# Patient Record
Sex: FEMALE | Race: WHITE | NOT HISPANIC OR LATINO | Employment: OTHER | URBAN - METROPOLITAN AREA
[De-identification: names, ages, dates, MRNs, and addresses within clinical notes are randomized per-mention and may not be internally consistent; named-entity substitution may affect disease eponyms.]

---

## 2017-01-24 ENCOUNTER — OFFICE VISIT (OUTPATIENT)
Dept: MEDICAL GROUP | Facility: MEDICAL CENTER | Age: 62
End: 2017-01-24
Payer: COMMERCIAL

## 2017-01-24 VITALS
DIASTOLIC BLOOD PRESSURE: 82 MMHG | HEIGHT: 62 IN | HEART RATE: 89 BPM | SYSTOLIC BLOOD PRESSURE: 140 MMHG | OXYGEN SATURATION: 96 % | WEIGHT: 243 LBS | TEMPERATURE: 97.2 F | BODY MASS INDEX: 44.72 KG/M2

## 2017-01-24 DIAGNOSIS — M25.511 CHRONIC RIGHT SHOULDER PAIN: ICD-10-CM

## 2017-01-24 DIAGNOSIS — E66.01 MORBID OBESITY WITH BMI OF 40.0-44.9, ADULT (HCC): ICD-10-CM

## 2017-01-24 DIAGNOSIS — G89.29 CHRONIC RIGHT SHOULDER PAIN: ICD-10-CM

## 2017-01-24 DIAGNOSIS — F32.89 OTHER DEPRESSION: ICD-10-CM

## 2017-01-24 PROCEDURE — 99214 OFFICE O/P EST MOD 30 MIN: CPT | Performed by: NURSE PRACTITIONER

## 2017-01-24 RX ORDER — HYDROCODONE BITARTRATE AND ACETAMINOPHEN 5; 325 MG/1; MG/1
1 TABLET ORAL EVERY 12 HOURS PRN
Qty: 60 TAB | Refills: 0 | Status: SHIPPED | OUTPATIENT
Start: 2017-01-24 | End: 2017-02-22 | Stop reason: SDUPTHER

## 2017-01-24 RX ORDER — DULOXETIN HYDROCHLORIDE 60 MG/1
60 CAPSULE, DELAYED RELEASE ORAL DAILY
Qty: 30 CAP | Refills: 1 | Status: SHIPPED | OUTPATIENT
Start: 2017-01-24 | End: 2017-02-22 | Stop reason: SDUPTHER

## 2017-01-24 ASSESSMENT — PATIENT HEALTH QUESTIONNAIRE - PHQ9
5. POOR APPETITE OR OVEREATING: 3 - NEARLY EVERY DAY
SUM OF ALL RESPONSES TO PHQ QUESTIONS 1-9: 20
CLINICAL INTERPRETATION OF PHQ2 SCORE: 6

## 2017-01-24 NOTE — PROGRESS NOTES
Subjective:      Rain Sanabria is a 61 y.o. female who presents with No chief complaint on file.            HPI  Seen in fu/ for chronic knee pain.  She also has LBP and rt shoulder pain.  She went to see ortho.  They did injection in her knees.  That pain is improved.  She is now more mobile.    She is due pain med refills.  She forgot to do her lab.  Will do this week.    She is sleeping better on elavil.  The zoloft is not helping so she inc to 200 mg.  Has never been on cymbalta.  She thinks about suicide occas but she r/t being overwhelmed.  Does have a plan but does feel that she will do it.  She feels that she wouldn't do that to her granddaughter.      Patient Active Problem List    Diagnosis Date Noted   • Morbid obesity with BMI of 40.0-44.9, adult ([facility]) 01/24/2017   • Chronic right shoulder pain 01/06/2016   • Hyperlipidemia    • Anxiety    • Vitamin D deficiency    • Abdominal hernia    • Sleep apnea 07/12/2010   • OA (osteoarthritis) 07/12/2010   • HTN (hypertension) 01/20/2010   • Depression 06/19/2009   • Genital herpes simplex 06/19/2009   • Insomnia 06/19/2009   • Migraine 06/19/2009   • Abnormal mammogram 06/19/2009   • Preventative health care 06/19/2009   • Cystocele 06/19/2009     Current Outpatient Prescriptions   Medication Sig Dispense Refill   • sertraline (ZOLOFT) 100 MG Tab TAKE 1 TAB BY MOUTH EVERY DAY. 30 Tab 3   • hydrocodone-acetaminophen (NORCO) 5-325 MG Tab per tablet Take 1 Tab by mouth every 12 hours as needed. May refill 5/19/16 60 Tab 0   • amitriptyline (ELAVIL) 10 MG Tab Take 1 Tab by mouth at bedtime as needed. 30 Tab 3   • acyclovir (ZOVIRAX) 400 MG tablet Take 1 Tab by mouth 2 times a day. 180 Tab 0   • albuterol (VENTOLIN OR PROVENTIL) 108 (90 BASE) MCG/ACT AERS Inhale 2 Puffs by mouth every four hours as needed for Shortness of Breath. Dispense a spacer 1 Inhaler 0   • Cholecalciferol (VITAMIN D) 1000 UNIT CAPS Take 2 Caps by mouth every day.     • Calcium-Vitamin D  "(CALCIUM + D PO) Take 400 mg by mouth every day.     • Multiple Vitamin (MULTIVITAMIN PO) Take 1 Tab by mouth every day.       No current facility-administered medications for this visit.     Allergies   Allergen Reactions   • Ace Inhibitors      Ace cough   • Tape      Adhesive tape       ROS    Review of Systems   Constitutional: Negative.  Negative for fever, chills, weight loss, malaise/fatigue and diaphoresis.   HENT: Negative.  Negative for hearing loss, ear pain, nosebleeds, congestion, sore throat, neck pain, tinnitus and ear discharge.    Respiratory: Negative.  Negative for cough, hemoptysis, sputum production, shortness of breath, wheezing and stridor.    Cardiovascular: Negative.  Negative for chest pain, palpitations, orthopnea, claudication, leg swelling and PND.   Gastrointestinal: denies nausea, vomiting, diarrhea, constipation, heartburn, melena or hematochezia.  Genitourinary: Denies dysuria, hematuria, urinary incontinence, frequency or urgency.           Objective:     /82 mmHg  Pulse 89  Temp(Src) 36.2 °C (97.2 °F)  Ht 1.575 m (5' 2.01\")  Wt 110.224 kg (243 lb)  BMI 44.43 kg/m2  SpO2 96%  Breastfeeding? No     Physical Exam  Physical Exam   Vitals reviewed.  Constitutional: oriented to person, place, and time. appears well-developed and well-nourished. No distress.   Cardiovascular: Normal rate, regular rhythm, normal heart sounds and intact distal pulses.  Exam reveals no gallop and no friction rub.  No murmur heard.  No carotid bruits.   Pulmonary/Chest: Effort normal and breath sounds normal. No stridor. No respiratory distress. no wheezes or rales. exhibits no tenderness.   Musculoskeletal: Normal range of motion. exhibits no edema. davin pedal pulses 2+.  Neurological: alert and oriented to person, place, and time. exhibits normal muscle tone. Coordination normal.   Skin: Skin is warm and dry. no diaphoresis.   Psychiatric: normal mood and affect. behavior is normal.             "   Assessment/Plan:     1. Chronic right shoulder pain  hydrocodone-acetaminophen (NORCO) 5-325 MG Tab per tablet    refill meds. f/u 1 months for med refill and lab review and sx eval   2. Other depression  Patient has been identified as being depressed and appropriate orders and counseling have been given    duloxetine (CYMBALTA) 60 MG Cap DR Particles delayed-release capsule    REFERRAL TO PSYCHIATRY    severe sx.  she promised that she would not hurt herself.  urgent referral to psych.  dc zoloft.  change to cymbalta 60 mg brittney.  f/u 1 month.     3. Morbid obesity with BMI of 40.0-44.9, adult (CMS-HCC)

## 2017-01-24 NOTE — MR AVS SNAPSHOT
"        Rain Sanabria   2017 11:30 AM   Office Visit   MRN: [id number]    Department:  [facility]   Dept Phone:  [phone]    Description:  Female : 1955   Provider:  SUHAIL Man           Allergies as of 2017     Allergen Noted Reactions    Ace Inhibitors 2010       Ace cough    Tape 2014       Adhesive tape      You were diagnosed with     Chronic right shoulder pain   [492584]   refill meds. f/u 1 months for med refill and lab review and sx eval    Other depression   [7004027]       Morbid obesity with BMI of 40.0-44.9, adult (CMS-HCC)   [884162]         Vital Signs     Blood Pressure Pulse Temperature Height Weight Body Mass Index    140/82 mmHg 89 36.2 °C (97.2 °F) 1.575 m (5' 2.01\") 110.224 kg (243 lb) 44.43 kg/m2    Oxygen Saturation Breastfeeding? Smoking Status             96% No Never Smoker          Basic Information     Date Of Birth Sex Race Ethnicity Preferred Language    1955 Female White Non- English      Problem List              ICD-10-CM Priority Class Noted - Resolved    Depression F32.9   2009 - Present    Genital herpes simplex A60.00   2009 - Present    Insomnia G47.00   2009 - Present    Migraine    2009 - Present    Abnormal mammogram R92.8   2009 - Present    Preventative health care Z00.00   2009 - Present    Cystocele N81.10   2009 - Present    HTN (hypertension) I10   2010 - Present    Sleep apnea G47.30   2010 - Present    OA (osteoarthritis) M19.90   2010 - Present    Vitamin D deficiency E55.9   Unknown - Present    Abdominal hernia K46.9   Unknown - Present    Hyperlipidemia E78.5   Unknown - Present    Anxiety F41.9   Unknown - Present    Chronic right shoulder pain M25.511, G89.29   2016 - Present    Morbid obesity with BMI of 40.0-44.9, adult (HCC) E66.01, Z68.41   2017 - Present      Health Maintenance        Date Due Completion Dates    IMM ZOSTER " VACCINE 2/10/2014 ---    MAMMOGRAM 1/19/2017 1/19/2016, 2/15/2013, 2/13/2012, 2/11/2011, 4/24/2009, 4/24/2009, 5/1/2008, 4/16/2008, 4/13/2007, 10/13/2004    PAP SMEAR 4/12/2019 4/12/2016, 4/12/2016, 4/12/2016 (Done), 6/28/2011    Override on 4/12/2016: Done    COLONOSCOPY 7/6/2022 7/6/2012 (Done)    Override on 7/6/2012: Done    IMM DTaP/Tdap/Td Vaccine (2 - Td) 6/13/2026 6/13/2016, 3/18/2016            Current Immunizations     Influenza TIV (IM) 1/4/2013, 12/22/2011 10:46 AM    Influenza Vaccine Adult HD 10/16/2016    Influenza Vaccine Pediatric 1/18/2011    Influenza Vaccine Quad Inj (Pf) 1/20/2016, 10/3/2014    Tdap Vaccine 6/13/2016 12:10 PM, 3/18/2016 11:42 AM      Below and/or attached are the medications your provider expects you to take. Review all of your home medications and newly ordered medications with your provider and/or pharmacist. Follow medication instructions as directed by your provider and/or pharmacist. Please keep your medication list with you and share with your provider. Update the information when medications are discontinued, doses are changed, or new medications (including over-the-counter products) are added; and carry medication information at all times in the event of emergency situations     Allergies:  ACE INHIBITORS - (reactions not documented)     TAPE - (reactions not documented)               Medications  Valid as of: January 24, 2017 - 11:50 AM    Generic Name Brand Name Tablet Size Instructions for use    Acyclovir (Tab) ZOVIRAX 400 MG Take 1 Tab by mouth 2 times a day.        Albuterol Sulfate (Aero Soln) albuterol 108 (90 BASE) MCG/ACT Inhale 2 Puffs by mouth every four hours as needed for Shortness of Breath. Dispense a spacer        Amitriptyline HCl (Tab) ELAVIL 10 MG Take 1 Tab by mouth at bedtime as needed.        Calcium Citrate-Vitamin D   Take 400 mg by mouth every day.        Cholecalciferol (Cap) Vitamin D 1000 UNIT Take 2 Caps by mouth every day.        DULoxetine  HCl (Cap DR Particles) CYMBALTA 60 MG Take 1 Cap by mouth every day.        Hydrocodone-Acetaminophen (Tab) NORCO 5-325 MG Take 1 Tab by mouth every 12 hours as needed. May refill 5/19/16        Multiple Vitamins-Minerals   Take 1 Tab by mouth every day.        Sertraline HCl (Tab) ZOLOFT 100 MG TAKE 1 TAB BY MOUTH EVERY DAY.        .                 Medicines prescribed today were sent to:     [facility]/PHARMACY #[id number] - [place], [place] - [address]    [address] [place] [address]    Phone: [phone] Fax: [phone]    Open 24 Hours?: No      Medication refill instructions:       If your prescription bottle indicates you have medication refills left, it is not necessary to call your provider’s office. Please contact your pharmacy and they will refill your medication.    If your prescription bottle indicates you do not have any refills left, you may request refills at any time through one of the following ways: The online [employer] system (except Urgent Care), by calling your provider’s office, or by asking your pharmacy to contact your provider’s office with a refill request. Medication refills are processed only during regular business hours and may not be available until the next business day. Your provider may request additional information or to have a follow-up visit with you prior to refilling your medication.   *Please Note: Medication refills are assigned a new Rx number when refilled electronically. Your pharmacy may indicate that no refills were authorized even though a new prescription for the same medication is available at the pharmacy. Please request the medicine by name with the pharmacy before contacting your provider for a refill.        Referral     A referral request has been sent to our patient care coordination department. Please allow 3-5 business days for us to process this request and contact you either by phone or mail. If you do not hear from us by the 5th business  day, please call us at [phone].           [employer] Access Code: Activation code not generated  Current [employer] Status: Active

## 2017-01-27 ENCOUNTER — HOSPITAL ENCOUNTER (OUTPATIENT)
Dept: LAB | Facility: MEDICAL CENTER | Age: 62
End: 2017-01-27
Attending: NURSE PRACTITIONER
Payer: COMMERCIAL

## 2017-01-27 LAB
25(OH)D3 SERPL-MCNC: 33 NG/ML (ref 30–100)
ALBUMIN SERPL BCP-MCNC: 3.6 G/DL (ref 3.2–4.9)
ALBUMIN/GLOB SERPL: 1.2 G/DL
ALP SERPL-CCNC: 57 U/L (ref 30–99)
ALT SERPL-CCNC: 17 U/L (ref 2–50)
ANION GAP SERPL CALC-SCNC: 6 MMOL/L (ref 0–11.9)
AST SERPL-CCNC: 20 U/L (ref 12–45)
BILIRUB SERPL-MCNC: 0.7 MG/DL (ref 0.1–1.5)
BUN SERPL-MCNC: 9 MG/DL (ref 8–22)
CALCIUM SERPL-MCNC: 9.5 MG/DL (ref 8.4–10.2)
CHLORIDE SERPL-SCNC: 106 MMOL/L (ref 96–112)
CHOLEST SERPL-MCNC: 199 MG/DL (ref 100–199)
CO2 SERPL-SCNC: 30 MMOL/L (ref 20–33)
CREAT SERPL-MCNC: 0.69 MG/DL (ref 0.5–1.4)
CREAT UR-MCNC: 67.5 MG/DL
GFR SERPL CREATININE-BSD FRML MDRD: >60 ML/MIN/1.73 M 2
GLOBULIN SER CALC-MCNC: 3.1 G/DL (ref 1.9–3.5)
GLUCOSE SERPL-MCNC: 103 MG/DL (ref 65–99)
HDLC SERPL-MCNC: 72 MG/DL
LDLC SERPL CALC-MCNC: 112 MG/DL
MICROALBUMIN UR-MCNC: <0.7 MG/DL
MICROALBUMIN/CREAT UR: NORMAL MG/G (ref 0–30)
POTASSIUM SERPL-SCNC: 4.7 MMOL/L (ref 3.6–5.5)
PROT SERPL-MCNC: 6.7 G/DL (ref 6–8.2)
SODIUM SERPL-SCNC: 142 MMOL/L (ref 135–145)
TRIGL SERPL-MCNC: 75 MG/DL (ref 0–149)

## 2017-01-27 PROCEDURE — 80061 LIPID PANEL: CPT

## 2017-01-27 PROCEDURE — 82570 ASSAY OF URINE CREATININE: CPT

## 2017-01-27 PROCEDURE — 82043 UR ALBUMIN QUANTITATIVE: CPT

## 2017-01-27 PROCEDURE — 82306 VITAMIN D 25 HYDROXY: CPT

## 2017-01-27 PROCEDURE — 80053 COMPREHEN METABOLIC PANEL: CPT

## 2017-01-27 PROCEDURE — 36415 COLL VENOUS BLD VENIPUNCTURE: CPT

## 2017-02-09 DIAGNOSIS — R05.9 COUGH: ICD-10-CM

## 2017-02-09 RX ORDER — ALBUTEROL SULFATE 90 UG/1
2 AEROSOL, METERED RESPIRATORY (INHALATION) EVERY 4 HOURS PRN
Qty: 1 INHALER | Refills: 0 | Status: SHIPPED | OUTPATIENT
Start: 2017-02-09 | End: 2017-03-13 | Stop reason: SDUPTHER

## 2017-02-09 NOTE — TELEPHONE ENCOUNTER
Was the patient seen in the last year in this department? Yes 1-    Does patient have an active prescription for medications requested? No     Received Request Via: Pharmacy

## 2017-02-22 ENCOUNTER — OFFICE VISIT (OUTPATIENT)
Dept: MEDICAL GROUP | Facility: MEDICAL CENTER | Age: 62
End: 2017-02-22
Payer: COMMERCIAL

## 2017-02-22 VITALS
TEMPERATURE: 98.1 F | DIASTOLIC BLOOD PRESSURE: 90 MMHG | SYSTOLIC BLOOD PRESSURE: 140 MMHG | HEART RATE: 84 BPM | OXYGEN SATURATION: 94 % | HEIGHT: 62 IN | BODY MASS INDEX: 45.27 KG/M2 | WEIGHT: 246 LBS

## 2017-02-22 DIAGNOSIS — E66.01 MORBID OBESITY WITH BMI OF 45.0-49.9, ADULT (HCC): ICD-10-CM

## 2017-02-22 DIAGNOSIS — G89.29 CHRONIC RIGHT SHOULDER PAIN: ICD-10-CM

## 2017-02-22 DIAGNOSIS — E78.5 HYPERLIPIDEMIA LDL GOAL <100: ICD-10-CM

## 2017-02-22 DIAGNOSIS — F32.89 OTHER DEPRESSION: ICD-10-CM

## 2017-02-22 DIAGNOSIS — G89.29 CHRONIC LEFT SHOULDER PAIN: ICD-10-CM

## 2017-02-22 DIAGNOSIS — M25.511 CHRONIC RIGHT SHOULDER PAIN: ICD-10-CM

## 2017-02-22 DIAGNOSIS — M25.512 CHRONIC LEFT SHOULDER PAIN: ICD-10-CM

## 2017-02-22 DIAGNOSIS — R73.01 IMPAIRED FASTING GLUCOSE: ICD-10-CM

## 2017-02-22 PROCEDURE — 99213 OFFICE O/P EST LOW 20 MIN: CPT | Performed by: NURSE PRACTITIONER

## 2017-02-22 RX ORDER — HYDROCODONE BITARTRATE AND ACETAMINOPHEN 5; 325 MG/1; MG/1
1 TABLET ORAL EVERY 12 HOURS PRN
Qty: 60 TAB | Refills: 0 | Status: SHIPPED | OUTPATIENT
Start: 2017-02-22 | End: 2017-02-22 | Stop reason: SDUPTHER

## 2017-02-22 RX ORDER — DULOXETIN HYDROCHLORIDE 60 MG/1
60 CAPSULE, DELAYED RELEASE ORAL DAILY
Qty: 90 CAP | Refills: 1 | Status: SHIPPED | OUTPATIENT
Start: 2017-02-22 | End: 2017-05-22

## 2017-02-22 RX ORDER — HYDROCODONE BITARTRATE AND ACETAMINOPHEN 5; 325 MG/1; MG/1
1 TABLET ORAL EVERY 12 HOURS PRN
Qty: 60 TAB | Refills: 0 | Status: SHIPPED | OUTPATIENT
Start: 2017-02-22 | End: 2017-05-22 | Stop reason: SDUPTHER

## 2017-02-22 NOTE — PROGRESS NOTES
Subjective:      Rain Sanabria is a 62 y.o. female who presents with No chief complaint on file.            HPI  Seen in f/u for depression.  She was taken off zoloft last month and start on cymbalta.  Her sx are improved.  She is set to see psych on friday.  No suicidal ideation.  She is still having her lt shoulder pain.  She uses rice heat on it.  Also on norco. Needs refill on meds  Reviewed lab with pt. H er CMP is wnl except glcuose mildly elevated at 103.    LP shows good trg and HDL. LDL sl elevated at 112.  Goal <100.  Not on a low carb diet.  GFR, alb/cr ratio, D is wnl.    Patient Active Problem List    Diagnosis Date Noted   • Morbid obesity with BMI of 45.0-49.9, adult ([facility]) 02/22/2017   • Morbid obesity with BMI of 40.0-44.9, adult ([facility]) 01/24/2017   • Chronic right shoulder pain 01/06/2016   • Hyperlipidemia    • Anxiety    • Vitamin D deficiency    • Abdominal hernia    • Sleep apnea 07/12/2010   • OA (osteoarthritis) 07/12/2010   • HTN (hypertension) 01/20/2010   • Depression 06/19/2009   • Genital herpes simplex 06/19/2009   • Insomnia 06/19/2009   • Migraine 06/19/2009   • Abnormal mammogram 06/19/2009   • Preventative health care 06/19/2009   • Cystocele 06/19/2009     Current Outpatient Prescriptions   Medication Sig Dispense Refill   • amitriptyline (ELAVIL) 10 MG Tab Take 1 Tab by mouth at bedtime as needed. 30 Tab 2   • albuterol 108 (90 BASE) MCG/ACT Aero Soln inhalation aerosol Inhale 2 Puffs by mouth every four hours as needed for Shortness of Breath. Dispense a spacer 1 Inhaler 0   • duloxetine (CYMBALTA) 60 MG Cap DR Particles delayed-release capsule Take 1 Cap by mouth every day. 30 Cap 1   • hydrocodone-acetaminophen (NORCO) 5-325 MG Tab per tablet Take 1 Tab by mouth every 12 hours as needed. May refill 5/19/16 60 Tab 0   • acyclovir (ZOVIRAX) 400 MG tablet Take 1 Tab by mouth 2 times a day. 180 Tab 0   • Cholecalciferol (VITAMIN D) 1000 UNIT CAPS Take 2 Caps by mouth every day.   "   • Calcium-Vitamin D (CALCIUM + D PO) Take 400 mg by mouth every day.     • Multiple Vitamin (MULTIVITAMIN PO) Take 1 Tab by mouth every day.       No current facility-administered medications for this visit.     Allergies   Allergen Reactions   • Ace Inhibitors      Ace cough   • Tape      Adhesive tape         ROS    Review of Systems   Constitutional: Negative.  Negative for fever, chills, weight loss, malaise/fatigue and diaphoresis.   HENT: Negative.  Negative for hearing loss, ear pain, nosebleeds, congestion, sore throat, neck pain, tinnitus and ear discharge.    Respiratory: Negative.  Negative for cough, hemoptysis, sputum production, shortness of breath, wheezing and stridor.    Cardiovascular: Negative.  Negative for chest pain, palpitations, orthopnea, claudication, leg swelling and PND.   Gastrointestinal: denies nausea, vomiting, diarrhea, constipation, heartburn, melena or hematochezia.  Genitourinary: Denies dysuria, hematuria, urinary incontinence, frequency or urgency.             Objective:     /90 mmHg  Pulse 84  Temp(Src) 36.7 °C (98.1 °F)  Ht 1.575 m (5' 2\")  Wt 111.585 kg (246 lb)  BMI 44.98 kg/m2  SpO2 94%  Breastfeeding? No     Physical Exam  Physical Exam   Vitals reviewed.  Constitutional: oriented to person, place, and time. appears well-developed and well-nourished. No distress.   Cardiovascular: Normal rate, regular rhythm, normal heart sounds and intact distal pulses.  Exam reveals no gallop and no friction rub.  No murmur heard.  No carotid bruits.   Pulmonary/Chest: Effort normal and breath sounds normal. No stridor. No respiratory distress. no wheezes or rales. exhibits no tenderness.   Musculoskeletal: Normal range of motion. exhibits no edema. davin pedal pulses 2+.  Neurological: alert and oriented to person, place, and time. exhibits normal muscle tone. Coordination normal.   Skin: Skin is warm and dry. no diaphoresis.   Psychiatric: normal mood and affect. behavior " is normal.             Assessment/Plan:     1. Other depression  duloxetine (CYMBALTA) 60 MG Cap DR Particles delayed-release capsule    improved on cymbalta.  f/u with psych this week   2. Chronic left shoulder pain      refill norco.  f/u 3m onths for med refill   3. Impaired fasting glucose      glucose only sl elevated.  dec complex carbs in diet.    4. Hyperlipidemia LDL goal <100      improve exercise and low fat/chol diet.     5. Morbid obesity with BMI of 45.0-49.9, adult ([facility])  Patient identified as having weight management issue.  Appropriate orders and counseling given.   6. Other depression  duloxetine (CYMBALTA) 60 MG Cap DR Particles delayed-release capsule    severe sx.  she promised that she would not hurt herself.  urgent referral to psych.  dc zoloft.  change to cymbalta 60 mg brittney.  f/u 1 month.     7. Chronic right shoulder pain  hydrocodone-acetaminophen (NORCO) 5-325 MG Tab per tablet    DISCONTINUED: hydrocodone-acetaminophen (NORCO) 5-325 MG Tab per tablet    DISCONTINUED: hydrocodone-acetaminophen (NORCO) 5-325 MG Tab per tablet    refill meds. f/u 1 months for med refill and lab review and sx eval

## 2017-02-22 NOTE — MR AVS SNAPSHOT
"        Rain Sanabria   2017 11:30 AM   Office Visit   MRN: [id number]    Department:  [facility]   Dept Phone:  [phone]    Description:  Female : 1955   Provider:  SUHAIL Man           Allergies as of 2017     Allergen Noted Reactions    Ace Inhibitors 2010       Ace cough    Tape 2014       Adhesive tape      You were diagnosed with     Other depression   [9495228]   improved on cymbalta.  f/u with psych this week    Chronic left shoulder pain   [024549]   refill norco.  f/u [address] [place] [address] for med refill    Impaired fasting glucose   [790.21.ICD-9-CM]   glucose only sl elevated.  dec complex carbs in diet.     Hyperlipidemia LDL goal <100   [888850]   improve exercise and low fat/chol diet.      Morbid obesity with BMI of 45.0-49.9, adult (CMS-HCC)   [467075]       Other depression   [3371863]   severe sx.  she promised that she would not hurt herself.  urgent referral to psych.  dc zoloft.  change to cymbalta 60 mg brittney.  f/u 1 month.      Chronic right shoulder pain   [396126]   refill meds. f/u 1 months for med refill and lab review and sx eval      Vital Signs     Blood Pressure Pulse Temperature Height Weight Body Mass Index    140/90 mmHg 84 36.7 °C (98.1 °F) 1.575 m (5' 2\") 111.585 kg (246 lb) 44.98 kg/m2    Oxygen Saturation Breastfeeding? Smoking Status             94% No Never Smoker          Basic Information     Date Of Birth Sex Race Ethnicity Preferred Language    1955 Female White Non- English      Problem List              ICD-10-CM Priority Class Noted - Resolved    Depression F32.9   2009 - Present    Genital herpes simplex A60.00   2009 - Present    Insomnia G47.00   2009 - Present    Migraine    2009 - Present    Abnormal mammogram R92.8   2009 - Present    Preventative health care Z00.00   2009 - Present    Cystocele N81.10   2009 - Present    HTN (hypertension) I10   2010 - Present   " Sleep apnea G47.30   7/12/2010 - Present    OA (osteoarthritis) M19.90   7/12/2010 - Present    Vitamin D deficiency E55.9   Unknown - Present    Abdominal hernia K46.9   Unknown - Present    Hyperlipidemia E78.5   Unknown - Present    Anxiety F41.9   Unknown - Present    Chronic right shoulder pain M25.511, G89.29   1/6/2016 - Present    Morbid obesity with BMI of 40.0-44.9, adult (HCC) E66.01, Z68.41   1/24/2017 - Present    Morbid obesity with BMI of 45.0-49.9, adult (HCC) E66.01, Z68.42   2/22/2017 - Present      Health Maintenance        Date Due Completion Dates    IMM ZOSTER VACCINE 2/10/2014 ---    MAMMOGRAM 1/19/2017 1/19/2016, 2/15/2013, 2/13/2012, 2/11/2011, 4/24/2009, 4/24/2009, 5/1/2008, 4/16/2008, 4/13/2007, 10/13/2004    PAP SMEAR 4/12/2019 4/12/2016, 4/12/2016, 4/12/2016 (Done), 6/28/2011    Override on 4/12/2016: Done    COLONOSCOPY 7/6/2022 7/6/2012 (Done)    Override on 7/6/2012: Done    IMM DTaP/Tdap/Td Vaccine (2 - Td) 6/13/2026 6/13/2016, 3/18/2016            Current Immunizations     Influenza TIV (IM) 1/4/2013, 12/22/2011 10:46 AM    Influenza Vaccine Adult HD 10/16/2016    Influenza Vaccine Pediatric 1/18/2011    Influenza Vaccine Quad Inj (Pf) 1/20/2016, 10/3/2014    Tdap Vaccine 6/13/2016 12:10 PM, 3/18/2016 11:42 AM      Below and/or attached are the medications your provider expects you to take. Review all of your home medications and newly ordered medications with your provider and/or pharmacist. Follow medication instructions as directed by your provider and/or pharmacist. Please keep your medication list with you and share with your provider. Update the information when medications are discontinued, doses are changed, or new medications (including over-the-counter products) are added; and carry medication information at all times in the event of emergency situations     Allergies:  ACE INHIBITORS - (reactions not documented)     TAPE - (reactions not documented)                  Medications  Valid as of: February 22, 2017 -  2:31 PM    Generic Name Brand Name Tablet Size Instructions for use    Acyclovir (Tab) ZOVIRAX 400 MG Take 1 Tab by mouth 2 times a day.        Albuterol Sulfate (Aero Soln) albuterol 108 (90 BASE) MCG/ACT Inhale 2 Puffs by mouth every four hours as needed for Shortness of Breath. Dispense a spacer        Amitriptyline HCl (Tab) ELAVIL 10 MG Take 1 Tab by mouth at bedtime as needed.        Calcium Citrate-Vitamin D   Take 400 mg by mouth every day.        Cholecalciferol (Cap) Vitamin D 1000 UNIT Take 2 Caps by mouth every day.        DULoxetine HCl (Cap DR Particles) CYMBALTA 60 MG Take 1 Cap by mouth every day.        Hydrocodone-Acetaminophen (Tab) NORCO 5-325 MG Take 1 Tab by mouth every 12 hours as needed. May refill 5/19/16        Multiple Vitamins-Minerals   Take 1 Tab by mouth every day.        .                 Medicines prescribed today were sent to:     [facility]/PHARMACY #[id number] - [place], [place] - [address] [place] [address] [address] [address]    Phone: [phone] Fax: [phone]    Open 24 Hours?: No      Medication refill instructions:       If your prescription bottle indicates you have medication refills left, it is not necessary to call your provider’s office. Please contact your pharmacy and they will refill your medication.    If your prescription bottle indicates you do not have any refills left, you may request refills at any time through one of the following ways: The online [employer] system (except Urgent Care), by calling your provider’s office, or by asking your pharmacy to contact your provider’s office with a refill request. Medication refills are processed only during regular business hours and may not be available until the next business day. Your provider may request additional information or to have a follow-up visit with you prior to refilling your medication.   *Please Note: Medication refills are assigned a new Rx  number when refilled electronically. Your pharmacy may indicate that no refills were authorized even though a new prescription for the same medication is available at the pharmacy. Please request the medicine by name with the pharmacy before contacting your provider for a refill.           MyChart Access Code: Activation code not generated  Current [employer] Status: Active

## 2017-03-10 ENCOUNTER — OFFICE VISIT (OUTPATIENT)
Dept: MEDICAL GROUP | Facility: MEDICAL CENTER | Age: 62
End: 2017-03-10
Payer: COMMERCIAL

## 2017-03-10 VITALS
HEIGHT: 62 IN | DIASTOLIC BLOOD PRESSURE: 76 MMHG | BODY MASS INDEX: 45.32 KG/M2 | SYSTOLIC BLOOD PRESSURE: 132 MMHG | OXYGEN SATURATION: 93 % | WEIGHT: 246.25 LBS | HEART RATE: 78 BPM | TEMPERATURE: 98.4 F | RESPIRATION RATE: 16 BRPM

## 2017-03-10 DIAGNOSIS — L72.3 SEBACEOUS CYST: ICD-10-CM

## 2017-03-10 PROCEDURE — 99214 OFFICE O/P EST MOD 30 MIN: CPT | Performed by: PHYSICIAN ASSISTANT

## 2017-03-10 RX ORDER — SERTRALINE HYDROCHLORIDE 100 MG/1
TABLET, FILM COATED ORAL
Refills: 3 | COMMUNITY
Start: 2017-02-11 | End: 2017-05-22

## 2017-03-10 RX ORDER — AMITRIPTYLINE HYDROCHLORIDE 25 MG/1
25 TABLET, FILM COATED ORAL
Refills: 1 | COMMUNITY
Start: 2017-02-24 | End: 2017-06-22 | Stop reason: SDUPTHER

## 2017-03-10 ASSESSMENT — PAIN SCALES - GENERAL: PAINLEVEL: NO PAIN

## 2017-03-10 NOTE — MR AVS SNAPSHOT
"        Rain Sanabria   3/10/2017 8:40 AM   Office Visit   MRN: [id number]    Department:  [place] [address]   Dept Phone:  [phone]    Description:  Female : 1955   Provider:  Luiza Olmedo PA-C           Reason for Visit     Cyst bilat cyst on head      Allergies as of 3/10/2017     Allergen Noted Reactions    Ace Inhibitors 2010       Ace cough    Tape 2014       Adhesive tape      Vital Signs     Blood Pressure Pulse Temperature Respirations Height Weight    132/76 mmHg 78 36.9 °C (98.4 °F) 16 1.575 m (5' 2\") 111.7 kg (246 lb 4.1 oz)    Body Mass Index Oxygen Saturation Breastfeeding? Smoking Status          45.03 kg/m2 93% No Never Smoker         Basic Information     Date Of Birth Sex Race Ethnicity Preferred Language    1955 Female White Non- English      Your appointments     Mar 24, 2017  4:20 PM   Established Patient with Luiza Olmedo PA-C   [facility])    [address]  [address]  [place] [id number] [phone]           You will be receiving a confirmation call a few days before your appointment from our automated call confirmation system.              Problem List              ICD-10-CM Priority Class Noted - Resolved    Depression F32.9   2009 - Present    Genital herpes simplex A60.00   2009 - Present    Insomnia G47.00   2009 - Present    Migraine    2009 - Present    Abnormal mammogram R92.8   2009 - Present    Preventative health care Z00.00   2009 - Present    Cystocele N81.10   2009 - Present    HTN (hypertension) I10   2010 - Present    Sleep apnea G47.30   2010 - Present    OA (osteoarthritis) M19.90   2010 - Present    Vitamin D deficiency E55.9   Unknown - Present    Abdominal hernia K46.9   Unknown - Present    Hyperlipidemia E78.5   Unknown - Present    Anxiety F41.9   Unknown - Present    Chronic right shoulder pain M25.511, G89.29   " 1/6/2016 - Present    Morbid obesity with BMI of 40.0-44.9, adult ([facility]) E66.01, Z68.41   1/24/2017 - Present    Morbid obesity with BMI of 45.0-49.9, adult ([facility]) E66.01, Z68.42   2/22/2017 - Present      Health Maintenance        Date Due Completion Dates    IMM ZOSTER VACCINE 2/10/2014 ---    MAMMOGRAM 1/19/2017 1/19/2016, 2/15/2013, 2/13/2012, 2/11/2011, 4/24/2009, 4/24/2009, 5/1/2008, 4/16/2008, 4/13/2007, 10/13/2004    PAP SMEAR 4/12/2019 4/12/2016, 4/12/2016, 4/12/2016 (Done), 6/28/2011    Override on 4/12/2016: Done    COLONOSCOPY 7/6/2022 7/6/2012 (Done)    Override on 7/6/2012: Done    IMM DTaP/Tdap/Td Vaccine (2 - Td) 6/13/2026 6/13/2016, 3/18/2016            Current Immunizations     Influenza TIV (IM) 1/4/2013, 12/22/2011 10:46 AM    Influenza Vaccine Adult HD 10/16/2016    Influenza Vaccine Pediatric 1/18/2011    Influenza Vaccine Quad Inj (Pf) 1/20/2016, 10/3/2014    Tdap Vaccine 6/13/2016 12:10 PM, 3/18/2016 11:42 AM      Below and/or attached are the medications your provider expects you to take. Review all of your home medications and newly ordered medications with your provider and/or pharmacist. Follow medication instructions as directed by your provider and/or pharmacist. Please keep your medication list with you and share with your provider. Update the information when medications are discontinued, doses are changed, or new medications (including over-the-counter products) are added; and carry medication information at all times in the event of emergency situations     Allergies:  ACE INHIBITORS - (reactions not documented)     TAPE - (reactions not documented)               Medications  Valid as of: March 10, 2017 -  9:20 AM    Generic Name Brand Name Tablet Size Instructions for use    Acyclovir (Tab) ZOVIRAX 400 MG Take 1 Tab by mouth 2 times a day.        Albuterol Sulfate (Aero Soln) albuterol 108 (90 BASE) MCG/ACT Inhale 2 Puffs by mouth every four hours as needed for Shortness of Breath.  Dispense a spacer        Amitriptyline HCl (Tab) ELAVIL 10 MG Take 1 Tab by mouth at bedtime as needed.        Amitriptyline HCl (Tab) ELAVIL 25 MG Take 25 mg by mouth.        Calcium Citrate-Vitamin D   Take 400 mg by mouth every day.        Cholecalciferol (Cap) Vitamin D 1000 UNIT Take 2 Caps by mouth every day.        DULoxetine HCl (Cap DR Particles) CYMBALTA 60 MG Take 1 Cap by mouth every day.        Hydrocodone-Acetaminophen (Tab) NORCO 5-325 MG Take 1 Tab by mouth every 12 hours as needed. May refill 5/19/16        Multiple Vitamins-Minerals   Take 1 Tab by mouth every day.        Sertraline HCl (Tab) ZOLOFT 100 MG TAKE 1 TABLET BY MOUTH DAILY        .                 Medicines prescribed today were sent to:     [facility]/PHARMACY #[id number] - [place], [place] - [address]    [address] [place] [address]    Phone: [phone] Fax: [phone]    Open 24 Hours?: No      Medication refill instructions:       If your prescription bottle indicates you have medication refills left, it is not necessary to call your provider’s office. Please contact your pharmacy and they will refill your medication.    If your prescription bottle indicates you do not have any refills left, you may request refills at any time through one of the following ways: The online [employer] system (except Urgent Care), by calling your provider’s office, or by asking your pharmacy to contact your provider’s office with a refill request. Medication refills are processed only during regular business hours and may not be available until the next business day. Your provider may request additional information or to have a follow-up visit with you prior to refilling your medication.   *Please Note: Medication refills are assigned a new Rx number when refilled electronically. Your pharmacy may indicate that no refills were authorized even though a new prescription for the same medication is available at the pharmacy. Please request  the medicine by name with the pharmacy before contacting your provider for a refill.           MyChart Access Code: Activation code not generated  Current MyChart Status: Active

## 2017-03-10 NOTE — PROGRESS NOTES
"Subjective:     Chief Complaint   Patient presents with   • Cyst     bilat cyst on head     Rain Sanabria is a 62 y.o. female here today for cysts on scalp as listed below    Has hx of getting cysts.   Now has two \"cysts\" on scalp.   They don't bother her but she was going to shave her head to support her friend with cancer and wants to get them removed first.   Denies growing, changing, itching, drainage, fever, chills.     Current medicines (including changes today)  Current Outpatient Prescriptions   Medication Sig Dispense Refill   • duloxetine (CYMBALTA) 60 MG Cap DR Particles delayed-release capsule Take 1 Cap by mouth every day. 90 Cap 1   • hydrocodone-acetaminophen (NORCO) 5-325 MG Tab per tablet Take 1 Tab by mouth every 12 hours as needed. May refill 5/19/16 60 Tab 0   • amitriptyline (ELAVIL) 10 MG Tab Take 1 Tab by mouth at bedtime as needed. 30 Tab 2   • albuterol 108 (90 BASE) MCG/ACT Aero Soln inhalation aerosol Inhale 2 Puffs by mouth every four hours as needed for Shortness of Breath. Dispense a spacer 1 Inhaler 0   • Cholecalciferol (VITAMIN D) 1000 UNIT CAPS Take 2 Caps by mouth every day.     • Calcium-Vitamin D (CALCIUM + D PO) Take 400 mg by mouth every day.     • Multiple Vitamin (MULTIVITAMIN PO) Take 1 Tab by mouth every day.     • sertraline (ZOLOFT) 100 MG Tab TAKE 1 TABLET BY MOUTH DAILY  3   • amitriptyline (ELAVIL) 25 MG Tab Take 25 mg by mouth.  1   • acyclovir (ZOVIRAX) 400 MG tablet Take 1 Tab by mouth 2 times a day. 180 Tab 0     No current facility-administered medications for this visit.     She  has a past medical history of Depression (6/19/2009); Genital herpes (6/19/2009); Insomnia (6/19/2009); Migraine (6/19/2009); Abnormal mammogram (6/19/2009); Cystocele (6/19/2009); Hypertension; Hyperlipidemia; Vitamin D deficiency; Sleep apnea; Abdominal hernia; Anxiety; OA (osteoarthritis); and Chronic right shoulder pain.    ROS   See history of present illness above       " "Objective:     Blood pressure 132/76, pulse 78, temperature 36.9 °C (98.4 °F), resp. rate 16, height 1.575 m (5' 2\"), weight 111.7 kg (246 lb 4.1 oz), SpO2 93 %, not currently breastfeeding. Body mass index is 45.03 kg/(m^2).   Physical Exam:  Alert, oriented in no acute distress.  Eye contact is good, speech goal directed, affect calm  HEENT: conjunctiva non-injected, sclera non-icteric, PERRL.  Skin: left parietal scalp, 2.5cm x 2.5cm smooth mobile, skin colored subcutaneous skin colored nodule.  Also right parietal scalp with 1cm x 1cm smooth mobile, skin colored subcutaneous skin colored nodule.  Ext: no edema, color normal, peripheral pulses 2+, temperature normal    Assessment and Plan:   The following treatment plan was discussed     1. Sebaceous cyst  New dx. x2 on scalp. Most likely cysts.  Discussed doing nothing or the removal process and that they can still grow back especially if she typically grows a lot of cysts.   Pt would like these removed. Stated she has others and may get more done as well but we will only do 2 on scalp at 1 time.       Followup: Return in about 2 weeks (around 3/24/2017) for cyst removal .           Please note that this dictation was created using voice recognition software. I have made every reasonable attempt to correct obvious errors, but I expect that there are errors of grammar and possibly content that I did not discover before finalizing the note.  "

## 2017-03-14 RX ORDER — ALBUTEROL SULFATE 90 UG/1
AEROSOL, METERED RESPIRATORY (INHALATION)
Qty: 18 INHALER | Refills: 0 | Status: SHIPPED | OUTPATIENT
Start: 2017-03-14 | End: 2017-03-16 | Stop reason: SDUPTHER

## 2017-03-16 ENCOUNTER — OFFICE VISIT (OUTPATIENT)
Dept: MEDICAL GROUP | Facility: MEDICAL CENTER | Age: 62
End: 2017-03-16
Payer: COMMERCIAL

## 2017-03-16 VITALS
TEMPERATURE: 98.1 F | HEART RATE: 92 BPM | DIASTOLIC BLOOD PRESSURE: 82 MMHG | SYSTOLIC BLOOD PRESSURE: 130 MMHG | OXYGEN SATURATION: 93 % | RESPIRATION RATE: 16 BRPM | HEIGHT: 62 IN | WEIGHT: 247 LBS | BODY MASS INDEX: 45.45 KG/M2

## 2017-03-16 DIAGNOSIS — R06.02 SHORTNESS OF BREATH: ICD-10-CM

## 2017-03-16 PROCEDURE — 99214 OFFICE O/P EST MOD 30 MIN: CPT | Performed by: FAMILY MEDICINE

## 2017-03-16 RX ORDER — ALBUTEROL SULFATE 90 UG/1
AEROSOL, METERED RESPIRATORY (INHALATION)
Qty: 18 INHALER | Refills: 5 | Status: SHIPPED | OUTPATIENT
Start: 2017-03-16 | End: 2019-06-25 | Stop reason: SDUPTHER

## 2017-03-16 NOTE — ASSESSMENT & PLAN NOTE
Patient occasionally get shortness of breath after doing rapid talking for her job. She has fast and prolonged talking. She will use of Ventolin about 2-3 times a week after one of these rapid talking episodes.  She recently lost her Ventolin inhaler and requests a refill.  There is no history of smoking or asthma.  Patient does have central obesity and does not exercise.

## 2017-03-16 NOTE — MR AVS SNAPSHOT
"        Rain Sanabria   3/16/2017 1:40 PM   Office Visit   MRN: [id number]    Department:  [facility]   Dept Phone:  [phone]    Description:  Female : 1955   Provider:  Haider Mathur M.D.           Reason for Visit     Medication Management           Allergies as of 3/16/2017     Allergen Noted Reactions    Ace Inhibitors 2010       Ace cough    Tape 2014       Adhesive tape      You were diagnosed with     Shortness of breath   [786.05.ICD-9-CM]         Vital Signs     Blood Pressure Pulse Temperature Respirations Height Weight    130/82 mmHg 92 36.7 °C (98.1 °F) 16 1.575 m (5' 2.01\") 112.038 kg (247 lb)    Body Mass Index Oxygen Saturation Breastfeeding? Smoking Status          45.17 kg/m2 93% No Never Smoker         Basic Information     Date Of Birth Sex Race Ethnicity Preferred Language    1955 Female White Non- English      Your appointments     Mar 24, 2017  4:20 PM   Established Patient with Luiza Olmedo PA-C   [facility]    [address]  [address]  [place] [id number] [phone]           You will be receiving a confirmation call a few days before your appointment from our automated call confirmation system.            May 22, 2017 11:45 AM   Established Patient with SUHAIL Man   [facility])    [address]  [place] [id number] [phone]           You will be receiving a confirmation call a few days before your appointment from our automated call confirmation system.              Problem List              ICD-10-CM Priority Class Noted - Resolved    Depression F32.9   2009 - Present    Genital herpes simplex A60.00   2009 - Present    Insomnia G47.00   2009 - Present    Migraine    2009 - Present    Abnormal mammogram R92.8   2009 - Present    Preventative health care Z00.00   2009 - Present   " Cystocele N81.10   6/19/2009 - Present    HTN (hypertension) I10   1/20/2010 - Present    Sleep apnea G47.30   7/12/2010 - Present    OA (osteoarthritis) M19.90   7/12/2010 - Present    Vitamin D deficiency E55.9   Unknown - Present    Abdominal hernia K46.9   Unknown - Present    Hyperlipidemia E78.5   Unknown - Present    Anxiety F41.9   Unknown - Present    Chronic right shoulder pain M25.511, G89.29   1/6/2016 - Present    Morbid obesity with BMI of 40.0-44.9, adult ([facility]) E66.01, Z68.41   1/24/2017 - Present    Morbid obesity with BMI of 45.0-49.9, adult ([facility]) E66.01, Z68.42   2/22/2017 - Present    Shortness of breath R06.02   3/16/2017 - Present      Health Maintenance        Date Due Completion Dates    IMM ZOSTER VACCINE 2/10/2014 ---    MAMMOGRAM 1/19/2017 1/19/2016, 2/15/2013, 2/13/2012, 2/11/2011, 4/24/2009, 4/24/2009, 5/1/2008, 4/16/2008, 4/13/2007, 10/13/2004    PAP SMEAR 4/12/2019 4/12/2016, 4/12/2016, 4/12/2016 (Done), 6/28/2011    Override on 4/12/2016: Done    COLONOSCOPY 7/6/2022 7/6/2012 (Done)    Override on 7/6/2012: Done    IMM DTaP/Tdap/Td Vaccine (2 - Td) 6/13/2026 6/13/2016, 3/18/2016            Current Immunizations     Influenza TIV (IM) 1/4/2013, 12/22/2011 10:46 AM    Influenza Vaccine Adult HD 10/16/2016    Influenza Vaccine Pediatric 1/18/2011    Influenza Vaccine Quad Inj (Pf) 1/20/2016, 10/3/2014    Tdap Vaccine 6/13/2016 12:10 PM, 3/18/2016 11:42 AM      Below and/or attached are the medications your provider expects you to take. Review all of your home medications and newly ordered medications with your provider and/or pharmacist. Follow medication instructions as directed by your provider and/or pharmacist. Please keep your medication list with you and share with your provider. Update the information when medications are discontinued, doses are changed, or new medications (including over-the-counter products) are added; and carry medication information at all times in the event of  emergency situations     Allergies:  ACE INHIBITORS - (reactions not documented)     TAPE - (reactions not documented)               Medications  Valid as of: March 16, 2017 -  2:23 PM    Generic Name Brand Name Tablet Size Instructions for use    Acyclovir (Tab) ZOVIRAX 400 MG Take 1 Tab by mouth 2 times a day.        Albuterol Sulfate (Aero Soln) albuterol 108 (90 BASE) MCG/ACT INHALE 2 PUFFS BY MOUTH EVERY FOUR HOURS AS NEEDED FOR SHORTNESS OF BREATH. DISPENSE A SPACER        Amitriptyline HCl (Tab) ELAVIL 10 MG Take 1 Tab by mouth at bedtime as needed.        Amitriptyline HCl (Tab) ELAVIL 25 MG Take 25 mg by mouth.        Calcium Citrate-Vitamin D   Take 400 mg by mouth every day.        Cholecalciferol (Cap) Vitamin D 1000 UNIT Take 2 Caps by mouth every day.        DULoxetine HCl (Cap DR Particles) CYMBALTA 60 MG Take 1 Cap by mouth every day.        Hydrocodone-Acetaminophen (Tab) NORCO 5-325 MG Take 1 Tab by mouth every 12 hours as needed. May refill 5/19/16        Multiple Vitamins-Minerals   Take 1 Tab by mouth every day.        Sertraline HCl (Tab) ZOLOFT 100 MG TAKE 1 TABLET BY MOUTH DAILY        .                 Medicines prescribed today were sent to:     [facility]/PHARMACY #[id number] - [place], [place] - [address] [place] [address] [address]    [address] [place] [address]    Phone: [phone] Fax: [phone]    Open 24 Hours?: No      Medication refill instructions:       If your prescription bottle indicates you have medication refills left, it is not necessary to call your provider’s office. Please contact your pharmacy and they will refill your medication.    If your prescription bottle indicates you do not have any refills left, you may request refills at any time through one of the following ways: The online [employer] system (except Urgent Care), by calling your provider’s office, or by asking your pharmacy to contact your provider’s office with a refill request. Medication refills are processed only  during regular business hours and may not be available until the next business day. Your provider may request additional information or to have a follow-up visit with you prior to refilling your medication.   *Please Note: Medication refills are assigned a new Rx number when refilled electronically. Your pharmacy may indicate that no refills were authorized even though a new prescription for the same medication is available at the pharmacy. Please request the medicine by name with the pharmacy before contacting your provider for a refill.           [employer] Access Code: Activation code not generated  Current [employer] Status: Active

## 2017-03-16 NOTE — PROGRESS NOTES
Subjective:   Rain Sanabria is a 62 y.o. female here today for shortness of breath    Shortness of breath  Patient occasionally get shortness of breath after doing rapid talking for her job. She has fast and prolonged talking. She will use of Ventolin about 2-3 times a week after one of these rapid talking episodes.  She recently lost her Ventolin inhaler and requests a refill.  There is no history of smoking or asthma.  Patient does have central obesity and does not exercise.         Current medicines (including changes today)  Current Outpatient Prescriptions   Medication Sig Dispense Refill   • albuterol (VENTOLIN HFA) 108 (90 BASE) MCG/ACT Aero Soln inhalation aerosol INHALE 2 PUFFS BY MOUTH EVERY FOUR HOURS AS NEEDED FOR SHORTNESS OF BREATH. DISPENSE A SPACER 18 Inhaler 5   • sertraline (ZOLOFT) 100 MG Tab TAKE 1 TABLET BY MOUTH DAILY  3   • amitriptyline (ELAVIL) 25 MG Tab Take 25 mg by mouth.  1   • duloxetine (CYMBALTA) 60 MG Cap DR Particles delayed-release capsule Take 1 Cap by mouth every day. 90 Cap 1   • hydrocodone-acetaminophen (NORCO) 5-325 MG Tab per tablet Take 1 Tab by mouth every 12 hours as needed. May refill 5/19/16 60 Tab 0   • amitriptyline (ELAVIL) 10 MG Tab Take 1 Tab by mouth at bedtime as needed. 30 Tab 2   • acyclovir (ZOVIRAX) 400 MG tablet Take 1 Tab by mouth 2 times a day. 180 Tab 0   • Cholecalciferol (VITAMIN D) 1000 UNIT CAPS Take 2 Caps by mouth every day.     • Calcium-Vitamin D (CALCIUM + D PO) Take 400 mg by mouth every day.     • Multiple Vitamin (MULTIVITAMIN PO) Take 1 Tab by mouth every day.       No current facility-administered medications for this visit.     She  has a past medical history of Depression (6/19/2009); Genital herpes (6/19/2009); Insomnia (6/19/2009); Migraine (6/19/2009); Abnormal mammogram (6/19/2009); Cystocele (6/19/2009); Hypertension; Hyperlipidemia; Vitamin D deficiency; Sleep apnea; Abdominal hernia; Anxiety; OA (osteoarthritis); and Chronic  "right shoulder pain.    ROS   No chest pain, no abdominal pain, no wheezing       Objective:     Blood pressure 130/82, pulse 92, temperature 36.7 °C (98.1 °F), resp. rate 16, height 1.575 m (5' 2.01\"), weight 112.038 kg (247 lb), SpO2 93 %, not currently breastfeeding. Body mass index is 45.17 kg/(m^2).   Physical Exam:  Constitutional: Alert, no distress.  Skin: Warm, dry, good turgor, no rashes in visible areas.  Eye: Equal, round and reactive, conjunctiva clear, lids normal.  Respiratory: Unlabored respiratory effort, lungs clear to auscultation, no wheezes, no ronchi.  Psych: Alert and oriented x3, normal affect and mood.        Assessment and Plan:   The following treatment plan was discussed    1. Shortness of breath  Continue as needed Ventolin. No suspicion for COPD or asthma. This is most likely similar to an exercise-induced asthma comprehensive exercise she is having exertional dyspnea with rapid and prolonged forced talking.  - albuterol (VENTOLIN HFA) 108 (90 BASE) MCG/ACT Aero Soln inhalation aerosol; INHALE 2 PUFFS BY MOUTH EVERY FOUR HOURS AS NEEDED FOR SHORTNESS OF BREATH. DISPENSE A SPACER  Dispense: 18 Inhaler; Refill: 5      Followup: Return if symptoms worsen or fail to improve.           "

## 2017-03-24 ENCOUNTER — HOSPITAL ENCOUNTER (OUTPATIENT)
Facility: MEDICAL CENTER | Age: 62
End: 2017-03-24
Attending: PHYSICIAN ASSISTANT
Payer: COMMERCIAL

## 2017-03-24 ENCOUNTER — OFFICE VISIT (OUTPATIENT)
Dept: MEDICAL GROUP | Facility: MEDICAL CENTER | Age: 62
End: 2017-03-24
Payer: COMMERCIAL

## 2017-03-24 VITALS
BODY MASS INDEX: 45.45 KG/M2 | OXYGEN SATURATION: 93 % | WEIGHT: 247 LBS | TEMPERATURE: 97.8 F | HEIGHT: 62 IN | RESPIRATION RATE: 16 BRPM | SYSTOLIC BLOOD PRESSURE: 122 MMHG | DIASTOLIC BLOOD PRESSURE: 80 MMHG | HEART RATE: 78 BPM

## 2017-03-24 DIAGNOSIS — D48.5 NEOPLASM OF UNCERTAIN BEHAVIOR OF SKIN: ICD-10-CM

## 2017-03-24 PROCEDURE — 88304 TISSUE EXAM BY PATHOLOGIST: CPT

## 2017-03-24 PROCEDURE — 11422 EXC H-F-NK-SP B9+MARG 1.1-2: CPT | Performed by: PHYSICIAN ASSISTANT

## 2017-03-24 PROCEDURE — 11420 EXC H-F-NK-SP B9+MARG 0.5/<: CPT | Mod: 51 | Performed by: PHYSICIAN ASSISTANT

## 2017-03-24 ASSESSMENT — PAIN SCALES - GENERAL: PAINLEVEL: NO PAIN

## 2017-03-25 NOTE — PROGRESS NOTES
"PROCEDURE NOTE,     Rain Sanabria 61yo female here for cyst removals.     Has hx of getting cysts.    Now has two \"cysts\" on scalp.    They don't bother her but she was going to shave her head to support her friend with cancer and wants to get them removed first.    Denies growing, changing, itching, drainage, fever, chills.     O: Skin: left parietal scalp, 2.5cm x 2.5cm smooth mobile, skin colored subcutaneous skin colored nodule.  Also right parietal scalp with 1cm x 1cm smooth mobile, skin colored subcutaneous skin colored nodule.     The risks (including bleeding and infection) and benefits of the   procedure and written informed consent obtained.     Local anesthesia was performed with  Lidocaine 2% with epinephrine, prepped with hibiclens, and draped in the usual sterile fashion.  On the right parietal scalp a 5mm punch was used and the rest of the cyst was dissected free and removed. On the left parietal scalp An incision was made over the cyst, which was dissected free of the surrounding tissue and removed.  The cyst was filled with typical sebaceous material.    The right parietal scalp wound was closed with 4-0 nylon using 2 simple interrupted stitches and a sterile dressing   applied. The left parietal scalp wound was closed with 5 staples. The specimens were sent for pathologic   examination.    A/P: cysts vs other, scalp x2.    The patient tolerated the procedure well and without apparent complications.     The patient was instructed to keep the wound dry and covered for 24-48h and clean thereafter, and warning signs of infection were reviewed.  Will return for suture removal in 10 days.  Recommended that the patient use NSAID as needed for pain.  Followup sooner for any concerns.  "

## 2017-04-04 ENCOUNTER — NON-PROVIDER VISIT (OUTPATIENT)
Dept: MEDICAL GROUP | Facility: MEDICAL CENTER | Age: 62
End: 2017-04-04

## 2017-04-04 DIAGNOSIS — Z48.02 REMOVAL OF STAPLE: ICD-10-CM

## 2017-04-04 NOTE — MR AVS SNAPSHOT
Rain Sanabria   2017 11:30 AM   Appointment   MRN: [id number]    Department:  [facility]   Dept Phone:  [phone]    Description:  Female : 1955   Provider:  [facility]           Allergies as of 2017     Allergen Noted Reactions    Ace Inhibitors 2010       Ace cough    Tape 2014       Adhesive tape      Vital Signs     Smoking Status                   Never Smoker            Basic Information     Date Of Birth Sex Race Ethnicity Preferred Language    1955 Female White Non- English      Your appointments     May 22, 2017 11:45 AM   Established Patient with SUHAIL Man   [facility] ([facility])    [address]  [facility] [address] [phone]           You will be receiving a confirmation call a few days before your appointment from our automated call confirmation system.              Problem List              ICD-10-CM Priority Class Noted - Resolved    Depression F32.9   2009 - Present    Genital herpes simplex A60.00   2009 - Present    Insomnia G47.00   2009 - Present    Migraine    2009 - Present    Abnormal mammogram R92.8   2009 - Present    Preventative health care Z00.00   2009 - Present    Cystocele N81.10   2009 - Present    HTN (hypertension) I10   2010 - Present    Sleep apnea G47.30   2010 - Present    OA (osteoarthritis) M19.90   2010 - Present    Vitamin D deficiency E55.9   Unknown - Present    Abdominal hernia K46.9   Unknown - Present    Hyperlipidemia E78.5   Unknown - Present    Anxiety F41.9   Unknown - Present    Chronic right shoulder pain M25.511, G89.29   2016 - Present    Morbid obesity with BMI of 40.0-44.9, adult (HCC) E66.01, Z68.41   2017 - Present    Morbid obesity with BMI of 45.0-49.9, adult (HCC) E66.01, Z68.42   2017 - Present    Shortness of breath R06.02   3/16/2017 - Present      Health  Maintenance        Date Due Completion Dates    IMM ZOSTER VACCINE 2/10/2014 ---    MAMMOGRAM 1/19/2017 1/19/2016, 2/15/2013, 2/13/2012, 2/11/2011, 4/24/2009, 4/24/2009, 5/1/2008, 4/16/2008, 4/13/2007, 10/13/2004    PAP SMEAR 4/12/2019 4/12/2016, 4/12/2016, 4/12/2016 (Done), 6/28/2011    Override on 4/12/2016: Done    COLONOSCOPY 7/6/2022 7/6/2012 (Done)    Override on 7/6/2012: Done    IMM DTaP/Tdap/Td Vaccine (2 - Td) 6/13/2026 6/13/2016, 3/18/2016            Current Immunizations     Influenza TIV (IM) 1/4/2013, 12/22/2011 10:46 AM    Influenza Vaccine Adult HD 10/16/2016    Influenza Vaccine Pediatric 1/18/2011    Influenza Vaccine Quad Inj (Pf) 1/20/2016, 10/3/2014    Tdap Vaccine 6/13/2016 12:10 PM, 3/18/2016 11:42 AM      Below and/or attached are the medications your provider expects you to take. Review all of your home medications and newly ordered medications with your provider and/or pharmacist. Follow medication instructions as directed by your provider and/or pharmacist. Please keep your medication list with you and share with your provider. Update the information when medications are discontinued, doses are changed, or new medications (including over-the-counter products) are added; and carry medication information at all times in the event of emergency situations     Allergies:  ACE INHIBITORS - (reactions not documented)     TAPE - (reactions not documented)               Medications  Valid as of: April 04, 2017 - 11:53 AM    Generic Name Brand Name Tablet Size Instructions for use    Acyclovir (Tab) ZOVIRAX 400 MG Take 1 Tab by mouth 2 times a day.        Albuterol Sulfate (Aero Soln) albuterol 108 (90 BASE) MCG/ACT INHALE 2 PUFFS BY MOUTH EVERY FOUR HOURS AS NEEDED FOR SHORTNESS OF BREATH. DISPENSE A SPACER        Amitriptyline HCl (Tab) ELAVIL 10 MG Take 1 Tab by mouth at bedtime as needed.        Amitriptyline HCl (Tab) ELAVIL 25 MG Take 25 mg by mouth.        Calcium Citrate-Vitamin D   Take 400  mg by mouth every day.        Cholecalciferol (Cap) Vitamin D 1000 UNIT Take 2 Caps by mouth every day.        DULoxetine HCl (Cap DR Particles) CYMBALTA 60 MG Take 1 Cap by mouth every day.        Hydrocodone-Acetaminophen (Tab) NORCO 5-325 MG Take 1 Tab by mouth every 12 hours as needed. May refill 5/19/16        Multiple Vitamins-Minerals   Take 1 Tab by mouth every day.        Sertraline HCl (Tab) ZOLOFT 100 MG TAKE 1 TABLET BY MOUTH DAILY        .                 Medicines prescribed today were sent to:     [facility]Y #[id number] - [place], [place] - [address]    [address] [place] [address]    Phone: [phone] Fax: [phone]    Open 24 Hours?: No      Medication refill instructions:       If your prescription bottle indicates you have medication refills left, it is not necessary to call your provider’s office. Please contact your pharmacy and they will refill your medication.    If your prescription bottle indicates you do not have any refills left, you may request refills at any time through one of the following ways: The online [employer] system (except Urgent Care), by calling your provider’s office, or by asking your pharmacy to contact your provider’s office with a refill request. Medication refills are processed only during regular business hours and may not be available until the next business day. Your provider may request additional information or to have a follow-up visit with you prior to refilling your medication.   *Please Note: Medication refills are assigned a new Rx number when refilled electronically. Your pharmacy may indicate that no refills were authorized even though a new prescription for the same medication is available at the pharmacy. Please request the medicine by name with the pharmacy before contacting your provider for a refill.           [employer] Access Code: Activation code not generated  Current [employer] Status: Active

## 2017-04-04 NOTE — PROGRESS NOTES
Rain Sanabria is a 62 y.o. female here for a non-provider visit for Staple and stitch removal    If abnormal was an in office provider notified today (if so, indicate provider)? No  Routed to PCP? No

## 2017-05-22 ENCOUNTER — OFFICE VISIT (OUTPATIENT)
Dept: MEDICAL GROUP | Facility: MEDICAL CENTER | Age: 62
End: 2017-05-22
Payer: COMMERCIAL

## 2017-05-22 VITALS
SYSTOLIC BLOOD PRESSURE: 130 MMHG | TEMPERATURE: 97.7 F | WEIGHT: 252 LBS | BODY MASS INDEX: 46.38 KG/M2 | OXYGEN SATURATION: 99 % | DIASTOLIC BLOOD PRESSURE: 84 MMHG | HEIGHT: 62 IN | HEART RATE: 83 BPM

## 2017-05-22 DIAGNOSIS — G89.29 CHRONIC PAIN OF LEFT KNEE: ICD-10-CM

## 2017-05-22 DIAGNOSIS — Z79.891 CHRONIC USE OF OPIATE DRUGS THERAPEUTIC PURPOSES: ICD-10-CM

## 2017-05-22 DIAGNOSIS — R59.0 CERVICAL LYMPHADENOPATHY: ICD-10-CM

## 2017-05-22 DIAGNOSIS — M25.511 CHRONIC RIGHT SHOULDER PAIN: ICD-10-CM

## 2017-05-22 DIAGNOSIS — M25.562 CHRONIC PAIN OF LEFT KNEE: ICD-10-CM

## 2017-05-22 DIAGNOSIS — N95.9 POST MENOPAUSAL PROBLEMS: ICD-10-CM

## 2017-05-22 DIAGNOSIS — R30.0 DYSURIA: ICD-10-CM

## 2017-05-22 DIAGNOSIS — G89.29 CHRONIC RIGHT SHOULDER PAIN: ICD-10-CM

## 2017-05-22 DIAGNOSIS — Z12.31 ENCOUNTER FOR SCREENING MAMMOGRAM FOR MALIGNANT NEOPLASM OF BREAST: ICD-10-CM

## 2017-05-22 LAB
APPEARANCE UR: CLEAR
BILIRUB UR STRIP-MCNC: NORMAL MG/DL
COLOR UR AUTO: YELLOW
GLUCOSE UR STRIP.AUTO-MCNC: NORMAL MG/DL
KETONES UR STRIP.AUTO-MCNC: NORMAL MG/DL
LEUKOCYTE ESTERASE UR QL STRIP.AUTO: NORMAL
NITRITE UR QL STRIP.AUTO: NORMAL
PH UR STRIP.AUTO: 6 [PH] (ref 5–8)
PROT UR QL STRIP: NORMAL MG/DL
RBC UR QL AUTO: NORMAL
SP GR UR STRIP.AUTO: 1
UROBILINOGEN UR STRIP-MCNC: NORMAL MG/DL

## 2017-05-22 PROCEDURE — 99214 OFFICE O/P EST MOD 30 MIN: CPT | Performed by: NURSE PRACTITIONER

## 2017-05-22 PROCEDURE — 81002 URINALYSIS NONAUTO W/O SCOPE: CPT | Performed by: NURSE PRACTITIONER

## 2017-05-22 RX ORDER — DULOXETIN HYDROCHLORIDE 20 MG/1
20 CAPSULE, DELAYED RELEASE ORAL DAILY
COMMUNITY
End: 2017-11-22

## 2017-05-22 RX ORDER — HYDROCODONE BITARTRATE AND ACETAMINOPHEN 5; 325 MG/1; MG/1
1 TABLET ORAL EVERY 12 HOURS PRN
Qty: 60 TAB | Refills: 0 | Status: SHIPPED | OUTPATIENT
Start: 2017-05-22 | End: 2017-05-22 | Stop reason: SDUPTHER

## 2017-05-22 RX ORDER — HYDROCODONE BITARTRATE AND ACETAMINOPHEN 5; 325 MG/1; MG/1
1 TABLET ORAL EVERY 12 HOURS PRN
Qty: 60 TAB | Refills: 0 | Status: SHIPPED | OUTPATIENT
Start: 2017-05-22 | End: 2017-08-21 | Stop reason: SDUPTHER

## 2017-05-22 NOTE — MR AVS SNAPSHOT
"        Rain Sanabria   2017 11:45 AM   Office Visit   MRN: [id number]    Department:  [facility]   Dept Phone:  [phone]    Description:  Female : 1955   Provider:  SUHAIL Man           Reason for Visit     Medication Refill           Allergies as of 2017     Allergen Noted Reactions    Ace Inhibitors 2010       Ace cough    Tape 2014       Adhesive tape      You were diagnosed with     Chronic right shoulder pain   [911516]   refill meds. UDS and contract updated today    Chronic use of opiate drugs therapeutic purposes   [1032718]       Chronic pain of left knee   [767095]       Encounter for screening mammogram for malignant neoplasm of breast   [370395]       Post menopausal problems   [031067]       Dysuria   [788.1.ICD-9-CM]   check UA in office today.      Cervical lymphadenopathy   [043283]   do neck us and f/u with pt with results.       Vital Signs     Blood Pressure Pulse Temperature Height Weight Body Mass Index    130/84 mmHg 83 36.5 °C (97.7 °F) 1.575 m (5' 2\") 114.306 kg (252 lb) 46.08 kg/m2    Oxygen Saturation Breastfeeding? Smoking Status             99% No Never Smoker          Basic Information     Date Of Birth Sex Race Ethnicity Preferred Language    1955 Female White Non- English      Problem List              ICD-10-CM Priority Class Noted - Resolved    Depression F32.9   2009 - Present    Genital herpes simplex A60.00   2009 - Present    Insomnia G47.00   2009 - Present    Migraine    2009 - Present    Abnormal mammogram R92.8   2009 - Present    Preventative health care Z00.00   2009 - Present    Cystocele N81.10   2009 - Present    HTN (hypertension) I10   2010 - Present    Sleep apnea G47.30   2010 - Present    OA (osteoarthritis) M19.90   2010 - Present    Vitamin D deficiency E55.9   Unknown - Present    Abdominal hernia K46.9   Unknown - Present    Hyperlipidemia " E78.5   Unknown - Present    Anxiety F41.9   Unknown - Present    Chronic right shoulder pain M25.511, G89.29   1/6/2016 - Present    Morbid obesity with BMI of 40.0-44.9, adult ([facility]) E66.01, Z68.41   1/24/2017 - Present    Morbid obesity with BMI of 45.0-49.9, adult ([facility]) E66.01, Z68.42   2/22/2017 - Present    Shortness of breath R06.02   3/16/2017 - Present      Health Maintenance        Date Due Completion Dates    BONE DENSITY 2/11/2013 2/11/2011    IMM ZOSTER VACCINE 2/10/2014 ---    MAMMOGRAM 1/19/2017 1/19/2016, 2/15/2013, 2/13/2012, 2/11/2011, 4/24/2009, 4/24/2009, 5/1/2008, 4/16/2008, 4/13/2007, 10/13/2004    PAP SMEAR 4/12/2019 4/12/2016, 4/12/2016, 4/12/2016 (Done), 6/28/2011    Override on 4/12/2016: Done    COLONOSCOPY 7/6/2022 7/6/2012 (Done)    Override on 7/6/2012: Done    IMM DTaP/Tdap/Td Vaccine (2 - Td) 6/13/2026 6/13/2016, 3/18/2016            Current Immunizations     Influenza TIV (IM) 1/4/2013, 12/22/2011 10:46 AM    Influenza Vaccine Adult HD 10/16/2016    Influenza Vaccine Pediatric 1/18/2011    Influenza Vaccine Quad Inj (Pf) 1/20/2016, 10/3/2014    Tdap Vaccine 6/13/2016 12:10 PM, 3/18/2016 11:42 AM      Below and/or attached are the medications your provider expects you to take. Review all of your home medications and newly ordered medications with your provider and/or pharmacist. Follow medication instructions as directed by your provider and/or pharmacist. Please keep your medication list with you and share with your provider. Update the information when medications are discontinued, doses are changed, or new medications (including over-the-counter products) are added; and carry medication information at all times in the event of emergency situations     Allergies:  ACE INHIBITORS - (reactions not documented)     TAPE - (reactions not documented)               Medications  Valid as of: May 22, 2017 - 12:45 PM    Generic Name Brand Name Tablet Size Instructions for use    Acyclovir  (Tab) ZOVIRAX 400 MG Take 1 Tab by mouth 2 times a day.        Albuterol Sulfate (Aero Soln) albuterol 108 (90 BASE) MCG/ACT INHALE 2 PUFFS BY MOUTH EVERY FOUR HOURS AS NEEDED FOR SHORTNESS OF BREATH. DISPENSE A SPACER        Amitriptyline HCl (Tab) ELAVIL 25 MG Take 25 mg by mouth.        Calcium Citrate-Vitamin D   Take 400 mg by mouth every day.        Cholecalciferol (Cap) Vitamin D 1000 UNIT Take 2 Caps by mouth every day.        DULoxetine HCl (Cap DR Particles) CYMBALTA 20 MG Take 20 mg by mouth every day.        Hydrocodone-Acetaminophen (Tab) NORCO 5-325 MG Take 1 Tab by mouth every 12 hours as needed. May refill 5/19/16        Multiple Vitamins-Minerals   Take 1 Tab by mouth every day.        .                 Medicines prescribed today were sent to:     [facility]/PHARMACY #[id number] - [place], [place] - [address]    [address] [place] [address]    Phone: [phone] Fax: [phone]    Open 24 Hours?: No      Medication refill instructions:       If your prescription bottle indicates you have medication refills left, it is not necessary to call your provider’s office. Please contact your pharmacy and they will refill your medication.    If your prescription bottle indicates you do not have any refills left, you may request refills at any time through one of the following ways: The online [email or web address] system (except Urgent Care), by calling your provider’s office, or by asking your pharmacy to contact your provider’s office with a refill request. Medication refills are processed only during regular business hours and may not be available until the next business day. Your provider may request additional information or to have a follow-up visit with you prior to refilling your medication.   *Please Note: Medication refills are assigned a new Rx number when refilled electronically. Your pharmacy may indicate that no refills were authorized even though a new prescription for the same medication  is available at the pharmacy. Please request the medicine by name with the pharmacy before contacting your provider for a refill.        Your To Do List     Future Labs/Procedures Complete By Expires    DS-BONE DENSITY STUDY (DEXA)  As directed 11/22/2017    MA-SCREEN MAMMO W/CAD-BILAT  As directed 6/23/2018    [facility] PAIN MANAGEMENT SCREEN  As directed 5/22/2018    Comments:    Current Meds (name, sig, last dose):   Current outpatient prescriptions:   •  duloxetine, 20 mg, Oral, DAILY  •  hydrocodone-acetaminophen, 1 Tab, Oral, Q12HRS PRN  •  albuterol, INHALE 2 PUFFS BY MOUTH EVERY FOUR HOURS AS NEEDED FOR SHORTNESS OF BREATH. DISPENSE A SPACER  •  amitriptyline, Take 25 mg by mouth.  •  acyclovir, 400 mg, Oral, BID, 5/17/2016  •  Vitamin D, 2 Cap, Oral, DAILY  •  Calcium-Vitamin D (CALCIUM + D PO), 400 mg, Oral, DAILY  •  Multiple Vitamin (MULTIVITAMIN PO), 1 Tab, Oral, DAILY          US-SOFT TISSUES OF HEAD - NECK  As directed 11/22/2017         [employer] Access Code: Activation code not generated  Current [employer] Status: Active

## 2017-05-22 NOTE — PROGRESS NOTES
Subjective:      Rain Sanabria is a 62 y.o. female who presents with Medication Refill            HPI  Seen in f/u for deprssion and anxiety.  She saw psych.  Was dec on cymbalta to 20 mg dialy.  It is cotnrolling her anxiety and depression.  It was dec d/t day time fatigue.  Still having that.  Will try taking at nite.    She is due mammo and dexa scan.   Needs refills of her norco.  She uses that for rt shouder and left knee pain.  She went and had davin knee inj.  This was her 2nd time.  Helped some but will prob need TKR in next couple of years.  She is having occas dysuria.  That has been occurring over last 3 weeks.  None for last 3 days.  No abd or back pain.  No fever, chills or sweating.   She is having multiple family issues.  She has noted left neck lymph node is larger.  No tenderenss.  No URI. No fever.     Patient Active Problem List    Diagnosis Date Noted   • Shortness of breath 03/16/2017   • Morbid obesity with BMI of 45.0-49.9, adult ([facility]) 02/22/2017   • Morbid obesity with BMI of 40.0-44.9, adult ([facility]) 01/24/2017   • Chronic right shoulder pain 01/06/2016   • Hyperlipidemia    • Anxiety    • Vitamin D deficiency    • Abdominal hernia    • Sleep apnea 07/12/2010   • OA (osteoarthritis) 07/12/2010   • HTN (hypertension) 01/20/2010   • Depression 06/19/2009   • Genital herpes simplex 06/19/2009   • Insomnia 06/19/2009   • Migraine 06/19/2009   • Abnormal mammogram 06/19/2009   • Preventative health care 06/19/2009   • Cystocele 06/19/2009     Current Outpatient Prescriptions   Medication Sig Dispense Refill   • duloxetine (CYMBALTA) 20 MG Cap DR Particles Take 20 mg by mouth every day.     • albuterol (VENTOLIN HFA) 108 (90 BASE) MCG/ACT Aero Soln inhalation aerosol INHALE 2 PUFFS BY MOUTH EVERY FOUR HOURS AS NEEDED FOR SHORTNESS OF BREATH. DISPENSE A SPACER 18 Inhaler 5   • amitriptyline (ELAVIL) 25 MG Tab Take 25 mg by mouth.  1   • hydrocodone-acetaminophen (NORCO) 5-325 MG Tab per tablet Take  "1 Tab by mouth every 12 hours as needed. May refill 5/19/16 60 Tab 0   • acyclovir (ZOVIRAX) 400 MG tablet Take 1 Tab by mouth 2 times a day. 180 Tab 0   • Cholecalciferol (VITAMIN D) 1000 UNIT CAPS Take 2 Caps by mouth every day.     • Calcium-Vitamin D (CALCIUM + D PO) Take 400 mg by mouth every day.     • Multiple Vitamin (MULTIVITAMIN PO) Take 1 Tab by mouth every day.       No current facility-administered medications for this visit.     Allergies   Allergen Reactions   • Ace Inhibitors      Ace cough   • Tape      Adhesive tape          ROS  Review of Systems   Constitutional: Negative.  Negative for fever, chills, weight loss, malaise/fatigue and diaphoresis.   HENT: Negative.  Negative for hearing loss, ear pain, nosebleeds, congestion, sore throat, neck pain, tinnitus and ear discharge.    Respiratory: Negative.  Negative for cough, hemoptysis, sputum production, shortness of breath, wheezing and stridor.    Cardiovascular: Negative.  Negative for chest pain, palpitations, orthopnea, claudication, leg swelling and PND.   Gastrointestinal: denies nausea, vomiting, diarrhea, constipation, heartburn, melena or hematochezia.  Genitourinary: Denies dysuria, hematuria, urinary incontinence, frequency or urgency.           Objective:     /84 mmHg  Pulse 83  Temp(Src) 36.5 °C (97.7 °F)  Ht 1.575 m (5' 2\")  Wt 114.306 kg (252 lb)  BMI 46.08 kg/m2  SpO2 99%  Breastfeeding? No     Physical Exam      Physical Exam   Vitals reviewed.  Constitutional: oriented to person, place, and time. appears well-developed and well-nourished. No distress.   Cardiovascular: Normal rate, regular rhythm, normal heart sounds and intact distal pulses.  Exam reveals no gallop and no friction rub.  No murmur heard.  No carotid bruits.   Pulmonary/Chest: Effort normal and breath sounds normal. No stridor. No respiratory distress. no wheezes or rales. exhibits no tenderness.   Musculoskeletal: Normal range of motion. exhibits no " edema. davin pedal pulses 2+.  Lymphadenopathy: no cervical or supraclavicular adenopathy.   Abd:  No CVAT,  Soft,  Bs noted in all quadrants.  No HSM.  No abdominal tenderness.  Neurological: alert and oriented to person, place, and time. exhibits normal muscle tone. Coordination normal.   Skin: Skin is warm and dry. no diaphoresis.   Psychiatric: normal mood and affect. behavior is normal.            Assessment/Plan:     1. Chronic right shoulder pain  duloxetine (CYMBALTA) 20 MG Cap DR Particles    hydrocodone-acetaminophen (NORCO) 5-325 MG Tab per tablet    CONTROLLED SUBSTANCE TREATMENT AGREEMENT    [facility] PAIN MANAGEMENT SCREEN    DISCONTINUED: hydrocodone-acetaminophen (NORCO) 5-325 MG Tab per tablet    DISCONTINUED: hydrocodone-acetaminophen (NORCO) 5-325 MG Tab per tablet    refill meds. UDS and contract updated today   2. Chronic use of opiate drugs therapeutic purposes  duloxetine (CYMBALTA) 20 MG Cap DR Particles    hydrocodone-acetaminophen (NORCO) 5-325 MG Tab per tablet    CONTROLLED SUBSTANCE TREATMENT AGREEMENT    [facility] PAIN MANAGEMENT SCREEN    DISCONTINUED: hydrocodone-acetaminophen (NORCO) 5-325 MG Tab per tablet   3. Chronic pain of left knee  duloxetine (CYMBALTA) 20 MG Cap DR Particles    hydrocodone-acetaminophen (NORCO) 5-325 MG Tab per tablet    CONTROLLED SUBSTANCE TREATMENT AGREEMENT    [facility] PAIN MANAGEMENT SCREEN    DISCONTINUED: hydrocodone-acetaminophen (NORCO) 5-325 MG Tab per tablet   4. Encounter for screening mammogram for malignant neoplasm of breast  MA-SCREEN MAMMO W/CAD-BILAT   5. Post menopausal problems  DS-BONE DENSITY STUDY (DEXA)     6.  F/u 3 months for pain med refill

## 2017-06-09 ENCOUNTER — TELEPHONE (OUTPATIENT)
Dept: MEDICAL GROUP | Facility: MEDICAL CENTER | Age: 62
End: 2017-06-09

## 2017-06-09 ENCOUNTER — HOSPITAL ENCOUNTER (OUTPATIENT)
Dept: RADIOLOGY | Facility: MEDICAL CENTER | Age: 62
End: 2017-06-09
Attending: NURSE PRACTITIONER
Payer: COMMERCIAL

## 2017-06-09 DIAGNOSIS — R59.0 CERVICAL LYMPHADENOPATHY: ICD-10-CM

## 2017-06-09 PROCEDURE — 76536 US EXAM OF HEAD AND NECK: CPT

## 2017-07-09 ENCOUNTER — TELEPHONE (OUTPATIENT)
Dept: MEDICAL GROUP | Facility: MEDICAL CENTER | Age: 62
End: 2017-07-09

## 2017-07-09 NOTE — TELEPHONE ENCOUNTER
Please let ptknow that her fatigue may be dt the norco and amitriptyline.  She can try stopping both and see if her sx improve.

## 2017-07-10 ENCOUNTER — APPOINTMENT (OUTPATIENT)
Dept: RADIOLOGY | Facility: MEDICAL CENTER | Age: 62
End: 2017-07-10
Attending: EMERGENCY MEDICINE
Payer: COMMERCIAL

## 2017-07-10 ENCOUNTER — HOSPITAL ENCOUNTER (EMERGENCY)
Facility: MEDICAL CENTER | Age: 62
End: 2017-07-10
Attending: EMERGENCY MEDICINE
Payer: COMMERCIAL

## 2017-07-10 VITALS
SYSTOLIC BLOOD PRESSURE: 144 MMHG | HEART RATE: 77 BPM | TEMPERATURE: 99.4 F | BODY MASS INDEX: 44.3 KG/M2 | DIASTOLIC BLOOD PRESSURE: 93 MMHG | HEIGHT: 63 IN | WEIGHT: 250 LBS | RESPIRATION RATE: 16 BRPM | OXYGEN SATURATION: 98 %

## 2017-07-10 DIAGNOSIS — R07.81 RIB PAIN: ICD-10-CM

## 2017-07-10 PROCEDURE — 700111 HCHG RX REV CODE 636 W/ 250 OVERRIDE (IP): Performed by: EMERGENCY MEDICINE

## 2017-07-10 PROCEDURE — 96372 THER/PROPH/DIAG INJ SC/IM: CPT

## 2017-07-10 PROCEDURE — 71101 X-RAY EXAM UNILAT RIBS/CHEST: CPT | Mod: LT

## 2017-07-10 PROCEDURE — 99284 EMERGENCY DEPT VISIT MOD MDM: CPT

## 2017-07-10 RX ORDER — KETOROLAC TROMETHAMINE 30 MG/ML
30 INJECTION, SOLUTION INTRAMUSCULAR; INTRAVENOUS ONCE
Status: COMPLETED | OUTPATIENT
Start: 2017-07-10 | End: 2017-07-10

## 2017-07-10 RX ADMIN — KETOROLAC TROMETHAMINE 30 MG: 30 INJECTION, SOLUTION INTRAMUSCULAR at 15:30

## 2017-07-10 ASSESSMENT — PAIN SCALES - GENERAL: PAINLEVEL_OUTOF10: 6

## 2017-07-10 NOTE — ED AVS SNAPSHOT
Home Care Instructions                                                                                                                Rain Sanabria   MRN: [id number]    Department:  [facility], Emergency Dept   Date of Visit:  7/10/2017            [facility], Emergency Dept    [address]    [place] [address]    Phone:  [phone]      You were seen by     Wolf Gilbert M.D.      Your Diagnosis Was     Rib pain     R07.81 Left anterior      Follow-up Information     1. Follow up with SUHAIL Man.    Specialty:  Family Medicine    Why:  call your doctor regarding any extra pain medication you may need.    Contact information    [address]  [address]  [place] [address] [phone]        Medication Information     Review all of your home medications and newly ordered medications with your primary doctor and/or pharmacist as soon as possible. Follow medication instructions as directed by your doctor and/or pharmacist.     Please keep your complete medication list with you and share with your physician. Update the information when medications are discontinued, doses are changed, or new medications (including over-the-counter products) are added; and carry medication information at all times in the event of emergency situations.               Medication List      ASK your doctor about these medications        Instructions    Morning Afternoon Evening Bedtime    acyclovir 400 MG tablet   Commonly known as:  ZOVIRAX        Take 1 Tab by mouth 2 times a day.   Dose:  400 mg                        albuterol 108 (90 BASE) MCG/ACT Aers inhalation aerosol   Commonly known as:  VENTOLIN HFA        Doctor's comments:  DSW   INHALE 2 PUFFS BY MOUTH EVERY FOUR HOURS AS NEEDED FOR SHORTNESS OF BREATH. DISPENSE A SPACER                        amitriptyline 25 MG Tabs   Commonly known as:  ELAVIL        Take 1 Tab by mouth at bedtime as needed.     Dose:  25 mg                        CALCIUM + D PO        Take 400 mg by mouth every day.   Dose:  400 mg                        * duloxetine 20 MG Cpep   Commonly known as:  CYMBALTA        Take 20 mg by mouth every day.   Dose:  20 mg                        * duloxetine 30 MG Cpep   Commonly known as:  CYMBALTA        Take 1 Cap by mouth every day.   Dose:  30 mg                        hydrocodone-acetaminophen 5-325 MG Tabs per tablet   Commonly known as:  NORCO        Doctor's comments:  May refill 7/22/17   Take 1 Tab by mouth every 12 hours as needed. May refill 5/19/16   Dose:  1 Tab                        MULTIVITAMIN PO        Take 1 Tab by mouth every day.   Dose:  1 Tab                        Vitamin D 1000 UNIT Caps        Take 2 Caps by mouth every day.   Dose:  2 Cap                        * Notice:  This list has 2 medication(s) that are the same as other medications prescribed for you. Read the directions carefully, and ask your doctor or other care provider to review them with you.            Procedures and tests performed during your visit     [id number] (WITH 1-VIEW CXR) LEFT        Discharge Instructions       Chest Wall Pain  Chest wall pain is pain in or around the bones and muscles of your chest. It may take up to 6 weeks to get better. It may take longer if you must stay physically active in your work and activities.   CAUSES   Chest wall pain may happen on its own. However, it may be caused by:  · A viral illness like the flu.  · Injury.  · Coughing.  · Exercise.  · Arthritis.  · Fibromyalgia.  · Shingles.  HOME CARE INSTRUCTIONS   · Avoid overtiring physical activity. Try not to strain or perform activities that cause pain. This includes any activities using your chest or your abdominal and side muscles, especially if heavy weights are used.  · Put ice on the sore area.  ¨ Put ice in a plastic bag.  ¨ Place a towel between your skin and the bag.  ¨ Leave the ice on for 15-20  minutes per hour while awake for the first 2 days.  · Only take over-the-counter or prescription medicines for pain, discomfort, or fever as directed by your caregiver.  SEEK IMMEDIATE MEDICAL CARE IF:   · Your pain increases, or you are very uncomfortable.  · You have a fever.  · Your chest pain becomes worse.  · You have new, unexplained symptoms.  · You have nausea or vomiting.  · You feel sweaty or lightheaded.  · You have a cough with phlegm (sputum), or you cough up blood.  MAKE SURE YOU:   · Understand these instructions.  · Will watch your condition.  · Will get help right away if you are not doing well or get worse.     This information is not intended to replace advice given to you by your health care provider. Make sure you discuss any questions you have with your health care provider.     Document Released: 12/18/2006 Document Revised: 03/11/2013 Document Reviewed: 03/14/2016  [employer] Interactive Patient Education ©2016 [employer]            Patient Information     Patient Information    Following emergency treatment: all patient requiring follow-up care must return either to a private physician or a clinic if your condition worsens before you are able to obtain further medical attention, please return to the emergency room.     Billing Information    At [facility], we work to make the billing process streamlined for our patients.  Our Representatives are here to answer any questions you may have regarding your hospital bill.  If you have insurance coverage and have supplied your insurance information to us, we will submit a claim to your insurer on your behalf.  Should you have any questions regarding your bill, we can be reached online or by phone as follows:  Online: You are able pay your bills online or live chat with our representatives about any billing questions you may have. We are here to help Monday - Friday from 8:00am to 7:30pm and 9:00am - 12:00pm on Saturdays.  Please visit  [email or web address]  for more information.   Phone:  [phone] or [phone]    Please note that your emergency physician, surgeon, pathologist, radiologist, anesthesiologist, and other specialists are not employed by [facility] and will therefore bill separately for their services.  Please contact them directly for any questions concerning their bills at the numbers below:     Emergency Physician Services:  [phone]  [facility]:  [phone]  [facility]:  [phone]  [facility]:  [phone]    1. Your final bill may vary from the amount quoted upon discharge if all procedures are not complete at that time, or if your doctor has additional procedures of which we are not aware. You will receive an additional bill if you return to the Emergency Department at [facility] for suture removal regardless of the facility of which the sutures were placed.     2. Please arrange for settlement of this account at the emergency registration.    3. All self-pay accounts are due in full at the time of treatment.  If you are unable to meet this obligation then payment is expected within 4-5 days.     4. If you have had radiology studies (CT, X-ray, Ultrasound, MRI), you have received a preliminary result during your emergency department visit. Please contact the radiology department [phone] to receive a copy of your final result. Please discuss the Final result with your primary physician or with the follow up physician provided.     Crisis Hotline:  [place] [address] Crisis Hotline:  1-882-VDFOLMS or 1-520.322.9275  [place] Crisis Hotline:    1-286.158.6990 or 975-304-2899         ED Discharge Follow Up Questions    1. In order to provide you with very good care, we would like to follow up with a phone call in the next few days.  May we have your permission to contact you?     YES /  NO    2. What is the best phone number to call  you? (       )_____-__________    3. What is the best time to call you?      Morning  /  Afternoon  /  Evening                   Patient Signature:  ____________________________________________________________    Date:  ____________________________________________________________      Your appointments     Jul 21, 2017  7:20 AM   MA [id number] with [facility] MG 1   [facility] ([address])    [address]  [facility] [id number] [phone]           No deodorant, powder, perfume or lotion under the arm or breast area.            Jul 21, 2017  8:00 AM   BONE DENSITY (DEXASCAN) with [facility] BD 1   [facility] ([address])    [address]  [facility] [id number] [phone]           No calcium supplements 24 hours prior to exam, including antacids or multivitamins w/calcium.  Pt may eat and drink normally.  No injection procedures prior to Dexa on the same day.  No barium contrast, no CTs with IV or oral contrast, no Pet/CTs and no Nuc Med injections for 7 days prior to a Dexa (including Barium Swallow, Upper GI, Small Bowel Series).  If scheduling a Dexa on the same day as a CT with IV or oral contrast, any test with barium, Pet/CT or a Nuc Med with injection, always schedule the Dexa before the other study.            Aug 21, 2017 11:30 AM   Established Patient with SUHAIL Man   [facility]    [address]  [facility] [address]   [phone]           You will be receiving a confirmation call a few days before your appointment from our automated call confirmation system.   Please bring to your appointment; a photo ID, copies of your insurance card, all medication bottles and any co-pay that you are responsible for.  Please allow 45-60 minutes to complete your scheduled appointment.

## 2017-07-10 NOTE — DISCHARGE INSTRUCTIONS
Chest Wall Pain  Chest wall pain is pain in or around the bones and muscles of your chest. It may take up to 6 weeks to get better. It may take longer if you must stay physically active in your work and activities.   CAUSES   Chest wall pain may happen on its own. However, it may be caused by:  · A viral illness like the flu.  · Injury.  · Coughing.  · Exercise.  · Arthritis.  · Fibromyalgia.  · Shingles.  HOME CARE INSTRUCTIONS   · Avoid overtiring physical activity. Try not to strain or perform activities that cause pain. This includes any activities using your chest or your abdominal and side muscles, especially if heavy weights are used.  · Put ice on the sore area.  ¨ Put ice in a plastic bag.  ¨ Place a towel between your skin and the bag.  ¨ Leave the ice on for 15-20 minutes per hour while awake for the first 2 days.  · Only take over-the-counter or prescription medicines for pain, discomfort, or fever as directed by your caregiver.  SEEK IMMEDIATE MEDICAL CARE IF:   · Your pain increases, or you are very uncomfortable.  · You have a fever.  · Your chest pain becomes worse.  · You have new, unexplained symptoms.  · You have nausea or vomiting.  · You feel sweaty or lightheaded.  · You have a cough with phlegm (sputum), or you cough up blood.  MAKE SURE YOU:   · Understand these instructions.  · Will watch your condition.  · Will get help right away if you are not doing well or get worse.     This information is not intended to replace advice given to you by your health care provider. Make sure you discuss any questions you have with your health care provider.     Document Released: 12/18/2006 Document Revised: 03/11/2013 Document Reviewed: 03/14/2016  [employer] Interactive Patient Education ©2016 [employer]

## 2017-07-10 NOTE — ED NOTES
Cleaning her hot tub yesterday leaning on her right rib and now co pain to the same. More pain with a deep breath.

## 2017-07-10 NOTE — ED AVS SNAPSHOT
AnaBios Access Code: Activation code not generated  Current AnaBios Status: Active    [employer]  A secure, online tool to manage your health information     [employer]’s AnaBios® is a secure, online tool that connects you to your personalized health information from the privacy of your home -- day or night - making it very easy for you to manage your healthcare. Once the activation process is completed, you can even access your medical information using the AnaBios ana, which is available for free in the Apple Ana store or Google Play store.     AnaBios provides the following levels of access (as shown below):   My Chart Features   [facility] Primary Care Doctor [facility]  Specialists [facility]  Urgent  Care Non-[facility]  Primary Care  Doctor   Email your healthcare team securely and privately 24/7 X X X X   Manage appointments: schedule your next appointment; view details of past/upcoming appointments X      Request prescription refills. X      View recent personal medical records, including lab and immunizations X X X X   View health record, including health history, allergies, medications X X X X   Read reports about your outpatient visits, procedures, consult and ER notes X X X X   See your discharge summary, which is a recap of your hospital and/or ER visit that includes your diagnosis, lab results, and care plan. X X       How to register for AnaBios:  1. Go to  [email or web address].  2. Click on the Sign Up Now box, which takes you to the New Member Sign Up page. You will need to provide the following information:  a. Enter your AnaBios Access Code exactly as it appears at the top of this page. (You will not need to use this code after you’ve completed the sign-up process. If you do not sign up before the expiration date, you must request a new code.)   b. Enter your date of birth.   c. Enter your home email address.   d. Click Submit, and follow the next screen’s instructions.  3. Create a AnaBios ID. This will  be your Cyber-Rain login ID and cannot be changed, so think of one that is secure and easy to remember.  4. Create a Cyber-Rain password. You can change your password at any time.  5. Enter your Password Reset Question and Answer. This can be used at a later time if you forget your password.   6. Enter your e-mail address. This allows you to receive e-mail notifications when new information is available in Cyber-Rain.  7. Click Sign Up. You can now view your health information.    For assistance activating your Cyber-Rain account, call [phone]

## 2017-07-10 NOTE — ED AVS SNAPSHOT
7/10/2017    Rain Sanabria  [address] [place] [address]    Dear Rain:    [facility] wants to ensure your discharge home is safe and you or your loved ones have had all of your questions answered regarding your care after you leave the hospital.    Below is a list of resources and contact information should you have any questions regarding your hospital stay, follow-up instructions, or active medical symptoms.    Questions or Concerns Regarding… Contact   Medical Questions Related to Your Discharge  (7 days a week, 8am-5pm) Contact a Nurse Care Coordinator   [phone]   Medical Questions Not Related to Your Discharge  (24 hours a day / 7 days a week)  Contact the Nurse Health Line   [phone]    Medications or Discharge Instructions Refer to your discharge packet   or contact your [facility] Primary Care Provider   [phone]   Follow-up Appointment(s) Schedule your appointment via alooma   or contact Scheduling [phone]   Billing Review your statement via alooma  or contact Billing [phone]   Medical Records Review your records via alooma   or contact Medical Records [phone]     You may receive a telephone call within two days of discharge. This call is to make certain you understand your discharge instructions and have the opportunity to have any questions answered. You can also easily access your medical information, test results and upcoming appointments via the alooma free online health management tool. You can learn more and sign up at [employer]/alooma. For assistance setting up your alooma account, please call [phone].    Once again, we want to ensure your discharge home is safe and that you have a clear understanding of any next steps in your care. If you have any questions or concerns, please do not hesitate to contact us, we are here for you. Thank you for choosing [facility] for your healthcare needs.    Sincerely,    Your [facility] Healthcare Team

## 2017-07-10 NOTE — ED PROVIDER NOTES
ED Provider Note    CHIEF COMPLAINT  Chief Complaint   Patient presents with   • Rib Injury     left       HPI  Rain Sanabria is a 62 y.o. female who presents with left anterior and lateral rib pain, onset yesterday while cleaning her doctor.  She was bending over the edge reaching into the hot tub when she felt sudden pain to the right anterolateral ribs.  No cough, no shortness of breath.  The pain is worse with deep breath.  She denies history of DVT.  No recent leg swelling.  Pain was sudden onset at the time of the injury.  No head or neck pain acutely.  No cough, no shortness of breath.    REVIEW OF SYSTEMS    Constitutional: No fever  Respiratory: Pain with breathing.  She denies shortness of breath  Cardiac: No exertional chest pain or syncope  Gastrointestinal: No abdominal pain  Musculoskeletal: Right-sided rib pain, no spinal pain  Neurologic: No numbness        PAST MEDICAL HISTORY  Past Medical History   Diagnosis Date   • Depression 6/19/2009   • Genital herpes 6/19/2009   • Insomnia 6/19/2009   • Migraine 6/19/2009   • Abnormal mammogram 6/19/2009   • Cystocele 6/19/2009   • Hypertension    • Hyperlipidemia    • Vitamin D deficiency    • Sleep apnea      2011 had a very abn apnea link.  sx improved on nitetime o2.  her ins wouldn't pay for o2 and she can't afford it.  she is looking into options for o2.    • Abdominal hernia    • Anxiety    • OA (osteoarthritis)    • Chronic right shoulder pain        FAMILY HISTORY  Family History   Problem Relation Age of Onset   • Blood Disease Daughter      leukemia-all   • Diabetes Mother    • Hypertension Mother    • Diabetes Maternal Grandfather    • Diabetes Paternal Grandfather    • Cancer Paternal Grandfather        SOCIAL HISTORY  Social History     Social History   • Marital Status: Legally      Spouse Name: N/A   • Number of Children: N/A   • Years of Education: N/A     Social History Main Topics   • Smoking status: Never Smoker    •  "Smokeless tobacco: Never Used   • Alcohol Use: 4.2 oz/week     7 Standard drinks or equivalent per week   • Drug Use: No   • Sexual Activity: Not on file     Other Topics Concern   • Not on file     Social History Narrative       SURGICAL HISTORY  Past Surgical History   Procedure Laterality Date   • Tubal ligation     • Gastric bypass laparoscopic     • Breast biopsy       benign   • Hernia repair     • Shoulder surgery  11/24/2009     right   • Tubal coagulation laparoscopic bilateral     • Toe arthroplasty       toe shortening middle toe davin   • Rhinoplasty     • Breast biopsy       hx of benign left breast bx.   • Hysteroscopy thermal ablation       endometrial       CURRENT MEDICATIONS  Home Medications     **Home medications have not yet been reviewed for this encounter**          ALLERGIES  Allergies   Allergen Reactions   • Ace Inhibitors      Ace cough   • Tape      Adhesive tape       PHYSICAL EXAM  VITAL SIGNS: /93 mmHg  Pulse 77  Temp(Src) 37.4 °C (99.4 °F)  Resp 16  Ht 1.588 m (5' 2.5\")  Wt 113.4 kg (250 lb)  BMI 44.97 kg/m2  SpO2 98%  Constitutional:  Non-toxic appearance.   HENT: No facial swelling  Eyes: Anicteric, no conjunctivitis.     Cardiovascular: Normal heart rate, Normal rhythm  Pulmonary:  No wheezing, No rales.  Good air movement.  Gastrointestinal: Soft, No tenderness, No masses  Skin: Warm, Dry, No cyanosis.  No bruising to the chest wall, no erythematous mary carmen or abrasion  Neurologic: Speech is clear, follows commands, facial expression is symmetrical.  Psychiatric: Affect normal,  Mood normal.   Musculoskeletal: Right anterior axillary line, tenderness extends towards the sternum without crepitance or deformity to the right mid ribs.      RADIOLOGY/PROCEDURES  [id number] (WITH 1-VIEW CXR) LEFT   Final Result      Normal rib series.            COURSE & MEDICAL DECISION MAKING  Pertinent Labs & Imaging studies reviewed. (See chart for details)  Patient with rib injury, " negative x-ray.  No evidence of pneumothorax or underlying pulmonary contusion.  Patient will be treated with pain medication to be used as needed, is advised to return for shortness of breath, hemoptysis, any concerns.  Suspect rib contusion versus subclinical fracture versus pulled intercostal musculature    FINAL IMPRESSION     1. Rib pain                    Electronically signed by: Wolf Gilbert, 7/10/2017 10:33 PM

## 2017-07-21 ENCOUNTER — APPOINTMENT (OUTPATIENT)
Dept: RADIOLOGY | Facility: MEDICAL CENTER | Age: 62
End: 2017-07-21
Attending: NURSE PRACTITIONER
Payer: COMMERCIAL

## 2017-08-17 RX ORDER — DULOXETIN HYDROCHLORIDE 30 MG/1
CAPSULE, DELAYED RELEASE ORAL
Qty: 90 CAP | Refills: 1 | Status: SHIPPED | OUTPATIENT
Start: 2017-08-17 | End: 2017-10-17 | Stop reason: SDUPTHER

## 2017-08-17 RX ORDER — AMITRIPTYLINE HYDROCHLORIDE 25 MG/1
TABLET, FILM COATED ORAL
Qty: 30 TAB | Refills: 5 | Status: SHIPPED | OUTPATIENT
Start: 2017-08-17 | End: 2018-01-26

## 2017-08-18 ENCOUNTER — APPOINTMENT (OUTPATIENT)
Dept: RADIOLOGY | Facility: MEDICAL CENTER | Age: 62
End: 2017-08-18
Attending: NURSE PRACTITIONER
Payer: COMMERCIAL

## 2017-08-21 ENCOUNTER — OFFICE VISIT (OUTPATIENT)
Dept: MEDICAL GROUP | Facility: MEDICAL CENTER | Age: 62
End: 2017-08-21
Payer: COMMERCIAL

## 2017-08-21 VITALS
DIASTOLIC BLOOD PRESSURE: 92 MMHG | OXYGEN SATURATION: 99 % | BODY MASS INDEX: 45.64 KG/M2 | SYSTOLIC BLOOD PRESSURE: 149 MMHG | HEART RATE: 76 BPM | WEIGHT: 248 LBS | TEMPERATURE: 97.6 F | HEIGHT: 62 IN

## 2017-08-21 DIAGNOSIS — M25.511 CHRONIC RIGHT SHOULDER PAIN: ICD-10-CM

## 2017-08-21 DIAGNOSIS — M25.562 CHRONIC PAIN OF LEFT KNEE: ICD-10-CM

## 2017-08-21 DIAGNOSIS — G89.29 CHRONIC RIGHT SHOULDER PAIN: ICD-10-CM

## 2017-08-21 DIAGNOSIS — Z79.891 CHRONIC USE OF OPIATE DRUGS THERAPEUTIC PURPOSES: ICD-10-CM

## 2017-08-21 DIAGNOSIS — G89.29 CHRONIC PAIN OF LEFT KNEE: ICD-10-CM

## 2017-08-21 PROCEDURE — 99213 OFFICE O/P EST LOW 20 MIN: CPT | Performed by: NURSE PRACTITIONER

## 2017-08-21 RX ORDER — HYDROCODONE BITARTRATE AND ACETAMINOPHEN 5; 325 MG/1; MG/1
1 TABLET ORAL EVERY 12 HOURS PRN
Qty: 60 TAB | Refills: 0 | Status: SHIPPED | OUTPATIENT
Start: 2017-08-21 | End: 2017-11-22 | Stop reason: SDUPTHER

## 2017-08-21 RX ORDER — HYDROCODONE BITARTRATE AND ACETAMINOPHEN 5; 325 MG/1; MG/1
1 TABLET ORAL EVERY 12 HOURS PRN
Qty: 60 TAB | Refills: 0 | Status: SHIPPED | OUTPATIENT
Start: 2017-08-21 | End: 2017-08-21 | Stop reason: SDUPTHER

## 2017-08-21 NOTE — MR AVS SNAPSHOT
"        Rain Sanabria   2017 11:30 AM   Office Visit   MRN: [id number]    Department:  [facility]   Dept Phone:  [phone]    Description:  Female : 1955   Provider:  SUHAIL Man           Allergies as of 2017     Allergen Noted Reactions    Ace Inhibitors 2010       Ace cough    Tape 2014       Adhesive tape      You were diagnosed with     Chronic right shoulder pain   [162243]   refill meds. f/u 3 months for pain med refill    Chronic use of opiate drugs therapeutic purposes   [5386214]       Chronic pain of left knee   [150907]   refilled pain meds. continue f/u with ortho    BMI 45.0-49.9, adult ([facility])   [346959]         Vital Signs     Blood Pressure Pulse Temperature Height Weight Body Mass Index    149/92 mmHg 76 36.4 °C (97.6 °F) 1.575 m (5' 2\") 112.492 kg (248 lb) 45.35 kg/m2    Oxygen Saturation Breastfeeding? Smoking Status             99% No Never Smoker          Basic Information     Date Of Birth Sex Race Ethnicity Preferred Language    1955 Female White Non- English      Your appointments     Oct 06, 2017  9:45 AM   MA SCRN10 with [facility] MG 3   [facility] ([address])    [address] [place] [id number]   [phone]           No deodorant, powder, perfume or lotion under the arm or breast area.            Oct 06, 2017 10:00 AM   BONE DENSITY (DEXASCAN) with [facility] BD 1   [address])    [address] [place] [id number] [phone]           No calcium supplements 24 hours prior to exam, including antacids or multivitamins w/calcium.  Pt may eat and drink normally.  No injection procedures prior to Dexa on the same day.  No barium contrast, no CTs with IV or oral contrast, no Pet/CTs and no Nuc Med injections for 7 days prior to a Dexa (including Barium Swallow, Upper GI, Small Bowel Series).  If scheduling a Dexa on the same day as a CT " with IV or oral contrast, any test with barium, Pet/CT or a Nuc Med with injection, always schedule the Dexa before the other study.              Problem List              ICD-10-CM Priority Class Noted - Resolved    Depression F32.9   6/19/2009 - Present    Genital herpes simplex A60.00   6/19/2009 - Present    Insomnia G47.00   6/19/2009 - Present    Migraine    6/19/2009 - Present    Abnormal mammogram R92.8   6/19/2009 - Present    Preventative health care Z00.00   6/19/2009 - Present    Cystocele N81.10   6/19/2009 - Present    HTN (hypertension) I10   1/20/2010 - Present    Sleep apnea G47.30   7/12/2010 - Present    OA (osteoarthritis) M19.90   7/12/2010 - Present    Vitamin D deficiency E55.9   Unknown - Present    Abdominal hernia K46.9   Unknown - Present    Hyperlipidemia E78.5   Unknown - Present    Anxiety F41.9   Unknown - Present    Chronic right shoulder pain M25.511, G89.29   1/6/2016 - Present    Morbid obesity with BMI of 40.0-44.9, adult (HCC) E66.01, Z68.41   1/24/2017 - Present    Morbid obesity with BMI of 45.0-49.9, adult (HCC) E66.01, Z68.42   2/22/2017 - Present    Shortness of breath R06.02   3/16/2017 - Present      Health Maintenance        Date Due Completion Dates    BONE DENSITY 2/11/2013 2/11/2011    IMM ZOSTER VACCINE 2/10/2014 ---    MAMMOGRAM 1/19/2017 1/19/2016, 2/15/2013, 2/13/2012, 2/11/2011, 4/24/2009, 4/24/2009, 5/1/2008, 4/16/2008, 4/13/2007, 10/13/2004    IMM INFLUENZA (1) 9/1/2017 10/16/2016, 1/20/2016, 10/3/2014, 1/4/2013, 12/22/2011, 1/18/2011    PAP SMEAR 4/12/2019 4/12/2016, 4/12/2016, 4/12/2016 (Done), 6/28/2011    Override on 4/12/2016: Done    COLONOSCOPY 7/6/2022 7/6/2012 (Done)    Override on 7/6/2012: Done    IMM DTaP/Tdap/Td Vaccine (2 - Td) 6/13/2026 6/13/2016, 3/18/2016            Current Immunizations     Influenza TIV (IM) 1/4/2013, 12/22/2011 10:46 AM    Influenza Vaccine Adult HD 10/16/2016    Influenza Vaccine Pediatric 1/18/2011    Influenza Vaccine Quad  Inj (Pf) 1/20/2016, 10/3/2014    Tdap Vaccine 6/13/2016 12:10 PM, 3/18/2016 11:42 AM      Below and/or attached are the medications your provider expects you to take. Review all of your home medications and newly ordered medications with your provider and/or pharmacist. Follow medication instructions as directed by your provider and/or pharmacist. Please keep your medication list with you and share with your provider. Update the information when medications are discontinued, doses are changed, or new medications (including over-the-counter products) are added; and carry medication information at all times in the event of emergency situations     Allergies:  ACE INHIBITORS - (reactions not documented)     TAPE - (reactions not documented)               Medications  Valid as of: August 21, 2017 - 12:00 PM    Generic Name Brand Name Tablet Size Instructions for use    Acyclovir (Tab) ZOVIRAX 400 MG Take 1 Tab by mouth 2 times a day.        Albuterol Sulfate (Aero Soln) albuterol 108 (90 Base) MCG/ACT INHALE 2 PUFFS BY MOUTH EVERY FOUR HOURS AS NEEDED FOR SHORTNESS OF BREATH. DISPENSE A SPACER        Amitriptyline HCl (Tab) ELAVIL 25 MG TAKE 1 TAB BY MOUTH AT BEDTIME AS NEEDED.        Calcium Citrate-Vitamin D   Take 400 mg by mouth every day.        Cholecalciferol (Cap) Vitamin D 1000 UNIT Take 2 Caps by mouth every day.        DULoxetine HCl (Cap DR Particles) CYMBALTA 20 MG Take 20 mg by mouth every day.        DULoxetine HCl (Cap DR Particles) CYMBALTA 30 MG TAKE 1 CAP BY MOUTH EVERY DAY.        Hydrocodone-Acetaminophen (Tab) NORCO 5-325 MG Take 1 Tab by mouth every 12 hours as needed. May refill 5/19/16        Multiple Vitamins-Minerals   Take 1 Tab by mouth every day.        .                 Medicines prescribed today were sent to:     [facility]ACY #[id number] - [place] - [address]    [address] [address]    Phone: [phone] Fax: [phone]    Open 24 Hours?: No      Medication refill  instructions:       If your prescription bottle indicates you have medication refills left, it is not necessary to call your provider’s office. Please contact your pharmacy and they will refill your medication.    If your prescription bottle indicates you do not have any refills left, you may request refills at any time through one of the following ways: The online IKOTECH system (except Urgent Care), by calling your provider’s office, or by asking your pharmacy to contact your provider’s office with a refill request. Medication refills are processed only during regular business hours and may not be available until the next business day. Your provider may request additional information or to have a follow-up visit with you prior to refilling your medication.   *Please Note: Medication refills are assigned a new Rx number when refilled electronically. Your pharmacy may indicate that no refills were authorized even though a new prescription for the same medication is available at the pharmacy. Please request the medicine by name with the pharmacy before contacting your provider for a refill.           IKOTECH Access Code: Activation code not generated  Current IKOTECH Status: Active

## 2017-08-21 NOTE — PROGRESS NOTES
No chief complaint on file.      HISTORY OF PRESENT ILLNESS: Patient is a 62 y.o. female established patient who presents today for a med refill of a controlled substance in addition to their other issues listed in the rest of the progress note    The patient is requesting medication for the following issue:  joint pain in davin knees.  Recent xray showed severe OA.  She is set up with ORTHO to discuss what to do.   Approximate date of onset of condition was long ago.  The source of the pain/condition is OA davin knees.  Has had davin knee shots that worked well initially.  The current severity of pain is 7/ 10.  Today her bp is elevated.  She has had a stressful month.  She is also having lots of pain today.     Patient Active Problem List    Diagnosis Date Noted   • Shortness of breath 03/16/2017   • Morbid obesity with BMI of 45.0-49.9, adult ([facility]) 02/22/2017   • Morbid obesity with BMI of 40.0-44.9, adult ([facility]) 01/24/2017   • Chronic right shoulder pain 01/06/2016   • Hyperlipidemia    • Anxiety    • Vitamin D deficiency    • Abdominal hernia    • Sleep apnea 07/12/2010   • OA (osteoarthritis) 07/12/2010   • HTN (hypertension) 01/20/2010   • Depression 06/19/2009   • Genital herpes simplex 06/19/2009   • Insomnia 06/19/2009   • Migraine 06/19/2009   • Abnormal mammogram 06/19/2009   • Preventative health care 06/19/2009   • Cystocele 06/19/2009     Current Outpatient Prescriptions   Medication Sig Dispense Refill   • duloxetine (CYMBALTA) 30 MG Cap DR Particles TAKE 1 CAP BY MOUTH EVERY DAY. 90 Cap 1   • amitriptyline (ELAVIL) 25 MG Tab TAKE 1 TAB BY MOUTH AT BEDTIME AS NEEDED. 30 Tab 5   • duloxetine (CYMBALTA) 20 MG Cap DR Particles Take 20 mg by mouth every day.     • hydrocodone-acetaminophen (NORCO) 5-325 MG Tab per tablet Take 1 Tab by mouth every 12 hours as needed. May refill 5/19/16 60 Tab 0   • albuterol (VENTOLIN HFA) 108 (90 BASE) MCG/ACT Aero Soln inhalation aerosol INHALE 2 PUFFS BY MOUTH EVERY FOUR  HOURS AS NEEDED FOR SHORTNESS OF BREATH. DISPENSE A SPACER 18 Inhaler 5   • acyclovir (ZOVIRAX) 400 MG tablet Take 1 Tab by mouth 2 times a day. 180 Tab 0   • Cholecalciferol (VITAMIN D) 1000 UNIT CAPS Take 2 Caps by mouth every day.     • Calcium-Vitamin D (CALCIUM + D PO) Take 400 mg by mouth every day.     • Multiple Vitamin (MULTIVITAMIN PO) Take 1 Tab by mouth every day.       No current facility-administered medications for this visit.     Allergies   Allergen Reactions   • Ace Inhibitors      Ace cough   • Tape      Adhesive tape       Current Problems:    Chronic Opiate Therapy: V58.69:    Chronic Pain Syndrome:  338.4:     Other condition: hyperlipidemiat, migraine, depreesion, HTN      Consequences of Chronic Opiate therapy:  (5 A's)  Analgesia:  stable  Activity:  improved  Adverse Events:  none  Aberrant Behaviors:  none  Affect/Mood: appropriate  Last CMP:   1/17  Appropriate Imaging done:   6/17/16    Patient Active Problem List    Diagnosis Date Noted   • Shortness of breath 03/16/2017   • Morbid obesity with BMI of 45.0-49.9, adult ([facility]) 02/22/2017   • Morbid obesity with BMI of 40.0-44.9, adult ([facility]) 01/24/2017   • Chronic right shoulder pain 01/06/2016   • Hyperlipidemia    • Anxiety    • Vitamin D deficiency    • Abdominal hernia    • Sleep apnea 07/12/2010   • OA (osteoarthritis) 07/12/2010   • HTN (hypertension) 01/20/2010   • Depression 06/19/2009   • Genital herpes simplex 06/19/2009   • Insomnia 06/19/2009   • Migraine 06/19/2009   • Abnormal mammogram 06/19/2009   • Preventative health care 06/19/2009   • Cystocele 06/19/2009       Allergies:Ace inhibitors and Tape    Current Outpatient Prescriptions   Medication Sig Dispense Refill   • duloxetine (CYMBALTA) 30 MG Cap DR Particles TAKE 1 CAP BY MOUTH EVERY DAY. 90 Cap 1   • amitriptyline (ELAVIL) 25 MG Tab TAKE 1 TAB BY MOUTH AT BEDTIME AS NEEDED. 30 Tab 5   • duloxetine (CYMBALTA) 20 MG Cap DR Particles Take 20 mg by mouth every day.     •  hydrocodone-acetaminophen (NORCO) 5-325 MG Tab per tablet Take 1 Tab by mouth every 12 hours as needed. May refill 5/19/16 60 Tab 0   • albuterol (VENTOLIN HFA) 108 (90 BASE) MCG/ACT Aero Soln inhalation aerosol INHALE 2 PUFFS BY MOUTH EVERY FOUR HOURS AS NEEDED FOR SHORTNESS OF BREATH. DISPENSE A SPACER 18 Inhaler 5   • acyclovir (ZOVIRAX) 400 MG tablet Take 1 Tab by mouth 2 times a day. 180 Tab 0   • Cholecalciferol (VITAMIN D) 1000 UNIT CAPS Take 2 Caps by mouth every day.     • Calcium-Vitamin D (CALCIUM + D PO) Take 400 mg by mouth every day.     • Multiple Vitamin (MULTIVITAMIN PO) Take 1 Tab by mouth every day.       No current facility-administered medications for this visit.       I reviewed Tylenol/ Acetaminophen dosing: the patient has been informed about the use of tylenol to help with any pain issue. The appropriate amount for this patient given their liver function has been discussed.    I have discussed with the patient that with any controlled substance prescription it is vital to have these medications in a safe, secure environment. I have discussed that it is their responsibility that their medications are not accessible to anyone else who may use them inadvertently.    I have discussed with the patient that any controlled substance can impair the ability to drive or operate any machinery and that the patient should not use them if they plan on driving or operating machinery.    I have reviewed and updated the past medical/surgical, family history and social history as of today.    Review of Systems   Constitutional: Negative.  Negative for fever, chills, weight loss, malaise/fatigue and diaphoresis.   HENT: Negative.  Negative for hearing loss, ear pain, nosebleeds, congestion, sore throat, neck pain, tinnitus and ear discharge.    Respiratory: Negative.  Negative for cough, hemoptysis, sputum production, shortness of breath, wheezing and stridor.    Cardiovascular: Negative.  Negative for chest  pain, palpitations, orthopnea, claudication, leg swelling and PND.   Gastrointestinal: denies nausea, vomiting, diarrhea, constipation, heartburn, melena or hematochezia.  Genitourinary: Denies dysuria, hematuria, urinary incontinence, frequency or urgency.        Physical Exam   Vitals reviewed.  Constitutional: oriented to person, place, and time. appears well-developed and well-nourished. No distress.   Cardiovascular: Normal rate, regular rhythm, normal heart sounds and intact distal pulses.  Exam reveals no gallop and no friction rub.  No murmur heard.  No carotid bruits.   Pulmonary/Chest: Effort normal and breath sounds normal. No stridor. No respiratory distress. no wheezes or rales. exhibits no tenderness.   Musculoskeletal: Normal range of motion. exhibits no edema. davin pedal pulses 2+.  Lymphadenopathy: no cervical or supraclavicular adenopathy.   Abd:  No CVAT,  Soft,  Bs noted in all quadrants.  No HSM.  No abdominal tenderness.  Neurological: alert and oriented to person, place, and time. exhibits normal muscle tone. Coordination normal.   Skin: Skin is warm and dry. no diaphoresis.   Psychiatric: normal mood and affect. behavior is normal.           DISCUSSION OF CARE PLAN:    - I discussed management options. I reviewed symptomatic care     - I will obtain/review additional records if needed    - I discussed efforts with home exercise program and activity modification     - I reviewed medication monitoring.     I recommend discontinuation of benzodiazepines if the patient is on chronic opiate therapy    The patient was advised to avoid alcohol use with prescribed medication. I counseled the patient regarding risks, side effects, and interactions of medications. I counseled the patient on the controlled substance prescribing program. I reviewed further symptomatic medications.  - I reviewed additional diagnostic options: Yes.  - I reviewed additional therapeutic options: Yes  - I reviewed psychosocial  interventions:  Yes    Please note that this dictation was created using voice recognition software. I have made every reasonable attempt to correct obvious errors, but I expect that there are errors of grammar and possibly content that I did not discover before finalizing the note.    Assessment/Plan:    A Controlled Substance Agreement has been signed within the last year. A urine drug screen has been done in the past year.

## 2017-10-17 DIAGNOSIS — F41.9 ANXIETY: ICD-10-CM

## 2017-10-17 RX ORDER — DULOXETIN HYDROCHLORIDE 30 MG/1
30 CAPSULE, DELAYED RELEASE ORAL
Qty: 90 CAP | Refills: 1 | Status: SHIPPED | OUTPATIENT
Start: 2017-10-17 | End: 2018-02-23 | Stop reason: SDUPTHER

## 2017-10-17 RX ORDER — ACYCLOVIR 400 MG/1
400 TABLET ORAL 2 TIMES DAILY
Qty: 180 TAB | Refills: 0 | Status: CANCELLED | OUTPATIENT
Start: 2017-10-17

## 2017-10-17 NOTE — TELEPHONE ENCOUNTER
From: Rain Thompsone Daniele  Sent: 10/17/2017 12:09 PM PDT  Subject: Medication Renewal Request    Rain BELTRE Daniele would like a refill of the following medications:  acyclovir (ZOVIRAX) 400 MG tablet [Haider Mathur M.D.]    Preferred pharmacy: [id number] [place] - [address]    Comment:  Please refill Rx for DULOXETINE and I am using [facility] [address] [facility]. DO NOT fill RX checked above.

## 2017-11-03 ENCOUNTER — HOSPITAL ENCOUNTER (OUTPATIENT)
Dept: RADIOLOGY | Facility: MEDICAL CENTER | Age: 62
End: 2017-11-03
Attending: NURSE PRACTITIONER
Payer: COMMERCIAL

## 2017-11-03 DIAGNOSIS — Z12.31 ENCOUNTER FOR SCREENING MAMMOGRAM FOR MALIGNANT NEOPLASM OF BREAST: ICD-10-CM

## 2017-11-03 DIAGNOSIS — N95.9 POST MENOPAUSAL PROBLEMS: ICD-10-CM

## 2017-11-03 PROCEDURE — 77080 DXA BONE DENSITY AXIAL: CPT

## 2017-11-03 PROCEDURE — G0202 SCR MAMMO BI INCL CAD: HCPCS

## 2017-11-19 ENCOUNTER — OFFICE VISIT (OUTPATIENT)
Dept: URGENT CARE | Facility: PHYSICIAN GROUP | Age: 62
End: 2017-11-19
Payer: COMMERCIAL

## 2017-11-19 VITALS
TEMPERATURE: 98.2 F | WEIGHT: 252 LBS | DIASTOLIC BLOOD PRESSURE: 78 MMHG | HEIGHT: 62 IN | SYSTOLIC BLOOD PRESSURE: 130 MMHG | RESPIRATION RATE: 16 BRPM | HEART RATE: 86 BPM | OXYGEN SATURATION: 96 % | BODY MASS INDEX: 46.38 KG/M2

## 2017-11-19 DIAGNOSIS — J20.9 ACUTE BRONCHITIS WITH BRONCHOSPASM: ICD-10-CM

## 2017-11-19 PROCEDURE — 99203 OFFICE O/P NEW LOW 30 MIN: CPT | Performed by: EMERGENCY MEDICINE

## 2017-11-19 RX ORDER — ALBUTEROL SULFATE 90 UG/1
1-2 AEROSOL, METERED RESPIRATORY (INHALATION) EVERY 6 HOURS PRN
Qty: 8.5 G | Refills: 0 | Status: SHIPPED | OUTPATIENT
Start: 2017-11-19 | End: 2018-01-26

## 2017-11-19 RX ORDER — BENZONATATE 100 MG/1
100 CAPSULE ORAL 3 TIMES DAILY PRN
Qty: 15 CAP | Refills: 0 | Status: SHIPPED | OUTPATIENT
Start: 2017-11-19 | End: 2018-01-26

## 2017-11-19 ASSESSMENT — ENCOUNTER SYMPTOMS
MYALGIAS: 0
HEARTBURN: 0
SINUS PAIN: 0
NAUSEA: 0
HEMOPTYSIS: 0
COUGH: 1
SWEATS: 0
SHORTNESS OF BREATH: 0
RHINORRHEA: 0
FEVER: 1
HEADACHES: 1
CHILLS: 0
DIARRHEA: 0
SORE THROAT: 1
PALPITATIONS: 0
VOMITING: 0
WHEEZING: 1
SPUTUM PRODUCTION: 0

## 2017-11-20 NOTE — PROGRESS NOTES
Subjective:      Rain Sanabria is a 62 y.o. female who presents with URI (x5days cough, fever, congestion sinus pressure)            Cough   This is a new problem. Episode onset: 5 days. The problem has been unchanged. The problem occurs every few minutes. The cough is non-productive. Associated symptoms include a fever, headaches, postnasal drip, a sore throat and wheezing. Pertinent negatives include no chest pain, chills, ear congestion, ear pain, heartburn, hemoptysis, myalgias, nasal congestion, rash, rhinorrhea, shortness of breath or sweats. Exacerbated by: deep breathing. She has tried nothing for the symptoms. Her past medical history is significant for bronchitis. There is no history of environmental allergies.       Review of Systems   Constitutional: Positive for fever. Negative for chills.   HENT: Positive for nosebleeds, postnasal drip and sore throat. Negative for congestion, ear pain, rhinorrhea and sinus pain.    Respiratory: Positive for cough and wheezing. Negative for hemoptysis, sputum production and shortness of breath.    Cardiovascular: Negative for chest pain, palpitations and leg swelling.   Gastrointestinal: Negative for diarrhea, heartburn, nausea and vomiting.   Musculoskeletal: Negative for myalgias.   Skin: Negative for rash.   Neurological: Positive for headaches.   Endo/Heme/Allergies: Negative for environmental allergies.     PMH:  has a past medical history of Abdominal hernia; Abnormal mammogram (6/19/2009); Anxiety; Chronic right shoulder pain; Cystocele (6/19/2009); Depression (6/19/2009); Genital herpes (6/19/2009); Hyperlipidemia; Hypertension; Insomnia (6/19/2009); Migraine (6/19/2009); OA (osteoarthritis); Sleep apnea; and Vitamin D deficiency.  MEDS:   Current Outpatient Prescriptions:   •  albuterol 108 (90 Base) MCG/ACT Aero Soln inhalation aerosol, Inhale 1-2 Puffs by mouth every 6 hours as needed (cough, wheeze)., Disp: 8.5 g, Rfl: 0  •  benzonatate (TESSALON) 100  MG Cap, Take 1 Cap by mouth 3 times a day as needed for Cough., Disp: 15 Cap, Rfl: 0  •  duloxetine (CYMBALTA) 30 MG Cap DR Particles, Take 1 Cap by mouth every day. TAKE 1 CAP BY MOUTH EVERY DAY., Disp: 90 Cap, Rfl: 1  •  hydrocodone-acetaminophen (NORCO) 5-325 MG Tab per tablet, Take 1 Tab by mouth every 12 hours as needed. May refill 5/19/16, Disp: 60 Tab, Rfl: 0  •  Cholecalciferol (VITAMIN D) 1000 UNIT CAPS, Take 2 Caps by mouth every day., Disp: , Rfl:   •  Calcium-Vitamin D (CALCIUM + D PO), Take 400 mg by mouth every day., Disp: , Rfl:   •  Multiple Vitamin (MULTIVITAMIN PO), Take 1 Tab by mouth every day., Disp: , Rfl:   •  amitriptyline (ELAVIL) 25 MG Tab, TAKE 1 TAB BY MOUTH AT BEDTIME AS NEEDED., Disp: 30 Tab, Rfl: 5  •  duloxetine (CYMBALTA) 20 MG Cap DR Particles, Take 20 mg by mouth every day., Disp: , Rfl:   •  albuterol (VENTOLIN HFA) 108 (90 BASE) MCG/ACT Aero Soln inhalation aerosol, INHALE 2 PUFFS BY MOUTH EVERY FOUR HOURS AS NEEDED FOR SHORTNESS OF BREATH. DISPENSE A SPACER, Disp: 18 Inhaler, Rfl: 5  •  acyclovir (ZOVIRAX) 400 MG tablet, Take 1 Tab by mouth 2 times a day., Disp: 180 Tab, Rfl: 0  ALLERGIES:   Allergies   Allergen Reactions   • Ace Inhibitors      Ace cough   • Tape      Adhesive tape     SURGHX:   Past Surgical History:   Procedure Laterality Date   • SHOULDER SURGERY  11/24/2009    right   • BREAST BIOPSY      benign   • BREAST BIOPSY      hx of benign left breast bx.   • GASTRIC BYPASS LAPAROSCOPIC     • HERNIA REPAIR     • HYSTEROSCOPY THERMAL ABLATION      endometrial   • RHINOPLASTY     • TOE ARTHROPLASTY      toe shortening middle toe davin   • TUBAL COAGULATION LAPAROSCOPIC BILATERAL     • TUBAL LIGATION       SOCHX:  reports that she has never smoked. She has never used smokeless tobacco. She reports that she drinks about 4.2 oz of alcohol per week . She reports that she does not use drugs.  FH: family history includes Blood Disease in her daughter; Cancer in her paternal  "grandfather; Diabetes in her maternal grandfather, mother, and paternal grandfather; Hypertension in her mother.       Objective:     /78   Pulse 86   Temp 36.8 °C (98.2 °F)   Resp 16   Ht 1.575 m (5' 2\")   Wt 114.3 kg (252 lb)   SpO2 96%   BMI 46.09 kg/m²      Physical Exam   Constitutional: She appears well-developed and well-nourished. She is cooperative.  Non-toxic appearance. No distress.   HENT:   Right Ear: Tympanic membrane and ear canal normal.   Left Ear: Tympanic membrane and ear canal normal.   Nose: Mucosal edema present. No rhinorrhea.   Mouth/Throat: Mucous membranes are normal. No uvula swelling. No oropharyngeal exudate, posterior oropharyngeal edema or posterior oropharyngeal erythema.   Eyes: Conjunctivae are normal.   Neck: Trachea normal and phonation normal. Neck supple.   Cardiovascular: Normal rate, regular rhythm and normal heart sounds.    No significant pedal edema.   Pulmonary/Chest: Effort normal and breath sounds normal.   Lymphadenopathy:     She has no cervical adenopathy.   Neurological: She is alert.   Skin: Skin is warm and dry.   Psychiatric: She has a normal mood and affect.               Assessment/Plan:     1. Acute bronchitis with bronchospasm  Recommended supportive care measures, including rest, increasing oral fluid intake.  - albuterol 108 (90 Base) MCG/ACT Aero Soln inhalation aerosol; Inhale 1-2 Puffs by mouth every 6 hours as needed (cough, wheeze).  Dispense: 8.5 g; Refill: 0  - benzonatate (TESSALON) 100 MG Cap; Take 1 Cap by mouth 3 times a day as needed for Cough.  Dispense: 15 Cap; Refill: 0      "

## 2017-11-22 ENCOUNTER — OFFICE VISIT (OUTPATIENT)
Dept: MEDICAL GROUP | Facility: MEDICAL CENTER | Age: 62
End: 2017-11-22
Payer: COMMERCIAL

## 2017-11-22 VITALS
DIASTOLIC BLOOD PRESSURE: 78 MMHG | HEART RATE: 83 BPM | BODY MASS INDEX: 45.82 KG/M2 | WEIGHT: 249 LBS | TEMPERATURE: 97.3 F | SYSTOLIC BLOOD PRESSURE: 128 MMHG | HEIGHT: 62 IN | OXYGEN SATURATION: 98 %

## 2017-11-22 DIAGNOSIS — R73.9 HYPERGLYCEMIA: ICD-10-CM

## 2017-11-22 DIAGNOSIS — Z79.891 CHRONIC USE OF OPIATE DRUGS THERAPEUTIC PURPOSES: ICD-10-CM

## 2017-11-22 DIAGNOSIS — E78.5 HYPERLIPIDEMIA LDL GOAL <100: ICD-10-CM

## 2017-11-22 DIAGNOSIS — M25.511 CHRONIC RIGHT SHOULDER PAIN: ICD-10-CM

## 2017-11-22 DIAGNOSIS — G89.29 CHRONIC PAIN OF LEFT KNEE: ICD-10-CM

## 2017-11-22 DIAGNOSIS — R09.89 BIBASILAR CRACKLES: ICD-10-CM

## 2017-11-22 DIAGNOSIS — G89.29 CHRONIC RIGHT SHOULDER PAIN: ICD-10-CM

## 2017-11-22 DIAGNOSIS — M25.562 CHRONIC PAIN OF LEFT KNEE: ICD-10-CM

## 2017-11-22 PROCEDURE — 99214 OFFICE O/P EST MOD 30 MIN: CPT | Performed by: NURSE PRACTITIONER

## 2017-11-22 RX ORDER — HYDROCODONE BITARTRATE AND ACETAMINOPHEN 5; 325 MG/1; MG/1
1 TABLET ORAL EVERY 12 HOURS PRN
Qty: 60 TAB | Refills: 0 | Status: SHIPPED | OUTPATIENT
Start: 2017-11-22 | End: 2017-11-22 | Stop reason: SDUPTHER

## 2017-11-22 RX ORDER — HYDROCODONE BITARTRATE AND ACETAMINOPHEN 5; 325 MG/1; MG/1
1 TABLET ORAL EVERY 12 HOURS PRN
Qty: 60 TAB | Refills: 0 | Status: SHIPPED | OUTPATIENT
Start: 2017-11-22 | End: 2018-02-26 | Stop reason: SDUPTHER

## 2017-11-22 ASSESSMENT — PATIENT HEALTH QUESTIONNAIRE - PHQ9: CLINICAL INTERPRETATION OF PHQ2 SCORE: 0

## 2017-11-22 NOTE — PROGRESS NOTES
Subjective:     Rain Sanabria is a 62 y.o. female who presents with   Chief Complaint   Patient presents with   • Follow-Up   .    HPI:   Seen in fu for pain med refill.  She is on chronic norco for her back and joint pain.  She is going to have on her left knee                     2/12/18 with Dr Frias.  She is due refills on med.  Last dose of 2 nites ago.   She will be due her routine lab with next appt.  Her CMP was wnl last year except for very mildly elevated glucose.  Her LP showed elevated LDL.    She is due flu shot and shingles.  Will get at pharmacy today.    She is getting over URI. Was seen in  on sunday.        Patient Active Problem List    Diagnosis Date Noted   • Shortness of breath 03/16/2017   • Morbid obesity with BMI of 45.0-49.9, adult ([facility]) 02/22/2017   • Morbid obesity with BMI of 40.0-44.9, adult ([facility]) 01/24/2017   • Chronic right shoulder pain 01/06/2016   • Hyperlipidemia    • Anxiety    • Vitamin D deficiency    • Abdominal hernia    • Sleep apnea 07/12/2010   • OA (osteoarthritis) 07/12/2010   • HTN (hypertension) 01/20/2010   • Depression 06/19/2009   • Genital herpes simplex 06/19/2009   • Insomnia 06/19/2009   • Migraine 06/19/2009   • Abnormal mammogram 06/19/2009   • Preventative health care 06/19/2009   • Cystocele 06/19/2009       Current medicines (including changes today)  Current Outpatient Prescriptions   Medication Sig Dispense Refill   • hydrocodone-acetaminophen (NORCO) 5-325 MG Tab per tablet Take 1 Tab by mouth every 12 hours as needed. May refill 1/22/18 60 Tab 0   • benzonatate (TESSALON) 100 MG Cap Take 1 Cap by mouth 3 times a day as needed for Cough. 15 Cap 0   • duloxetine (CYMBALTA) 30 MG Cap DR Particles Take 1 Cap by mouth every day. TAKE 1 CAP BY MOUTH EVERY DAY. 90 Cap 1   • albuterol (VENTOLIN HFA) 108 (90 BASE) MCG/ACT Aero Soln inhalation aerosol INHALE 2 PUFFS BY MOUTH EVERY FOUR HOURS AS NEEDED FOR SHORTNESS OF BREATH. DISPENSE A SPACER 18  "Inhaler 5   • Cholecalciferol (VITAMIN D) 1000 UNIT CAPS Take 2 Caps by mouth every day.     • Calcium-Vitamin D (CALCIUM + D PO) Take 400 mg by mouth every day.     • Multiple Vitamin (MULTIVITAMIN PO) Take 1 Tab by mouth every day.     • albuterol 108 (90 Base) MCG/ACT Aero Soln inhalation aerosol Inhale 1-2 Puffs by mouth every 6 hours as needed (cough, wheeze). 8.5 g 0   • amitriptyline (ELAVIL) 25 MG Tab TAKE 1 TAB BY MOUTH AT BEDTIME AS NEEDED. 30 Tab 5   • acyclovir (ZOVIRAX) 400 MG tablet Take 1 Tab by mouth 2 times a day. 180 Tab 0     No current facility-administered medications for this visit.        Allergies   Allergen Reactions   • Ace Inhibitors      Ace cough   • Tape      Adhesive tape       ROS  Constitutional: Negative. Negative for fever, chills, weight loss, malaise/fatigue and diaphoresis.   HENT: Negative. Negative for hearing loss, ear pain, nosebleeds, congestion, sore throat, neck pain, tinnitus and ear discharge.   Respiratory: Negative. Negative for cough, hemoptysis, sputum production, shortness of breath, wheezing and stridor.   Cardiovascular: Negative. Negative for chest pain, palpitations, orthopnea, claudication, leg swelling and PND.   Gastrointestinal: Denies nausea, vomiting, diarrhea, constipation, heartburn, melena or hematochezia.  Genitourinary: Denies dysuria, hematuria, urinary incontinence, frequency or urgency.        Objective:     Blood pressure 128/78, pulse 83, temperature 36.3 °C (97.3 °F), height 1.575 m (5' 2\"), weight 112.9 kg (249 lb), SpO2 98 %. Body mass index is 45.54 kg/m².    Physical Exam:  Vitals reviewed.  Constitutional: Oriented to person, place, and time. appears well-developed and well-nourished. No distress.   Cardiovascular: Normal rate, regular rhythm, normal heart sounds and intact distal pulses. Exam reveals no gallop and no friction rub. No murmur heard. No carotid bruits.   Pulmonary/Chest: Effort normal and breath sounds normal. No stridor. No " respiratory distress. no wheezes.  Bibasilar crackles. exhibits no tenderness.   Musculoskeletal: Normal range of motion. exhibits no edema. davin pedal pulses 2+.  Lymphadenopathy: No cervical or supraclavicular adenopathy.   Neurological: Alert and oriented to person, place, and time. exhibits normal muscle tone.  Skin: Skin is warm and dry. No diaphoresis.   Psychiatric: Normal mood and affect. Behavior is normal.      Assessment and Plan:     The following treatment plan was discussed:    1. Chronic right shoulder pain  hydrocodone-acetaminophen (NORCO) 5-325 MG Tab per tablet    DISCONTINUED: hydrocodone-acetaminophen (NORCO) 5-325 MG Tab per tablet    DISCONTINUED: hydrocodone-acetaminophen (NORCO) 5-325 MG Tab per tablet    DISCONTINUED: hydrocodone-acetaminophen (NORCO) 5-325 MG Tab per tablet    refill meds. f/u 3 months for pain med refill and lab review   2. Chronic use of opiate drugs therapeutic purposes  hydrocodone-acetaminophen (NORCO) 5-325 MG Tab per tablet    DISCONTINUED: hydrocodone-acetaminophen (NORCO) 5-325 MG Tab per tablet    DISCONTINUED: hydrocodone-acetaminophen (NORCO) 5-325 MG Tab per tablet    DISCONTINUED: hydrocodone-acetaminophen (NORCO) 5-325 MG Tab per tablet   3. Chronic pain of left knee  hydrocodone-acetaminophen (NORCO) 5-325 MG Tab per tablet    DISCONTINUED: hydrocodone-acetaminophen (NORCO) 5-325 MG Tab per tablet    DISCONTINUED: hydrocodone-acetaminophen (NORCO) 5-325 MG Tab per tablet    DISCONTINUED: hydrocodone-acetaminophen (NORCO) 5-325 MG Tab per tablet    refilled pain meds. continue f/u with ortho. surgery in 2/18.   4. Hyperglycemia  CMP14+LP    check lab in 3 months with CMP, LP.  f/u for lab review and med refill.    5. Hyperlipidemia LDL goal <100  CMP14+LP   6. Bibasilar crackles  DX-CHEST-2 VIEWS    do cxr.  not on antibx         Followup: Return in about 3 months (around 2/22/2018).

## 2018-01-08 ENCOUNTER — TELEPHONE (OUTPATIENT)
Dept: MEDICAL GROUP | Facility: MEDICAL CENTER | Age: 63
End: 2018-01-08

## 2018-01-09 ENCOUNTER — TELEPHONE (OUTPATIENT)
Dept: MEDICAL GROUP | Facility: MEDICAL CENTER | Age: 63
End: 2018-01-09

## 2018-01-09 NOTE — TELEPHONE ENCOUNTER
1. Caller Name: Rain Sanabria                                         Call Back Number: [phone]      Patient approves a detailed voicemail message: yes    Pt states her insurance is not going to cover this medication any longer. Asking for an alternate.

## 2018-01-09 NOTE — TELEPHONE ENCOUNTER
Left message for patient to call back at [phone].  Tried PAR-jarrod states pt is inactive-ask pt if she has new insurance or if she wants different med

## 2018-01-10 NOTE — TELEPHONE ENCOUNTER
Pt called back and read a letter from insurance Co. Letter states must call this number for benefits and will be shown as inactive if calling the normal [facility] number. [facility] [phone]

## 2018-01-11 NOTE — TELEPHONE ENCOUNTER
Par submitted to 4 companies total all have stated they do not service the pt drug plan. Notified pt through [employer]

## 2018-01-12 ENCOUNTER — HOSPITAL ENCOUNTER (OUTPATIENT)
Dept: LAB | Facility: MEDICAL CENTER | Age: 63
End: 2018-01-12
Attending: NURSE PRACTITIONER
Payer: COMMERCIAL

## 2018-01-13 ENCOUNTER — HOSPITAL ENCOUNTER (OUTPATIENT)
Dept: LAB | Facility: MEDICAL CENTER | Age: 63
End: 2018-01-13
Attending: NURSE PRACTITIONER
Payer: COMMERCIAL

## 2018-01-13 LAB
ALBUMIN SERPL BCP-MCNC: 3.7 G/DL (ref 3.2–4.9)
ALBUMIN/GLOB SERPL: 1.2 G/DL
ALP SERPL-CCNC: 64 U/L (ref 30–99)
ALT SERPL-CCNC: 17 U/L (ref 2–50)
ANION GAP SERPL CALC-SCNC: 7 MMOL/L (ref 0–11.9)
AST SERPL-CCNC: 22 U/L (ref 12–45)
BILIRUB SERPL-MCNC: 0.5 MG/DL (ref 0.1–1.5)
BUN SERPL-MCNC: 16 MG/DL (ref 8–22)
CALCIUM SERPL-MCNC: 9.3 MG/DL (ref 8.5–10.5)
CHLORIDE SERPL-SCNC: 106 MMOL/L (ref 96–112)
CHOLEST SERPL-MCNC: 178 MG/DL (ref 100–199)
CO2 SERPL-SCNC: 29 MMOL/L (ref 20–33)
CREAT SERPL-MCNC: 0.57 MG/DL (ref 0.5–1.4)
GLOBULIN SER CALC-MCNC: 3.1 G/DL (ref 1.9–3.5)
GLUCOSE SERPL-MCNC: 84 MG/DL (ref 65–99)
HDLC SERPL-MCNC: 78 MG/DL
LDLC SERPL CALC-MCNC: 85 MG/DL
POTASSIUM SERPL-SCNC: 4.2 MMOL/L (ref 3.6–5.5)
PROT SERPL-MCNC: 6.8 G/DL (ref 6–8.2)
SODIUM SERPL-SCNC: 142 MMOL/L (ref 135–145)
TRIGL SERPL-MCNC: 75 MG/DL (ref 0–149)

## 2018-01-13 PROCEDURE — 80053 COMPREHEN METABOLIC PANEL: CPT

## 2018-01-13 PROCEDURE — 36415 COLL VENOUS BLD VENIPUNCTURE: CPT

## 2018-01-13 PROCEDURE — 80061 LIPID PANEL: CPT

## 2018-01-25 ENCOUNTER — APPOINTMENT (OUTPATIENT)
Dept: OTHER | Facility: IMAGING CENTER | Age: 63
End: 2018-01-25

## 2018-01-26 DIAGNOSIS — Z01.812 PRE-OPERATIVE LABORATORY EXAMINATION: ICD-10-CM

## 2018-01-26 DIAGNOSIS — Z01.810 PRE-OPERATIVE CARDIOVASCULAR EXAMINATION: ICD-10-CM

## 2018-01-26 LAB
ANION GAP SERPL CALC-SCNC: 8 MMOL/L (ref 0–11.9)
APPEARANCE UR: CLEAR
BILIRUB UR QL STRIP.AUTO: NEGATIVE
BUN SERPL-MCNC: 12 MG/DL (ref 8–22)
CALCIUM SERPL-MCNC: 9.2 MG/DL (ref 8.5–10.5)
CHLORIDE SERPL-SCNC: 106 MMOL/L (ref 96–112)
CO2 SERPL-SCNC: 28 MMOL/L (ref 20–33)
COLOR UR: NORMAL
CREAT SERPL-MCNC: 0.71 MG/DL (ref 0.5–1.4)
CULTURE IF INDICATED INDCX: NO UA CULTURE
EKG IMPRESSION: NORMAL
ERYTHROCYTE [DISTWIDTH] IN BLOOD BY AUTOMATED COUNT: 51.3 FL (ref 35.9–50)
GLUCOSE SERPL-MCNC: 84 MG/DL (ref 65–99)
GLUCOSE UR STRIP.AUTO-MCNC: NEGATIVE MG/DL
HCT VFR BLD AUTO: 45.9 % (ref 37–47)
HGB BLD-MCNC: 14.6 G/DL (ref 12–16)
KETONES UR STRIP.AUTO-MCNC: NEGATIVE MG/DL
LEUKOCYTE ESTERASE UR QL STRIP.AUTO: NEGATIVE
MCH RBC QN AUTO: 31.4 PG (ref 27–33)
MCHC RBC AUTO-ENTMCNC: 31.8 G/DL (ref 33.6–35)
MCV RBC AUTO: 98.7 FL (ref 81.4–97.8)
MICRO URNS: NORMAL
NITRITE UR QL STRIP.AUTO: NEGATIVE
PH UR STRIP.AUTO: 6.5 [PH]
PLATELET # BLD AUTO: 292 K/UL (ref 164–446)
PMV BLD AUTO: 10.6 FL (ref 9–12.9)
POTASSIUM SERPL-SCNC: 4 MMOL/L (ref 3.6–5.5)
PROT UR QL STRIP: NEGATIVE MG/DL
RBC # BLD AUTO: 4.65 M/UL (ref 4.2–5.4)
RBC UR QL AUTO: NEGATIVE
SCCMEC + MECA PNL NOSE NAA+PROBE: NEGATIVE
SCCMEC + MECA PNL NOSE NAA+PROBE: NEGATIVE
SODIUM SERPL-SCNC: 142 MMOL/L (ref 135–145)
SP GR UR STRIP.AUTO: 1.02
UROBILINOGEN UR STRIP.AUTO-MCNC: 1 MG/DL
WBC # BLD AUTO: 6.9 K/UL (ref 4.8–10.8)

## 2018-01-26 PROCEDURE — 81003 URINALYSIS AUTO W/O SCOPE: CPT

## 2018-01-26 PROCEDURE — 87641 MR-STAPH DNA AMP PROBE: CPT

## 2018-01-26 PROCEDURE — 80048 BASIC METABOLIC PNL TOTAL CA: CPT

## 2018-01-26 PROCEDURE — 36415 COLL VENOUS BLD VENIPUNCTURE: CPT

## 2018-01-26 PROCEDURE — 93010 ELECTROCARDIOGRAM REPORT: CPT | Performed by: INTERNAL MEDICINE

## 2018-01-26 PROCEDURE — 93005 ELECTROCARDIOGRAM TRACING: CPT

## 2018-01-26 PROCEDURE — 85027 COMPLETE CBC AUTOMATED: CPT

## 2018-01-26 PROCEDURE — 87640 STAPH A DNA AMP PROBE: CPT

## 2018-01-26 RX ORDER — SODIUM PHOSPHATE,MONO-DIBASIC 19G-7G/118
1500 ENEMA (ML) RECTAL DAILY
COMMUNITY
End: 2023-12-07

## 2018-01-26 RX ORDER — ACYCLOVIR 400 MG/1
400 TABLET ORAL 2 TIMES DAILY PRN
COMMUNITY
End: 2019-04-17 | Stop reason: SDUPTHER

## 2018-01-26 RX ORDER — ASCORBIC ACID 500 MG
3000 TABLET ORAL DAILY
COMMUNITY

## 2018-01-26 NOTE — DISCHARGE PLANNING
DISCHARGE PLANNING NOTE - TOTAL JOINT     Procedure: Procedure(s):  KNEE ARTHROPLASTY TOTAL  Procedure Date: 2/12/2018  Insurance:  Payor: [employer] / Plan: [place] BCBS / Product Type: *No Product type* /   Equipment currently available at home? None  Steps into the home? 2  Steps within the home? 0  Toilet height? Standard  Type of shower? tub-shower  Who will be with you during your recovery? daughter  Is Outpatient Physical Therapy set up after surgery? Yes   Did you take the Total Joint Class and where? Yes, at [facility]     Plan:

## 2018-01-26 NOTE — OR NURSING
"Pre-admit appointment completed. \"Preparing for your procedure\" sheet given to pt along with verbal and written instructions. Pt instructed to continue regularly prescribed medications through the day before surgery. Pt instructed to take the following medications the day of surgery with a sip of water, per anesthesia protocol; if needed-albuterol MDI and norco.    Pt to bring MDI DOS.     Pt given DU education. Pt has hx of diagnosed sleep apnea but does not have a CPAP device and scores 5 on DU screen.     Dr Jacques notified via email of procedure due to BMI=45.84, pt does not use CPAP, and other co-morbidities.   "

## 2018-01-29 NOTE — OR NURSING
Dr Jacques reviewed patients medical history. Based upon information available, Dr Jacques indicates that it is OK to proceed with procedure.

## 2018-02-12 ENCOUNTER — HOSPITAL ENCOUNTER (INPATIENT)
Facility: MEDICAL CENTER | Age: 63
LOS: 1 days | DRG: 470 | End: 2018-02-13
Attending: ORTHOPAEDIC SURGERY | Admitting: ORTHOPAEDIC SURGERY
Payer: COMMERCIAL

## 2018-02-12 ENCOUNTER — APPOINTMENT (OUTPATIENT)
Dept: RADIOLOGY | Facility: MEDICAL CENTER | Age: 63
DRG: 470 | End: 2018-02-12
Attending: ORTHOPAEDIC SURGERY
Payer: COMMERCIAL

## 2018-02-12 PROCEDURE — 700101 HCHG RX REV CODE 250

## 2018-02-12 PROCEDURE — A6223 GAUZE >16<=48 NO W/SAL W/O B: HCPCS | Performed by: ORTHOPAEDIC SURGERY

## 2018-02-12 PROCEDURE — 160002 HCHG RECOVERY MINUTES (STAT): Performed by: ORTHOPAEDIC SURGERY

## 2018-02-12 PROCEDURE — 160009 HCHG ANES TIME/MIN: Performed by: ORTHOPAEDIC SURGERY

## 2018-02-12 PROCEDURE — 500440 HCHG DRESSING, STERILE ROLL (KERLIX): Performed by: ORTHOPAEDIC SURGERY

## 2018-02-12 PROCEDURE — 700101 HCHG RX REV CODE 250: Performed by: ORTHOPAEDIC SURGERY

## 2018-02-12 PROCEDURE — 502000 HCHG MISC OR IMPLANTS RC 0278: Performed by: ORTHOPAEDIC SURGERY

## 2018-02-12 PROCEDURE — 73560 X-RAY EXAM OF KNEE 1 OR 2: CPT | Mod: LT

## 2018-02-12 PROCEDURE — L8699 PROSTHETIC IMPLANT NOS: HCPCS | Performed by: ORTHOPAEDIC SURGERY

## 2018-02-12 PROCEDURE — 160035 HCHG PACU - 1ST 60 MINS PHASE I: Performed by: ORTHOPAEDIC SURGERY

## 2018-02-12 PROCEDURE — 160048 HCHG OR STATISTICAL LEVEL 1-5: Performed by: ORTHOPAEDIC SURGERY

## 2018-02-12 PROCEDURE — A9270 NON-COVERED ITEM OR SERVICE: HCPCS | Performed by: ORTHOPAEDIC SURGERY

## 2018-02-12 PROCEDURE — 160029 HCHG SURGERY MINUTES - 1ST 30 MINS LEVEL 4: Performed by: ORTHOPAEDIC SURGERY

## 2018-02-12 PROCEDURE — 700102 HCHG RX REV CODE 250 W/ 637 OVERRIDE(OP)

## 2018-02-12 PROCEDURE — 700112 HCHG RX REV CODE 229: Performed by: ORTHOPAEDIC SURGERY

## 2018-02-12 PROCEDURE — 770001 HCHG ROOM/CARE - MED/SURG/GYN PRIV*

## 2018-02-12 PROCEDURE — 160022 HCHG BLOCK: Performed by: ORTHOPAEDIC SURGERY

## 2018-02-12 PROCEDURE — 700111 HCHG RX REV CODE 636 W/ 250 OVERRIDE (IP): Performed by: ORTHOPAEDIC SURGERY

## 2018-02-12 PROCEDURE — 700111 HCHG RX REV CODE 636 W/ 250 OVERRIDE (IP)

## 2018-02-12 PROCEDURE — A9270 NON-COVERED ITEM OR SERVICE: HCPCS

## 2018-02-12 PROCEDURE — 160041 HCHG SURGERY MINUTES - EA ADDL 1 MIN LEVEL 4: Performed by: ORTHOPAEDIC SURGERY

## 2018-02-12 PROCEDURE — 160036 HCHG PACU - EA ADDL 30 MINS PHASE I: Performed by: ORTHOPAEDIC SURGERY

## 2018-02-12 PROCEDURE — 94760 N-INVAS EAR/PLS OXIMETRY 1: CPT

## 2018-02-12 PROCEDURE — 501838 HCHG SUTURE GENERAL: Performed by: ORTHOPAEDIC SURGERY

## 2018-02-12 PROCEDURE — 500864 HCHG NEEDLE, SPINAL 18G: Performed by: ORTHOPAEDIC SURGERY

## 2018-02-12 PROCEDURE — 700102 HCHG RX REV CODE 250 W/ 637 OVERRIDE(OP): Performed by: ORTHOPAEDIC SURGERY

## 2018-02-12 PROCEDURE — 0SRD069 REPLACEMENT OF LEFT KNEE JOINT WITH OXIDIZED ZIRCONIUM ON POLYETHYLENE SYNTHETIC SUBSTITUTE, CEMENTED, OPEN APPROACH: ICD-10-PCS | Performed by: ORTHOPAEDIC SURGERY

## 2018-02-12 DEVICE — IMPLANTABLE DEVICE: Type: IMPLANTABLE DEVICE | Site: KNEE | Status: FUNCTIONAL

## 2018-02-12 DEVICE — IMPLANT GNS II CMT TIB SIZE 5 LEFT: Type: IMPLANTABLE DEVICE | Site: KNEE | Status: FUNCTIONAL

## 2018-02-12 DEVICE — IMPLANT GII OVAL RESURFACING PAT 32MM (1EA): Type: IMPLANTABLE DEVICE | Site: KNEE | Status: FUNCTIONAL

## 2018-02-12 DEVICE — IMPLANT GNS II C/R FEM SIZE 5 LEFT: Type: IMPLANTABLE DEVICE | Site: KNEE | Status: FUNCTIONAL

## 2018-02-12 RX ORDER — ROPIVACAINE HYDROCHLORIDE 5 MG/ML
INJECTION, SOLUTION EPIDURAL; INFILTRATION; PERINEURAL
Status: DISCONTINUED | OUTPATIENT
Start: 2018-02-12 | End: 2018-02-12 | Stop reason: HOSPADM

## 2018-02-12 RX ORDER — GABAPENTIN 300 MG/1
CAPSULE ORAL
Status: COMPLETED
Start: 2018-02-12 | End: 2018-02-12

## 2018-02-12 RX ORDER — DOCUSATE SODIUM 100 MG/1
100 CAPSULE, LIQUID FILLED ORAL 2 TIMES DAILY
Status: DISCONTINUED | OUTPATIENT
Start: 2018-02-12 | End: 2018-02-13 | Stop reason: HOSPADM

## 2018-02-12 RX ORDER — ENEMA 19; 7 G/133ML; G/133ML
1 ENEMA RECTAL
Status: DISCONTINUED | OUTPATIENT
Start: 2018-02-12 | End: 2018-02-13 | Stop reason: HOSPADM

## 2018-02-12 RX ORDER — EPINEPHRINE 1 MG/ML(1)
AMPUL (ML) INJECTION
Status: DISCONTINUED | OUTPATIENT
Start: 2018-02-12 | End: 2018-02-12 | Stop reason: HOSPADM

## 2018-02-12 RX ORDER — DIPHENHYDRAMINE HYDROCHLORIDE 50 MG/ML
25 INJECTION INTRAMUSCULAR; INTRAVENOUS EVERY 6 HOURS PRN
Status: DISCONTINUED | OUTPATIENT
Start: 2018-02-12 | End: 2018-02-13 | Stop reason: HOSPADM

## 2018-02-12 RX ORDER — ALBUTEROL SULFATE 90 UG/1
2 AEROSOL, METERED RESPIRATORY (INHALATION) EVERY 4 HOURS PRN
Status: DISCONTINUED | OUTPATIENT
Start: 2018-02-12 | End: 2018-02-13 | Stop reason: HOSPADM

## 2018-02-12 RX ORDER — CELECOXIB 200 MG/1
200 CAPSULE ORAL 2 TIMES DAILY WITH MEALS
Status: DISCONTINUED | OUTPATIENT
Start: 2018-02-12 | End: 2018-02-13 | Stop reason: HOSPADM

## 2018-02-12 RX ORDER — HYDROMORPHONE HYDROCHLORIDE 2 MG/ML
0.5 INJECTION, SOLUTION INTRAMUSCULAR; INTRAVENOUS; SUBCUTANEOUS
Status: DISCONTINUED | OUTPATIENT
Start: 2018-02-12 | End: 2018-02-13 | Stop reason: HOSPADM

## 2018-02-12 RX ORDER — BISACODYL 10 MG
10 SUPPOSITORY, RECTAL RECTAL
Status: DISCONTINUED | OUTPATIENT
Start: 2018-02-12 | End: 2018-02-13 | Stop reason: HOSPADM

## 2018-02-12 RX ORDER — OXYCODONE HYDROCHLORIDE 10 MG/1
10 TABLET ORAL
Status: DISCONTINUED | OUTPATIENT
Start: 2018-02-12 | End: 2018-02-13 | Stop reason: HOSPADM

## 2018-02-12 RX ORDER — SODIUM CHLORIDE, SODIUM LACTATE, POTASSIUM CHLORIDE, CALCIUM CHLORIDE 600; 310; 30; 20 MG/100ML; MG/100ML; MG/100ML; MG/100ML
INJECTION, SOLUTION INTRAVENOUS
Status: DISCONTINUED | OUTPATIENT
Start: 2018-02-12 | End: 2018-02-13 | Stop reason: HOSPADM

## 2018-02-12 RX ORDER — OXYCODONE HCL 10 MG/1
TABLET, FILM COATED, EXTENDED RELEASE ORAL
Status: COMPLETED
Start: 2018-02-12 | End: 2018-02-12

## 2018-02-12 RX ORDER — KETOROLAC TROMETHAMINE 30 MG/ML
INJECTION, SOLUTION INTRAMUSCULAR; INTRAVENOUS
Status: DISCONTINUED | OUTPATIENT
Start: 2018-02-12 | End: 2018-02-12 | Stop reason: HOSPADM

## 2018-02-12 RX ORDER — AMOXICILLIN 250 MG
1 CAPSULE ORAL
Status: DISCONTINUED | OUTPATIENT
Start: 2018-02-12 | End: 2018-02-13 | Stop reason: HOSPADM

## 2018-02-12 RX ORDER — ACETAMINOPHEN 500 MG
TABLET ORAL
Status: COMPLETED
Start: 2018-02-12 | End: 2018-02-12

## 2018-02-12 RX ORDER — DULOXETIN HYDROCHLORIDE 30 MG/1
30 CAPSULE, DELAYED RELEASE ORAL EVERY EVENING
Status: DISCONTINUED | OUTPATIENT
Start: 2018-02-12 | End: 2018-02-13 | Stop reason: HOSPADM

## 2018-02-12 RX ORDER — ONDANSETRON 2 MG/ML
4 INJECTION INTRAMUSCULAR; INTRAVENOUS EVERY 4 HOURS PRN
Status: DISCONTINUED | OUTPATIENT
Start: 2018-02-12 | End: 2018-02-13 | Stop reason: HOSPADM

## 2018-02-12 RX ORDER — ALBUTEROL SULFATE 90 UG/1
2 AEROSOL, METERED RESPIRATORY (INHALATION)
Status: DISCONTINUED | OUTPATIENT
Start: 2018-02-12 | End: 2018-02-12

## 2018-02-12 RX ORDER — AMOXICILLIN 250 MG
1 CAPSULE ORAL NIGHTLY
Status: DISCONTINUED | OUTPATIENT
Start: 2018-02-12 | End: 2018-02-13 | Stop reason: HOSPADM

## 2018-02-12 RX ORDER — HALOPERIDOL 5 MG/ML
1 INJECTION INTRAMUSCULAR EVERY 6 HOURS PRN
Status: DISCONTINUED | OUTPATIENT
Start: 2018-02-12 | End: 2018-02-13 | Stop reason: HOSPADM

## 2018-02-12 RX ORDER — DIPHENHYDRAMINE HCL 25 MG
25 TABLET ORAL NIGHTLY PRN
Status: DISCONTINUED | OUTPATIENT
Start: 2018-02-13 | End: 2018-02-13 | Stop reason: HOSPADM

## 2018-02-12 RX ORDER — DEXAMETHASONE SODIUM PHOSPHATE 4 MG/ML
4 INJECTION, SOLUTION INTRA-ARTICULAR; INTRALESIONAL; INTRAMUSCULAR; INTRAVENOUS; SOFT TISSUE
Status: DISCONTINUED | OUTPATIENT
Start: 2018-02-12 | End: 2018-02-13 | Stop reason: HOSPADM

## 2018-02-12 RX ORDER — SCOLOPAMINE TRANSDERMAL SYSTEM 1 MG/1
1 PATCH, EXTENDED RELEASE TRANSDERMAL
Status: DISCONTINUED | OUTPATIENT
Start: 2018-02-12 | End: 2018-02-13 | Stop reason: HOSPADM

## 2018-02-12 RX ORDER — OXYCODONE HYDROCHLORIDE 5 MG/1
5 TABLET ORAL
Status: DISCONTINUED | OUTPATIENT
Start: 2018-02-12 | End: 2018-02-13 | Stop reason: HOSPADM

## 2018-02-12 RX ORDER — ACETAMINOPHEN 500 MG
1000 TABLET ORAL EVERY 6 HOURS
Status: DISCONTINUED | OUTPATIENT
Start: 2018-02-12 | End: 2018-02-13 | Stop reason: HOSPADM

## 2018-02-12 RX ORDER — POLYETHYLENE GLYCOL 3350 17 G/17G
1 POWDER, FOR SOLUTION ORAL 2 TIMES DAILY PRN
Status: DISCONTINUED | OUTPATIENT
Start: 2018-02-12 | End: 2018-02-13 | Stop reason: HOSPADM

## 2018-02-12 RX ORDER — CELECOXIB 200 MG/1
CAPSULE ORAL
Status: COMPLETED
Start: 2018-02-12 | End: 2018-02-12

## 2018-02-12 RX ADMIN — DOCUSATE SODIUM AND SENNOSIDES 1 TABLET: 8.6; 5 TABLET, FILM COATED ORAL at 20:36

## 2018-02-12 RX ADMIN — OXYCODONE HYDROCHLORIDE 10 MG: 10 TABLET ORAL at 20:02

## 2018-02-12 RX ADMIN — ACETAMINOPHEN 1000 MG: 500 TABLET, COATED ORAL at 09:26

## 2018-02-12 RX ADMIN — CELECOXIB 200 MG: 200 CAPSULE ORAL at 17:53

## 2018-02-12 RX ADMIN — DOCUSATE SODIUM 100 MG: 100 CAPSULE, LIQUID FILLED ORAL at 20:37

## 2018-02-12 RX ADMIN — OXYCODONE HYDROCHLORIDE 10 MG: 10 TABLET, FILM COATED, EXTENDED RELEASE ORAL at 09:26

## 2018-02-12 RX ADMIN — GABAPENTIN 300 MG: 300 CAPSULE ORAL at 09:26

## 2018-02-12 RX ADMIN — ACETAMINOPHEN 1000 MG: 500 TABLET ORAL at 17:52

## 2018-02-12 RX ADMIN — WATER 2 G: 100 INJECTION, SOLUTION INTRAVENOUS at 17:52

## 2018-02-12 RX ADMIN — CELECOXIB 400 MG: 200 CAPSULE ORAL at 09:26

## 2018-02-12 RX ADMIN — OXYCODONE HYDROCHLORIDE 10 MG: 10 TABLET ORAL at 14:12

## 2018-02-12 RX ADMIN — DULOXETINE HYDROCHLORIDE 30 MG: 30 CAPSULE, DELAYED RELEASE ORAL at 20:36

## 2018-02-12 RX ADMIN — SODIUM CHLORIDE, SODIUM LACTATE, POTASSIUM CHLORIDE, CALCIUM CHLORIDE: 600; 310; 30; 20 INJECTION, SOLUTION INTRAVENOUS at 09:08

## 2018-02-12 RX ADMIN — DOCUSATE SODIUM 100 MG: 100 CAPSULE, LIQUID FILLED ORAL at 14:00

## 2018-02-12 ASSESSMENT — PATIENT HEALTH QUESTIONNAIRE - PHQ9
SUM OF ALL RESPONSES TO PHQ9 QUESTIONS 1 AND 2: 0
SUM OF ALL RESPONSES TO PHQ QUESTIONS 1-9: 0
2. FEELING DOWN, DEPRESSED, IRRITABLE, OR HOPELESS: NOT AT ALL
1. LITTLE INTEREST OR PLEASURE IN DOING THINGS: NOT AT ALL

## 2018-02-12 ASSESSMENT — LIFESTYLE VARIABLES
ON A TYPICAL DAY WHEN YOU DRINK ALCOHOL HOW MANY DRINKS DO YOU HAVE: 2
HOW MANY TIMES IN THE PAST YEAR HAVE YOU HAD 5 OR MORE DRINKS IN A DAY: 0
EVER_SMOKED: NEVER
TOTAL SCORE: 0
AVERAGE NUMBER OF DAYS PER WEEK YOU HAVE A DRINK CONTAINING ALCOHOL: 7
HAVE PEOPLE ANNOYED YOU BY CRITICIZING YOUR DRINKING: NO
CONSUMPTION TOTAL: POSITIVE
EVER HAD A DRINK FIRST THING IN THE MORNING TO STEADY YOUR NERVES TO GET RID OF A HANGOVER: NO
EVER_SMOKED: NEVER
TOTAL SCORE: 0
TOTAL SCORE: 0
DO YOU DRINK ALCOHOL: YES
HAVE YOU EVER FELT YOU SHOULD CUT DOWN ON YOUR DRINKING: NO
EVER FELT BAD OR GUILTY ABOUT YOUR DRINKING: NO

## 2018-02-12 ASSESSMENT — COPD QUESTIONNAIRES
HAVE YOU SMOKED AT LEAST 100 CIGARETTES IN YOUR ENTIRE LIFE: NO/DON'T KNOW
DO YOU EVER COUGH UP ANY MUCUS OR PHLEGM?: NO/ONLY WITH OCCASIONAL COLDS OR INFECTIONS
COPD SCREENING SCORE: 2
DURING THE PAST 4 WEEKS HOW MUCH DID YOU FEEL SHORT OF BREATH: NONE/LITTLE OF THE TIME

## 2018-02-12 ASSESSMENT — PAIN SCALES - GENERAL
PAINLEVEL_OUTOF10: 0
PAINLEVEL_OUTOF10: 3
PAINLEVEL_OUTOF10: 7
PAINLEVEL_OUTOF10: 3
PAINLEVEL_OUTOF10: 0
PAINLEVEL_OUTOF10: 7

## 2018-02-12 NOTE — OR SURGEON
Immediate Post OP Note    PreOp Diagnosis: Severe OA left knee    PostOp Diagnosis: same    Procedure(s):  KNEE ARTHROPLASTY TOTAL - Wound Class: Clean    Surgeon(s):  Ankti Frias M.D.  Assist Aguila    Anesthesiologist/Type of Anesthesia:  Anesthesiologist: Nito Rodríguez M.D./General    Surgical Staff:  Assistant: Michael Sheehan C.N.A.  Circulator: Doretha Schulz R.N.  Limb Terry: Dmitriy eDlgado  Relief Circulator: Savanna Riggins R.N.  Scrub Person: Cal Blanca    Specimens:  * No specimens in log *    Estimated Blood Loss: 100cc    Findings: OA    Complications: none        2/12/2018 11:32 AM Ankit Frias

## 2018-02-12 NOTE — OR NURSING
0900: Brought patient back to pre-op and assumed care.  0940: Patient allergies and NPO status verified, home medication reconciliation completed and belongings secured. Patient verbalizes understanding of pain scale, expected course of stay and plan of care. Surgical site verified with patient. IV access established. Sequentials placed on legs.

## 2018-02-12 NOTE — OR NURSING
1128 arrived from OR via bed, received jesus wright.   1135 left knee with ace wrap, polar ice under ace wrap per Or, pt awake alert but dozes frequently, tolerating sips water without incident.  Pedal pulses equal bilaterally cap refill both lower extrem.Q 3 sec. HOB elevated 30 degrees, pillow under left ankle. Pt states she has mild pain but no pain med requested. Denies nausea   1140 resting comfortably,   1155 resting comfortably tolerating water without incident. 1200 x ray here   1210 resting comfortably, denies uncontrolled pain or nausea ,   1225 pt denies uncontrolled pain or nausea, wants a diet coke!   1245 meets criteria for dc to floor, o2 remains on nc

## 2018-02-12 NOTE — FLOWSHEET NOTE
02/12/18 1353   Interdisciplinary Plan of Care-Goals (Indications)   Hyperinflation Protocol Indications Pre or Post-op Abdominal, Thoracic or Orthopedic Surgery   Interdisciplinary Plan of Care-Outcomes    Hyperinflation Protocol Goals/Outcome Greater Than 60% of Predicted I.S. Volume x 24 hrs   Education   Education Yes - Pt. / Family has been Instructed in use of Respiratory Equipment   Incentive Spirometry Group   Incentive Spirometry Instruction Yes   Breathing Exercises Yes   Incentive Spirometer Volume 2250 mL   Incentive Spirometer Date Last Changed 02/12/18   Incentive Spirometer Next Change Date (Q 30 Days) 03/12/18   Chest Exam   Respiration 16   Heart Rate (Monitored) 96   Oximetry   #Pulse Oximetry (Single Determination) Yes   Continuous Oximetry Yes   Oxygen   Home O2 Use Prior To Admission? No   Pulse Oximetry 100 %   O2 (LPM) 2   O2 Daily Delivery Respiratory  Silicone Nasal Cannula

## 2018-02-12 NOTE — OR NURSING
1128- Pt to Pacu via bed with side rails up.  VSS.  Pt unresponsive with OPA in place.  L knee dressing cdi. Brisk cap refill to L toes noted.  1133- report to Kesha REIS.

## 2018-02-12 NOTE — OP REPORT
DATE OF SERVICE:  02/12/2018    PREOPERATIVE DIAGNOSIS:  Severe degenerative arthritis, left knee.    POSTOPERATIVE DIAGNOSIS:  Severe degenerative arthritis, left knee.    PROCEDURE:  Left total knee replacement using a [employer] Irma II   system with a cemented size 5 femur, cemented size 5 tibia, a 13 mm spacer,   and a cemented 32 mm oval patella.    SURGEON:  Ankit Frias MD    ASSISTANT:  MEENAKSHI Batista.  Non MD first assist.  Of note, the assistant   played a crucial role in this procedure by helping with the exposure and   instrumentation for this complex arthroplasty.    ANESTHESIA:  General per LMA with adductor canal block.    ANESTHESIOLOGIST:  Nito Rodríguez MD    OPERATIVE INDICATIONS:  The patient is a 63-year-old white female who has had   a long history of worsening lateral knee pain and increasing valgus deformity   with x-ray showing severe lateral osteoarthritis.  She is no longer responding   to conservative program which included nonsteroidal anti-inflammatories,   activity modification, corticosteroid injections, and exercise and it is felt   that a total knee replacement is indicated in this active woman.    PROCEDURE IN DETAIL:  The patient was brought to the operating room and placed   on table in supine position where a satisfactory general anesthetic agent was   administered per LMA.  An adductor canal block was performed by Dr. Rodríguez in   preoperative holding for perioperative pain control.  The left knee revealed a   range of approximately 0, 5, 110.  The left lower extremity was prepped with   gel prep, draped free in a sterile fashion.  Leg was elevated for   exsanguination and tourniquet was inflated to 250 mmHg.  An 18 cm midline   incision was made beginning just distal to the tibial tubercle and extending   proximally.  This was carried sharply through skin and subcutaneous tissue   down to the extensor mechanism, which was exposed.  A medial parapatellar    incision was made.  The fat pad was excised and patella was everted.  The   anterior horns of the medial and lateral menisci were resected as was the   remnants of the ACL.  There was noted to be marked lateral arthritis with some   bony erosion on the lateral portion of the tibia.  A minimal medial release   was performed.  The distal femur was cannulated and a long intramedullary   guide deepika was placed with 5-degree valgus alignment guide.  This was   appropriately positioned and secured and the initial size 5 femur was felt   appropriate from the intraoperative measurements.  Initial anterior femoral   cut was made.  The distal femoral cutting guide was placed and secured and a   distal femoral cut was made taking an additional 2 mm to allow for decent bone   on the lateral condyle.  The size 5, 4-in-1 cutting jig was placed and   secured and final anterior and posterior as well as chamfer cuts were made.  A   size 5 trial was placed with excellent fit.  The PCL retractor was placed.    The posterior horns of medial and lateral menisci were resected.  The   extramedullary tibial guide was positioned and secured and a tibial cut was   made.  A size 5 tibia was felt to be appropriate.  The initial tibial template   was placed, positioned and secured and the tibia was cannulated.  The second   tibial guide was placed along with the femoral trial and 11 mm spacer.  The   rotation was obtained and marked.  The lug holes for the femur were punched.    The fin cutter was placed to cut the fins for the tibia.  The tourniquet was   released with initial tourniquet time of 24 minutes.  The wound was irrigated   with normal saline and hemostasis was obtained with electrocautery.  The   patella was measured and the patellar cutting guide was placed and a patellar   cut being made.  A 32 oval patella fit well.  The guide for the 32 oval   patella was placed.  The alignment holes were drilled.  The wound was   copiously  irrigated with normal saline using a waterpik and final hemostasis   was obtained.  The leg was re-elevated and the tourniquet inflated to 250   mmHg.  The sharp bony surfaces were irrigated with the Waterpik.  Two batches   of high viscosity cement were mixed with vacuum suctioning techniques.  This   was applied first to the tibia and tibial component, which was placed and   fully impacted, then to the femur and femoral component, which was placed and   fully impacted.  Excess cement was removed from around both components.  A 13   mm spacer was placed and the knee was placed in full extension to compress the   components.  Cement was applied to the patella and patellar component, which   was placed and held with a patellar clamp.  The excess cement was removed from   around the patella.  The cement was allowed to fully cure.  All excess cement   was then carefully removed from around the components.  Trials with 11 and 13   mm spacers were performed.  The 13 mm spacer gave full extension with Flexion   to 130 degrees and excellent stability both in flexion and extension.  A   joint solution was created consisting a 40 mL of 0.5% ropivacaine, 30 mg of   Toradol, 1 mL of epinephrine diluted in 90 mL in normal saline.  60 mL were   placed in the periarticular tissues with remaining 30 mL placed in the   subcutaneous tissues.  The permanent 13 mm spacer was placed and seated again   giving full motion with excellent stability.  The patella continues to track   laterally.  A lateral release was performed under direct visualization with an   electrocautery.  The tourniquet was then released.  Total tourniquet time of   56 minutes.  The superior lateral genicular artery was cauterized.  The wound   was again irrigated with normal saline with final hemostasis was obtained.    The extensor mechanism was closed with, #2 PDO Quill suture, the subcutaneous   tissue with 2-0 Monocryl, and the skin with staples.  A sterile  dressing of   Adaptic flats, Kerlix, and Ace wrap and PolarCare cooling pad and another Ace   wrap was applied.  The patient was awakened and extubated in the operating   room, taken to recovery room in stable condition.  Sponge and needle counts   were correct.  There were no identified intraoperative complications.       ____________________________________     MD HALIE Conrad / BUBBA    DD:  02/12/2018 11:32:16  DT:  02/12/2018 13:35:56    D#:  [id number]  Job#:  [id number]    cc: XIAO AVITIA [facility] FA

## 2018-02-13 VITALS
HEART RATE: 76 BPM | SYSTOLIC BLOOD PRESSURE: 119 MMHG | WEIGHT: 250.44 LBS | OXYGEN SATURATION: 93 % | HEIGHT: 62 IN | TEMPERATURE: 98 F | BODY MASS INDEX: 46.09 KG/M2 | RESPIRATION RATE: 18 BRPM | DIASTOLIC BLOOD PRESSURE: 59 MMHG

## 2018-02-13 LAB
ANION GAP SERPL CALC-SCNC: 3 MMOL/L (ref 0–11.9)
BUN SERPL-MCNC: 10 MG/DL (ref 8–22)
CALCIUM SERPL-MCNC: 8.1 MG/DL (ref 8.4–10.2)
CHLORIDE SERPL-SCNC: 108 MMOL/L (ref 96–112)
CO2 SERPL-SCNC: 28 MMOL/L (ref 20–33)
CREAT SERPL-MCNC: 0.62 MG/DL (ref 0.5–1.4)
ERYTHROCYTE [DISTWIDTH] IN BLOOD BY AUTOMATED COUNT: 51.3 FL (ref 35.9–50)
GLUCOSE SERPL-MCNC: 144 MG/DL (ref 65–99)
HCT VFR BLD AUTO: 34.9 % (ref 37–47)
HGB BLD-MCNC: 11.5 G/DL (ref 12–16)
MCH RBC QN AUTO: 32.3 PG (ref 27–33)
MCHC RBC AUTO-ENTMCNC: 33 G/DL (ref 33.6–35)
MCV RBC AUTO: 98 FL (ref 81.4–97.8)
PLATELET # BLD AUTO: 199 K/UL (ref 164–446)
PMV BLD AUTO: 10.5 FL (ref 9–12.9)
POTASSIUM SERPL-SCNC: 3.8 MMOL/L (ref 3.6–5.5)
RBC # BLD AUTO: 3.56 M/UL (ref 4.2–5.4)
SODIUM SERPL-SCNC: 139 MMOL/L (ref 135–145)
WBC # BLD AUTO: 10.2 K/UL (ref 4.8–10.8)

## 2018-02-13 PROCEDURE — G8988 SELF CARE GOAL STATUS: HCPCS | Mod: CJ

## 2018-02-13 PROCEDURE — 700101 HCHG RX REV CODE 250: Performed by: ORTHOPAEDIC SURGERY

## 2018-02-13 PROCEDURE — A9270 NON-COVERED ITEM OR SERVICE: HCPCS | Performed by: ORTHOPAEDIC SURGERY

## 2018-02-13 PROCEDURE — 85027 COMPLETE CBC AUTOMATED: CPT

## 2018-02-13 PROCEDURE — 80048 BASIC METABOLIC PNL TOTAL CA: CPT

## 2018-02-13 PROCEDURE — G8989 SELF CARE D/C STATUS: HCPCS | Mod: CJ

## 2018-02-13 PROCEDURE — 97535 SELF CARE MNGMENT TRAINING: CPT

## 2018-02-13 PROCEDURE — 97165 OT EVAL LOW COMPLEX 30 MIN: CPT

## 2018-02-13 PROCEDURE — 700112 HCHG RX REV CODE 229: Performed by: ORTHOPAEDIC SURGERY

## 2018-02-13 PROCEDURE — G8987 SELF CARE CURRENT STATUS: HCPCS | Mod: CJ

## 2018-02-13 PROCEDURE — G8980 MOBILITY D/C STATUS: HCPCS | Mod: CJ

## 2018-02-13 PROCEDURE — G8978 MOBILITY CURRENT STATUS: HCPCS | Mod: CJ

## 2018-02-13 PROCEDURE — 36415 COLL VENOUS BLD VENIPUNCTURE: CPT

## 2018-02-13 PROCEDURE — G8979 MOBILITY GOAL STATUS: HCPCS | Mod: CJ

## 2018-02-13 PROCEDURE — 700102 HCHG RX REV CODE 250 W/ 637 OVERRIDE(OP): Performed by: ORTHOPAEDIC SURGERY

## 2018-02-13 PROCEDURE — 97162 PT EVAL MOD COMPLEX 30 MIN: CPT

## 2018-02-13 PROCEDURE — 700111 HCHG RX REV CODE 636 W/ 250 OVERRIDE (IP): Performed by: ORTHOPAEDIC SURGERY

## 2018-02-13 RX ADMIN — OXYCODONE HYDROCHLORIDE 10 MG: 10 TABLET ORAL at 03:06

## 2018-02-13 RX ADMIN — CELECOXIB 200 MG: 200 CAPSULE ORAL at 08:50

## 2018-02-13 RX ADMIN — ACETAMINOPHEN 1000 MG: 500 TABLET ORAL at 06:10

## 2018-02-13 RX ADMIN — OXYCODONE HYDROCHLORIDE 10 MG: 10 TABLET ORAL at 12:19

## 2018-02-13 RX ADMIN — OXYCODONE HYDROCHLORIDE 10 MG: 10 TABLET ORAL at 06:11

## 2018-02-13 RX ADMIN — ASPIRIN 325 MG: 325 TABLET, DELAYED RELEASE ORAL at 06:11

## 2018-02-13 RX ADMIN — OXYCODONE HYDROCHLORIDE 10 MG: 10 TABLET ORAL at 00:11

## 2018-02-13 RX ADMIN — WATER 2 G: 100 INJECTION, SOLUTION INTRAVENOUS at 00:11

## 2018-02-13 RX ADMIN — ACETAMINOPHEN 1000 MG: 500 TABLET ORAL at 00:11

## 2018-02-13 RX ADMIN — DOCUSATE SODIUM 100 MG: 100 CAPSULE, LIQUID FILLED ORAL at 08:50

## 2018-02-13 ASSESSMENT — PAIN SCALES - GENERAL
PAINLEVEL_OUTOF10: 0
PAINLEVEL_OUTOF10: 7
PAINLEVEL_OUTOF10: 5

## 2018-02-13 ASSESSMENT — GAIT ASSESSMENTS
DEVIATION: STEP TO;ANTALGIC
ASSISTIVE DEVICE: FRONT WHEEL WALKER
GAIT LEVEL OF ASSIST: STAND BY ASSIST
DISTANCE (FEET): 100

## 2018-02-13 ASSESSMENT — PATIENT HEALTH QUESTIONNAIRE - PHQ9
SUM OF ALL RESPONSES TO PHQ QUESTIONS 1-9: 0
SUM OF ALL RESPONSES TO PHQ9 QUESTIONS 1 AND 2: 0
2. FEELING DOWN, DEPRESSED, IRRITABLE, OR HOPELESS: NOT AT ALL
1. LITTLE INTEREST OR PLEASURE IN DOING THINGS: NOT AT ALL

## 2018-02-13 ASSESSMENT — ACTIVITIES OF DAILY LIVING (ADL): TOILETING: INDEPENDENT

## 2018-02-13 NOTE — PROGRESS NOTES
"Post op day # 1    No New Complaints, pain well controlled    Blood pressure 119/59, pulse 76, temperature 36.7 °C (98 °F), resp. rate 18, height 1.575 m (5' 2\"), weight 113.6 kg (250 lb 7.1 oz), SpO2 93 %.    Neurovascular intact  Wound Clean and Dry  Good quad control, ROM 0/1/90    Recent Labs      02/13/18   0525   WBC  10.2   RBC  3.56*   HEMOGLOBIN  11.5*   HEMATOCRIT  34.9*   MCV  98.0*   MCH  32.3   MCHC  33.0*   RDW  51.3*   PLATELETCT  199   MPV  10.5     Recent Labs      02/13/18   0525   SODIUM  139   POTASSIUM  3.8   CHLORIDE  108   CO2  28   GLUCOSE  144*   BUN  10   CREATININE  0.62   CALCIUM  8.1*       Plan: Home after pm PT.  WBAT on left, aggressive ROM left knee.  Please remove IV and change dressing before discharge.  I will arrange outpt PT.  May shower with incision covered.  bid.  Dictated  "

## 2018-02-13 NOTE — DISCHARGE INSTRUCTIONS
Discharge Instructions    Discharged to home by car with relative. Discharged via wheelchair, hospital escort: Yes.  Special equipment needed: Not Applicable    Be sure to schedule a follow-up appointment with your primary care doctor or any specialists as instructed.     Discharge Plan:   Influenza Vaccine Indication: Not indicated: Previously immunized this influenza season and > 8 years of age    I understand that a diet low in cholesterol, fat, and sodium is recommended for good health. Unless I have been given specific instructions below for another diet, I accept this instruction as my diet prescription.   Other diet: Regular    Special Instructions: Discharge instructions for the Orthopedic Patient    Follow up with Primary Care Physician within 2 weeks of discharge to home, regarding:  Review of medications and diagnostic testing.  Surveillance for medical complications.  Workup and treatment of osteoporosis, if appropriate.     -Is this a Joint Replacement patient? Yes Total Joint Knee Replacement Discharge Instructions    Pain  - The goal is to slowly wean off the prescription pain medicine.  - Ice can be used for pain control.  20 minutes at a time is recommended, and never directly against your skin or incision.  - Most patients are off the pain pills by 3 weeks; others may require a low level of pain medications for many months.  If your pain continues to be severe, follow up with your physician.  Infection    Knee joint infections; occur in fewer than 2% of patients. The most common causes of infection following total knee replacement surgery are from bacteria that enter the bloodstream during dental procedures, urinary tract infections, or skin infections. These bacteria can lodge around your knee replacement and cause an infection.  - Keep the incision as clean and dry as possible.  - Always wash your hands before touching your incision.  - Skin infections tend to develop around 7-10 days after  surgery; most can be treated with oral antibiotics.  - Dental Care should be delayed for 3 months after surgery, your surgeon recommends taking a dose of antibiotics 1 hour prior to any dental procedure. After 2 years, most surgeons recommend antibiotics only before an extensive procedure.  Ask your surgeon what he recommends.  - Signs and symptoms of infection can include:  low grade fever, redness, pain, swelling and drainage from your incision.  Notify your surgeon immediately if you develop any of these symptoms.  Other instructions  - Bowel habits - constipation is extremely common and is caused by a combination of anesthesia, lack of mobility and pain medicine.  Use stool softeners or laxatives if necessary. It is important not to ignore this problem, as bowel obstructions can be a serious complication after joint replacement surgery.  - Mood/Energy Level - Many patients experience a lack of energy and endurance for up to 2-3 months after surgery.  Some may also feel down and can even become depressed.  This is likely due to the postoperative anemia, change in activity level, lack of sleep, pain medicine and just the emotional reaction to the surgery itself that is a big disruption in a person’s life.  This usually passes.  If symptoms persist, follow up with your primary physician.  - Returning to work - Your surgeon will give you more specific instructions. Depending on the type of activities you perform, it may be 6 to 8 weeks before you return to work.   Generally, if you work a sedentary job requiring little standing or walking, most patients may return within 2-6 weeks.  Manual labor jobs involving walking, lifting and standing may take longer. Your surgeon’s office can provide a release to part-time or light duty work early on in your recovery and progress you to full duty as able.    - Driving - If your left knee was replaced and you have an automatic transmission, you may be able to begin driving in a  week or so, provided you are no longer taking narcotic pain medication. If your right knee was replaced, avoid driving for 6 to 8 weeks. Remember that your reflexes may not be as sharp as before your surgery. Ask your surgeon for specific instructions.   - Avoiding falls - A fall during the first few weeks after surgery can damage your new knee and may result in a need for further surgery.   throw rugs and tack down loose carpeting.  Be aware of floor hazards such as pets, small objects or uneven surfaces.    - Airport Metal Detectors - The sensitivity of metal detectors varies and it is likely that your prosthesis will cause an alarm.  Inform the  of your artificial joint.  Diet  - Resume your normal diet as tolerated.  - It is important to achieve a healthy nutritional status by eating a well balanced diet on a regular basis.  - Your physician may recommend that you take iron and vitamin supplements.   - Continue to drink plenty of fluids.  Shower/Bathing  - You may shower as soon as you get home from the hospital unless otherwise instructed.  - Keep your incision out of water.  To keep the incision dry when showering, cover it with a plastic bag or plastic wrap.  - Pat incision dry if it gets wet.  Don’t rub.  - Do not submerge in a bath until staples are out and the incision is completely healed. (Approximately 6-8 weeks)  Dressing Change:  Procedure (if recommended by your physician)  - Wash hands.  - Open all new dressing change materials.  - Remove old dressing and discard.  - Inspect incision for redness, increase in clear drainage, yellow/green drainage, odor and surrounding skin hot to touch.  -  ABD (large gauze) pad or “island dressing” by one corner and lay over the incision.  Be careful not to touch the inside of the dressing that will lay over the incision.  - Secure in place as instructed (Ace wrap or tape).    Swelling/Bruising    - Swelling can last from 3-6  months.  - Elevate your leg higher than your heart while reclining.   The first week you are home you should elevate your leg an equal amount of time, as you are active.    - Anti-inflammatory pills can be taken once you have stopped the blood thinners.  - The swelling is usually worse after you go home since you are upright for longer periods of time.  - Bruising is common and can involve the entire leg including the thigh, calf and even foot. Bruising often does not appear until after you arrive home and it can be quite dramatic- purple, black, and green.  The bruising you can see is not usually concerning and will subside without any treatment.      Blood Clot Prevention  Blood clots in the legs and the less common, but frightening, clots that travel to the lungs are a real focus of our preventative. Most patients are at standard risk for them, but those patients who are at higher risk include people who have had previous clots, a family history of clotting, smoking, diabetes, obesity, advanced age, use of estrogen and a sedentary lifestyle.    - Signs of blood clots in legs - Swelling in thigh, calf or ankle that does not go down with elevation.  Pain, heat and tenderness in calf, back of calf or groin area.  NOTE: blood clots can occur in either leg.  - You have been receiving anticoagulant therapy (blood thinners) in the hospital and you may be instructed to continue at home depending on your risk factors.  - Your risk for developing a clot continues for up to 2-3 months after surgery.  You should avoid prolonged sitting and dehydration during that time (long air trips and car trips).  If you do take a trip during this time, please get up and move around every 1- 1.5 hours.  - If you are prescribed blood-thinning medication for home, follow instructions as directed. (Handouts provided if applicable).      Activity  Once home, you should continue to stay active. The key is to remember not to overdo it! While  you can expect some good days and some bad days, you should notice a gradual improvement and a gradual increase in your endurance over the next 6 to 12 months. Exercise is a critical component of home care, particularly during the first few weeks after surgery.     - Normal activities of daily living You should be able to resume most within 3 to 6 weeks following surgery. Some pain with activity and at night is common for several weeks after surgery  -  Physical Therapy Exercises - Continue to do the exercises prescribed for at least two months after surgery. Riding a stationary bicycle can help maintain muscle tone and keep your knee flexible. Try to achieve the maximum degree of bending and extension possible. (handout provided by Therapist).  - Sexual Activity -. Your surgeon can tell you when it safe to resume sexual activity.    - Sleeping Positions - You can safely sleep on your back, on either side, or on your stomach.   - Other Activities - Walk as much as you like, but remember that walking is no substitute for the exercises your doctor and physical therapist will prescribe. Lower impact activities are preferred.  If you have specific questions, consult your Surgeon.    When to Call the Doctor   Call the physician if:   - Fever over 100.5? F  - Increased pain, drainage, redness, odor or heat around the incision area  - Shaking chills  - Increased knee pain with activity and rest  - Increased pain in calf, tenderness or redness above or below the knee  - Increased swelling of calf, ankle, foot  - Sudden increased shortness of breath, sudden onset of chest pain, localized chest pain with coughing  - Incision opening  Or, if there are any questions or concerns about medications or care.    and   Total Joint Hip Replacement Discharge Instructions    Pain  - The goal is to slowly wean off the prescription pain medicine.  - Ice can be used for pain control.  20 minutes at a time is recommended, and never directly  against your skin or incision.  - Most patients are off the pain pills by 3 weeks; others may require a low level of pain medications for many months. If your pain continues to be severe, follow up with your physician.  Infection  Deep hip joint infections that require removal of the prostheses occur in less than 0.1% of patients. Lesser infections in the skin (cellulites) are more common and much more easily treated.  - Keep the incision as clean and dry as possible.  - Always wash your hands before touching your incision.  - Skin infections tend to develop around 7-10 days after surgery, most can be treated with oral antibiotics.  - Dental Care should be delayed for 3 months after surgery, your surgeon recommends taking a dose of antibiotics 1 hour prior to any dental procedure.  After 2 years, most surgeons recommend antibiotics only before an extensive procedure.  Ask your surgeon what he recommends.  - Signs and symptoms of infection can include:  low grade fever, redness, pain, swelling and drainage from your incision.  Notify your surgeon immediately if you develop any of these symptoms.  Post op Disturbances  - Bowel habits - constipation is extremely common and is caused by a combination of anesthesia, lack of mobility and pain medicine.  Use stool softeners or laxatives if necessary. It is important not to ignore this problem, as bowel obstructions can be a serious complication after joint replacement surgery.  - Mood/Energy Level - Many patients experience a lack of energy and endurance for up to 2-3 months after surgery.  Some may also feel down and can even become depressed.  This is likely due to the postoperative anemia, change in activity level, lack of sleep, pain medicine and just the emotional reaction to the surgery itself that is a big disruption in a person’s life.  This usually passes.  If symptoms persist, follow up with your primary physician.  - Returning to work - Your surgeon will give  you more specific instructions.  Generally, if you work a sedentary job requiring little standing or walking, most patients may return within 2-6 weeks.  Manual labor jobs involving walking, lifting and standing may take 3-4 months.  Your surgeon’s office can provide a release to part-time or light duty work early on in your recovery and progress you to full duty as able.  - Driving - You can begin driving an automatic shift car in 4 to 8 weeks, provided you are no longer taking narcotic pain medication. If you have a stick-shift car and your right hip was replaced, do not begin driving until your doctor says you can.   - Avoiding falls -  throw rugs and tack down loose carpeting.  Be aware of floor hazards such as pets, small objects or uneven surfaces.   -  Airport Metal Detectors - The sensitivity of metal detectors varies and it is likely that your prosthesis will cause an alarm. Inform the  that you have an artificial joint.  Diet  - Resume your normal diet as tolerated.  - It is important to achieve a healthy nutritional status by eating a well balanced diet on a regular basis.  - Your physician may recommend that you take iron and vitamin supplements.   - Continue to drink plenty of fluids.  Shower/Bathing  - You may shower as soon as you get home from the hospital unless otherwise instructed.  - Keep your incision out of water.  To keep the incision dry when showering, cover it with a plastic bag or plastic wrap.  - Pat incision dry if it gets wet.  Don’t rub.  - Do not submerge in a bath until staples are out and the incision is completely healed. (Approximately 6-8 weeks after surgery).  Dressing Change:  Procedure (if recommended by your physician)  - Wash hands.  - Open all dressing change materials.  - Remove old dressing and discard.  - Inspect incision for redness, increase in clear drainage, yellow/green drainage, odor and surrounding skin hot to touch.  -  ABD (large  gauze) pad by one corner and lay over the incision.  Be careful not to touch the inside of the dressing that will lay over the incision.  - Secure in place as instructed (Ace wrap or tape).    Swelling/Bruising  - Swelling is normal after hip replacement and can involve the thigh, knee, calf and foot.  - Swelling can last from 3-6 months.  - Elevate your leg higher than your heart while reclining.  The first week you are home you should elevate your leg an equal amount of time, as you are active.    - Anti-inflammatory pills can be taken once you have stopped the blood thinners.  - The swelling is usually worse after you go home since you are upright for longer periods of time.  - Bruising is common and can involve the entire leg including the thigh, calf and even foot.  Bruising often does not appear until after you arrive home and it can be quite dramatic- purple, black, green.  The bruising you can see is not usually concerning and will subside without any treatment.      Blood Clot Prevention  Blood clots in the legs and the less common, but frightening, clots that travel to the lungs are a real focus of our preventative. Most patients are at standard risk for them, but those patients who are at higher risk include people who have had previous clots, a family history of clotting, smoking, diabetes, obesity, advanced age, use of estrogen and a sedentary lifestyle.    - Signs of blood clots in legs - Swelling in thigh, calf or ankle that does not go down with elevation.  Pain, heat and tenderness in calf, back of calf or groin area.  NOTE: blood clots can occur in either leg.  - You have been receiving anticoagulant therapy (blood thinners) in the hospital and you may be instructed to continue at home depending on your risk factors.  - Your risk for developing a clot continues for up to 2-3 months after surgery.  You should avoid prolonged sitting and dehydration during that time (long air trips and car trips).   If you do take a trip during this time, please get up and move around every 1- 1.5 hours.  - If you are prescribed blood thinning medication for home, follow instructions as directed. (Handouts provided if applicable).      Activity    Once you get home, you should stay active. The key is not to overdo it! While you can expect some good days and some bad days, you should notice a gradual improvement over time you should notice a gradual improvement and a gradual increase in your endurance over the next 6 to 12 months.    - Weight Bearing - If you have undergone cemented or hybrid hip replacement, you can put some weight on the leg immediately using a cane or walker, and you should continue to use some support for 4 to 6 weeks to help the muscles recover.   - Sleeping Positions - Sleep on your back with your legs slightly apart or on your side with a regular pillow between your knees. Be sure to use the pillow for at least 6 weeks, or until your doctor says you can do without it. Sleeping on your stomach should be all right  - Sitting - For at least the first 3 months, sit only in chairs that have arms. Do not sit on low chairs, low stools, or reclining chairs. Do not cross your legs at the knees. The physical therapist will show you how to sit and stand from a chair, keeping your affected leg out in front of you. Get up and move around on a regular basis--at least once every hour.  - Walking - Walk as much as you like once your doctor gives you the go-ahead, but remember that walking is no substitute for your prescribed exercises. Walking with a pair of trekking poles is helpful and adds as much as 40% to the exercise you get when you walk  - Therapy may be needed in some cases, to strengthen your muscles and improve your gait (walking pattern).  This decision will be made at your post-operative appointment.  Follow your therapist recommended post-operative exercises (handout provided by Therapist).  - Swimming is  also recommended; you can begin as soon as the sutures have been removed and the wound is healed, approximately 6 to 8 weeks after surgery. Using a pair of training fins may make swimming a more enjoyable and effective exercise.  - Other activities - Lower impact activities are preferred.  If you have specific questions, consult your Surgeon.    - Sexual activity - Your surgeon can tell you when it should be safe to resume sexual activity.      When to Call the Doctor   Call the physician if:   - Fever over 100.5? F  - Increased pain, drainage, redness, odor or heat around the incision area  - Shaking chills  - Increased knee pain with activity and rest  - Increased pain in calf, tenderness or redness above or below the knee  - Increased swelling of calf, ankle, foot  - Sudden increased shortness of breath, sudden onset of chest pain, localized chest pain with coughing  - Incision opening  Or, if there are any questions or concerns about medications or care.       -Is this patient being discharged with medication to prevent blood clots?  Yes, Aspirin Aspirin, ASA oral tablets  What is this medicine?  ASPIRIN (AS pir in) is a pain reliever. It is used to treat mild pain and fever. This medicine is also used as directed by a doctor to prevent and to treat heart attacks, to prevent strokes, and to treat arthritis or inflammation.  This medicine may be used for other purposes; ask your health care provider or pharmacist if you have questions.  COMMON BRAND NAME(S): Aspir-Low, Aspir-Livia , Aspirtab , Firecomms Advanced Aspirin, Firecomms Aspirin Extra Strength, Firecomms Aspirin Plus, Firecomms Aspirin, Firecomms Genuine Aspirin, Firecomms Womens Aspirin , Bufferin Extra Strength, Bufferin Low Dose, Bufferin  What should I tell my health care provider before I take this medicine?  They need to know if you have any of these conditions:  -anemia  -asthma  -bleeding problems  -child with chickenpox, the flu, or other viral  infection  -diabetes  -gout  -if you frequently drink alcohol containing drinks  -kidney disease  -liver disease  -low level of vitamin K  -lupus  -smoke tobacco  -stomach ulcers or other problems  -an unusual or allergic reaction to aspirin, tartrazine dye, other medicines, dyes, or preservatives  -pregnant or trying to get pregnant  -breast-feeding  How should I use this medicine?  Take this medicine by mouth with a glass of water. Follow the directions on the package or prescription label. You can take this medicine with or without food. If it upsets your stomach, take it with food. Do not take your medicine more often than directed.  Talk to your pediatrician regarding the use of this medicine in children. While this drug may be prescribed for children as young as 12 years of age for selected conditions, precautions do apply. Children and teenagers should not use this medicine to treat chicken pox or flu symptoms unless directed by a doctor.  Patients over 65 years old may have a stronger reaction and need a smaller dose.  Overdosage: If you think you have taken too much of this medicine contact a poison control center or emergency room at once.  NOTE: This medicine is only for you. Do not share this medicine with others.  What if I miss a dose?  If you are taking this medicine on a regular schedule and miss a dose, take it as soon as you can. If it is almost time for your next dose, take only that dose. Do not take double or extra doses.  What may interact with this medicine?  Do not take this medicine with any of the following medications:  -cidofovir  -ketorolac  -probenecid  This medicine may also interact with the following medications:  -alcohol  -alendronate  -bismuth subsalicylate  -flavocoxid  -herbal supplements like feverfew, garlic, aman, ginkgo biloba, horse chestnut  -medicines for diabetes or glaucoma like acetazolamide, methazolamide  -medicines for gout  -medicines that treat or prevent blood  clots like enoxaparin, heparin, ticlopidine, warfarin  -other aspirin and aspirin-like medicines  -NSAIDs, medicines for pain and inflammation, like ibuprofen or naproxen  -pemetrexed  -sulfinpyrazone  -varicella live vaccine  This list may not describe all possible interactions. Give your health care provider a list of all the medicines, herbs, non-prescription drugs, or dietary supplements you use. Also tell them if you smoke, drink alcohol, or use illegal drugs. Some items may interact with your medicine.  What should I watch for while using this medicine?  If you are treating yourself for pain, tell your doctor or health care professional if the pain lasts more than 10 days, if it gets worse, or if there is a new or different kind of pain. Tell your doctor if you see redness or swelling. Also, check with your doctor if you have a fever that lasts for more than 3 days. Only take this medicine to prevent heart attacks or blood clotting if prescribed by your doctor or health care professional.  Do not take aspirin or aspirin-like medicines with this medicine. Too much aspirin can be dangerous. Always read the labels carefully.  This medicine can irritate your stomach or cause bleeding problems. Do not smoke cigarettes or drink alcohol while taking this medicine. Do not lie down for 30 minutes after taking this medicine to prevent irritation to your throat.  If you are scheduled for any medical or dental procedure, tell your healthcare provider that you are taking this medicine. You may need to stop taking this medicine before the procedure.  What side effects may I notice from receiving this medicine?  Side effects that you should report to your doctor or health care professional as soon as possible:  -allergic reactions like skin rash, itching or hives, swelling of the face, lips, or tongue  -black, tarry stools  -bloody, coffee ground-like vomit  -breathing problems  -changes in hearing, ringing in the  ears  -confusion  -general ill feeling or flu-like symptoms  -pain on swallowing  -redness, blistering, peeling or loosening of the skin, including inside the mouth or nose  -trouble passing urine or change in the amount of urine  -unusual bleeding or bruising  -unusually weak or tired  -yellowing of the eyes or skin  Side effects that usually do not require medical attention (report to your doctor or health care professional if they continue or are bothersome):  -diarrhea or constipation  -nausea, vomiting  -stomach gas, heartburn  This list may not describe all possible side effects. Call your doctor for medical advice about side effects. You may report side effects to FDA at 6-181-ABC-7366.  Where should I keep my medicine?  Keep out of the reach of children.  Store at room temperature between 15 and 30 degrees C (59 and 86 degrees F). Protect from heat and moisture. Do not use this medicine if it has a strong vinegar smell. Throw away any unused medicine after the expiration date.  NOTE: This sheet is a summary. It may not cover all possible information. If you have questions about this medicine, talk to your doctor, pharmacist, or health care provider.  © 2014, Elsevier/Gold Standard. (3/10/2009 10:44:17 AM)      · Is patient discharged on Warfarin / Coumadin?   No     Depression / Suicide Risk    As you are discharged from this [facility] facility, it is important to learn how to keep safe from harming yourself.    Recognize the warning signs:  · Abrupt changes in personality, positive or negative- including increase in energy   · Giving away possessions  · Change in eating patterns- significant weight changes-  positive or negative  · Change in sleeping patterns- unable to sleep or sleeping all the time   · Unwillingness or inability to communicate  · Depression  · Unusual sadness, discouragement and loneliness  · Talk of wanting to die  · Neglect of personal appearance   · Rebelliousness- reckless  behavior  · Withdrawal from people/activities they love  · Confusion- inability to concentrate     If you or a loved one observes any of these behaviors or has concerns about self-harm, here's what you can do:  · Talk about it- your feelings and reasons for harming yourself  · Remove any means that you might use to hurt yourself (examples: pills, rope, extension cords, firearm)  · Get professional help from the community (Mental Health, Substance Abuse, psychological counseling)  · Do not be alone:Call your Safe Contact- someone whom you trust who will be there for you.  · Call your local CRISIS HOTLINE 466-5831 or 452-183-3076  · Call your local Children's Mobile Crisis Response Team [place] (814) 393-3060 or www.[employer]  · Call the toll free National Suicide Prevention Hotlines   · National Suicide Prevention Lifeline 625-198-OMND (9526)  · National Hope Line Network 800-SUICIDE (423-8827)

## 2018-02-13 NOTE — PROGRESS NOTES
Pt A&Ox4, sitting up in bed. Denies pain at this time. Pt able to dorsi/plantar flex w/out difficulty. Polar ice to the Lt knee, +CMS. Pt notified to call for assistance.

## 2018-02-13 NOTE — DIETARY
NUTRITION SERVICES: BMI - Pt with BMI >40 (=45.81). Weight loss counseling not appropriate in acute care setting.     RECOMMEND - Referral to outpatient nutrition services for weight management after D/C. RD available PRN.

## 2018-02-13 NOTE — THERAPY
"Occupational Therapy Evaluation completed.   Functional Status:  Pt seated up in chair, reports she has been toileting with nursing without issue.  Pt able to dress with SBA for LB, indep UB.  Denied need to groom at the sink.  SBA with FWW for short distance in the room.  Educated pt on role of OT and Total Knee Replacement Protocol.  Reviewed application of precautions and use of Adaptive Equipment for dressing, bathing, toileting, grooming, kitchen activities and general functional mobility in the home. Reviewed the use of reacher if needed, shower chair and raised toilet seat to assist with ADL's.  Reviewed tub/shower transfers. Provided pt with handout and suggestions on where to obtain needed items. Educated on covering incision/dressing with extra plastic wrap and waterproof tape to ensure that incision does not get wet.  Educated on around the clock pain management strategies so that pt does not wake up in a pain crisis.  Pt verbalized understanding of all education provided.    Plan of Care: Patient with no further skilled OT needs in the acute care setting at this time  Discharge Recommendations:  Equipment: No Equipment Needed. Post-acute therapy Discharge to home with outpatient or home health for additional skilled therapy services.    See \"Rehab Therapy-Acute\" Patient Summary Report for complete documentation.    "

## 2018-02-13 NOTE — PROGRESS NOTES
Total Joint Replacement Program Rounding     Inpatient bedside rounding attempted to address quality of care provided by total joint replacement program IDT and overall patient experience at [facility]. Pt currently working with PT. Rounding with RN completed - no concerns at this time. Patient is progressing appropriately s/p surgery. Will check back as able.

## 2018-02-13 NOTE — DISCHARGE SUMMARY
DATE OF ADMISSION:  02/12/2018    DATE OF DISCHARGE:  02/13/2018    ADMITTING DIAGNOSIS:  Advanced degenerative arthritis, left knee.    DISCHARGE DIAGNOSIS:  Advanced degenerative arthritis, left knee.    PROCEDURE:  Left total knee replacement on 02/12/2018.    INFECTIONS:  None.    COMPLICATIONS:  None.    CONDITION ON DISCHARGE:  Stable.    ADMITTING INFORMATION:  Please see dictated note.    HOSPITAL COURSE:  After appropriate preoperative evaluation and obtaining   informed consent, the patient was taken to the operating on 02/12/2018 where   the above listed procedure was performed under general anesthetic agent.  The   patient tolerated the procedure well and was taken to recovery room, then to   the floor in stable condition.  She received 24 hours of perioperative Ancef   and was begun on aspirin as well as use of mechanical prophylaxis for DVT   prevention.  She was begun on ambulation and weightbearing as tolerated on the   left leg on the afternoon of surgery and by postoperative day #1, was   comfortable on p.o. pain medication and independently ambulatory with her   walker.  She has range of motion of 0, 1, 90 in the left knee with good quad   control.  She has an intact neurologic and vascular exam.  A clean, dry wound;   a soft, nontender calf; and stable labs.  She will be discharged to home   after p.m. therapy on postoperative day #1.    DISPOSITION:  The patient will be discharged home today and will return to see   me in 2 weeks for a wound check and staple removal.  Arrangements were made   for outpatient physical therapy and I have encouraged her to begin this week.    She may be weightbearing as tolerated on the left leg and I have encouraged   her to work vigorously on range of motion, both flexion and extension of the   left knee.  She does have a walker at home for ambulation.  She may shower   with the incision covered, but the wound should be kept clean and dry and we   should be  notified if there are problems with increasing pain, drainage,   evidence of infection, neurovascular compromise, or other significant concern.    She will resume her normal preoperative home medications and was given a   prescription for Percocet 10/325 to take 1/2 to 1 p.o. q.4 hours p.r.n. pain   at her preoperative visit.  She will take a 325 mg aspirin b.i.d. for DVT   prophylaxis.       ____________________________________     MD HALIE Conrad / BUBBA    DD:  02/13/2018 08:31:12  DT:  02/13/2018 09:06:35    D#:  [id number]  Job#:  [id number]

## 2018-02-13 NOTE — CARE PLAN
Problem: Safety  Goal: Will remain free from injury  Outcome: PROGRESSING AS EXPECTED    Intervention: Provide assistance with mobility   02/13/18 0581   OTHER   Assistance / Tolerance Assistance of One;Tolerates Well   Pt educated to call for assistance before ambulating in hallways or up to bathroom       Problem: Venous Thromboembolism (VTW)/Deep Vein Thrombosis (DVT) Prevention:  Goal: Patient will participate in Venous Thrombosis (VTE)/Deep Vein Thrombosis (DVT)Prevention Measures  Outcome: PROGRESSING AS EXPECTED   02/13/18 0109   OTHER   Risk Assessment Score 5   VTE RISK Very High   Pharmacologic Prophylaxis Used (ASA)   Mechanical/VTE Prophylaxis   Mechanical Prophylaxis  SCDs, Sequential Compression Device   SCDs, Sequential Compression Device On

## 2018-02-18 ENCOUNTER — HOSPITAL ENCOUNTER (EMERGENCY)
Facility: MEDICAL CENTER | Age: 63
End: 2018-02-19
Attending: EMERGENCY MEDICINE
Payer: COMMERCIAL

## 2018-02-18 DIAGNOSIS — M79.605 PAIN OF LEFT LOWER EXTREMITY: ICD-10-CM

## 2018-02-18 PROCEDURE — 99284 EMERGENCY DEPT VISIT MOD MDM: CPT

## 2018-02-18 ASSESSMENT — PAIN SCALES - GENERAL: PAINLEVEL_OUTOF10: 9

## 2018-02-19 VITALS
RESPIRATION RATE: 16 BRPM | DIASTOLIC BLOOD PRESSURE: 72 MMHG | SYSTOLIC BLOOD PRESSURE: 162 MMHG | TEMPERATURE: 98.2 F | OXYGEN SATURATION: 93 % | HEART RATE: 83 BPM | BODY MASS INDEX: 46.01 KG/M2 | WEIGHT: 250 LBS | HEIGHT: 62 IN

## 2018-02-19 PROCEDURE — 93971 EXTREMITY STUDY: CPT

## 2018-02-19 PROCEDURE — A9270 NON-COVERED ITEM OR SERVICE: HCPCS | Performed by: EMERGENCY MEDICINE

## 2018-02-19 PROCEDURE — 700102 HCHG RX REV CODE 250 W/ 637 OVERRIDE(OP): Performed by: EMERGENCY MEDICINE

## 2018-02-19 RX ORDER — HYDROCODONE BITARTRATE AND ACETAMINOPHEN 5; 325 MG/1; MG/1
0.5 TABLET ORAL ONCE
Status: COMPLETED | OUTPATIENT
Start: 2018-02-19 | End: 2018-02-19

## 2018-02-19 RX ADMIN — HYDROCODONE BITARTRATE AND ACETAMINOPHEN 0.5 TABLET: 5; 325 TABLET ORAL at 01:34

## 2018-02-19 NOTE — ED PROVIDER NOTES
ED Provider Note    CHIEF COMPLAINT  Chief Complaint   Patient presents with   • Leg Pain     Pt had L total knee replacement on 2/12, discharged on 2/14. Since return has had increase in bruising and pain to whole leg. Large amount of bruising noted from lateral calf up to thigh, tender to touch. CSM intact, reports no difference in sensation.    • Post-Op Complications       HPI  Rain Sanbaria is a 63 y.o. female who presents to the emergency department with ecchymosis and discomfort to the left lower extremity. The patient had a total knee replacement on Tuesday of last week. She states the surgery was uneventful and she went to physical therapy subsequently. She says occasionally she gets shooting pain that was already down into her calf. She states she is continuing to have some swelling to the left leg and she is also noticed some ecchymosis posteriorly in the calf in the popliteal region of the left lower extremity. She has not had any associated fevers. She does not have any chest pain or difficulty with breathing.    REVIEW OF SYSTEMS  See HPI for further details. All other systems are negative.     PAST MEDICAL HISTORY  Past Medical History:   Diagnosis Date   • Abdominal hernia    • Abnormal mammogram 6/19/2009   • Alcohol use 01/26/2018    2 per day   • Anxiety    • Arthritis 01/26/2018    To hands and knees   • Chronic right shoulder pain    • Cystocele 6/19/2009   • Dental disorder     Upper denture   • Depression 6/19/2009   • Depression with anxiety 01/26/2018   • Genital herpes 6/19/2009 1/26/18-no breakout for 10 years.   • High cholesterol    • Hyperlipidemia    • Hypertension 01/26/2018    Controlled and no longer any meds for 1 year.   • Insomnia 6/19/2009   • Migraine 06/19/2009 1/26/18-states none since 2003.   • OA (osteoarthritis)    • Pain 01/26/2018    Knees   • Sleep apnea     2011 had a very abn apnea link.  sx improved on nitetime o2.  her ins wouldn't pay for o2 and she  "can't afford it.  she is looking into options for o2.    • Sleep apnea 01/26/2018    States did not want CPAP so never had one.    • Vitamin D deficiency        SOCIAL HISTORY  Social History     Social History   • Marital status: Legally      Spouse name: N/A   • Number of children: N/A   • Years of education: N/A     Social History Main Topics   • Smoking status: Never Smoker   • Smokeless tobacco: Never Used   • Alcohol use 4.2 oz/week     7 Standard drinks or equivalent per week      Comment: 2 per day   • Drug use: No   • Sexual activity: Not on file     Other Topics Concern   • Not on file     Social History Narrative   • No narrative on file           PHYSICAL EXAM  VITAL SIGNS: BP (!) 162/72   Pulse 83   Temp 36.8 °C (98.2 °F)   Resp 16   Ht 1.575 m (5' 2\")   Wt 113.4 kg (250 lb)   SpO2 96%   BMI 45.73 kg/m²   Constitutional: Well developed, Well nourished, No acute distress, Non-toxic appearance.   HENT: Normocephalic, Atraumatic, tympanic membranes are intact and nonerythematous bilaterally, Oropharynx moist without exudates or erythema, Nose normal.   Eyes: PERRLA, EOMI, Conjunctiva normal.  Neck: Supple without meningismus  Lymphatic: No lymphadenopathy noted.   Cardiovascular: Normal heart rate, Normal rhythm, No murmurs, No rubs, No gallops.   Thorax & Lungs: Normal breath sounds, No respiratory distress, No wheezing, No chest tenderness.   Abdomen: Bowel sounds normal, Soft, No tenderness, no rebound, no guarding, no distention, No masses, No pulsatile masses.   Skin: Ecchymosis tracking from the popliteal region of the left leg down into the calf  Back: No tenderness, No CVA tenderness.   Extremities: The ecchymosis described above, mild swelling to the left lower extremity, extremities otherwise Atraumatic with symmetric distal pulses, No edema, No tenderness, No cyanosis, No clubbing.   Neurologic: Alert & oriented x 3, cranial nerves II through XII are intact, Normal motor " function, Normal sensory function, No focal deficits noted.   Psychiatric: Affect normal, Judgment normal, Mood normal.       RADIOLOGY/PROCEDURES  Ultrasound shows no evidence of a DVT    COURSE & MEDICAL DECISION MAKING  Pertinent Labs & Imaging studies reviewed. (See chart for details)  This a 63-year-old female who presents with postoperative leg discomfort. The ultrasound does not show any evidence of a DVT. I suspect the ecchymosis is postoperative in nature. The patient will be discharged with instructions to continue her physical therapy and follow up with orthopedics as needed.     FINAL IMPRESSION  1. Postoperative left leg pain     Disposition  The patient will be discharged in stable condition    Electronically signed by: Sukhi Zapata, 2/18/2018 11:28 PM

## 2018-02-19 NOTE — ED TRIAGE NOTES
"Chief Complaint   Patient presents with   • Leg Pain     Pt had L total knee replacement on 2/12, discharged on 2/14. Since return has had increase in bruising and pain to whole leg. Large amount of bruising noted from lateral calf up to thigh, tender to touch. CSM intact, reports no difference in sensation.    • Post-Op Complications     BP (!) 162/72   Pulse 83   Temp 36.8 °C (98.2 °F)   Resp 16   Ht 1.575 m (5' 2\")   Wt 113.4 kg (250 lb)   SpO2 96%   BMI 45.73 kg/m²     Pt to triage via , states unable to stand for weight at this time d/t discomfort/difficulty ambulating. Reports increase in bruising and pain began after first PT session on Thursday. Pt has been taking her prescribed post op pain meds. Pt returned to [facility], educated on triage process, instructed to notify staff of worsening symptoms.   "

## 2018-02-25 RX ORDER — DULOXETIN HYDROCHLORIDE 30 MG/1
30 CAPSULE, DELAYED RELEASE ORAL
Qty: 90 CAP | Refills: 1 | Status: SHIPPED | OUTPATIENT
Start: 2018-02-25 | End: 2018-08-21 | Stop reason: SDUPTHER

## 2018-02-26 ENCOUNTER — OFFICE VISIT (OUTPATIENT)
Dept: MEDICAL GROUP | Facility: MEDICAL CENTER | Age: 63
End: 2018-02-26
Payer: COMMERCIAL

## 2018-02-26 VITALS
HEIGHT: 62 IN | SYSTOLIC BLOOD PRESSURE: 124 MMHG | HEART RATE: 84 BPM | WEIGHT: 253 LBS | TEMPERATURE: 97.4 F | BODY MASS INDEX: 46.56 KG/M2 | OXYGEN SATURATION: 95 % | DIASTOLIC BLOOD PRESSURE: 90 MMHG

## 2018-02-26 DIAGNOSIS — Z79.891 CHRONIC USE OF OPIATE DRUGS THERAPEUTIC PURPOSES: ICD-10-CM

## 2018-02-26 DIAGNOSIS — G89.29 CHRONIC PAIN OF LEFT KNEE: ICD-10-CM

## 2018-02-26 DIAGNOSIS — M25.562 CHRONIC PAIN OF LEFT KNEE: ICD-10-CM

## 2018-02-26 PROCEDURE — 99214 OFFICE O/P EST MOD 30 MIN: CPT | Performed by: NURSE PRACTITIONER

## 2018-02-26 RX ORDER — HYDROCODONE BITARTRATE AND ACETAMINOPHEN 5; 325 MG/1; MG/1
1 TABLET ORAL EVERY 12 HOURS PRN
Qty: 60 TAB | Refills: 0 | Status: SHIPPED | OUTPATIENT
Start: 2018-03-26 | End: 2018-02-26 | Stop reason: SDUPTHER

## 2018-02-26 RX ORDER — HYDROCODONE BITARTRATE AND ACETAMINOPHEN 5; 325 MG/1; MG/1
1 TABLET ORAL EVERY 12 HOURS PRN
Qty: 60 TAB | Refills: 0 | Status: SHIPPED | OUTPATIENT
Start: 2018-02-26 | End: 2018-02-26 | Stop reason: SDUPTHER

## 2018-02-26 RX ORDER — HYDROCODONE BITARTRATE AND ACETAMINOPHEN 5; 325 MG/1; MG/1
1 TABLET ORAL EVERY 12 HOURS PRN
Qty: 60 TAB | Refills: 0 | Status: SHIPPED | OUTPATIENT
Start: 2018-04-26 | End: 2018-05-26

## 2018-02-26 NOTE — PROGRESS NOTES
Subjective:     Rain Sanabria is a 63 y.o. female who presents with left knee pain.    HPI:   Seen in f/u for pain med refill for left knee pain.  She just had TKR on 2/12/18.  Still having lots of pain and swelling.  She had to go toER.  Her surgery leg turned black and blue.  More swelling.  They checked for DVT and ti was neg.  Now  Needs refill on norco.  Her last med was 2/22/18.  She is also using tyl.    Reviewed lab with pt.  Her CMP, GFR, LP is wnl.   Her last UDS and contract wree 5/17.      Patient Active Problem List    Diagnosis Date Noted   • Shortness of breath 03/16/2017   • Morbid obesity with BMI of 45.0-49.9, adult ([facility]) 02/22/2017   • Morbid obesity with BMI of 40.0-44.9, adult ([facility]) 01/24/2017   • Chronic right shoulder pain 01/06/2016   • Hyperlipidemia    • Anxiety    • Vitamin D deficiency    • Abdominal hernia    • Sleep apnea 07/12/2010   • OA (osteoarthritis) 07/12/2010   • HTN (hypertension) 01/20/2010   • Depression 06/19/2009   • Genital herpes simplex 06/19/2009   • Insomnia 06/19/2009   • Migraine 06/19/2009   • Abnormal mammogram 06/19/2009   • Preventative health care 06/19/2009   • Cystocele 06/19/2009       Current medicines (including changes today)  Current Outpatient Prescriptions   Medication Sig Dispense Refill   • [START ON 4/26/2018] HYDROcodone-acetaminophen (NORCO) 5-325 MG Tab per tablet Take 1 Tab by mouth every 12 hours as needed for up to 30 days. 60 Tab 0   • DULoxetine (CYMBALTA) 30 MG Cap DR Particles Take 1 Cap by mouth every day. TAKE 1 CAP BY MOUTH EVERY DAY. 90 Cap 1   • ascorbic acid (ASCORBIC ACID) 500 MG Tab Take 3,000 mg by mouth every day.     • vitamin E 100 UNIT capsule Take 300 Units by mouth every day.     • Multiple Vitamins-Minerals (ENERGY BOOSTER PO) Take 80 mg by mouth. With caffiene     • glucosamine Sulfate 500 MG Cap Take 1,500 mg by mouth every day.     • acyclovir (ZOVIRAX) 400 MG tablet Take 400 mg by mouth 2 times a day as needed.    "  • Cholecalciferol (VITAMIN D) 400 UNIT Tab Take 1,600 Units by mouth every day.     • B Complex Vitamins (VITAMIN B COMPLEX PO) Take 20 mg by mouth every day.     • albuterol (VENTOLIN HFA) 108 (90 BASE) MCG/ACT Aero Soln inhalation aerosol INHALE 2 PUFFS BY MOUTH EVERY FOUR HOURS AS NEEDED FOR SHORTNESS OF BREATH. DISPENSE A SPACER 18 Inhaler 5   • Multiple Vitamin (MULTIVITAMIN PO) Take 1 Tab by mouth every day.       No current facility-administered medications for this visit.        Allergies   Allergen Reactions   • Ace Inhibitors      Ace cough   • Tape Rash and Itching     Adhesive tape.  PAPER TAPE OK.       ROS  Constitutional: Negative. Negative for fever, chills, weight loss, malaise/fatigue and diaphoresis.   HENT: Negative. Negative for hearing loss, ear pain, nosebleeds, congestion, sore throat, neck pain, tinnitus and ear discharge.   Respiratory: Negative. Negative for cough, hemoptysis, sputum production, shortness of breath, wheezing and stridor.   Cardiovascular: Negative. Negative for chest pain, palpitations, orthopnea, claudication, leg swelling and PND.   Gastrointestinal: Denies nausea, vomiting, diarrhea, constipation, heartburn, melena or hematochezia.  Genitourinary: Denies dysuria, hematuria, urinary incontinence, frequency or urgency.        Objective:     Blood pressure 124/90, pulse 84, temperature 36.3 °C (97.4 °F), height 1.575 m (5' 2\"), weight 114.8 kg (253 lb), SpO2 95 %, not currently breastfeeding. Body mass index is 46.27 kg/m².    Physical Exam:  Vitals reviewed.  Constitutional: Oriented to person, place, and time. appears well-developed and well-nourished. No distress.   Cardiovascular: Normal rate, regular rhythm, normal heart sounds and intact distal pulses. Exam reveals no gallop and no friction rub. No murmur heard. No carotid bruits.   Pulmonary/Chest: Effort normal and breath sounds normal. No stridor. No respiratory distress. no wheezes or rales. exhibits no " tenderness.   Musculoskeletal: Normal range of motion. exhibits no edema. davin pedal pulses 2+.  Lymphadenopathy: No cervical or supraclavicular adenopathy.   Neurological: Alert and oriented to person, place, and time. exhibits normal muscle tone.  Skin: Skin is warm and dry. No diaphoresis.   Psychiatric: Normal mood and affect. Behavior is normal.      Assessment and Plan:     The following treatment plan was discussed:    1. Chronic use of opiate drugs therapeutic purposes  HYDROcodone-acetaminophen (NORCO) 5-325 MG Tab per tablet    DISCONTINUED: HYDROcodone-acetaminophen (NORCO) 5-325 MG Tab per tablet    DISCONTINUED: HYDROcodone-acetaminophen (NORCO) 5-325 MG Tab per tablet    refilled norco for 23 months.  f/u 3 months for med refill.  all lab wnl.     2. Chronic pain of left knee  HYDROcodone-acetaminophen (NORCO) 5-325 MG Tab per tablet    DISCONTINUED: HYDROcodone-acetaminophen (NORCO) 5-325 MG Tab per tablet    DISCONTINUED: HYDROcodone-acetaminophen (NORCO) 5-325 MG Tab per tablet    refilled pain meds   3. BMI 40.0-44.9, adult (CMS-HCC)  Patient identified as having weight management issue.  Appropriate orders and counseling given.         Followup: Return in about 3 months (around 5/26/2018).

## 2018-04-22 ENCOUNTER — HOSPITAL ENCOUNTER (EMERGENCY)
Facility: MEDICAL CENTER | Age: 63
End: 2018-04-23
Attending: EMERGENCY MEDICINE
Payer: COMMERCIAL

## 2018-04-22 DIAGNOSIS — M25.551 RIGHT HIP PAIN: ICD-10-CM

## 2018-04-22 DIAGNOSIS — M54.31 SCIATICA OF RIGHT SIDE: ICD-10-CM

## 2018-04-22 PROCEDURE — 99283 EMERGENCY DEPT VISIT LOW MDM: CPT

## 2018-04-22 ASSESSMENT — PAIN SCALES - GENERAL: PAINLEVEL_OUTOF10: 10

## 2018-04-23 ENCOUNTER — TELEPHONE (OUTPATIENT)
Dept: MEDICAL GROUP | Facility: MEDICAL CENTER | Age: 63
End: 2018-04-23

## 2018-04-23 VITALS
DIASTOLIC BLOOD PRESSURE: 89 MMHG | HEIGHT: 63 IN | RESPIRATION RATE: 16 BRPM | SYSTOLIC BLOOD PRESSURE: 178 MMHG | HEART RATE: 78 BPM | BODY MASS INDEX: 44.96 KG/M2 | TEMPERATURE: 98.6 F | OXYGEN SATURATION: 96 % | WEIGHT: 253.75 LBS

## 2018-04-23 DIAGNOSIS — M25.512 ACUTE PAIN OF LEFT SHOULDER: ICD-10-CM

## 2018-04-23 DIAGNOSIS — M75.102 TEAR OF LEFT ROTATOR CUFF, UNSPECIFIED TEAR EXTENT: ICD-10-CM

## 2018-04-23 PROCEDURE — A9270 NON-COVERED ITEM OR SERVICE: HCPCS | Performed by: EMERGENCY MEDICINE

## 2018-04-23 PROCEDURE — 700102 HCHG RX REV CODE 250 W/ 637 OVERRIDE(OP): Performed by: EMERGENCY MEDICINE

## 2018-04-23 RX ORDER — PREDNISONE 20 MG/1
40 TABLET ORAL DAILY
Qty: 6 TAB | Refills: 0 | Status: SHIPPED | OUTPATIENT
Start: 2018-04-23 | End: 2018-04-26

## 2018-04-23 RX ORDER — IBUPROFEN 600 MG/1
600 TABLET ORAL ONCE
Status: COMPLETED | OUTPATIENT
Start: 2018-04-23 | End: 2018-04-23

## 2018-04-23 RX ORDER — IBUPROFEN 600 MG/1
600 TABLET ORAL EVERY 6 HOURS PRN
Qty: 20 TAB | Refills: 0 | Status: SHIPPED | OUTPATIENT
Start: 2018-04-23 | End: 2023-12-07

## 2018-04-23 RX ORDER — LIDOCAINE 50 MG/G
1 PATCH TOPICAL EVERY 24 HOURS
Qty: 30 PATCH | Refills: 3 | Status: SHIPPED | OUTPATIENT
Start: 2018-04-23 | End: 2019-06-25 | Stop reason: SDUPTHER

## 2018-04-23 RX ADMIN — IBUPROFEN 600 MG: 600 TABLET, FILM COATED ORAL at 01:16

## 2018-04-23 NOTE — TELEPHONE ENCOUNTER
Pt called requesting a fill of pain patch she used a while back(she diditn state the name) she was in ER over the weekend due to pinched nerve.

## 2018-04-23 NOTE — ED TRIAGE NOTES
"Chief Complaint   Patient presents with   • Low Back Pain     RT lower back \"top of hip\" since this afternoon. Denies injuries. +numbness/tingling down RT leg.    • Numbness     Pt amb to triage with slow steady gait. Reports pain is better with ambulation. No relief from OTC meds or ice/heat application.   NAD noted. BP noted. Other VSS. Pt returned to lobby. Educated on triage process and to inform staff of any changes.     BP (!) 186/95   Pulse 85   Temp 36.7 °C (98 °F)   Resp 16   Ht 1.6 m (5' 3\")   Wt 115.1 kg (253 lb 12 oz)   SpO2 96%   BMI 44.95 kg/m²     "

## 2018-04-23 NOTE — ED TRIAGE NOTES
Pt has tenderness to right lower back.  Pt has taken aspirin and tylenol this evening with no relief.  Pt has also tried ice with no relief.  Pt was taking hydrocodone for knee replacement and arthritis in right shoulder but is out until 04/26/18.

## 2018-04-23 NOTE — DISCHARGE INSTRUCTIONS
Please call your primary care physician tomorrow morning to schedule a follow-up appointment. Return to the emergency department if you develop any new or worsening symptoms including worsening pain, weakness in the legs, urinary or stool incontinence, difficulty walking, numbness or tingling in the legs, or any further concerns.      Sciatica  Introduction  Sciatica is pain, numbness, weakness, or tingling along your sciatic nerve. The sciatic nerve starts in the lower back and goes down the back of each leg. Sciatica happens when this nerve is pinched or has pressure put on it. Sciatica usually goes away on its own or with treatment. Sometimes, sciatica may keep coming back (recur).  Follow these instructions at home:  Medicines  · Take over-the-counter and prescription medicines only as told by your doctor.  · Do not drive or use heavy machinery while taking prescription pain medicine.  Managing pain  · If directed, put ice on the affected area.  ¨ Put ice in a plastic bag.  ¨ Place a towel between your skin and the bag.  ¨ Leave the ice on for 20 minutes, 2-3 times a day.  · After icing, apply heat to the affected area before you exercise or as often as told by your doctor. Use the heat source that your doctor tells you to use, such as a moist heat pack or a heating pad.  ¨ Place a towel between your skin and the heat source.  ¨ Leave the heat on for 20-30 minutes.  ¨ Remove the heat if your skin turns bright red. This is especially important if you are unable to feel pain, heat, or cold. You may have a greater risk of getting burned.  Activity  · Return to your normal activities as told by your doctor. Ask your doctor what activities are safe for you.  ¨ Avoid activities that make your sciatica worse.  · Take short rests during the day. Rest in a lying or standing position. This is usually better than sitting to rest.  ¨ When you rest for a long time, do some physical activity or stretching between periods of  rest.  ¨ Avoid sitting for a long time without moving. Get up and move around at least one time each hour.  · Exercise and stretch regularly, as told by your doctor.  · Do not lift anything that is heavier than 10 lb (4.5 kg) while you have symptoms of sciatica.  ¨ Avoid lifting heavy things even when you do not have symptoms.  ¨ Avoid lifting heavy things over and over.  · When you lift objects, always lift in a way that is safe for your body. To do this, you should:  ¨ Bend your knees.  ¨ Keep the object close to your body.  ¨ Avoid twisting.  General instructions  · Use good posture.  ¨ Avoid leaning forward when you are sitting.  ¨ Avoid hunching over when you are standing.  · Stay at a healthy weight.  · Wear comfortable shoes that support your feet. Avoid wearing high heels.  · Avoid sleeping on a mattress that is too soft or too hard. You might have less pain if you sleep on a mattress that is firm enough to support your back.  · Keep all follow-up visits as told by your doctor. This is important.  Contact a doctor if:  · You have pain that:  ¨ Wakes you up when you are sleeping.  ¨ Gets worse when you lie down.  ¨ Is worse than the pain you have had in the past.  ¨ Lasts longer than 4 weeks.  · You lose weight for without trying.  Get help right away if:  · You cannot control when you pee (urinate) or poop (have a bowel movement).  · You have weakness in any of these areas and it gets worse.  ¨ Lower back.  ¨ Lower belly (pelvis).  ¨ Butt (buttocks).  ¨ Legs.  · You have redness or swelling of your back.  · You have a burning feeling when you pee.  This information is not intended to replace advice given to you by your health care provider. Make sure you discuss any questions you have with your health care provider.  Document Released: 09/26/2009 Document Revised: 05/25/2017 Document Reviewed: 08/26/2016  © 2017 [employer]

## 2018-04-23 NOTE — ED PROVIDER NOTES
"ED Provider Note    Scribed for Jihan Schroeder M.D. by Sarah Alva. 4/23/2018, 12:46 AM.    Primary care provider: SUHAIL Marina  Means of arrival: walk-in  History obtained from: patient  History limited by: none    CHIEF COMPLAINT  Low back pain    HPI  Rain Sanabria is a 63 y.o. female who presents to the Emergency Department for lower right sided back pain onset around 12PM this afternoon. Pain is associated with a sensation of discomfort which she describes as a \"muscle cramp, before it becomes a full muscle cramp\" intermittently shooting down the backside of her right leg. This lower back/right lower extremity pain is improved whenever she stands or walks, however, is exacerbated whenever she lays down or sits. Patient denies any recent falls or injuries and she has never experienced similar symptoms in the past.    Patient underwent a left knee replacement 2/12/18 at [facility]. She has taken Asprin and Tylenol at home as well as utilized ice packs with no real relief from her discomfort.  No complaints of bowel/bladder incontinence, lower extremity weakness, saddle anesthesia or motor changes.    REVIEW OF SYSTEMS  Pertinent positives includes lower back pain that radiates down her right hip and leg. Pertinent negatives include no bowel/bladder incontinence, lower extremity weakness or motor changes.     PAST MEDICAL HISTORY   has a past medical history of Abdominal hernia; Abnormal mammogram (6/19/2009); Alcohol use (01/26/2018); Anxiety; Arthritis (01/26/2018); Chronic right shoulder pain; Cystocele (6/19/2009); Dental disorder; Depression (6/19/2009); Depression with anxiety (01/26/2018); Genital herpes (6/19/2009); High cholesterol; Hyperlipidemia; Hypertension (01/26/2018); Insomnia (6/19/2009); Migraine (06/19/2009); OA (osteoarthritis); Pain (01/26/2018); Sleep apnea; Sleep apnea (01/26/2018); and Vitamin D deficiency.    SURGICAL HISTORY   has a past surgical history that includes " tubal ligation (Bilateral, 1985); gastric bypass laparoscopic (02/2003); hernia repair (2004); toe arthroplasty (Bilateral, 1990'S); rhinoplasty (07/2004); breast biopsy (Left, 1996); hysteroscopy thermal ablation (early 2000's); rotator cuff repair (Right, 11/24/2009); colonoscopy (2015); primary c section (1984); and knee arthroplasty total (Left, 2/12/2018).    SOCIAL HISTORY  Social History   Substance Use Topics   • Smoking status: Never Smoker   • Smokeless tobacco: Never Used   • Alcohol use 4.2 oz/week     7 Standard drinks or equivalent per week      Comment: 2 per day      History   Drug Use No       FAMILY HISTORY  Family History   Problem Relation Age of Onset   • Diabetes Mother    • Hypertension Mother    • Diabetes Maternal Grandfather    • Diabetes Paternal Grandfather    • Cancer Paternal Grandfather    • Blood Disease Daughter      leukemia-all       CURRENT MEDICATIONS  No current facility-administered medications on file prior to encounter.      Current Outpatient Prescriptions on File Prior to Encounter   Medication Sig Dispense Refill   • [START ON 4/26/2018] HYDROcodone-acetaminophen (NORCO) 5-325 MG Tab per tablet Take 1 Tab by mouth every 12 hours as needed for up to 30 days. 60 Tab 0   • DULoxetine (CYMBALTA) 30 MG Cap DR Particles Take 1 Cap by mouth every day. TAKE 1 CAP BY MOUTH EVERY DAY. 90 Cap 1   • ascorbic acid (ASCORBIC ACID) 500 MG Tab Take 3,000 mg by mouth every day.     • vitamin E 100 UNIT capsule Take 300 Units by mouth every day.     • Multiple Vitamins-Minerals (ENERGY BOOSTER PO) Take 80 mg by mouth. With caffiene     • glucosamine Sulfate 500 MG Cap Take 1,500 mg by mouth every day.     • acyclovir (ZOVIRAX) 400 MG tablet Take 400 mg by mouth 2 times a day as needed.     • Cholecalciferol (VITAMIN D) 400 UNIT Tab Take 1,600 Units by mouth every day.     • B Complex Vitamins (VITAMIN B COMPLEX PO) Take 20 mg by mouth every day.     • albuterol (VENTOLIN HFA) 108 (90 BASE)  "MCG/ACT Aero Soln inhalation aerosol INHALE 2 PUFFS BY MOUTH EVERY FOUR HOURS AS NEEDED FOR SHORTNESS OF BREATH. DISPENSE A SPACER 18 Inhaler 5   • Multiple Vitamin (MULTIVITAMIN PO) Take 1 Tab by mouth every day.         ALLERGIES  Allergies   Allergen Reactions   • Ace Inhibitors      Ace cough   • Tape Rash and Itching     Adhesive tape.  PAPER TAPE OK.       PHYSICAL EXAM  Vital Signs: BP (!) 186/95   Pulse 85   Temp 36.7 °C (98 °F)   Resp 16   Ht 1.6 m (5' 3\")   Wt 115.1 kg (253 lb 12 oz)   SpO2 96%   BMI 44.95 kg/m²     Constitutional: Alert, no acute distress  HENT: Normocephalic, atraumatic  Neck: Supple, normal range of motion  Skin: Warm, dry, no rashes or lesions  Back: no bony midline tenderness along T or L spine, palpation to right gluteal region reproduces pain, negative right straight leg raise, 5/5 bilateral lower extremity strength, , right lower extremity with normal sensation throughout, exam is limited by body habitus  Musculoskeletal: Lower extremity exam as documented above, no swelling or deformity noted  Neurologic: Alert, oriented, normal motor function, no speech deficits  Psychiatric: Normal and appropriate mood and affect    COURSE & MEDICAL DECISION MAKING  Pertinent Labs & Imaging studies reviewed. (See chart for details)    12:46 AM - Patient seen and examined at bedside. Patient will be treated with 600mg Motrin tablet. I informed the patient that her discomfort is most likely secondary to an inflamed sciatic nerve. I explained that first point of care is steroids and antiinflammatories. I advised her to follow up with her PCP in the coming week and begin the prescribed Motrin and steroid regimens. Patient understands and agrees with discharge at this time.    Decision Making:  This is a 63 y.o. year old female who presents with right gluteal pain that began around noon today. She has no midline back pain, history without symptoms of spinal cord compression/cauda equina " syndrome. She has no motor weakness, no loss of sensation. Distribution of discomfort is consistent with sciatica. Symptoms are not entirely reproduced on straight leg raise, however patient's mobility is somewhat limited by morbid obesity. At this time, I do believe she is stable for discharge home with symptomatic management. She is instructed to take anti-inflammatories as needed for pain control. Additionally I will prescribe a few doses of prednisone. She will contact her primary care physician this morning for complete recheck and return to the emergency department if she develops any new or worsening symptoms. Signs and symptoms of spinal cord injury discussed as return precautions including saddle anesthesia, numbness in the legs, urinary or fecal incontinence, worsening midline back pain, or any further concerns.     The patient will return for new or worsening symptoms and is stable at the time of discharge.    DISPOSITION:  Patient will be discharged home in stable condition.    FOLLOW UP:  SUHAIL Marina  [address]  [id number]  [place] [facility] [address] [phone]    Call in 1 day  please call tomorrow morning for a follow-up appointment    [facility], Emergency Dept  [address] [address] [phone]  Go to  If symptoms worsen      OUTPATIENT MEDICATIONS:  Discharge Medication List as of 4/23/2018  1:19 AM      START taking these medications    Details   ibuprofen (MOTRIN) 600 MG Tab Take 1 Tab by mouth every 6 hours as needed., Disp-20 Tab, R-0, Print Rx Paper      predniSONE (DELTASONE) 20 MG Tab Take 2 Tabs by mouth every day for 3 days., Disp-6 Tab, R-0, Print Rx Paper               FINAL IMPRESSION  1. Right hip pain    2. Sciatica of right side          ISarah (Scribcori), am scribing for, and in the presence of, Jihan Schroeder M.D..    Electronically signed by: Sarah Alva (Melinda), 4/23/2018    Jihan CARPENTER M.D. personally  performed the services described in this documentation, as scribed by Sarah Alva in my presence, and it is both accurate and complete.    The note accurately reflects work and decisions made by me.  Jihan Schroeder  4/23/2018  1:29 AM

## 2018-04-23 NOTE — ED NOTES
Pt discharged to home with discharge instructions.  Pt verbalized understanding of discharge instructions and follow up.  Steady gait, vital signs stable.  Questions with discharge answered. Pt left ambulatory from department with steady gait.

## 2018-05-14 ENCOUNTER — OFFICE VISIT (OUTPATIENT)
Dept: MEDICAL GROUP | Facility: MEDICAL CENTER | Age: 63
End: 2018-05-14
Payer: COMMERCIAL

## 2018-05-14 VITALS
OXYGEN SATURATION: 98 % | WEIGHT: 253 LBS | BODY MASS INDEX: 44.83 KG/M2 | DIASTOLIC BLOOD PRESSURE: 86 MMHG | HEIGHT: 63 IN | SYSTOLIC BLOOD PRESSURE: 142 MMHG | TEMPERATURE: 98.4 F | HEART RATE: 89 BPM

## 2018-05-14 DIAGNOSIS — M54.50 LOW BACK PAIN RADIATING TO RIGHT LEG: ICD-10-CM

## 2018-05-14 DIAGNOSIS — M79.604 LOW BACK PAIN RADIATING TO RIGHT LEG: ICD-10-CM

## 2018-05-14 DIAGNOSIS — Z79.891 CHRONIC USE OF OPIATE DRUGS THERAPEUTIC PURPOSES: ICD-10-CM

## 2018-05-14 DIAGNOSIS — M25.562 CHRONIC PAIN OF LEFT KNEE: ICD-10-CM

## 2018-05-14 DIAGNOSIS — G89.29 CHRONIC PAIN OF LEFT KNEE: ICD-10-CM

## 2018-05-14 PROCEDURE — 99214 OFFICE O/P EST MOD 30 MIN: CPT | Performed by: NURSE PRACTITIONER

## 2018-05-14 RX ORDER — HYDROCODONE BITARTRATE AND ACETAMINOPHEN 7.5; 325 MG/1; MG/1
1-2 TABLET ORAL EVERY 12 HOURS
Qty: 60 TAB | Refills: 0 | Status: SHIPPED | OUTPATIENT
Start: 2018-05-26 | End: 2018-06-25

## 2018-05-14 RX ORDER — GABAPENTIN 300 MG/1
300 CAPSULE ORAL 3 TIMES DAILY
COMMUNITY
End: 2019-06-25

## 2018-05-14 RX ORDER — TRAMADOL HYDROCHLORIDE 50 MG/1
50 TABLET ORAL EVERY 12 HOURS PRN
Qty: 30 TAB | Refills: 0 | Status: SHIPPED | OUTPATIENT
Start: 2018-05-14 | End: 2018-05-27

## 2018-05-14 RX ORDER — DICLOFENAC SODIUM 75 MG/1
75 TABLET, DELAYED RELEASE ORAL 2 TIMES DAILY
COMMUNITY
End: 2022-07-21

## 2018-05-14 NOTE — PROGRESS NOTES
Subjective:     Rain Sanabria is a 63 y.o. female who presents with LBP with radiation to rt leg.    HPI:   Seen in f/u for rt LPB.  Started about 3 weeks ago.  She was seen in ER.  They gave her motrin and ASA.  She was then seen by [facility] for her leftknee check up.  She has also seen chiro. That didn't help.  She was referrred by neuro.  Neuro gave her neurontin.  Also ordered PT and tyl.   Now her pain is getting worse.   She has been having more pain in rt leg and back that is severe.  Neurontin is nnot helping.   Several nites ago the pain was sooo severe that she bruised herself hitting herself.    She had the left knee surgery.  Just now going back to work for that.   She is also depressed d/t loosing a close friend several weeks ago.  She just restarted the lidoderm patches.  That is helping.     She has been using more norco for the severe pain.  Will run out tonite.  Not due refill on norco til 5/26/18.    She is also having numbness in left leg.  It is down her whole leg.         Patient Active Problem List    Diagnosis Date Noted   • Shortness of breath 03/16/2017   • Morbid obesity with BMI of 45.0-49.9, adult ([facility]) 02/22/2017   • Morbid obesity with BMI of 40.0-44.9, adult ([facility]) 01/24/2017   • Chronic right shoulder pain 01/06/2016   • Hyperlipidemia    • Anxiety    • Vitamin D deficiency    • Abdominal hernia    • Sleep apnea 07/12/2010   • OA (osteoarthritis) 07/12/2010   • HTN (hypertension) 01/20/2010   • Depression 06/19/2009   • Genital herpes simplex 06/19/2009   • Insomnia 06/19/2009   • Migraine 06/19/2009   • Abnormal mammogram 06/19/2009   • Preventative health care 06/19/2009   • Cystocele 06/19/2009       Current medicines (including changes today)  Current Outpatient Prescriptions   Medication Sig Dispense Refill   • gabapentin (NEURONTIN) 300 MG Cap Take 300 mg by mouth 3 times a day.     • diclofenac EC (VOLTAREN) 75 MG Tablet Delayed Response Take 75 mg by mouth 2 times a day.      • tramadol (ULTRAM) 50 MG Tab Take 1 Tab by mouth every 12 hours as needed for up to 13 days. 30 Tab 0   • [START ON 5/26/2018] HYDROcodone-acetaminophen (NORCO) 7.5-325 MG per tablet Take 1-2 Tabs by mouth every 12 hours for 30 days. 60 Tab 0   • ibuprofen (MOTRIN) 600 MG Tab Take 1 Tab by mouth every 6 hours as needed. 20 Tab 0   • lidocaine (LIDODERM) 5 % Patch Apply 1 Patch to skin as directed every 24 hours. 30 Patch 3   • HYDROcodone-acetaminophen (NORCO) 5-325 MG Tab per tablet Take 1 Tab by mouth every 12 hours as needed for up to 30 days. 60 Tab 0   • DULoxetine (CYMBALTA) 30 MG Cap DR Particles Take 1 Cap by mouth every day. TAKE 1 CAP BY MOUTH EVERY DAY. 90 Cap 1   • ascorbic acid (ASCORBIC ACID) 500 MG Tab Take 3,000 mg by mouth every day.     • vitamin E 100 UNIT capsule Take 300 Units by mouth every day.     • Multiple Vitamins-Minerals (ENERGY BOOSTER PO) Take 80 mg by mouth. With caffiene     • glucosamine Sulfate 500 MG Cap Take 1,500 mg by mouth every day.     • acyclovir (ZOVIRAX) 400 MG tablet Take 400 mg by mouth 2 times a day as needed.     • Cholecalciferol (VITAMIN D) 400 UNIT Tab Take 1,600 Units by mouth every day.     • B Complex Vitamins (VITAMIN B COMPLEX PO) Take 20 mg by mouth every day.     • albuterol (VENTOLIN HFA) 108 (90 BASE) MCG/ACT Aero Soln inhalation aerosol INHALE 2 PUFFS BY MOUTH EVERY FOUR HOURS AS NEEDED FOR SHORTNESS OF BREATH. DISPENSE A SPACER 18 Inhaler 5   • Multiple Vitamin (MULTIVITAMIN PO) Take 1 Tab by mouth every day.       No current facility-administered medications for this visit.        Allergies   Allergen Reactions   • Ace Inhibitors      Ace cough   • Tape Rash and Itching     Adhesive tape.  PAPER TAPE OK.       ROS  Constitutional: Negative. Negative for fever, chills, weight loss, malaise/fatigue and diaphoresis.   HENT: Negative. Negative for hearing loss, ear pain, nosebleeds, congestion, sore throat, neck pain, tinnitus and ear discharge.  "  Respiratory: Negative. Negative for cough, hemoptysis, sputum production, shortness of breath, wheezing and stridor.   Cardiovascular: Negative. Negative for chest pain, palpitations, orthopnea, claudication, leg swelling and PND.   Gastrointestinal: Denies nausea, vomiting, diarrhea, constipation, heartburn, melena or hematochezia.  Genitourinary: Denies dysuria, hematuria, urinary incontinence, frequency or urgency.        Objective:     Blood pressure 142/86, pulse 89, temperature 36.9 °C (98.4 °F), height 1.6 m (5' 3\"), weight 114.8 kg (253 lb), SpO2 98 %, not currently breastfeeding. Body mass index is 44.82 kg/m².    Physical Exam:  Vitals reviewed.  Constitutional: Oriented to person, place, and time. appears well-developed and well-nourished. No distress.   Cardiovascular: Normal rate, regular rhythm, normal heart sounds and intact distal pulses. Exam reveals no gallop and no friction rub. No murmur heard. No carotid bruits.   Pulmonary/Chest: Effort normal and breath sounds normal. No stridor. No respiratory distress. no wheezes or rales. exhibits no tenderness.   Musculoskeletal:  exhibits no edema. davin pedal pulses 2+.  Lymphadenopathy: No cervical or supraclavicular adenopathy.   Neurological: Alert and oriented to person, place, and time. exhibits normal muscle tone.  + rt SLR.  Sensation dec rt lateral leg.    Skin: Skin is warm and dry. No diaphoresis.   Psychiatric: Normal mood and affect. Behavior is normal.      Assessment and Plan:     The following treatment plan was discussed:    1. Chronic use of opiate drugs therapeutic purposes  gabapentin (NEURONTIN) 300 MG Cap    diclofenac EC (VOLTAREN) 75 MG Tablet Delayed Response    tramadol (ULTRAM) 50 MG Tab    HYDROcodone-acetaminophen (NORCO) 7.5-325 MG per tablet    CONSENT FOR OPIATE PRESCRIPTION    [facility] PAIN MANAGEMENT SCREEN    Controlled Substance Treatment Agreement    use ultram bid until 5/26/18 when she can refill ultram.  then inc " to norco 7.5 mg   2. Chronic pain of left knee  gabapentin (NEURONTIN) 300 MG Cap    diclofenac EC (VOLTAREN) 75 MG Tablet Delayed Response    tramadol (ULTRAM) 50 MG Tab    HYDROcodone-acetaminophen (NORCO) 7.5-325 MG per tablet    CONSENT FOR OPIATE PRESCRIPTION    [facility] PAIN MANAGEMENT SCREEN    Controlled Substance Treatment Agreement    refilled pain meds   3. Low back pain radiating to right leg  gabapentin (NEURONTIN) 300 MG Cap    diclofenac EC (VOLTAREN) 75 MG Tablet Delayed Response    tramadol (ULTRAM) 50 MG Tab    HYDROcodone-acetaminophen (NORCO) 7.5-325 MG per tablet    CONSENT FOR OPIATE PRESCRIPTION    [facility] PAIN MANAGEMENT SCREEN    Controlled Substance Treatment Agreement    see if neuronitc and my chag e to ultram until 5/26/16 .  then will refill norco but incre t 7.5..   continue  neurontin         Followup: Return in about 6 weeks (around 6/25/2018).

## 2018-05-17 ENCOUNTER — TELEPHONE (OUTPATIENT)
Dept: MEDICAL GROUP | Facility: MEDICAL CENTER | Age: 63
End: 2018-05-17

## 2018-05-17 NOTE — LETTER
May 23, 2018        Rain Morales Daniele  [address] [address]        Dear Rain:    Esme Baeza's office has tried contacting you. At your earliest convenience please contact our office at [phone].        If you have any questions or concerns, please don't hesitate to call.        Sincerely,        SCOOBY Marina.    Electronically Signed

## 2018-05-17 NOTE — TELEPHONE ENCOUNTER
Please remind pt that she cannot drink etoh when taking narcotic.  We will recheck UDS with next visit to verify compliance

## 2018-06-27 ENCOUNTER — OFFICE VISIT (OUTPATIENT)
Dept: MEDICAL GROUP | Facility: MEDICAL CENTER | Age: 63
End: 2018-06-27
Payer: COMMERCIAL

## 2018-06-27 VITALS
DIASTOLIC BLOOD PRESSURE: 90 MMHG | OXYGEN SATURATION: 97 % | WEIGHT: 255 LBS | SYSTOLIC BLOOD PRESSURE: 140 MMHG | HEART RATE: 106 BPM | HEIGHT: 63 IN | TEMPERATURE: 98.4 F | BODY MASS INDEX: 45.18 KG/M2

## 2018-06-27 DIAGNOSIS — M54.41 CHRONIC BILATERAL LOW BACK PAIN WITH RIGHT-SIDED SCIATICA: ICD-10-CM

## 2018-06-27 DIAGNOSIS — G89.29 CHRONIC BILATERAL LOW BACK PAIN WITH RIGHT-SIDED SCIATICA: ICD-10-CM

## 2018-06-27 DIAGNOSIS — M15.9 OSTEOARTHRITIS OF MULTIPLE JOINTS, UNSPECIFIED OSTEOARTHRITIS TYPE: ICD-10-CM

## 2018-06-27 PROCEDURE — 99214 OFFICE O/P EST MOD 30 MIN: CPT | Performed by: NURSE PRACTITIONER

## 2018-06-27 RX ORDER — HYDROCODONE BITARTRATE AND ACETAMINOPHEN 7.5; 325 MG/1; MG/1
1-2 TABLET ORAL EVERY 6 HOURS PRN
Qty: 60 TAB | Refills: 0 | Status: SHIPPED | OUTPATIENT
Start: 2018-06-27 | End: 2018-06-27 | Stop reason: SDUPTHER

## 2018-06-27 RX ORDER — HYDROCODONE BITARTRATE AND ACETAMINOPHEN 7.5; 325 MG/1; MG/1
1-2 TABLET ORAL EVERY 6 HOURS PRN
Qty: 60 TAB | Refills: 0 | Status: SHIPPED | OUTPATIENT
Start: 2018-08-27 | End: 2018-09-26 | Stop reason: SDUPTHER

## 2018-06-27 RX ORDER — HYDROCODONE BITARTRATE AND ACETAMINOPHEN 7.5; 325 MG/1; MG/1
1-2 TABLET ORAL EVERY 6 HOURS PRN
Qty: 60 TAB | Refills: 0 | Status: SHIPPED | OUTPATIENT
Start: 2018-07-27 | End: 2018-06-27 | Stop reason: SDUPTHER

## 2018-06-27 NOTE — PROGRESS NOTES
Subjective:     Rain Sanabria is a 63 y.o. female who presents with chronic pain in lower back.    HPI:   Seen in f/u for low back pain radiating to rt hip and leg.  She saw Dr Jarvis at pain [facility]  He gave her neurontin and steroids.  The pain continued and was severe. She was having tingling and sharp pain in rt leg with severe pain.  Went back to pain [facility]  Had to wait for 2 weeks to do MRI.   Ins denied injection.  She went back to chiro. MRI showed bulging disc.  She had her back injection last week.  Now pain is significantly improved.    She needs her pain meds refilled. She has been on ultram but its not helping the pain as much.  Switching to norco 7.5.  UDS and contract is up to date.  Last dose of ultram was a week ago.  She is only on tyl now.  Its not helping.  Was using lots of tyl while waiting for the injection.    Her last UDS showed etoh.  She is stopping drinking now.        Patient Active Problem List    Diagnosis Date Noted   • Shortness of breath 03/16/2017   • Morbid obesity with BMI of 45.0-49.9, adult ([facility]) 02/22/2017   • Morbid obesity with BMI of 40.0-44.9, adult ([facility]) 01/24/2017   • Chronic right shoulder pain 01/06/2016   • Hyperlipidemia    • Anxiety    • Vitamin D deficiency    • Abdominal hernia    • Sleep apnea 07/12/2010   • OA (osteoarthritis) 07/12/2010   • HTN (hypertension) 01/20/2010   • Depression 06/19/2009   • Genital herpes simplex 06/19/2009   • Insomnia 06/19/2009   • Migraine 06/19/2009   • Abnormal mammogram 06/19/2009   • Preventative health care 06/19/2009   • Cystocele 06/19/2009       Current medicines (including changes today)  Current Outpatient Prescriptions   Medication Sig Dispense Refill   • [START ON 8/27/2018] HYDROcodone-acetaminophen (NORCO) 7.5-325 MG per tablet Take 1-2 Tabs by mouth every 6 hours as needed for up to 30 days. 60 Tab 0   • gabapentin (NEURONTIN) 300 MG Cap Take 300 mg by mouth 3 times a day.     • diclofenac EC (VOLTAREN) 75 MG  Tablet Delayed Response Take 75 mg by mouth 2 times a day.     • ibuprofen (MOTRIN) 600 MG Tab Take 1 Tab by mouth every 6 hours as needed. 20 Tab 0   • lidocaine (LIDODERM) 5 % Patch Apply 1 Patch to skin as directed every 24 hours. 30 Patch 3   • DULoxetine (CYMBALTA) 30 MG Cap DR Particles Take 1 Cap by mouth every day. TAKE 1 CAP BY MOUTH EVERY DAY. 90 Cap 1   • ascorbic acid (ASCORBIC ACID) 500 MG Tab Take 3,000 mg by mouth every day.     • vitamin E 100 UNIT capsule Take 300 Units by mouth every day.     • Multiple Vitamins-Minerals (ENERGY BOOSTER PO) Take 80 mg by mouth. With caffiene     • glucosamine Sulfate 500 MG Cap Take 1,500 mg by mouth every day.     • acyclovir (ZOVIRAX) 400 MG tablet Take 400 mg by mouth 2 times a day as needed.     • Cholecalciferol (VITAMIN D) 400 UNIT Tab Take 1,600 Units by mouth every day.     • B Complex Vitamins (VITAMIN B COMPLEX PO) Take 20 mg by mouth every day.     • albuterol (VENTOLIN HFA) 108 (90 BASE) MCG/ACT Aero Soln inhalation aerosol INHALE 2 PUFFS BY MOUTH EVERY FOUR HOURS AS NEEDED FOR SHORTNESS OF BREATH. DISPENSE A SPACER 18 Inhaler 5   • Multiple Vitamin (MULTIVITAMIN PO) Take 1 Tab by mouth every day.       No current facility-administered medications for this visit.        Allergies   Allergen Reactions   • Ace Inhibitors      Ace cough   • Tape Rash and Itching     Adhesive tape.  PAPER TAPE OK.       ROS  Constitutional: Negative. Negative for fever, chills, weight loss, malaise/fatigue and diaphoresis.   HENT: Negative. Negative for hearing loss, ear pain, nosebleeds, congestion, sore throat, neck pain, tinnitus and ear discharge.   Respiratory: Negative. Negative for cough, hemoptysis, sputum production, shortness of breath, wheezing and stridor.   Cardiovascular: Negative. Negative for chest pain, palpitations, orthopnea, claudication, leg swelling and PND.   Gastrointestinal: Denies nausea, vomiting, diarrhea, constipation, heartburn, melena or  "hematochezia.  Genitourinary: Denies dysuria, hematuria, urinary incontinence, frequency or urgency.        Objective:     Blood pressure 140/90, pulse (!) 106, temperature 36.9 °C (98.4 °F), height 1.6 m (5' 3\"), weight 115.7 kg (255 lb), SpO2 97 %, not currently breastfeeding. Body mass index is 45.17 kg/m².    Physical Exam:  Vitals reviewed.  Constitutional: Oriented to person, place, and time. appears well-developed and well-nourished. No distress.   Cardiovascular: Normal rate, regular rhythm, normal heart sounds and intact distal pulses. Exam reveals no gallop and no friction rub. No murmur heard. No carotid bruits.   Pulmonary/Chest: Effort normal and breath sounds normal. No stridor. No respiratory distress. no wheezes or rales. exhibits no tenderness.   Musculoskeletal: Normal range of motion. exhibits no edema. davin pedal pulses 2+.  Lymphadenopathy: No cervical or supraclavicular adenopathy.   Neurological: Alert and oriented to person, place, and time. exhibits normal muscle tone.  Skin: Skin is warm and dry. No diaphoresis.   Psychiatric: Normal mood and affect. Behavior is normal.      Assessment and Plan:     The following treatment plan was discussed:    1. Chronic bilateral low back pain with right-sided sciatica  HYDROcodone-acetaminophen (NORCO) 7.5-325 MG per tablet    DISCONTINUED: HYDROcodone-acetaminophen (NORCO) 7.5-325 MG per tablet    DISCONTINUED: HYDROcodone-acetaminophen (NORCO) 7.5-325 MG per tablet    change pain med to norco 7.5 mg bid.  gave 3 month supply.  f/u 3 months for med refil   2. Osteoarthritis of multiple joints, unspecified osteoarthritis type  HYDROcodone-acetaminophen (NORCO) 7.5-325 MG per tablet    DISCONTINUED: HYDROcodone-acetaminophen (NORCO) 7.5-325 MG per tablet    DISCONTINUED: HYDROcodone-acetaminophen (NORCO) 7.5-325 MG per tablet         Followup: Return in about 3 months (around 9/27/2018).  "

## 2018-08-21 RX ORDER — DULOXETIN HYDROCHLORIDE 30 MG/1
30 CAPSULE, DELAYED RELEASE ORAL
Qty: 90 CAP | Refills: 1 | Status: SHIPPED | OUTPATIENT
Start: 2018-08-21 | End: 2018-09-26

## 2018-09-03 RX ORDER — DULOXETIN HYDROCHLORIDE 30 MG/1
CAPSULE, DELAYED RELEASE ORAL
Qty: 90 CAP | Refills: 1 | Status: SHIPPED | OUTPATIENT
Start: 2018-09-03 | End: 2019-06-25

## 2018-09-26 ENCOUNTER — OFFICE VISIT (OUTPATIENT)
Dept: MEDICAL GROUP | Facility: MEDICAL CENTER | Age: 63
End: 2018-09-26
Payer: COMMERCIAL

## 2018-09-26 VITALS
BODY MASS INDEX: 45.35 KG/M2 | OXYGEN SATURATION: 93 % | WEIGHT: 256 LBS | TEMPERATURE: 98.8 F | DIASTOLIC BLOOD PRESSURE: 86 MMHG | HEART RATE: 80 BPM | SYSTOLIC BLOOD PRESSURE: 140 MMHG

## 2018-09-26 DIAGNOSIS — M54.41 CHRONIC BILATERAL LOW BACK PAIN WITH RIGHT-SIDED SCIATICA: ICD-10-CM

## 2018-09-26 DIAGNOSIS — G89.29 CHRONIC BILATERAL LOW BACK PAIN WITH RIGHT-SIDED SCIATICA: ICD-10-CM

## 2018-09-26 DIAGNOSIS — M15.9 OSTEOARTHRITIS OF MULTIPLE JOINTS, UNSPECIFIED OSTEOARTHRITIS TYPE: ICD-10-CM

## 2018-09-26 DIAGNOSIS — Z12.31 ENCOUNTER FOR SCREENING MAMMOGRAM FOR MALIGNANT NEOPLASM OF BREAST: ICD-10-CM

## 2018-09-26 DIAGNOSIS — L72.9 CYST OF SKIN: ICD-10-CM

## 2018-09-26 DIAGNOSIS — F41.9 ANXIETY: ICD-10-CM

## 2018-09-26 DIAGNOSIS — Z96.652 HISTORY OF LEFT KNEE REPLACEMENT: ICD-10-CM

## 2018-09-26 DIAGNOSIS — I10 ESSENTIAL HYPERTENSION: ICD-10-CM

## 2018-09-26 PROCEDURE — 99214 OFFICE O/P EST MOD 30 MIN: CPT | Performed by: NURSE PRACTITIONER

## 2018-09-26 RX ORDER — LOSARTAN POTASSIUM 25 MG/1
25 TABLET ORAL DAILY
Qty: 30 TAB | Refills: 3 | Status: SHIPPED | OUTPATIENT
Start: 2018-09-26 | End: 2019-01-08 | Stop reason: SDUPTHER

## 2018-09-26 RX ORDER — AMOXICILLIN 500 MG/1
500 CAPSULE ORAL ONCE
Qty: 4 CAP | Refills: 1 | Status: SHIPPED | OUTPATIENT
Start: 2018-09-26 | End: 2018-10-03 | Stop reason: SDUPTHER

## 2018-09-26 RX ORDER — HYDROCODONE BITARTRATE AND ACETAMINOPHEN 7.5; 325 MG/1; MG/1
1 TABLET ORAL EVERY 6 HOURS PRN
Qty: 60 TAB | Refills: 0 | Status: SHIPPED | OUTPATIENT
Start: 2018-11-26 | End: 2018-12-19 | Stop reason: SDUPTHER

## 2018-09-26 RX ORDER — DULOXETIN HYDROCHLORIDE 60 MG/1
60 CAPSULE, DELAYED RELEASE ORAL DAILY
Qty: 30 CAP | Refills: 3 | Status: SHIPPED | OUTPATIENT
Start: 2018-09-26 | End: 2018-12-19 | Stop reason: SDUPTHER

## 2018-09-26 RX ORDER — HYDROCODONE BITARTRATE AND ACETAMINOPHEN 7.5; 325 MG/1; MG/1
1 TABLET ORAL EVERY 6 HOURS PRN
Qty: 60 TAB | Refills: 0 | Status: SHIPPED | OUTPATIENT
Start: 2018-10-26 | End: 2018-09-26 | Stop reason: SDUPTHER

## 2018-09-26 RX ORDER — HYDROCODONE BITARTRATE AND ACETAMINOPHEN 7.5; 325 MG/1; MG/1
1 TABLET ORAL EVERY 6 HOURS PRN
Qty: 60 TAB | Refills: 0 | Status: SHIPPED | OUTPATIENT
Start: 2018-09-26 | End: 2018-09-26 | Stop reason: SDUPTHER

## 2018-09-26 NOTE — PROGRESS NOTES
Subjective:     Rain Sanabria is a 63 y.o. female who presents with chronic pain.    HPI:   Seen in f/u for chronic pain.  She has changed her job position.  The new position is causing her a lot of anxiety.  She has increased her anxiety med d/t this.  She is sleeping at nite.    She is still having swellin gin her feet.  She is still eating potatoe chips.  Her bp is elevated again today.  Not on meds.  She had a cyst removed from scalp.  Now its back again.  Needs new derm referral since Luiza did the last one.   She is due pain med refills for her chronic knee pain.  She is up to date on UDS and contract.    She is going to the dentist.  Has a TKR left and needs antibiotic prophylaxis.      Patient Active Problem List    Diagnosis Date Noted   • Shortness of breath 03/16/2017   • Morbid obesity with BMI of 45.0-49.9, adult ([facility]) 02/22/2017   • Morbid obesity with BMI of 40.0-44.9, adult ([facility]) 01/24/2017   • Chronic right shoulder pain 01/06/2016   • Hyperlipidemia    • Anxiety    • Vitamin D deficiency    • Abdominal hernia    • Sleep apnea 07/12/2010   • OA (osteoarthritis) 07/12/2010   • HTN (hypertension) 01/20/2010   • Depression 06/19/2009   • Genital herpes simplex 06/19/2009   • Insomnia 06/19/2009   • Migraine 06/19/2009   • Abnormal mammogram 06/19/2009   • Preventative health care 06/19/2009   • Cystocele 06/19/2009       Current medicines (including changes today)  Current Outpatient Prescriptions   Medication Sig Dispense Refill   • losartan (COZAAR) 25 MG Tab Take 1 Tab by mouth every day. 30 Tab 3   • DULoxetine (CYMBALTA) 60 MG Cap DR Particles delayed-release capsule Take 1 Cap by mouth every day. 30 Cap 3   • amoxicillin (AMOXIL) 500 MG Cap Take 1 Cap by mouth Once for 1 dose. 4 Cap 1   • [START ON 11/26/2018] HYDROcodone-acetaminophen (NORCO) 7.5-325 MG per tablet Take 1 Tab by mouth every 6 hours as needed for up to 30 days. 60 Tab 0   • DULoxetine (CYMBALTA) 30 MG Cap DR Particles TAKE  ONE CAPSULE BY MOUTH EVERY DAY 90 Cap 1   • gabapentin (NEURONTIN) 300 MG Cap Take 300 mg by mouth 3 times a day.     • diclofenac EC (VOLTAREN) 75 MG Tablet Delayed Response Take 75 mg by mouth 2 times a day.     • ibuprofen (MOTRIN) 600 MG Tab Take 1 Tab by mouth every 6 hours as needed. 20 Tab 0   • lidocaine (LIDODERM) 5 % Patch Apply 1 Patch to skin as directed every 24 hours. 30 Patch 3   • ascorbic acid (ASCORBIC ACID) 500 MG Tab Take 3,000 mg by mouth every day.     • vitamin E 100 UNIT capsule Take 300 Units by mouth every day.     • Multiple Vitamins-Minerals (ENERGY BOOSTER PO) Take 80 mg by mouth. With caffiene     • glucosamine Sulfate 500 MG Cap Take 1,500 mg by mouth every day.     • acyclovir (ZOVIRAX) 400 MG tablet Take 400 mg by mouth 2 times a day as needed.     • Cholecalciferol (VITAMIN D) 400 UNIT Tab Take 1,600 Units by mouth every day.     • B Complex Vitamins (VITAMIN B COMPLEX PO) Take 20 mg by mouth every day.     • albuterol (VENTOLIN HFA) 108 (90 BASE) MCG/ACT Aero Soln inhalation aerosol INHALE 2 PUFFS BY MOUTH EVERY FOUR HOURS AS NEEDED FOR SHORTNESS OF BREATH. DISPENSE A SPACER 18 Inhaler 5   • Multiple Vitamin (MULTIVITAMIN PO) Take 1 Tab by mouth every day.       No current facility-administered medications for this visit.        Allergies   Allergen Reactions   • Ace Inhibitors      Ace cough   • Tape Rash and Itching     Adhesive tape.  PAPER TAPE OK.       ROS  Constitutional: Negative. Negative for fever, chills, weight loss, malaise/fatigue and diaphoresis.   HENT: Negative. Negative for hearing loss, ear pain, nosebleeds, congestion, sore throat, neck pain, tinnitus and ear discharge.   Respiratory: Negative. Negative for cough, hemoptysis, sputum production, shortness of breath, wheezing and stridor.   Cardiovascular: Negative. Negative for chest pain, palpitations, orthopnea, claudication, leg swelling and PND.   Gastrointestinal: Denies nausea, vomiting, diarrhea,  constipation, heartburn, melena or hematochezia.  Genitourinary: Denies dysuria, hematuria, urinary incontinence, frequency or urgency.        Objective:     Blood pressure 140/86, pulse 80, temperature 37.1 °C (98.8 °F), temperature source Temporal, weight 116.1 kg (256 lb), SpO2 93 %, not currently breastfeeding. Body mass index is 45.35 kg/m².    Physical Exam:  Vitals reviewed.  Constitutional: Oriented to person, place, and time. appears well-developed and well-nourished. No distress.   Cardiovascular: Normal rate, regular rhythm, normal heart sounds and intact distal pulses. Exam reveals no gallop and no friction rub. No murmur heard. No carotid bruits.   Pulmonary/Chest: Effort normal and breath sounds normal. No stridor. No respiratory distress. no wheezes or rales. exhibits no tenderness.   Musculoskeletal: Normal range of motion. exhibits no edema. davin pedal pulses 2+.  Lymphadenopathy: No cervical or supraclavicular adenopathy.   Neurological: Alert and oriented to person, place, and time. exhibits normal muscle tone.  Skin: Skin is warm and dry. No diaphoresis. Dime sized tender nonred cyst on rt scalp  Psychiatric: Normal mood and affect. Behavior is normal.      Assessment and Plan:     The following treatment plan was discussed:    1. Chronic bilateral low back pain with right-sided sciatica  HYDROcodone-acetaminophen (NORCO) 7.5-325 MG per tablet    DISCONTINUED: HYDROcodone-acetaminophen (NORCO) 7.5-325 MG per tablet    DISCONTINUED: HYDROcodone-acetaminophen (NORCO) 7.5-325 MG per tablet    refill pain meds  gave 3 month supply.  f/u 3 months for med refil   2. Osteoarthritis of multiple joints, unspecified osteoarthritis type  HYDROcodone-acetaminophen (NORCO) 7.5-325 MG per tablet    DISCONTINUED: HYDROcodone-acetaminophen (NORCO) 7.5-325 MG per tablet    DISCONTINUED: HYDROcodone-acetaminophen (NORCO) 7.5-325 MG per tablet   3. Anxiety  DULoxetine (CYMBALTA) 60 MG Cap DR Particles  delayed-release capsule    job stress.  increase cymbalta to 60 mg daily   4. Essential hypertension  losartan (COZAAR) 25 MG Tab    start losartan 25 mg daily.  has an ace cough to lisinopril.   5. Cyst of skin  REFERRAL TO DERMATOLOGY    refer to derm.  cyst on scalp reoccurred   6. History of left knee replacement  amoxicillin (AMOXIL) 500 MG Cap    was told by surgeon to take antibx prophylaxis for life.  she is going to have dental procedure    7. Encounter for screening mammogram for malignant neoplasm of breast  MA-SCREEN MAMMO W/CAD-BILAT   8. BMI 45.0-49.9, adult ([facility])  Patient identified as having weight management issue.  Appropriate orders and counseling given.         Followup: Return in about 3 months (around 12/26/2018).

## 2018-10-03 DIAGNOSIS — Z96.652 HISTORY OF LEFT KNEE REPLACEMENT: ICD-10-CM

## 2018-10-03 RX ORDER — AMOXICILLIN 500 MG/1
2000 CAPSULE ORAL ONCE
Qty: 4 CAP | Refills: 1 | Status: SHIPPED | OUTPATIENT
Start: 2018-10-03 | End: 2018-10-03

## 2018-11-10 ENCOUNTER — HOSPITAL ENCOUNTER (OUTPATIENT)
Dept: RADIOLOGY | Facility: MEDICAL CENTER | Age: 63
End: 2018-11-10
Attending: NURSE PRACTITIONER
Payer: COMMERCIAL

## 2018-11-10 DIAGNOSIS — Z12.31 ENCOUNTER FOR SCREENING MAMMOGRAM FOR MALIGNANT NEOPLASM OF BREAST: ICD-10-CM

## 2018-11-10 PROCEDURE — 77067 SCR MAMMO BI INCL CAD: CPT

## 2018-11-14 ENCOUNTER — NON-PROVIDER VISIT (OUTPATIENT)
Dept: MEDICAL GROUP | Facility: MEDICAL CENTER | Age: 63
End: 2018-11-14
Payer: COMMERCIAL

## 2018-11-14 VITALS — SYSTOLIC BLOOD PRESSURE: 138 MMHG | DIASTOLIC BLOOD PRESSURE: 82 MMHG

## 2018-11-14 NOTE — PROGRESS NOTES
Rain Sanabria is a 63 y.o. female here for a non-provider visit for BP Check     If abnormal was an in office provider notified today (if so, indicate provider)? No  Routed to PCP? No

## 2018-12-18 ENCOUNTER — TELEPHONE (OUTPATIENT)
Dept: MEDICAL GROUP | Facility: MEDICAL CENTER | Age: 63
End: 2018-12-18

## 2018-12-18 NOTE — TELEPHONE ENCOUNTER
ESTABLISHED PATIENT PRE-VISIT PLANNING     Patient was NOT contacted to complete PVP.     Note: Patient will not be contacted if there is no indication to call.     1.  Reviewed notes from the last few office visits within the medical group: Yes    2.  If any orders were placed at last visit or intended to be done for this visit (i.e. 6 mos follow-up), do we have Results/Consult Notes?        •  Labs - Labs were not ordered at last office visit.   Note: If patient appointment is for lab review and patient did not complete labs, check with provider if OK to reschedule patient until labs completed.       •  Imaging - Imaging was not ordered at last office visit.       •  Referrals - Referral ordered, patient has NOT been seen.    3. Is this appointment scheduled as a Hospital Follow-Up? No    4.  Immunizations were updated in [facility] using Web[employer]?: Yes       •  Web [employer] Recommendations: Patient is up to date on all vaccines    5.  Patient is due for the following Health Maintenance Topics:   Health Maintenance Due   Topic Date Due   • IMM ZOSTER VACCINES (2 of 2) 11/04/2018       - Patient is up-to-date on all Health Maintenance topics. No records have been requested at this time.    6.  MDX printed for Provider? NO    7.  Patient was NOT informed to arrive 15 min prior to their scheduled appointment and bring in their medication bottles.

## 2018-12-19 ENCOUNTER — OFFICE VISIT (OUTPATIENT)
Dept: MEDICAL GROUP | Facility: MEDICAL CENTER | Age: 63
End: 2018-12-19
Payer: COMMERCIAL

## 2018-12-19 VITALS
WEIGHT: 259 LBS | BODY MASS INDEX: 45.89 KG/M2 | TEMPERATURE: 97.7 F | HEIGHT: 63 IN | DIASTOLIC BLOOD PRESSURE: 80 MMHG | SYSTOLIC BLOOD PRESSURE: 132 MMHG | OXYGEN SATURATION: 95 % | HEART RATE: 99 BPM

## 2018-12-19 DIAGNOSIS — D50.8 OTHER IRON DEFICIENCY ANEMIA: ICD-10-CM

## 2018-12-19 DIAGNOSIS — M54.41 CHRONIC BILATERAL LOW BACK PAIN WITH RIGHT-SIDED SCIATICA: ICD-10-CM

## 2018-12-19 DIAGNOSIS — M15.9 OSTEOARTHRITIS OF MULTIPLE JOINTS, UNSPECIFIED OSTEOARTHRITIS TYPE: ICD-10-CM

## 2018-12-19 DIAGNOSIS — F41.8 DEPRESSION WITH ANXIETY: ICD-10-CM

## 2018-12-19 DIAGNOSIS — G89.29 CHRONIC BILATERAL LOW BACK PAIN WITH RIGHT-SIDED SCIATICA: ICD-10-CM

## 2018-12-19 DIAGNOSIS — I10 ESSENTIAL HYPERTENSION: ICD-10-CM

## 2018-12-19 DIAGNOSIS — E55.9 VITAMIN D DEFICIENCY: ICD-10-CM

## 2018-12-19 PROCEDURE — 99214 OFFICE O/P EST MOD 30 MIN: CPT | Performed by: NURSE PRACTITIONER

## 2018-12-19 RX ORDER — DULOXETIN HYDROCHLORIDE 60 MG/1
60 CAPSULE, DELAYED RELEASE ORAL DAILY
Qty: 90 CAP | Refills: 3 | Status: SHIPPED | OUTPATIENT
Start: 2018-12-19 | End: 2020-01-03 | Stop reason: SDUPTHER

## 2018-12-19 RX ORDER — HYDROCODONE BITARTRATE AND ACETAMINOPHEN 7.5; 325 MG/1; MG/1
1 TABLET ORAL EVERY 6 HOURS PRN
Qty: 60 TAB | Refills: 0 | Status: SHIPPED | OUTPATIENT
Start: 2018-12-19 | End: 2018-12-19 | Stop reason: SDUPTHER

## 2018-12-19 RX ORDER — DULOXETIN HYDROCHLORIDE 30 MG/1
30 CAPSULE, DELAYED RELEASE ORAL DAILY
Qty: 90 CAP | Refills: 3 | Status: SHIPPED | OUTPATIENT
Start: 2018-12-19 | End: 2019-03-20

## 2018-12-19 RX ORDER — HYDROCODONE BITARTRATE AND ACETAMINOPHEN 7.5; 325 MG/1; MG/1
1 TABLET ORAL EVERY 6 HOURS PRN
Qty: 60 TAB | Refills: 0 | Status: SHIPPED | OUTPATIENT
Start: 2019-01-19 | End: 2018-12-19 | Stop reason: SDUPTHER

## 2018-12-19 RX ORDER — HYDROCODONE BITARTRATE AND ACETAMINOPHEN 7.5; 325 MG/1; MG/1
1 TABLET ORAL EVERY 6 HOURS PRN
Qty: 60 TAB | Refills: 0 | Status: SHIPPED | OUTPATIENT
Start: 2019-02-19 | End: 2019-03-20 | Stop reason: SDUPTHER

## 2018-12-19 NOTE — PROGRESS NOTES
Subjective:     Rain Sanabria is a 63 y.o. female who presents with depression.    HPI:   Seen in f/u for depression.  She was inc on her cymbalta at last visit to 60 mg.  That didn't quite fix all of her sx.  She has inc to 30m g am and 60 mg pm.  Now she is feeling better.  Needs refill.  [facility] med well.  She needs refill on her norco. Uses for chronic back and joint pain. UDS and contract are up to date.  Stable on meds.    She will need updated lab wiht next visit.  She is stable on bp med. ; will check alb/cr ratio.    Has a hx of low d.  Will recheck lab.   Her last cbc showed anemia.  We will recheck.    We will also monitor her chol.      Patient Active Problem List    Diagnosis Date Noted   • Shortness of breath 03/16/2017   • Morbid obesity with BMI of 45.0-49.9, adult ([facility]) 02/22/2017   • Morbid obesity with BMI of 40.0-44.9, adult ([facility]) 01/24/2017   • Chronic right shoulder pain 01/06/2016   • Hyperlipidemia    • Anxiety    • Vitamin D deficiency    • Abdominal hernia    • Sleep apnea 07/12/2010   • OA (osteoarthritis) 07/12/2010   • HTN (hypertension) 01/20/2010   • Depression 06/19/2009   • Genital herpes simplex 06/19/2009   • Insomnia 06/19/2009   • Migraine 06/19/2009   • Abnormal mammogram 06/19/2009   • Preventative health care 06/19/2009   • Cystocele 06/19/2009       Current medicines (including changes today)  Current Outpatient Prescriptions   Medication Sig Dispense Refill   • DULoxetine (CYMBALTA) 60 MG Cap DR Particles delayed-release capsule Take 1 Cap by mouth every day. 90 Cap 3   • DULoxetine (CYMBALTA) 30 MG Cap DR Particles Take 1 Cap by mouth every day. 90 Cap 3   • [START ON 2/19/2019] HYDROcodone-acetaminophen (NORCO) 7.5-325 MG per tablet Take 1 Tab by mouth every 6 hours as needed for up to 30 days. 60 Tab 0   • losartan (COZAAR) 25 MG Tab Take 1 Tab by mouth every day. 30 Tab 3   • DULoxetine (CYMBALTA) 30 MG Cap DR Particles TAKE ONE CAPSULE BY MOUTH EVERY DAY 90 Cap 1   •  gabapentin (NEURONTIN) 300 MG Cap Take 300 mg by mouth 3 times a day.     • diclofenac EC (VOLTAREN) 75 MG Tablet Delayed Response Take 75 mg by mouth 2 times a day.     • ibuprofen (MOTRIN) 600 MG Tab Take 1 Tab by mouth every 6 hours as needed. 20 Tab 0   • lidocaine (LIDODERM) 5 % Patch Apply 1 Patch to skin as directed every 24 hours. 30 Patch 3   • ascorbic acid (ASCORBIC ACID) 500 MG Tab Take 3,000 mg by mouth every day.     • vitamin E 100 UNIT capsule Take 300 Units by mouth every day.     • Multiple Vitamins-Minerals (ENERGY BOOSTER PO) Take 80 mg by mouth. With caffiene     • glucosamine Sulfate 500 MG Cap Take 1,500 mg by mouth every day.     • acyclovir (ZOVIRAX) 400 MG tablet Take 400 mg by mouth 2 times a day as needed.     • Cholecalciferol (VITAMIN D) 400 UNIT Tab Take 1,600 Units by mouth every day.     • B Complex Vitamins (VITAMIN B COMPLEX PO) Take 20 mg by mouth every day.     • albuterol (VENTOLIN HFA) 108 (90 BASE) MCG/ACT Aero Soln inhalation aerosol INHALE 2 PUFFS BY MOUTH EVERY FOUR HOURS AS NEEDED FOR SHORTNESS OF BREATH. DISPENSE A SPACER 18 Inhaler 5   • Multiple Vitamin (MULTIVITAMIN PO) Take 1 Tab by mouth every day.       No current facility-administered medications for this visit.        Allergies   Allergen Reactions   • Ace Inhibitors      Ace cough   • Tape Rash and Itching     Adhesive tape.  PAPER TAPE OK.       ROS  Constitutional: Negative. Negative for fever, chills, weight loss, malaise/fatigue and diaphoresis.   HENT: Negative. Negative for hearing loss, ear pain, nosebleeds, congestion, sore throat, neck pain, tinnitus and ear discharge.   Respiratory: Negative. Negative for cough, hemoptysis, sputum production, shortness of breath, wheezing and stridor.   Cardiovascular: Negative. Negative for chest pain, palpitations, orthopnea, claudication, leg swelling and PND.  Has more gas recently.   Gastrointestinal: Denies nausea, vomiting, diarrhea, constipation, heartburn,  "melena or hematochezia.  Genitourinary: Denies dysuria, hematuria, urinary incontinence, frequency or urgency.        Objective:     Blood pressure 132/80, pulse 99, temperature 36.5 °C (97.7 °F), temperature source Temporal, height 1.6 m (5' 3\"), weight 117.5 kg (259 lb), SpO2 95 %, not currently breastfeeding. Body mass index is 45.88 kg/m².    Physical Exam:  Vitals reviewed.  Constitutional: Oriented to person, place, and time. appears well-developed and well-nourished. No distress.   Cardiovascular: Normal rate, regular rhythm, normal heart sounds and intact distal pulses. Exam reveals no gallop and no friction rub. No murmur heard. No carotid bruits.   Pulmonary/Chest: Effort normal and breath sounds normal. No stridor. No respiratory distress. no wheezes or rales. exhibits no tenderness.   Musculoskeletal: Normal range of motion. exhibits no edema. davin pedal pulses 2+.  Lymphadenopathy: No cervical or supraclavicular adenopathy.   Neurological: Alert and oriented to person, place, and time. exhibits normal muscle tone.  Skin: Skin is warm and dry. No diaphoresis.   Psychiatric: Normal mood and affect. Behavior is normal.      Assessment and Plan:     The following treatment plan was discussed:    1. Depression with anxiety  DULoxetine (CYMBALTA) 60 MG Cap DR Particles delayed-release capsule    DULoxetine (CYMBALTA) 30 MG Cap DR Particles    stable on cymbalta 30 mg am and 60 mg pm.  refilled meds   2. Chronic bilateral low back pain with right-sided sciatica  HYDROcodone-acetaminophen (NORCO) 7.5-325 MG per tablet    DISCONTINUED: HYDROcodone-acetaminophen (NORCO) 7.5-325 MG per tablet    DISCONTINUED: HYDROcodone-acetaminophen (NORCO) 7.5-325 MG per tablet    refill pain meds  gave 3 month supply.  f/u 3 months for med refil   3. Osteoarthritis of multiple joints, unspecified osteoarthritis type  HYDROcodone-acetaminophen (NORCO) 7.5-325 MG per tablet    DISCONTINUED: HYDROcodone-acetaminophen (NORCO) " 7.5-325 MG per tablet    DISCONTINUED: HYDROcodone-acetaminophen (NORCO) 7.5-325 MG per tablet    refilled meds.  UDS and contract are up to date.    4. Essential hypertension  CMP14+LP    MICROALB/CREAT RATIO RAND. UR    do routine yearly lab before next appt.  will review w/pt at 3 month f/u appt   5. Vitamin D deficiency  VITAMIN D 25-HYDROXY   6. Other iron deficiency anemia  CBC WITH DIFFERENTIAL         Followup: Return in about 3 months (around 3/19/2019).

## 2019-01-08 DIAGNOSIS — I10 ESSENTIAL HYPERTENSION: ICD-10-CM

## 2019-01-08 RX ORDER — LOSARTAN POTASSIUM 25 MG/1
25 TABLET ORAL DAILY
Qty: 90 TAB | Refills: 1 | Status: SHIPPED | OUTPATIENT
Start: 2019-01-08 | End: 2019-06-25

## 2019-03-16 ENCOUNTER — HOSPITAL ENCOUNTER (EMERGENCY)
Facility: MEDICAL CENTER | Age: 64
End: 2019-03-16
Attending: EMERGENCY MEDICINE
Payer: COMMERCIAL

## 2019-03-16 ENCOUNTER — HOSPITAL ENCOUNTER (OUTPATIENT)
Dept: LAB | Facility: MEDICAL CENTER | Age: 64
End: 2019-03-16
Attending: NURSE PRACTITIONER
Payer: COMMERCIAL

## 2019-03-16 ENCOUNTER — APPOINTMENT (OUTPATIENT)
Dept: RADIOLOGY | Facility: MEDICAL CENTER | Age: 64
End: 2019-03-16
Attending: EMERGENCY MEDICINE
Payer: COMMERCIAL

## 2019-03-16 VITALS
DIASTOLIC BLOOD PRESSURE: 91 MMHG | SYSTOLIC BLOOD PRESSURE: 167 MMHG | HEIGHT: 63 IN | HEART RATE: 88 BPM | RESPIRATION RATE: 21 BRPM | WEIGHT: 264.55 LBS | BODY MASS INDEX: 46.88 KG/M2 | TEMPERATURE: 98.2 F | OXYGEN SATURATION: 95 %

## 2019-03-16 DIAGNOSIS — S20.219A CONTUSION OF CHEST WALL, UNSPECIFIED LATERALITY, INITIAL ENCOUNTER: ICD-10-CM
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
## 2019-03-16 DIAGNOSIS — M54.9 PAIN, UPPER BACK: ICD-10-CM

## 2019-03-16 DIAGNOSIS — M54.2 NECK PAIN: ICD-10-CM

## 2019-03-16 DIAGNOSIS — S13.4XXA WHIPLASH INJURY TO NECK, INITIAL ENCOUNTER: ICD-10-CM

## 2019-03-16 LAB
25(OH)D3 SERPL-MCNC: 20 NG/ML (ref 30–100)
ALBUMIN SERPL BCP-MCNC: 4.1 G/DL (ref 3.2–4.9)
ALBUMIN/GLOB SERPL: 1.3 G/DL
ALP SERPL-CCNC: 73 U/L (ref 30–99)
ALT SERPL-CCNC: 17 U/L (ref 2–50)
ANION GAP SERPL CALC-SCNC: 5 MMOL/L (ref 0–11.9)
AST SERPL-CCNC: 24 U/L (ref 12–45)
BASOPHILS # BLD AUTO: 0.8 % (ref 0–1.8)
BASOPHILS # BLD: 0.05 K/UL (ref 0–0.12)
BILIRUB SERPL-MCNC: 0.4 MG/DL (ref 0.1–1.5)
BUN SERPL-MCNC: 16 MG/DL (ref 8–22)
CALCIUM SERPL-MCNC: 9.4 MG/DL (ref 8.5–10.5)
CHLORIDE SERPL-SCNC: 108 MMOL/L (ref 96–112)
CHOLEST SERPL-MCNC: 178 MG/DL (ref 100–199)
CO2 SERPL-SCNC: 26 MMOL/L (ref 20–33)
CREAT SERPL-MCNC: 0.66 MG/DL (ref 0.5–1.4)
CREAT UR-MCNC: 84.3 MG/DL
EOSINOPHIL # BLD AUTO: 0.14 K/UL (ref 0–0.51)
EOSINOPHIL NFR BLD: 2.3 % (ref 0–6.9)
ERYTHROCYTE [DISTWIDTH] IN BLOOD BY AUTOMATED COUNT: 55 FL (ref 35.9–50)
GLOBULIN SER CALC-MCNC: 3.2 G/DL (ref 1.9–3.5)
GLUCOSE SERPL-MCNC: 90 MG/DL (ref 65–99)
HCT VFR BLD AUTO: 46 % (ref 37–47)
HDLC SERPL-MCNC: 74 MG/DL
HGB BLD-MCNC: 14.7 G/DL (ref 12–16)
IMM GRANULOCYTES # BLD AUTO: 0.01 K/UL (ref 0–0.11)
IMM GRANULOCYTES NFR BLD AUTO: 0.2 % (ref 0–0.9)
LDLC SERPL CALC-MCNC: 77 MG/DL
LYMPHOCYTES # BLD AUTO: 2.13 K/UL (ref 1–4.8)
LYMPHOCYTES NFR BLD: 35.1 % (ref 22–41)
MCH RBC QN AUTO: 32.2 PG (ref 27–33)
MCHC RBC AUTO-ENTMCNC: 32 G/DL (ref 33.6–35)
MCV RBC AUTO: 100.9 FL (ref 81.4–97.8)
MICROALBUMIN UR-MCNC: <0.7 MG/DL
MICROALBUMIN/CREAT UR: NORMAL MG/G (ref 0–30)
MONOCYTES # BLD AUTO: 0.43 K/UL (ref 0–0.85)
MONOCYTES NFR BLD AUTO: 7.1 % (ref 0–13.4)
NEUTROPHILS # BLD AUTO: 3.3 K/UL (ref 2–7.15)
NEUTROPHILS NFR BLD: 54.5 % (ref 44–72)
NRBC # BLD AUTO: 0 K/UL
NRBC BLD-RTO: 0 /100 WBC
PLATELET # BLD AUTO: 268 K/UL (ref 164–446)
PMV BLD AUTO: 10.7 FL (ref 9–12.9)
POTASSIUM SERPL-SCNC: 4.1 MMOL/L (ref 3.6–5.5)
PROT SERPL-MCNC: 7.3 G/DL (ref 6–8.2)
RBC # BLD AUTO: 4.56 M/UL (ref 4.2–5.4)
SODIUM SERPL-SCNC: 139 MMOL/L (ref 135–145)
TRIGL SERPL-MCNC: 133 MG/DL (ref 0–149)
WBC # BLD AUTO: 6.1 K/UL (ref 4.8–10.8)

## 2019-03-16 PROCEDURE — 82570 ASSAY OF URINE CREATININE: CPT

## 2019-03-16 PROCEDURE — 700111 HCHG RX REV CODE 636 W/ 250 OVERRIDE (IP): Performed by: EMERGENCY MEDICINE

## 2019-03-16 PROCEDURE — 99285 EMERGENCY DEPT VISIT HI MDM: CPT

## 2019-03-16 PROCEDURE — 96375 TX/PRO/DX INJ NEW DRUG ADDON: CPT

## 2019-03-16 PROCEDURE — 82306 VITAMIN D 25 HYDROXY: CPT

## 2019-03-16 PROCEDURE — 96374 THER/PROPH/DIAG INJ IV PUSH: CPT

## 2019-03-16 PROCEDURE — 82043 UR ALBUMIN QUANTITATIVE: CPT

## 2019-03-16 PROCEDURE — 72125 CT NECK SPINE W/O DYE: CPT

## 2019-03-16 PROCEDURE — 72128 CT CHEST SPINE W/O DYE: CPT

## 2019-03-16 PROCEDURE — 80061 LIPID PANEL: CPT

## 2019-03-16 PROCEDURE — 71046 X-RAY EXAM CHEST 2 VIEWS: CPT

## 2019-03-16 PROCEDURE — 80053 COMPREHEN METABOLIC PANEL: CPT

## 2019-03-16 PROCEDURE — 85025 COMPLETE CBC W/AUTO DIFF WBC: CPT

## 2019-03-16 PROCEDURE — 36415 COLL VENOUS BLD VENIPUNCTURE: CPT

## 2019-03-16 RX ORDER — ONDANSETRON 2 MG/ML
4 INJECTION INTRAMUSCULAR; INTRAVENOUS
Status: DISCONTINUED | OUTPATIENT
Start: 2019-03-16 | End: 2019-03-16 | Stop reason: HOSPADM

## 2019-03-16 RX ORDER — MORPHINE SULFATE 4 MG/ML
4 INJECTION, SOLUTION INTRAMUSCULAR; INTRAVENOUS ONCE
Status: COMPLETED | OUTPATIENT
Start: 2019-03-16 | End: 2019-03-16

## 2019-03-16 RX ORDER — METHOCARBAMOL 750 MG/1
1500 TABLET, FILM COATED ORAL 4 TIMES DAILY PRN
Qty: 20 TAB | Refills: 0 | Status: SHIPPED | OUTPATIENT
Start: 2019-03-16

## 2019-03-16 RX ADMIN — ONDANSETRON 4 MG: 2 INJECTION INTRAMUSCULAR; INTRAVENOUS at 15:07

## 2019-03-16 RX ADMIN — MORPHINE SULFATE 4 MG: 4 INJECTION INTRAVENOUS at 15:06

## 2019-03-16 ASSESSMENT — PAIN DESCRIPTION - DESCRIPTORS: DESCRIPTORS: SHARP

## 2019-03-16 ASSESSMENT — LIFESTYLE VARIABLES: DO YOU DRINK ALCOHOL: NO
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
## 2019-03-16 NOTE — DISCHARGE INSTRUCTIONS
Neck Injury  (Cervical Strain, Care After)    Take Tylenol 650 mg 5 times a day as needed for pain unless you are taking your hydrocodone for pain.  Add Robaxin for spasm..  Return for weakness or fever, shortness of breath, dizziness or ill appearance.  See your doctor if not better in 7-10 days.     You had a borderline or high normal blood pressure reading today.  This does not necessarily mean you have hypertension.  Please followup with your/a primary physician for comprehensive blood pressure evaluation and yearly fasting cholesterol assessment.  BP Readings from Last 3 Encounters:   03/16/19 (!) 167/91   12/19/18 132/80   11/14/18 138/82         You have a cervical strain. The muscles and ligaments in your neck have been stretched. The bones are not broken.    HOME CARE INSTRUCTIONS  While awake, apply ice packs to the neck or areas of pain about every 1-2 hours, for 20 to 30 minutes at a time. Do this for 2 days or as directed. If you were given a cervical collar for support, ask your caregiver if you may remove it for bathing or applying ice.  After 2 days, you may apply heat to the area for 20 to 30 minutes, 4 to 5 times a day.  If given a [place] collar, wear as instructed. Do not remove any collar unless instructed by a caregiver.  Take prescribed medication as directed. You may use ibuprofen (Advil® or Motrin®) and acetaminophen (Tylenol®) for discomfort. Only use these if your caregiver has not given medications that interfere    Recheck with the hospital or clinic after a radiologist has read your x-rays. Recheck with the hospital or clinic to make sure the initial readings are correct. Do this also to determine if you need further studies. It is your responsibility to find out your x-ray results. X-rays are sometimes repeated in one week to ten days. These are often repeated to make sure that a hairline fracture was not overlooked. Ask your caregiver how you are to find out about your radiology  (x-ray) results.    SEEK IMMEDIATE MEDICAL ATTENTION IF:  You develop difficulties swallowing or breathing.  You have numbness, weakness or movement problems in you or your arms or legs.  You develop increasing pain which is uncontrolled with medications.    AGREEMENT BETWEEN PATIENT AND HEALTHCARE TEAM:  Your signature on this document represents an understanding between you and the healthcare team that took care of you today.  That means that you:  Understand these discharge instructions.   Will monitor your condition.  Will seek immediate medical attention as instructed.    Document Released: 12/18/2006  Document Re-Released: 06/30/2008  [employer]® Patient Information ©2008 [employer].
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
## 2019-03-20 ENCOUNTER — HOSPITAL ENCOUNTER (OUTPATIENT)
Facility: MEDICAL CENTER | Age: 64
End: 2019-03-20
Attending: NURSE PRACTITIONER
Payer: COMMERCIAL

## 2019-03-20 ENCOUNTER — OFFICE VISIT (OUTPATIENT)
Dept: MEDICAL GROUP | Facility: MEDICAL CENTER | Age: 64
End: 2019-03-20
Payer: COMMERCIAL

## 2019-03-20 VITALS
WEIGHT: 257 LBS | TEMPERATURE: 98.4 F | OXYGEN SATURATION: 95 % | HEART RATE: 71 BPM | BODY MASS INDEX: 45.54 KG/M2 | SYSTOLIC BLOOD PRESSURE: 142 MMHG | HEIGHT: 63 IN | DIASTOLIC BLOOD PRESSURE: 98 MMHG

## 2019-03-20 DIAGNOSIS — M54.41 CHRONIC BILATERAL LOW BACK PAIN WITH RIGHT-SIDED SCIATICA: ICD-10-CM

## 2019-03-20 DIAGNOSIS — E55.9 VITAMIN D DEFICIENCY: ICD-10-CM

## 2019-03-20 DIAGNOSIS — M25.511 ACUTE PAIN OF BOTH SHOULDERS: ICD-10-CM

## 2019-03-20 DIAGNOSIS — N89.8 VAGINA ITCHING: ICD-10-CM

## 2019-03-20 DIAGNOSIS — M15.9 OSTEOARTHRITIS OF MULTIPLE JOINTS, UNSPECIFIED OSTEOARTHRITIS TYPE: ICD-10-CM

## 2019-03-20 DIAGNOSIS — G89.29 CHRONIC BILATERAL LOW BACK PAIN WITH RIGHT-SIDED SCIATICA: ICD-10-CM

## 2019-03-20 DIAGNOSIS — M25.512 ACUTE PAIN OF BOTH SHOULDERS: ICD-10-CM

## 2019-03-20 DIAGNOSIS — M54.2 NECK PAIN, BILATERAL: ICD-10-CM

## 2019-03-20 PROCEDURE — 99214 OFFICE O/P EST MOD 30 MIN: CPT | Performed by: NURSE PRACTITIONER

## 2019-03-20 PROCEDURE — 87660 TRICHOMONAS VAGIN DIR PROBE: CPT

## 2019-03-20 PROCEDURE — 87480 CANDIDA DNA DIR PROBE: CPT

## 2019-03-20 PROCEDURE — 87510 GARDNER VAG DNA DIR PROBE: CPT

## 2019-03-20 RX ORDER — ERGOCALCIFEROL 1.25 MG/1
50000 CAPSULE ORAL
Qty: 12 CAP | Refills: 0 | Status: SHIPPED | OUTPATIENT
Start: 2019-03-20 | End: 2019-06-25

## 2019-03-20 RX ORDER — HYDROCODONE BITARTRATE AND ACETAMINOPHEN 7.5; 325 MG/1; MG/1
1 TABLET ORAL EVERY 6 HOURS PRN
Qty: 60 TAB | Refills: 0 | Status: SHIPPED | OUTPATIENT
Start: 2019-04-20 | End: 2019-03-20 | Stop reason: SDUPTHER

## 2019-03-20 RX ORDER — HYDROCODONE BITARTRATE AND ACETAMINOPHEN 7.5; 325 MG/1; MG/1
1 TABLET ORAL EVERY 6 HOURS PRN
Qty: 75 TAB | Refills: 0 | Status: SHIPPED | OUTPATIENT
Start: 2019-03-20 | End: 2019-03-20 | Stop reason: SDUPTHER

## 2019-03-20 RX ORDER — HYDROCODONE BITARTRATE AND ACETAMINOPHEN 7.5; 325 MG/1; MG/1
1 TABLET ORAL EVERY 6 HOURS PRN
Qty: 60 TAB | Refills: 0 | Status: SHIPPED | OUTPATIENT
Start: 2019-03-20 | End: 2019-03-20 | Stop reason: SDUPTHER

## 2019-03-20 RX ORDER — DICLOFENAC SODIUM 75 MG/1
75 TABLET, DELAYED RELEASE ORAL 2 TIMES DAILY
Qty: 30 TAB | Refills: 0 | Status: SHIPPED | OUTPATIENT
Start: 2019-03-20 | End: 2019-04-15 | Stop reason: SDUPTHER

## 2019-03-20 RX ORDER — HYDROCODONE BITARTRATE AND ACETAMINOPHEN 7.5; 325 MG/1; MG/1
1 TABLET ORAL EVERY 6 HOURS PRN
Qty: 60 TAB | Refills: 0 | Status: SHIPPED | OUTPATIENT
Start: 2019-05-20 | End: 2019-06-25 | Stop reason: SDUPTHER
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
## 2019-03-20 NOTE — LETTER
March 20, 2019        Patient: Rain Sanabria   YOB: 1955   Date of Visit: 3/20/2019           To Whom It May Concern:    It is my medical opinion that Rain Sanabria should remain out of participation until Monday, March 25, 2019 due to a medical condition.    If you have any questions or concerns, please don't hesitate to call.        Sincerely,          SCOOBY Marina.  Electronically Signed    [facility]  [address] [address] [phone] (Phone)  [phone] (Fax)

## 2019-03-21 ENCOUNTER — TELEPHONE (OUTPATIENT)
Dept: URGENT CARE | Facility: MEDICAL CENTER | Age: 64
End: 2019-03-21

## 2019-03-21 LAB
CANDIDA DNA VAG QL PROBE+SIG AMP: NEGATIVE
G VAGINALIS DNA VAG QL PROBE+SIG AMP: NEGATIVE
T VAGINALIS DNA VAG QL PROBE+SIG AMP: NEGATIVE

## 2019-04-12 PROBLEM — Z78.9 ALCOHOL USE: Status: ACTIVE | Noted: 2018-01-26

## 2019-04-12 PROBLEM — F10.90 ALCOHOL USE: Status: ACTIVE | Noted: 2018-01-26

## 2019-04-15 DIAGNOSIS — M25.511 ACUTE PAIN OF BOTH SHOULDERS: ICD-10-CM

## 2019-04-15 DIAGNOSIS — M54.2 NECK PAIN, BILATERAL: ICD-10-CM

## 2019-04-15 DIAGNOSIS — M25.512 ACUTE PAIN OF BOTH SHOULDERS: ICD-10-CM

## 2019-04-16 RX ORDER — DICLOFENAC SODIUM 75 MG/1
TABLET, DELAYED RELEASE ORAL
Qty: 30 TAB | Refills: 3 | Status: SHIPPED | OUTPATIENT
Start: 2019-04-16 | End: 2019-09-25

## 2019-04-17 ENCOUNTER — OFFICE VISIT (OUTPATIENT)
Dept: MEDICAL GROUP | Facility: MEDICAL CENTER | Age: 64
End: 2019-04-17
Payer: COMMERCIAL

## 2019-04-17 ENCOUNTER — HOSPITAL ENCOUNTER (OUTPATIENT)
Facility: MEDICAL CENTER | Age: 64
End: 2019-04-17
Attending: NURSE PRACTITIONER
Payer: COMMERCIAL

## 2019-04-17 VITALS
DIASTOLIC BLOOD PRESSURE: 100 MMHG | WEIGHT: 265 LBS | HEART RATE: 101 BPM | SYSTOLIC BLOOD PRESSURE: 140 MMHG | OXYGEN SATURATION: 95 % | TEMPERATURE: 97.5 F | BODY MASS INDEX: 46.95 KG/M2 | HEIGHT: 63 IN

## 2019-04-17 DIAGNOSIS — B00.1 COLD SORE: ICD-10-CM

## 2019-04-17 DIAGNOSIS — Z01.419 PAP SMEAR, LOW-RISK: ICD-10-CM

## 2019-04-17 DIAGNOSIS — N89.8 VAGINAL DISCHARGE: ICD-10-CM

## 2019-04-17 DIAGNOSIS — Z12.4 ROUTINE CERVICAL SMEAR: ICD-10-CM

## 2019-04-17 PROCEDURE — 88175 CYTOPATH C/V AUTO FLUID REDO: CPT

## 2019-04-17 PROCEDURE — 87510 GARDNER VAG DNA DIR PROBE: CPT

## 2019-04-17 PROCEDURE — 99397 PER PM REEVAL EST PAT 65+ YR: CPT | Performed by: NURSE PRACTITIONER

## 2019-04-17 PROCEDURE — 87660 TRICHOMONAS VAGIN DIR PROBE: CPT

## 2019-04-17 PROCEDURE — 87480 CANDIDA DNA DIR PROBE: CPT

## 2019-04-17 PROCEDURE — 87624 HPV HI-RISK TYP POOLED RSLT: CPT

## 2019-04-17 RX ORDER — ACYCLOVIR 400 MG/1
400 TABLET ORAL
Qty: 15 TAB | Refills: 3 | Status: SHIPPED | OUTPATIENT
Start: 2019-04-17 | End: 2020-08-17

## 2019-04-17 ASSESSMENT — PATIENT HEALTH QUESTIONNAIRE - PHQ9: CLINICAL INTERPRETATION OF PHQ2 SCORE: 0

## 2019-04-17 NOTE — PROGRESS NOTES
Subjective:      Rain Sanabria is a 64 y.o. female who presents with pap smear          HPI  Seen in f/u for pap smear.  She had affirm recently that was neg.  She still having itching and burning.  Would like retest.    She has a cold sore on her lip.  Needs refill acyclovir.  Was on upper and now on lower lip.     Patient Active Problem List    Diagnosis Date Noted   • Dental disorder    • Alcohol use 01/26/2018   • Morbid obesity with BMI of 45.0-49.9, adult (HCC) 02/22/2017   • Chronic right shoulder pain 01/06/2016   • Hyperlipidemia    • Depression with anxiety    • Vitamin D deficiency    • Abdominal hernia    • Sleep apnea 07/12/2010   • OA (osteoarthritis) 07/12/2010   • HTN (hypertension) 01/20/2010   • Genital herpes simplex 06/19/2009   • Insomnia 06/19/2009   • Migraine 06/19/2009   • Abnormal mammogram 06/19/2009   • Preventative health care 06/19/2009   • Cystocele 06/19/2009     Current Outpatient Prescriptions   Medication Sig Dispense Refill   • diclofenac EC (VOLTAREN) 75 MG Tablet Delayed Response TAKE 1 TABLET BY MOUTH TWICE A DAY 30 Tab 3   • [START ON 5/20/2019] HYDROcodone-acetaminophen (NORCO) 7.5-325 MG per tablet Take 1 Tab by mouth every 6 hours as needed for up to 30 days. 60 Tab 0   • vitamin D, Ergocalciferol, (DRISDOL) 70291 units Cap capsule Take 1 Cap by mouth every 7 days. 12 Cap 0   • methocarbamol (ROBAXIN) 750 MG Tab Take 2 Tabs by mouth 4 times a day as needed (muscle spasm). 20 Tab 0   • losartan (COZAAR) 25 MG Tab Take 1 Tab by mouth every day. 90 Tab 1   • DULoxetine (CYMBALTA) 60 MG Cap DR Particles delayed-release capsule Take 1 Cap by mouth every day. 90 Cap 3   • DULoxetine (CYMBALTA) 30 MG Cap DR Particles TAKE ONE CAPSULE BY MOUTH EVERY DAY 90 Cap 1   • gabapentin (NEURONTIN) 300 MG Cap Take 300 mg by mouth 3 times a day.     • diclofenac EC (VOLTAREN) 75 MG Tablet Delayed Response Take 75 mg by mouth 2 times a day.     • ibuprofen (MOTRIN) 600 MG Tab Take 1 Tab  by mouth every 6 hours as needed. 20 Tab 0   • lidocaine (LIDODERM) 5 % Patch Apply 1 Patch to skin as directed every 24 hours. 30 Patch 3   • ascorbic acid (ASCORBIC ACID) 500 MG Tab Take 3,000 mg by mouth every day.     • vitamin E 100 UNIT capsule Take 300 Units by mouth every day.     • Multiple Vitamins-Minerals (ENERGY BOOSTER PO) Take 80 mg by mouth. With caffiene     • glucosamine Sulfate 500 MG Cap Take 1,500 mg by mouth every day.     • acyclovir (ZOVIRAX) 400 MG tablet Take 400 mg by mouth 2 times a day as needed.     • Cholecalciferol (VITAMIN D) 400 UNIT Tab Take 1,600 Units by mouth every day.     • B Complex Vitamins (VITAMIN B COMPLEX PO) Take 20 mg by mouth every day.     • albuterol (VENTOLIN HFA) 108 (90 BASE) MCG/ACT Aero Soln inhalation aerosol INHALE 2 PUFFS BY MOUTH EVERY FOUR HOURS AS NEEDED FOR SHORTNESS OF BREATH. DISPENSE A SPACER 18 Inhaler 5   • Multiple Vitamin (MULTIVITAMIN PO) Take 1 Tab by mouth every day.       No current facility-administered medications for this visit.      Allergies   Allergen Reactions   • Ace Inhibitors      Ace cough   • Tape Rash and Itching     Adhesive tape.  PAPER TAPE OK.         ROS  Review of Systems   Constitutional: Negative.  Negative for fever, chills, weight loss, malaise/fatigue and diaphoresis.   HENT: Negative.  Negative for hearing loss, ear pain, nosebleeds, congestion, sore throat, neck pain, tinnitus and ear discharge.    Eyes: Negative.  Negative for blurred vision, double vision, photophobia, pain, discharge and redness.   Respiratory: Negative.  Negative for cough, hemoptysis, sputum production, shortness of breath, wheezing and stridor.    Cardiovascular: Negative.  Negative for chest pain, palpitations, orthopnea, claudication,  PND.  chronic unchanged leg swelling  Gastrointestinal: Negative.  Negative for heartburn, nausea, vomiting, abdominal pain, diarrhea, constipation, blood in stool and melena.   Genitourinary: Negative.   "Negative for dysuria, urgency, frequency, incontinence, hematuria and flank pain.   Musculoskeletal: Negative.  Negative for myalgias, falls.   Skin: Negative.  Negative for rash.   Neurological: Negative.  Negative for dizziness, tingling, tremors, sensory change, speech change, focal weakness, seizures, loss of consciousness, weakness and headaches.   Endo/Heme/Allergies: Negative.  Negative for environmental allergies and polydipsia. Does not bruise/bleed easily.   Psychiatric/Behavioral: Negative.  Negative for depression, suicidal ideas, hallucinations, memory loss and substance abuse. The patient is not nervous/anxious and does not have insomnia.    All other systems reviewed and are negative.           Objective:     /100 (BP Location: Right arm, Patient Position: Sitting)   Pulse (!) 101   Temp 36.4 °C (97.5 °F) (Temporal)   Ht 1.6 m (5' 3\")   Wt 120.2 kg (265 lb)   SpO2 95%   BMI 46.94 kg/m²      Physical Exam  Physical Exam   Vitals reviewed.  Constitutional: oriented to person, place, and time. appears well-developed and well-nourished. No distress.   HENT: Head: Normocephalic and atraumatic. Bilateral tympanic membranes wnl w/o bulging.  Right Ear: External ear normal. Left Ear: External ear normal. Nose: Nose normal.  Mouth/Throat: Oropharynx is clear and moist. No oropharyngeal exudate. davin tm wnl. Eyes: Conjunctivae and EOM are normal. Pupils are equal, round, and reactive to light. Right eye exhibits no discharge. Left eye exhibits no discharge. No scleral icterus.    Neck: Normal range of motion. Neck supple. No JVD present.   Cardiovascular: Normal rate, regular rhythm, normal heart sounds and intact distal pulses.  Exam reveals no gallop and no friction rub.  No murmur heard.  No carotid bruits   Pulmonary/Chest: Effort normal and breath sounds normal. No stridor. No respiratory distress. no wheezes or rales. exhibits no tenderness.   Abdominal: Soft. Bowel sounds are normal. exhibits " no distension and no mass. No tenderness. no rebound and no guarding.   Musculoskeletal: Normal range of motion. exhibits no edema or tenderness.  davin pedal pulses 2+.  Lymphadenopathy:  no cervical or supraclavicular adenopathy.   Neurological: alert and oriented to person, place, and time. has normal reflexes. displays normal reflexes. No cranial nerve deficit. exhibits normal muscle tone. Coordination normal.   Skin: Skin is warm and dry. No rash noted. no diaphoresis. No erythema. No pallor.   Psychiatric: normal mood and affect. behavior is normal.   SUBJECTIVE: 64 y.o. female for annual routine pap and checkup.  Gyn History:   Last Pap: 2016  Contraception: none  H/O Abnormal Pap no  H/O STI none  Current partner: none  Lmp: 2000  ROS:  No pelvic pain, vaginal discharge or dyspareunia.  No breast tenderness, mass, nipple discharge, changes in size or contour, or abnormal cyclic discomfort.   Breast Exam:Performed with instruction during examination. Breasts equal size and shape.  No axillary lymphadenopathy, no skin changes, no dominant masses. No nipple retraction  Pelvic Exam - Sure Path Pap obtained and specimen sent to lab. Normal external genitalia with no lesions. Normal vaginal mucosa with normal rugation. Cervix has no visible lesions. No cervical motion tenderness. Uterus is normal sized with no masses. No adnexal tenderness or enlargement appreciated.  Rectal: sphincter tone normal, no mass, stool guaiac negative       Assessment/Plan:     1. Pap smear, low-risk  THINPREP PAP WITH HPV    f/u with pt with test results and in 2 months.    2. Routine cervical smear  THINPREP PAP WITH HPV   3. Cold sore  acyclovir (ZOVIRAX) 400 MG tablet    refill acyclovir to take jprn for cold sores   4. Vaginal discharge  VAGINAL PATHOGENS DNA PANEL     5.  Fu in 2 months as sched and with all test results.

## 2019-04-18 DIAGNOSIS — Z01.419 PAP SMEAR, LOW-RISK: ICD-10-CM

## 2019-04-18 DIAGNOSIS — N89.8 VAGINAL DISCHARGE: ICD-10-CM

## 2019-04-18 DIAGNOSIS — Z12.4 ROUTINE CERVICAL SMEAR: ICD-10-CM

## 2019-04-18 LAB
CANDIDA DNA VAG QL PROBE+SIG AMP: NEGATIVE
CYTOLOGY REG CYTOL: NORMAL
G VAGINALIS DNA VAG QL PROBE+SIG AMP: NEGATIVE
HPV HR 12 DNA CVX QL NAA+PROBE: NEGATIVE
HPV16 DNA SPEC QL NAA+PROBE: NEGATIVE
HPV18 DNA SPEC QL NAA+PROBE: NEGATIVE
SPECIMEN SOURCE: NORMAL
T VAGINALIS DNA VAG QL PROBE+SIG AMP: NEGATIVE

## 2019-06-20 ENCOUNTER — OFFICE VISIT (OUTPATIENT)
Dept: URGENT CARE | Facility: PHYSICIAN GROUP | Age: 64
End: 2019-06-20
Payer: COMMERCIAL

## 2019-06-20 VITALS
HEIGHT: 63 IN | BODY MASS INDEX: 44.3 KG/M2 | HEART RATE: 87 BPM | DIASTOLIC BLOOD PRESSURE: 90 MMHG | RESPIRATION RATE: 14 BRPM | TEMPERATURE: 97.7 F | OXYGEN SATURATION: 96 % | WEIGHT: 250 LBS | SYSTOLIC BLOOD PRESSURE: 150 MMHG

## 2019-06-20 DIAGNOSIS — R05.9 COUGH: ICD-10-CM

## 2019-06-20 DIAGNOSIS — J01.00 ACUTE NON-RECURRENT MAXILLARY SINUSITIS: ICD-10-CM

## 2019-06-20 PROCEDURE — 99214 OFFICE O/P EST MOD 30 MIN: CPT | Performed by: NURSE PRACTITIONER

## 2019-06-20 RX ORDER — ECHINACEA PURPUREA EXTRACT 125 MG
2 TABLET ORAL
Qty: 1 BOTTLE | Refills: 0 | Status: SHIPPED | OUTPATIENT
Start: 2019-06-20

## 2019-06-20 RX ORDER — AMOXICILLIN AND CLAVULANATE POTASSIUM 875; 125 MG/1; MG/1
1 TABLET, FILM COATED ORAL 2 TIMES DAILY
Qty: 10 TAB | Refills: 0 | Status: SHIPPED | OUTPATIENT
Start: 2019-06-20 | End: 2019-06-25

## 2019-06-20 RX ORDER — BENZONATATE 100 MG/1
100 CAPSULE ORAL 3 TIMES DAILY PRN
Qty: 60 CAP | Refills: 0 | Status: SHIPPED | OUTPATIENT
Start: 2019-06-20 | End: 2019-06-25

## 2019-06-20 RX ORDER — FLUTICASONE PROPIONATE 50 MCG
2 SPRAY, SUSPENSION (ML) NASAL DAILY
Qty: 1 BOTTLE | Refills: 0 | Status: SHIPPED | OUTPATIENT
Start: 2019-06-20 | End: 2019-07-19 | Stop reason: SDUPTHER

## 2019-06-20 ASSESSMENT — ENCOUNTER SYMPTOMS
EYE DISCHARGE: 0
SINUS PAIN: 1
HEADACHES: 0
SORE THROAT: 1
RHINORRHEA: 1
COUGH: 1
EYE REDNESS: 0
STRIDOR: 0
EYE PAIN: 0
FEVER: 0
BACK PAIN: 0
WHEEZING: 1
SHORTNESS OF BREATH: 1

## 2019-06-20 ASSESSMENT — COPD QUESTIONNAIRES: COPD: 0

## 2019-06-21 NOTE — PATIENT INSTRUCTIONS
Cough, Adult  Introduction  A cough helps to clear your throat and lungs. A cough may last only 2-3 weeks (acute), or it may last longer than 8 weeks (chronic). Many different things can cause a cough. A cough may be a sign of an illness or another medical condition.  Follow these instructions at home:  · Pay attention to any changes in your cough.  · Take medicines only as told by your doctor.  ¨ If you were prescribed an antibiotic medicine, take it as told by your doctor. Do not stop taking it even if you start to feel better.  ¨ Talk with your doctor before you try using a cough medicine.  · Drink enough fluid to keep your pee (urine) clear or pale yellow.  · If the air is dry, use a cold steam vaporizer or humidifier in your home.  · Stay away from things that make you cough at work or at home.  · If your cough is worse at night, try using extra pillows to raise your head up higher while you sleep.  · Do not smoke, and try not to be around smoke. If you need help quitting, ask your doctor.  · Do not have caffeine.  · Do not drink alcohol.  · Rest as needed.  Contact a doctor if:  · You have new problems (symptoms).  · You cough up yellow fluid (pus).  · Your cough does not get better after 2-3 weeks, or your cough gets worse.  · Medicine does not help your cough and you are not sleeping well.  · You have pain that gets worse or pain that is not helped with medicine.  · You have a fever.  · You are losing weight and you do not know why.  · You have night sweats.  Get help right away if:  · You cough up blood.  · You have trouble breathing.  · Your heartbeat is very fast.  This information is not intended to replace advice given to you by your health care provider. Make sure you discuss any questions you have with your health care provider.  Document Released: 08/30/2012 Document Revised: 05/25/2017 Document Reviewed: 02/24/2016  © 2017 [employer]    Sinusitis, Adult  Sinusitis is soreness and inflammation of your  sinuses. Sinuses are hollow spaces in the bones around your face. They are located:  · Around your eyes.  · In the middle of your forehead.  · Behind your nose.  · In your cheekbones.  Your sinuses and nasal passages are lined with a stringy fluid (mucus). Mucus normally drains out of your sinuses. When your nasal tissues get inflamed or swollen, the mucus can get trapped or blocked so air cannot flow through your sinuses. This lets bacteria, viruses, and funguses grow, and that leads to infection.  Follow these instructions at home:  Medicines  · Take, use, or apply over-the-counter and prescription medicines only as told by your doctor. These may include nasal sprays.  · If you were prescribed an antibiotic medicine, take it as told by your doctor. Do not stop taking the antibiotic even if you start to feel better.  Hydrate and Humidify  · Drink enough water to keep your pee (urine) clear or pale yellow.  · Use a cool mist humidifier to keep the humidity level in your home above 50%.  · Breathe in steam for 10-15 minutes, 3-4 times a day or as told by your doctor. You can do this in the bathroom while a hot shower is running.  · Try not to spend time in cool or dry air.  Rest  · Rest as much as possible.  · Sleep with your head raised (elevated).  · Make sure to get enough sleep each night.  General instructions  · Put a warm, moist washcloth on your face 3-4 times a day or as told by your doctor. This will help with discomfort.  · Wash your hands often with soap and water. If there is no soap and water, use hand .  · Do not smoke. Avoid being around people who are smoking (secondhand smoke).  · Keep all follow-up visits as told by your doctor. This is important.  Contact a doctor if:  · You have a fever.  · Your symptoms get worse.  · Your symptoms do not get better within 10 days.  Get help right away if:  · You have a very bad headache.  · You cannot stop throwing up (vomiting).  · You have pain or  swelling around your face or eyes.  · You have trouble seeing.  · You feel confused.  · Your neck is stiff.  · You have trouble breathing.  This information is not intended to replace advice given to you by your health care provider. Make sure you discuss any questions you have with your health care provider.  Document Released: 06/05/2009 Document Revised: 08/13/2017 Document Reviewed: 10/12/2016  [employer] Interactive Patient Education © 2017 [employer]

## 2019-06-21 NOTE — PROGRESS NOTES
Subjective:     Rain Sanabria is a 64 y.o. female who presents for Cough (lots of congestion x4 weeks )      Started with allergy symptoms. Cough has persisted for 4 weeks. Also has had nasal dryness with an occasional bloody nose. Cough hurts throat and chest. Non-productive. Last abx treatment for sinus infection was a couple of years ago. Has an inhaler prescribed by PCP for seasonal wheezing and shortness of breath.       Cough   This is a new problem. The problem has been gradually worsening. The cough is non-productive. Associated symptoms include nasal congestion, rhinorrhea, a sore throat, shortness of breath and wheezing. Pertinent negatives include no ear congestion, ear pain, eye redness, fever, headaches, postnasal drip or rash. Associated symptoms comments: Bilateral sinus pressure. Left nasal swelling. Post nasal drip has resolved. . Treatments tried: Sin-ex, inhaler, decongestant. Her past medical history is significant for environmental allergies. There is no history of asthma, bronchitis, COPD, emphysema or pneumonia.       Past Medical History:   Diagnosis Date   • Abdominal hernia    • Abnormal mammogram 6/19/2009   • Alcohol use 01/26/2018    2 per day   • Chronic right shoulder pain    • Cystocele 6/19/2009   • Dental disorder     Upper denture   • Depression with anxiety 01/26/2018   • Genital herpes 6/19/2009 1/26/18-no breakout for 10 years.   • Hyperlipidemia    • Hypertension 01/26/2018    Controlled and no longer any meds for 1 year.   • Insomnia 6/19/2009   • Migraine 06/19/2009 1/26/18-states none since 2003.   • Morbid obesity with BMI of 45.0-49.9, adult ([facility]) 2/22/2017   • OA (osteoarthritis)    • Sleep apnea     2011 had a very abn apnea link.  sx improved on nitetime o2.  her ins wouldn't pay for o2 and she can't afford it.  she is looking into options for o2.    • Vitamin D deficiency        Past Surgical History:   Procedure Laterality Date   • KNEE ARTHROPLASTY TOTAL  Left 2/12/2018    Procedure: KNEE ARTHROPLASTY TOTAL;  Surgeon: Ankit Frias M.D.;  Location: SURGERY [facility];  Service: Orthopedics   • COLONOSCOPY  2015    Hx of a couple   • ROTATOR CUFF REPAIR Right 11/24/2009   • RHINOPLASTY  07/2004   • HERNIA REPAIR  2004   • GASTRIC BYPASS LAPAROSCOPIC  02/2003   • BREAST BIOPSY Left 1996    Benign   • TUBAL LIGATION Bilateral 1985   • PRIMARY C SECTION  1984   • HYSTEROSCOPY THERMAL ABLATION  early 2000's    endometrial   • TOE ARTHROPLASTY Bilateral 1990'S    2nd toes shortened.       Social History     Social History   • Marital status: Legally      Spouse name: N/A   • Number of children: N/A   • Years of education: N/A     Occupational History   • Not on file.     Social History Main Topics   • Smoking status: Never Smoker   • Smokeless tobacco: Never Used   • Alcohol use 4.2 oz/week     7 Standard drinks or equivalent per week      Comment: 2 per day   • Drug use: No   • Sexual activity: Not on file     Other Topics Concern   • Not on file     Social History Narrative   • No narrative on file        Family History   Problem Relation Age of Onset   • Diabetes Mother    • Hypertension Mother    • Diabetes Maternal Grandfather    • Diabetes Paternal Grandfather    • Cancer Paternal Grandfather    • Blood Disease Daughter         leukemia-all        Allergies   Allergen Reactions   • Ace Inhibitors      Ace cough   • Tape Rash and Itching     Adhesive tape.  PAPER TAPE OK.       Review of Systems   Constitutional: Negative for fever.   HENT: Positive for congestion, nosebleeds, rhinorrhea, sinus pain and sore throat. Negative for ear discharge, ear pain and postnasal drip.    Eyes: Negative for pain, discharge and redness.   Respiratory: Positive for cough, shortness of breath and wheezing. Negative for stridor.    Cardiovascular:        Upper mid chest pain with cough.    Musculoskeletal: Negative for back pain.   Skin: Negative for rash.  "  Neurological: Negative for headaches.   Endo/Heme/Allergies: Positive for environmental allergies.        Objective:   /90   Pulse 87   Temp 36.5 °C (97.7 °F) (Temporal)   Resp 14   Ht 1.6 m (5' 3\")   Wt 113.4 kg (250 lb)   SpO2 96%   BMI 44.29 kg/m²     Physical Exam   Constitutional: She is oriented to person, place, and time. She appears well-developed. No distress.   HENT:   Head: Normocephalic.   Right Ear: Tympanic membrane, external ear and ear canal normal.   Left Ear: Tympanic membrane, external ear and ear canal normal.   Nose: Mucosal edema present. No epistaxis. Right sinus exhibits no maxillary sinus tenderness and no frontal sinus tenderness. Left sinus exhibits maxillary sinus tenderness. Left sinus exhibits no frontal sinus tenderness.   Mouth/Throat: Uvula is midline and mucous membranes are normal.   Clear oropharyngeal post nasal drip.    Eyes: Conjunctivae are normal.   Neck: Normal range of motion. Neck supple. No JVD present.   Cardiovascular: Normal rate.    Pulmonary/Chest: Effort normal. No accessory muscle usage. No tachypnea. No respiratory distress. She has no decreased breath sounds. She has wheezes in the right upper field and the left upper field. She has no rhonchi. She has no rales. She exhibits no tenderness.   Slight upper occasional exp wheezing in upper lobes. Dry non-productive cough noted.    Abdominal: Soft.   Musculoskeletal: Normal range of motion.   Neurological: She is alert and oriented to person, place, and time.   Skin: Skin is warm and dry. No rash noted.   Psychiatric: She has a normal mood and affect. Her behavior is normal. Judgment and thought content normal.   Vitals reviewed.      Assessment/Plan:   1. Acute non-recurrent maxillary sinusitis  - amoxicillin-clavulanate (AUGMENTIN) 875-125 MG Tab; Take 1 Tab by mouth 2 times a day for 5 days.  Dispense: 10 Tab; Refill: 0  - sodium chloride (OCEAN NASAL SPRAY) 0.65 % Solution; Spray 2 Sprays in nose " every 2 hours as needed for Congestion.  Dispense: 1 Bottle; Refill: 0  - fluticasone (FLONASE) 50 MCG/ACT nasal spray; Spray 2 Sprays in nose every day.  Dispense: 1 Bottle; Refill: 0    2. Cough  - benzonatate (TESSALON) 100 MG Cap; Take 1 Cap by mouth 3 times a day as needed for Cough.  Dispense: 60 Cap; Refill: 0    Continue with OTC allergy medication and prescribed inhaler as directed.     Follow up for increased shortness of breath or wheezing, fevers, persistent cough, chest pain, headaches, or any other concerns.    Follow up with PCP.     Differential diagnosis, natural history, supportive care, and indications for immediate follow-up discussed.

## 2019-06-25 ENCOUNTER — OFFICE VISIT (OUTPATIENT)
Dept: MEDICAL GROUP | Facility: MEDICAL CENTER | Age: 64
End: 2019-06-25
Payer: COMMERCIAL

## 2019-06-25 VITALS
SYSTOLIC BLOOD PRESSURE: 154 MMHG | DIASTOLIC BLOOD PRESSURE: 106 MMHG | OXYGEN SATURATION: 93 % | BODY MASS INDEX: 46.42 KG/M2 | WEIGHT: 262 LBS | HEART RATE: 87 BPM | HEIGHT: 63 IN | TEMPERATURE: 98 F

## 2019-06-25 DIAGNOSIS — R01.1 CARDIAC MURMUR: ICD-10-CM

## 2019-06-25 DIAGNOSIS — M54.41 CHRONIC BILATERAL LOW BACK PAIN WITH RIGHT-SIDED SCIATICA: ICD-10-CM

## 2019-06-25 DIAGNOSIS — R06.02 SHORTNESS OF BREATH: ICD-10-CM

## 2019-06-25 DIAGNOSIS — Z23 NEED FOR PNEUMOCOCCAL VACCINATION: ICD-10-CM

## 2019-06-25 DIAGNOSIS — G89.29 CHRONIC BILATERAL LOW BACK PAIN WITH RIGHT-SIDED SCIATICA: ICD-10-CM

## 2019-06-25 DIAGNOSIS — I10 ESSENTIAL HYPERTENSION: ICD-10-CM

## 2019-06-25 DIAGNOSIS — R07.89 OTHER CHEST PAIN: ICD-10-CM

## 2019-06-25 DIAGNOSIS — F32.89 OTHER DEPRESSION: ICD-10-CM

## 2019-06-25 DIAGNOSIS — M15.9 OSTEOARTHRITIS OF MULTIPLE JOINTS, UNSPECIFIED OSTEOARTHRITIS TYPE: ICD-10-CM

## 2019-06-25 DIAGNOSIS — M75.102 TEAR OF LEFT ROTATOR CUFF, UNSPECIFIED TEAR EXTENT: ICD-10-CM

## 2019-06-25 PROCEDURE — 90471 IMMUNIZATION ADMIN: CPT | Performed by: NURSE PRACTITIONER

## 2019-06-25 PROCEDURE — 90670 PCV13 VACCINE IM: CPT | Performed by: NURSE PRACTITIONER

## 2019-06-25 PROCEDURE — 99214 OFFICE O/P EST MOD 30 MIN: CPT | Mod: 25 | Performed by: NURSE PRACTITIONER

## 2019-06-25 RX ORDER — LOSARTAN POTASSIUM 50 MG/1
50 TABLET ORAL DAILY
Qty: 90 TAB | Refills: 1 | Status: SHIPPED | OUTPATIENT
Start: 2019-06-25 | End: 2019-10-23

## 2019-06-25 RX ORDER — DULOXETIN HYDROCHLORIDE 30 MG/1
30 CAPSULE, DELAYED RELEASE ORAL
Qty: 90 CAP | Refills: 1 | Status: SHIPPED | OUTPATIENT
Start: 2019-06-25 | End: 2019-09-25

## 2019-06-25 RX ORDER — HYDROCODONE BITARTRATE AND ACETAMINOPHEN 7.5; 325 MG/1; MG/1
1 TABLET ORAL EVERY 6 HOURS PRN
Qty: 60 TAB | Refills: 0 | Status: SHIPPED | OUTPATIENT
Start: 2019-06-25 | End: 2019-06-25 | Stop reason: SDUPTHER

## 2019-06-25 RX ORDER — LIDOCAINE 50 MG/G
1 PATCH TOPICAL EVERY 24 HOURS
Qty: 30 PATCH | Refills: 3 | Status: SHIPPED | OUTPATIENT
Start: 2019-06-25 | End: 2020-02-05 | Stop reason: SDUPTHER

## 2019-06-25 RX ORDER — HYDROCODONE BITARTRATE AND ACETAMINOPHEN 7.5; 325 MG/1; MG/1
1 TABLET ORAL EVERY 6 HOURS PRN
Qty: 60 TAB | Refills: 0 | Status: SHIPPED | OUTPATIENT
Start: 2019-07-25 | End: 2019-06-25 | Stop reason: SDUPTHER

## 2019-06-25 RX ORDER — ALBUTEROL SULFATE 90 UG/1
AEROSOL, METERED RESPIRATORY (INHALATION)
Qty: 18 INHALER | Refills: 5 | Status: SHIPPED | OUTPATIENT
Start: 2019-06-25 | End: 2020-02-01 | Stop reason: SDUPTHER

## 2019-06-25 RX ORDER — LIDOCAINE 50 MG/G
1 PATCH TOPICAL EVERY 24 HOURS
Qty: 30 PATCH | Refills: 3 | Status: SHIPPED | OUTPATIENT
Start: 2019-06-25 | End: 2019-06-25 | Stop reason: SDUPTHER

## 2019-06-25 RX ORDER — HYDROCODONE BITARTRATE AND ACETAMINOPHEN 7.5; 325 MG/1; MG/1
1 TABLET ORAL EVERY 6 HOURS PRN
Qty: 60 TAB | Refills: 0 | Status: SHIPPED | OUTPATIENT
Start: 2019-08-25 | End: 2019-09-25 | Stop reason: SDUPTHER

## 2019-06-25 NOTE — PROGRESS NOTES
Subjective:     Rain Sanabria is a 64 y.o. female who presents with chronic back pain and multiple joints pain.    HPI:   Seen in f/u for chronic back pain and multiple joints pain.  She has been out of meds for 3 days.  Having severe pain.  She is due for refills on  norco and lidoderm.  Went controlled when she has her meds.  She is also due UDS and contract.    She is due prevnar 13.    Her bp is elevated today.  Has been elevated last 3 visits.  She is taking meds approp.  Needs refill on cymbalta 30 mg.  She is on 90 mg total.  That is helping her sx.   About 3 weeks ago she had jaw and left arm pain.  She also had chest tightness.  It lasts several min and resolved.  No reoccurred.  Now she has a murmur, new onset.  Did not seek medical care.         Patient Active Problem List    Diagnosis Date Noted   • Dental disorder    • Alcohol use 01/26/2018   • Morbid obesity with BMI of 45.0-49.9, adult (HCC) 02/22/2017   • Chronic right shoulder pain 01/06/2016   • Hyperlipidemia    • Depression with anxiety    • Vitamin D deficiency    • Abdominal hernia    • Sleep apnea 07/12/2010   • OA (osteoarthritis) 07/12/2010   • HTN (hypertension) 01/20/2010   • Genital herpes simplex 06/19/2009   • Insomnia 06/19/2009   • Migraine 06/19/2009   • Abnormal mammogram 06/19/2009   • Preventative health care 06/19/2009   • Cystocele 06/19/2009       Current medicines (including changes today)  Current Outpatient Prescriptions   Medication Sig Dispense Refill   • DULoxetine (CYMBALTA) 30 MG Cap DR Particles Take 1 Cap by mouth every day. 90 Cap 1   • albuterol (VENTOLIN HFA) 108 (90 Base) MCG/ACT Aero Soln inhalation aerosol INHALE 2 PUFFS BY MOUTH EVERY FOUR HOURS AS NEEDED FOR SHORTNESS OF BREATH. DISPENSE A SPACER 18 Inhaler 5   • losartan (COZAAR) 50 MG Tab Take 1 Tab by mouth every day. 90 Tab 1   • [START ON 8/25/2019] HYDROcodone-acetaminophen (NORCO) 7.5-325 MG per tablet Take 1 Tab by mouth every 6 hours as needed  for up to 30 days. 60 Tab 0   • lidocaine (LIDODERM) 5 % Patch Apply 1 Patch to skin as directed every 24 hours. 30 Patch 3   • diclofenac EC (VOLTAREN) 75 MG Tablet Delayed Response TAKE 1 TABLET BY MOUTH TWICE A DAY 30 Tab 3   • DULoxetine (CYMBALTA) 60 MG Cap DR Particles delayed-release capsule Take 1 Cap by mouth every day. 90 Cap 3   • vitamin E 100 UNIT capsule Take 300 Units by mouth every day.     • Multiple Vitamins-Minerals (ENERGY BOOSTER PO) Take 80 mg by mouth. With caffiene     • glucosamine Sulfate 500 MG Cap Take 1,500 mg by mouth every day.     • Cholecalciferol (VITAMIN D) 400 UNIT Tab Take 1,600 Units by mouth every day.     • B Complex Vitamins (VITAMIN B COMPLEX PO) Take 20 mg by mouth every day.     • Multiple Vitamin (MULTIVITAMIN PO) Take 1 Tab by mouth every day.     • sodium chloride (OCEAN NASAL SPRAY) 0.65 % Solution Spray 2 Sprays in nose every 2 hours as needed for Congestion. 1 Bottle 0   • fluticasone (FLONASE) 50 MCG/ACT nasal spray Spray 2 Sprays in nose every day. 1 Bottle 0   • acyclovir (ZOVIRAX) 400 MG tablet Take 1 Tab by mouth 5 Times a Day. 15 Tab 3   • methocarbamol (ROBAXIN) 750 MG Tab Take 2 Tabs by mouth 4 times a day as needed (muscle spasm). 20 Tab 0   • diclofenac EC (VOLTAREN) 75 MG Tablet Delayed Response Take 75 mg by mouth 2 times a day.     • ibuprofen (MOTRIN) 600 MG Tab Take 1 Tab by mouth every 6 hours as needed. 20 Tab 0   • ascorbic acid (ASCORBIC ACID) 500 MG Tab Take 3,000 mg by mouth every day.       No current facility-administered medications for this visit.        Allergies   Allergen Reactions   • Ace Inhibitors      Ace cough   • Tape Rash and Itching     Adhesive tape.  PAPER TAPE OK.       ROS  Constitutional: Negative. Negative for fever, chills, weight loss, malaise/fatigue and diaphoresis.   HENT: Negative. Negative for hearing loss, ear pain, nosebleeds, congestion, sore throat, neck pain, tinnitus and ear discharge.   Respiratory: Negative.  "Negative for cough, hemoptysis, sputum production, shortness of breath, wheezing and stridor.   Cardiovascular: Negative. Negative for chest pain, palpitations, orthopnea, claudication, leg swelling and PND.   Gastrointestinal: Denies nausea, vomiting, diarrhea, constipation, heartburn, melena or hematochezia.  Genitourinary: Denies dysuria, hematuria, urinary incontinence, frequency or urgency.        Objective:     /106   Pulse 87   Temp 36.7 °C (98 °F) (Temporal)   Ht 1.6 m (5' 3\")   Wt 118.8 kg (262 lb)   SpO2 93%  Body mass index is 46.41 kg/m².    Physical Exam:  Vitals reviewed.  Constitutional: Oriented to person, place, and time. appears well-developed and well-nourished. No distress.   Cardiovascular: Normal rate, regular rhythm, normal heart sounds and intact distal pulses. Exam reveals no gallop and no friction rub. 2/6 murmur heard. No carotid bruits.   Pulmonary/Chest: Effort normal and breath sounds normal. No stridor. No respiratory distress. no wheezes or rales. exhibits no tenderness.   Musculoskeletal: Normal range of motion. exhibits no edema. davin pedal pulses 2+.  Lymphadenopathy: No cervical or supraclavicular adenopathy.   Neurological: Alert and oriented to person, place, and time. exhibits normal muscle tone.  Skin: Skin is warm and dry. No diaphoresis.   Psychiatric: Normal mood and affect. Behavior is normal.      Assessment and Plan:     The following treatment plan was discussed:    1. Tear of left rotator cuff, unspecified tear extent  Controlled Substance Treatment Agreement    Pain Management Screen    Consent for Opiate Prescription    Controlled Substance Treatment Agreement    lidocaine (LIDODERM) 5 % Patch    DISCONTINUED: lidocaine (LIDODERM) 5 % Patch    refilled pain meds for 3 mo.  UDS and contract updated today.  f/u 3 mo for med refill.   2. Chronic bilateral low back pain with right-sided sciatica  Controlled Substance Treatment Agreement    Pain Management " Screen    Consent for Opiate Prescription    Controlled Substance Treatment Agreement    HYDROcodone-acetaminophen (NORCO) 7.5-325 MG per tablet    DISCONTINUED: HYDROcodone-acetaminophen (NORCO) 7.5-325 MG per tablet    DISCONTINUED: HYDROcodone-acetaminophen (NORCO) 7.5-325 MG per tablet    refill pain meds  gave 3 month supply.  f/u 3 months for med refil   3. Osteoarthritis of multiple joints, unspecified osteoarthritis type  Controlled Substance Treatment Agreement    Pain Management Screen    Consent for Opiate Prescription    Controlled Substance Treatment Agreement    HYDROcodone-acetaminophen (NORCO) 7.5-325 MG per tablet    DISCONTINUED: HYDROcodone-acetaminophen (NORCO) 7.5-325 MG per tablet    DISCONTINUED: HYDROcodone-acetaminophen (NORCO) 7.5-325 MG per tablet    refilled meds.  UDS and contract are up to date.    4. Shortness of breath  albuterol (VENTOLIN HFA) 108 (90 Base) MCG/ACT Aero Soln inhalation aerosol   5. Other depression  DULoxetine (CYMBALTA) 30 MG Cap DR Particles    stable on meds. refilled med   6. Essential hypertension      not well controlled.  inc losartan to 50 mg dialy  f/u 3 mo for bp check and med refill   7. Need for pneumococcal vaccination  Pneumococcal Conjugate Vaccine 13-Valent    prevnar 13   8. Cardiac murmur  EC-ECHOCARDIOGRAM COMPLETE W/O CONT    EKG - Clinic Performed    new onset.  do echo   9. Other chest pain  EKG - Clinic Performed    chest jpain occurrect 3 weeks ago with jaw and left arm pain.  resolved after several min and not reoccurred.  do EKG in office no acute changes   10.  EKG IN office read by me.  NSR. WNL      Followup: Return in about 3 months (around 9/25/2019).

## 2019-07-17 DIAGNOSIS — M54.41 CHRONIC BILATERAL LOW BACK PAIN WITH RIGHT-SIDED SCIATICA: ICD-10-CM

## 2019-07-17 DIAGNOSIS — M15.9 OSTEOARTHRITIS OF MULTIPLE JOINTS, UNSPECIFIED OSTEOARTHRITIS TYPE: ICD-10-CM

## 2019-07-17 DIAGNOSIS — M75.102 TEAR OF LEFT ROTATOR CUFF, UNSPECIFIED TEAR EXTENT: ICD-10-CM

## 2019-07-17 DIAGNOSIS — G89.29 CHRONIC BILATERAL LOW BACK PAIN WITH RIGHT-SIDED SCIATICA: ICD-10-CM

## 2019-07-19 DIAGNOSIS — J01.00 ACUTE NON-RECURRENT MAXILLARY SINUSITIS: ICD-10-CM

## 2019-07-21 RX ORDER — FLUTICASONE PROPIONATE 50 MCG
2 SPRAY, SUSPENSION (ML) NASAL DAILY
Qty: 1 BOTTLE | Refills: 0 | Status: SHIPPED | OUTPATIENT
Start: 2019-07-21 | End: 2019-08-17 | Stop reason: SDUPTHER

## 2019-07-31 ENCOUNTER — APPOINTMENT (OUTPATIENT)
Dept: CARDIOLOGY | Facility: MEDICAL CENTER | Age: 64
End: 2019-07-31
Attending: NURSE PRACTITIONER
Payer: COMMERCIAL

## 2019-09-25 ENCOUNTER — OFFICE VISIT (OUTPATIENT)
Dept: MEDICAL GROUP | Facility: MEDICAL CENTER | Age: 64
End: 2019-09-25
Payer: COMMERCIAL

## 2019-09-25 VITALS
TEMPERATURE: 94 F | DIASTOLIC BLOOD PRESSURE: 90 MMHG | BODY MASS INDEX: 46.25 KG/M2 | SYSTOLIC BLOOD PRESSURE: 136 MMHG | WEIGHT: 261 LBS | HEART RATE: 107 BPM | OXYGEN SATURATION: 94 % | HEIGHT: 63 IN

## 2019-09-25 DIAGNOSIS — D75.89 MACROCYTOSIS WITHOUT ANEMIA: ICD-10-CM

## 2019-09-25 DIAGNOSIS — Z23 NEED FOR PNEUMOCOCCAL VACCINATION: ICD-10-CM

## 2019-09-25 DIAGNOSIS — F32.89 OTHER DEPRESSION: ICD-10-CM

## 2019-09-25 DIAGNOSIS — I10 ESSENTIAL HYPERTENSION: ICD-10-CM

## 2019-09-25 DIAGNOSIS — Z23 NEED FOR INFLUENZA VACCINATION: ICD-10-CM

## 2019-09-25 DIAGNOSIS — M54.41 CHRONIC BILATERAL LOW BACK PAIN WITH RIGHT-SIDED SCIATICA: ICD-10-CM

## 2019-09-25 DIAGNOSIS — E78.5 HYPERLIPIDEMIA LDL GOAL <100: ICD-10-CM

## 2019-09-25 DIAGNOSIS — G89.29 CHRONIC BILATERAL LOW BACK PAIN WITH RIGHT-SIDED SCIATICA: ICD-10-CM

## 2019-09-25 PROCEDURE — 90471 IMMUNIZATION ADMIN: CPT | Performed by: NURSE PRACTITIONER

## 2019-09-25 PROCEDURE — 90732 PPSV23 VACC 2 YRS+ SUBQ/IM: CPT | Performed by: NURSE PRACTITIONER

## 2019-09-25 PROCEDURE — 99214 OFFICE O/P EST MOD 30 MIN: CPT | Mod: 25 | Performed by: NURSE PRACTITIONER

## 2019-09-25 PROCEDURE — 90472 IMMUNIZATION ADMIN EACH ADD: CPT | Performed by: NURSE PRACTITIONER

## 2019-09-25 PROCEDURE — 90662 IIV NO PRSV INCREASED AG IM: CPT | Performed by: NURSE PRACTITIONER

## 2019-09-25 RX ORDER — HYDROCODONE BITARTRATE AND ACETAMINOPHEN 7.5; 325 MG/1; MG/1
1 TABLET ORAL EVERY 6 HOURS PRN
Qty: 60 TAB | Refills: 0 | Status: SHIPPED | OUTPATIENT
Start: 2019-09-26 | End: 2019-09-25 | Stop reason: SDUPTHER

## 2019-09-25 RX ORDER — DULOXETIN HYDROCHLORIDE 30 MG/1
30 CAPSULE, DELAYED RELEASE ORAL
Qty: 90 CAP | Refills: 1
Start: 2019-09-25 | End: 2019-10-23

## 2019-09-25 RX ORDER — HYDROCODONE BITARTRATE AND ACETAMINOPHEN 7.5; 325 MG/1; MG/1
1 TABLET ORAL EVERY 6 HOURS PRN
Qty: 60 TAB | Refills: 0 | Status: SHIPPED | OUTPATIENT
Start: 2019-10-26 | End: 2019-09-25 | Stop reason: SDUPTHER

## 2019-09-25 RX ORDER — HYDROCODONE BITARTRATE AND ACETAMINOPHEN 7.5; 325 MG/1; MG/1
1 TABLET ORAL EVERY 6 HOURS PRN
Qty: 60 TAB | Refills: 0 | Status: SHIPPED | OUTPATIENT
Start: 2019-11-26 | End: 2020-02-05 | Stop reason: SDUPTHER

## 2019-09-25 RX ORDER — LOSARTAN POTASSIUM 100 MG/1
100 TABLET ORAL DAILY
Qty: 30 TAB | Refills: 1 | Status: SHIPPED | OUTPATIENT
Start: 2019-09-25 | End: 2019-10-18 | Stop reason: SDUPTHER

## 2019-09-25 NOTE — PROGRESS NOTES
Subjective:     Rain Sanabria is a 64 y.o. female who presents with chronic davin knee pain.    HPI:   Seen in f/u for chronic davin knee pain.  She is here for refill on pain meds. Using to control her pain.  This will also help her sleep.  Stable on meds. UDS and contract are upto date.  She also needs a work form completed.  She needs lab done to complete form.she will need LP updated.  She is not on med for lipids.  Her last CBC showed some rbc changes.  She is due updated lab.  Her bp is elevated today and for last 3 visits.  Taking cozaar approp.    She is due a flu shot.      Patient Active Problem List    Diagnosis Date Noted   • Dental disorder    • Alcohol use 01/26/2018   • Morbid obesity with BMI of 45.0-49.9, adult (HCC) 02/22/2017   • Chronic right shoulder pain 01/06/2016   • Hyperlipidemia    • Depression with anxiety    • Vitamin D deficiency    • Abdominal hernia    • Sleep apnea 07/12/2010   • OA (osteoarthritis) 07/12/2010   • HTN (hypertension) 01/20/2010   • Genital herpes simplex 06/19/2009   • Insomnia 06/19/2009   • Migraine 06/19/2009   • Abnormal mammogram 06/19/2009   • Preventative health care 06/19/2009   • Cystocele 06/19/2009       Current medicines (including changes today)  Current Outpatient Medications   Medication Sig Dispense Refill   • losartan (COZAAR) 100 MG Tab Take 1 Tab by mouth every day. 30 Tab 1   • DULoxetine (CYMBALTA) 30 MG Cap DR Particles Take 1 Cap by mouth every day. 90 Cap 1   • [START ON 11/26/2019] HYDROcodone-acetaminophen (NORCO) 7.5-325 MG per tablet Take 1 Tab by mouth every 6 hours as needed for up to 30 days. 60 Tab 0   • fluticasone (FLONASE) 50 MCG/ACT nasal spray SPRAY 2 SPRAYS IN NOSE EVERY DAY. 3 Bottle 1   • albuterol (VENTOLIN HFA) 108 (90 Base) MCG/ACT Aero Soln inhalation aerosol INHALE 2 PUFFS BY MOUTH EVERY FOUR HOURS AS NEEDED FOR SHORTNESS OF BREATH. DISPENSE A SPACER 18 Inhaler 5   • losartan (COZAAR) 50 MG Tab Take 1 Tab by mouth every  day. 90 Tab 1   • lidocaine (LIDODERM) 5 % Patch Apply 1 Patch to skin as directed every 24 hours. 30 Patch 3   • sodium chloride (OCEAN NASAL SPRAY) 0.65 % Solution Spray 2 Sprays in nose every 2 hours as needed for Congestion. 1 Bottle 0   • acyclovir (ZOVIRAX) 400 MG tablet Take 1 Tab by mouth 5 Times a Day. 15 Tab 3   • methocarbamol (ROBAXIN) 750 MG Tab Take 2 Tabs by mouth 4 times a day as needed (muscle spasm). 20 Tab 0   • DULoxetine (CYMBALTA) 60 MG Cap DR Particles delayed-release capsule Take 1 Cap by mouth every day. 90 Cap 3   • diclofenac EC (VOLTAREN) 75 MG Tablet Delayed Response Take 75 mg by mouth 2 times a day.     • ibuprofen (MOTRIN) 600 MG Tab Take 1 Tab by mouth every 6 hours as needed. 20 Tab 0   • ascorbic acid (ASCORBIC ACID) 500 MG Tab Take 3,000 mg by mouth every day.     • vitamin E 100 UNIT capsule Take 300 Units by mouth every day.     • Multiple Vitamins-Minerals (ENERGY BOOSTER PO) Take 80 mg by mouth. With caffiene     • glucosamine Sulfate 500 MG Cap Take 1,500 mg by mouth every day.     • Cholecalciferol (VITAMIN D) 400 UNIT Tab Take 1,600 Units by mouth every day.     • B Complex Vitamins (VITAMIN B COMPLEX PO) Take 20 mg by mouth every day.     • Multiple Vitamin (MULTIVITAMIN PO) Take 1 Tab by mouth every day.       No current facility-administered medications for this visit.        Allergies   Allergen Reactions   • Ace Inhibitors      Ace cough   • Tape Rash and Itching     Adhesive tape.  PAPER TAPE OK.       ROS  Constitutional: Negative. Negative for fever, chills, weight loss, malaise/fatigue and diaphoresis.   HENT: Negative. Negative for hearing loss, ear pain, nosebleeds, congestion, sore throat, neck pain, tinnitus and ear discharge.   Respiratory: Negative. Negative for cough, hemoptysis, sputum production, shortness of breath, wheezing and stridor.   Cardiovascular: Negative. Negative for chest pain, palpitations, orthopnea, claudication, leg swelling and PND.  "  Gastrointestinal: Denies nausea, vomiting, diarrhea, constipation, heartburn, melena or hematochezia.  Genitourinary: Denies dysuria, hematuria, urinary incontinence, frequency or urgency.        Objective:     /90 (BP Location: Right arm, Patient Position: Sitting)   Pulse (!) 107   Temp (!) 34.4 °C (94 °F) (Temporal)   Ht 1.6 m (5' 3\")   Wt 118.4 kg (261 lb)   SpO2 94%  Body mass index is 46.23 kg/m².    Physical Exam:  Vitals reviewed.  Constitutional: Oriented to person, place, and time. appears well-developed and well-nourished. No distress.   Cardiovascular: Normal rate, regular rhythm, normal heart sounds and intact distal pulses. Exam reveals no gallop and no friction rub. 2/6 murmur heard. No carotid bruits.   Pulmonary/Chest: Effort normal and breath sounds normal. No stridor. No respiratory distress. no wheezes or rales. exhibits no tenderness.   Musculoskeletal: Normal range of motion. exhibits no edema. davin pedal pulses 2+.  Lymphadenopathy: No cervical or supraclavicular adenopathy.   Neurological: Alert and oriented to person, place, and time. exhibits normal muscle tone.  Skin: Skin is warm and dry. No diaphoresis.   Psychiatric: Normal mood and affect. Behavior is normal.      Assessment and Plan:     The following treatment plan was discussed:    1. Chronic bilateral low back pain with right-sided sciatica  HYDROcodone-acetaminophen (NORCO) 7.5-325 MG per tablet    DISCONTINUED: HYDROcodone-acetaminophen (NORCO) 7.5-325 MG per tablet    DISCONTINUED: HYDROcodone-acetaminophen (NORCO) 7.5-325 MG per tablet    refill pain meds  gave 3 month supply.  f/u 3 months for med refil  UDS AND CONTRACT are up to date   2. Essential hypertension  Comp Metabolic Panel    MICROALBUMIN CREAT RATIO URINE    Lipid Profile    losartan (COZAAR) 100 MG Tab    bp not at goal.  inc losartan to 100 mg daily.  do lab tomorrow and will complete form when get lab.  f/u 1 mo for lab review and bp ck   3. " Hyperlipidemia LDL goal <100  Comp Metabolic Panel    Lipid Profile    not on meds.  recheck lab   4. Macrocytosis without anemia  CBC WITH DIFFERENTIAL    last cbc showed some minor changes.  will reevaluate   5. Need for influenza vaccination  INFLUENZA VACCINE, HIGH DOSE (65+ ONLY)   6. Need for pneumococcal vaccination  Pneumococal Polysaccharide Vaccine 23-Valent =>1YO SQ/IM   7. Other depression  DULoxetine (CYMBALTA) 30 MG Cap DR Particles    this dx in just d/t having tto reinput med         Followup: Return in about 4 weeks (around 10/23/2019).

## 2019-09-26 ENCOUNTER — HOSPITAL ENCOUNTER (OUTPATIENT)
Dept: LAB | Facility: MEDICAL CENTER | Age: 64
End: 2019-09-26
Attending: NURSE PRACTITIONER
Payer: COMMERCIAL

## 2019-09-26 DIAGNOSIS — E78.5 HYPERLIPIDEMIA LDL GOAL <100: ICD-10-CM

## 2019-09-26 DIAGNOSIS — D75.89 MACROCYTOSIS WITHOUT ANEMIA: ICD-10-CM

## 2019-09-26 DIAGNOSIS — I10 ESSENTIAL HYPERTENSION: ICD-10-CM

## 2019-09-26 LAB
ALBUMIN SERPL BCP-MCNC: 4.2 G/DL (ref 3.2–4.9)
ALBUMIN/GLOB SERPL: 1.4 G/DL
ALP SERPL-CCNC: 76 U/L (ref 30–99)
ALT SERPL-CCNC: 17 U/L (ref 2–50)
ANION GAP SERPL CALC-SCNC: 8 MMOL/L (ref 0–11.9)
AST SERPL-CCNC: 24 U/L (ref 12–45)
BASOPHILS # BLD AUTO: 0.4 % (ref 0–1.8)
BASOPHILS # BLD: 0.03 K/UL (ref 0–0.12)
BILIRUB SERPL-MCNC: 0.5 MG/DL (ref 0.1–1.5)
BUN SERPL-MCNC: 12 MG/DL (ref 8–22)
CALCIUM SERPL-MCNC: 9.1 MG/DL (ref 8.5–10.5)
CHLORIDE SERPL-SCNC: 102 MMOL/L (ref 96–112)
CHOLEST SERPL-MCNC: 172 MG/DL (ref 100–199)
CO2 SERPL-SCNC: 28 MMOL/L (ref 20–33)
CREAT SERPL-MCNC: 0.65 MG/DL (ref 0.5–1.4)
CREAT UR-MCNC: 85.8 MG/DL
EOSINOPHIL # BLD AUTO: 0.08 K/UL (ref 0–0.51)
EOSINOPHIL NFR BLD: 0.9 % (ref 0–6.9)
ERYTHROCYTE [DISTWIDTH] IN BLOOD BY AUTOMATED COUNT: 57.2 FL (ref 35.9–50)
GLOBULIN SER CALC-MCNC: 2.9 G/DL (ref 1.9–3.5)
GLUCOSE SERPL-MCNC: 106 MG/DL (ref 65–99)
HCT VFR BLD AUTO: 46.4 % (ref 37–47)
HDLC SERPL-MCNC: 73 MG/DL
HGB BLD-MCNC: 15 G/DL (ref 12–16)
IMM GRANULOCYTES # BLD AUTO: 0.02 K/UL (ref 0–0.11)
IMM GRANULOCYTES NFR BLD AUTO: 0.2 % (ref 0–0.9)
LDLC SERPL CALC-MCNC: 84 MG/DL
LYMPHOCYTES # BLD AUTO: 0.63 K/UL (ref 1–4.8)
LYMPHOCYTES NFR BLD: 7.4 % (ref 22–41)
MCH RBC QN AUTO: 33 PG (ref 27–33)
MCHC RBC AUTO-ENTMCNC: 32.3 G/DL (ref 33.6–35)
MCV RBC AUTO: 102 FL (ref 81.4–97.8)
MICROALBUMIN UR-MCNC: <0.7 MG/DL
MICROALBUMIN/CREAT UR: NORMAL MG/G (ref 0–30)
MONOCYTES # BLD AUTO: 0.28 K/UL (ref 0–0.85)
MONOCYTES NFR BLD AUTO: 3.3 % (ref 0–13.4)
NEUTROPHILS # BLD AUTO: 7.48 K/UL (ref 2–7.15)
NEUTROPHILS NFR BLD: 87.8 % (ref 44–72)
NRBC # BLD AUTO: 0 K/UL
NRBC BLD-RTO: 0 /100 WBC
PLATELET # BLD AUTO: 265 K/UL (ref 164–446)
PMV BLD AUTO: 10.7 FL (ref 9–12.9)
POTASSIUM SERPL-SCNC: 4.5 MMOL/L (ref 3.6–5.5)
PROT SERPL-MCNC: 7.1 G/DL (ref 6–8.2)
RBC # BLD AUTO: 4.55 M/UL (ref 4.2–5.4)
SODIUM SERPL-SCNC: 138 MMOL/L (ref 135–145)
TRIGL SERPL-MCNC: 75 MG/DL (ref 0–149)
WBC # BLD AUTO: 8.5 K/UL (ref 4.8–10.8)

## 2019-09-26 PROCEDURE — 80053 COMPREHEN METABOLIC PANEL: CPT

## 2019-09-26 PROCEDURE — 80061 LIPID PANEL: CPT

## 2019-09-26 PROCEDURE — 85025 COMPLETE CBC W/AUTO DIFF WBC: CPT

## 2019-09-26 PROCEDURE — 82570 ASSAY OF URINE CREATININE: CPT

## 2019-09-26 PROCEDURE — 36415 COLL VENOUS BLD VENIPUNCTURE: CPT

## 2019-09-26 PROCEDURE — 82043 UR ALBUMIN QUANTITATIVE: CPT

## 2019-09-27 ENCOUNTER — APPOINTMENT (OUTPATIENT)
Dept: CARDIOLOGY | Facility: MEDICAL CENTER | Age: 64
End: 2019-09-27
Attending: NURSE PRACTITIONER
Payer: COMMERCIAL

## 2019-10-23 ENCOUNTER — OFFICE VISIT (OUTPATIENT)
Dept: MEDICAL GROUP | Facility: MEDICAL CENTER | Age: 64
End: 2019-10-23
Payer: COMMERCIAL

## 2019-10-23 VITALS
DIASTOLIC BLOOD PRESSURE: 90 MMHG | WEIGHT: 263 LBS | HEART RATE: 86 BPM | OXYGEN SATURATION: 96 % | HEIGHT: 63 IN | SYSTOLIC BLOOD PRESSURE: 140 MMHG | TEMPERATURE: 97.2 F | BODY MASS INDEX: 46.6 KG/M2

## 2019-10-23 DIAGNOSIS — L98.9 SKIN LESION: ICD-10-CM

## 2019-10-23 DIAGNOSIS — I10 ESSENTIAL HYPERTENSION: ICD-10-CM

## 2019-10-23 DIAGNOSIS — J06.9 VIRAL URI WITH COUGH: ICD-10-CM

## 2019-10-23 DIAGNOSIS — F32.89 OTHER DEPRESSION: ICD-10-CM

## 2019-10-23 PROCEDURE — 99214 OFFICE O/P EST MOD 30 MIN: CPT | Performed by: NURSE PRACTITIONER

## 2019-10-23 RX ORDER — AMLODIPINE BESYLATE 5 MG/1
5 TABLET ORAL DAILY
Qty: 30 TAB | Refills: 1 | Status: SHIPPED | OUTPATIENT
Start: 2019-10-23 | End: 2019-11-14 | Stop reason: SDUPTHER

## 2019-10-23 RX ORDER — DULOXETIN HYDROCHLORIDE 30 MG/1
30 CAPSULE, DELAYED RELEASE ORAL
Qty: 90 CAP | Refills: 1
Start: 2019-10-23 | End: 2019-11-04 | Stop reason: SDUPTHER

## 2019-10-23 RX ORDER — BENZONATATE 100 MG/1
100 CAPSULE ORAL 3 TIMES DAILY PRN
Qty: 30 CAP | Refills: 0 | Status: SHIPPED | OUTPATIENT
Start: 2019-10-23 | End: 2021-04-06 | Stop reason: SDUPTHER

## 2019-10-23 RX ORDER — CODEINE PHOSPHATE/GUAIFENESIN 10-100MG/5
5 LIQUID (ML) ORAL 3 TIMES DAILY PRN
Qty: 120 ML | Refills: 0 | Status: SHIPPED | OUTPATIENT
Start: 2019-10-23 | End: 2019-11-07

## 2019-10-23 NOTE — PROGRESS NOTES
Subjective:     Rain Sanabria is a 64 y.o. female who presents with HTN.    HPI:   Seen in f/u for HTN.  Her bp was elevated last month.  Her losartan was inc to 100 mg.  Today bp is still high.  Not checking at home.  Taking meds approp.  She has URI.  Sx started 2 wks ago.  Continues to having cough.  Was green secretions but now better. No fever, chills or sweating.  No SOB or wheezing.  + sinus congestion with yellow secretions that are getting liter. Would like something for cough.  She is going to be laid off from her job next week.  Will poss retire.  Can't afford the echo d/t copay.  Will monitor.  She has another growth on her head.  Saw derm last year for one.  Needs to see them again.    Patient Active Problem List    Diagnosis Date Noted   • Dental disorder    • Alcohol use 01/26/2018   • Morbid obesity with BMI of 45.0-49.9, adult (HCC) 02/22/2017   • Chronic right shoulder pain 01/06/2016   • Hyperlipidemia    • Depression with anxiety    • Vitamin D deficiency    • Abdominal hernia    • Sleep apnea 07/12/2010   • OA (osteoarthritis) 07/12/2010   • HTN (hypertension) 01/20/2010   • Genital herpes simplex 06/19/2009   • Insomnia 06/19/2009   • Migraine 06/19/2009   • Abnormal mammogram 06/19/2009   • Preventative health care 06/19/2009   • Cystocele 06/19/2009       Current medicines (including changes today)  Current Outpatient Medications   Medication Sig Dispense Refill   • DULoxetine (CYMBALTA) 30 MG Cap DR Particles Take 1 Cap by mouth every day. 90 Cap 1   • amLODIPine (NORVASC) 5 MG Tab Take 1 Tab by mouth every day. 30 Tab 1   • benzonatate (TESSALON) 100 MG Cap Take 1 Cap by mouth 3 times a day as needed. 30 Cap 0   • guaifenesin-codeine (TUSSI-ORGANIDIN NR) 100-10 MG/5ML syrup Take 5 mL by mouth 3 times a day as needed for up to 15 days. 120 mL 0   • losartan (COZAAR) 100 MG Tab TAKE 1 TABLET BY MOUTH EVERY DAY 90 Tab 1   • [START ON 11/26/2019] HYDROcodone-acetaminophen (NORCO) 7.5-325  MG per tablet Take 1 Tab by mouth every 6 hours as needed for up to 30 days. 60 Tab 0   • fluticasone (FLONASE) 50 MCG/ACT nasal spray SPRAY 2 SPRAYS IN NOSE EVERY DAY. 3 Bottle 1   • albuterol (VENTOLIN HFA) 108 (90 Base) MCG/ACT Aero Soln inhalation aerosol INHALE 2 PUFFS BY MOUTH EVERY FOUR HOURS AS NEEDED FOR SHORTNESS OF BREATH. DISPENSE A SPACER 18 Inhaler 5   • lidocaine (LIDODERM) 5 % Patch Apply 1 Patch to skin as directed every 24 hours. 30 Patch 3   • sodium chloride (OCEAN NASAL SPRAY) 0.65 % Solution Spray 2 Sprays in nose every 2 hours as needed for Congestion. 1 Bottle 0   • acyclovir (ZOVIRAX) 400 MG tablet Take 1 Tab by mouth 5 Times a Day. 15 Tab 3   • methocarbamol (ROBAXIN) 750 MG Tab Take 2 Tabs by mouth 4 times a day as needed (muscle spasm). 20 Tab 0   • DULoxetine (CYMBALTA) 60 MG Cap DR Particles delayed-release capsule Take 1 Cap by mouth every day. 90 Cap 3   • diclofenac EC (VOLTAREN) 75 MG Tablet Delayed Response Take 75 mg by mouth 2 times a day.     • ibuprofen (MOTRIN) 600 MG Tab Take 1 Tab by mouth every 6 hours as needed. 20 Tab 0   • ascorbic acid (ASCORBIC ACID) 500 MG Tab Take 3,000 mg by mouth every day.     • vitamin E 100 UNIT capsule Take 300 Units by mouth every day.     • Multiple Vitamins-Minerals (ENERGY BOOSTER PO) Take 80 mg by mouth. With caffiene     • glucosamine Sulfate 500 MG Cap Take 1,500 mg by mouth every day.     • Cholecalciferol (VITAMIN D) 400 UNIT Tab Take 1,600 Units by mouth every day.     • B Complex Vitamins (VITAMIN B COMPLEX PO) Take 20 mg by mouth every day.     • Multiple Vitamin (MULTIVITAMIN PO) Take 1 Tab by mouth every day.       No current facility-administered medications for this visit.        Allergies   Allergen Reactions   • Ace Inhibitors      Ace cough   • Tape Rash and Itching     Adhesive tape.  PAPER TAPE OK.       ROS  Constitutional: Negative. Negative for fever, chills, weight loss, malaise/fatigue and diaphoresis.   HENT:  "Negative. Negative for hearing loss, ear pain, nosebleeds, sore throat, neck pain, tinnitus and ear discharge.   Respiratory: Negative. Negative for  hemoptysis, shortness of breath, wheezing and stridor.   Cardiovascular: Negative. Negative for chest pain, palpitations, orthopnea, claudication, leg swelling and PND.   Gastrointestinal: Denies nausea, vomiting, diarrhea, constipation, heartburn, melena or hematochezia.  Genitourinary: Denies dysuria, hematuria, urinary incontinence, frequency or urgency.        Objective:     /90 (BP Location: Right arm, Patient Position: Sitting)   Pulse 86   Temp 36.2 °C (97.2 °F) (Temporal)   Ht 1.6 m (5' 3\")   Wt 119.3 kg (263 lb)   SpO2 96%  Body mass index is 46.59 kg/m².    Physical Exam:  Vitals reviewed.  Constitutional: Oriented to person, place, and time. appears well-developed and well-nourished. No distress.   Cardiovascular: Normal rate, regular rhythm, normal heart sounds and intact distal pulses. Exam reveals no gallop and no friction rub. No murmur heard. No carotid bruits.   Pulmonary/Chest: Effort normal and breath sounds normal. No stridor. No respiratory distress. no wheezes or rales. exhibits no tenderness.   Musculoskeletal: Normal range of motion. exhibits no edema. davin pedal pulses 2+.  Lymphadenopathy: No cervical or supraclavicular adenopathy.   Neurological: Alert and oriented to person, place, and time. exhibits normal muscle tone.  Skin: Skin is warm and dry. No diaphoresis.   Psychiatric: Normal mood and affect. Behavior is normal.      Assessment and Plan:     The following treatment plan was discussed:    1. Essential hypertension  amLODIPine (NORVASC) 5 MG Tab    bp not controlled despite inc losartan.  add norvasc 5 mg daily.  f/u 1 mo for bp check.  enc inc exercise and low na diet.   2. Skin lesion  REFERRAL TO DERMATOLOGY    refer back to derm   3. Viral URI with cough  benzonatate (TESSALON) 100 MG Cap    guaifenesin-codeine " (TUSSI-KRYSTYNA NR) 100-10 MG/5ML syrup    tessalon and osiris w/cod for URI that is improving but still coughing.    4. BMI 45.0-49.9, adult (HCC)  Patient identified as having weight management issue.  Appropriate orders and counseling given.   5. Other depression  DULoxetine (CYMBALTA) 30 MG Cap DR Particles    this dx in just d/t having tto reinput med         Followup: Return in about 4 weeks (around 11/20/2019).

## 2019-11-04 DIAGNOSIS — F32.89 OTHER DEPRESSION: ICD-10-CM

## 2019-11-04 RX ORDER — DULOXETIN HYDROCHLORIDE 30 MG/1
30 CAPSULE, DELAYED RELEASE ORAL
Qty: 90 CAP | Refills: 3 | Status: SHIPPED | OUTPATIENT
Start: 2019-11-04 | End: 2020-01-03 | Stop reason: SDUPTHER

## 2019-11-14 DIAGNOSIS — I10 ESSENTIAL HYPERTENSION: ICD-10-CM

## 2019-11-14 RX ORDER — AMLODIPINE BESYLATE 5 MG/1
TABLET ORAL
Qty: 90 TAB | Refills: 1 | Status: SHIPPED | OUTPATIENT
Start: 2019-11-14 | End: 2020-01-03 | Stop reason: SDUPTHER

## 2020-01-02 ENCOUNTER — HOSPITAL ENCOUNTER (EMERGENCY)
Facility: MEDICAL CENTER | Age: 65
End: 2020-01-03
Attending: EMERGENCY MEDICINE

## 2020-01-02 DIAGNOSIS — G47.00 INSOMNIA, UNSPECIFIED TYPE: ICD-10-CM

## 2020-01-02 DIAGNOSIS — F32.89 OTHER DEPRESSION: ICD-10-CM

## 2020-01-02 DIAGNOSIS — I10 ESSENTIAL HYPERTENSION: ICD-10-CM

## 2020-01-02 DIAGNOSIS — B00.1 COLD SORE: ICD-10-CM

## 2020-01-02 DIAGNOSIS — R07.89 CHEST DISCOMFORT: ICD-10-CM

## 2020-01-02 DIAGNOSIS — F41.8 DEPRESSION WITH ANXIETY: ICD-10-CM

## 2020-01-02 DIAGNOSIS — M25.511 CHRONIC PAIN OF BOTH SHOULDERS: ICD-10-CM

## 2020-01-02 DIAGNOSIS — G89.29 CHRONIC PAIN OF BOTH SHOULDERS: ICD-10-CM

## 2020-01-02 DIAGNOSIS — M25.512 CHRONIC PAIN OF BOTH SHOULDERS: ICD-10-CM

## 2020-01-02 PROCEDURE — 99284 EMERGENCY DEPT VISIT MOD MDM: CPT

## 2020-01-03 ENCOUNTER — APPOINTMENT (OUTPATIENT)
Dept: RADIOLOGY | Facility: MEDICAL CENTER | Age: 65
End: 2020-01-03
Attending: EMERGENCY MEDICINE

## 2020-01-03 VITALS
HEIGHT: 66 IN | WEIGHT: 261.47 LBS | DIASTOLIC BLOOD PRESSURE: 73 MMHG | RESPIRATION RATE: 16 BRPM | BODY MASS INDEX: 42.02 KG/M2 | HEART RATE: 79 BPM | OXYGEN SATURATION: 96 % | TEMPERATURE: 97.5 F | SYSTOLIC BLOOD PRESSURE: 160 MMHG

## 2020-01-03 LAB
ALBUMIN SERPL BCP-MCNC: 4.1 G/DL (ref 3.2–4.9)
ALBUMIN/GLOB SERPL: 1.2 G/DL
ALP SERPL-CCNC: 67 U/L (ref 30–99)
ALT SERPL-CCNC: 15 U/L (ref 2–50)
ANION GAP SERPL CALC-SCNC: 9 MMOL/L (ref 0–11.9)
AST SERPL-CCNC: 19 U/L (ref 12–45)
BASOPHILS # BLD AUTO: 0.3 % (ref 0–1.8)
BASOPHILS # BLD: 0.02 K/UL (ref 0–0.12)
BILIRUB SERPL-MCNC: 0.2 MG/DL (ref 0.1–1.5)
BUN SERPL-MCNC: 12 MG/DL (ref 8–22)
CALCIUM SERPL-MCNC: 9.3 MG/DL (ref 8.5–10.5)
CHLORIDE SERPL-SCNC: 107 MMOL/L (ref 96–112)
CO2 SERPL-SCNC: 24 MMOL/L (ref 20–33)
CREAT SERPL-MCNC: 0.74 MG/DL (ref 0.5–1.4)
EKG IMPRESSION: NORMAL
EOSINOPHIL # BLD AUTO: 0.1 K/UL (ref 0–0.51)
EOSINOPHIL NFR BLD: 1.5 % (ref 0–6.9)
ERYTHROCYTE [DISTWIDTH] IN BLOOD BY AUTOMATED COUNT: 54.2 FL (ref 35.9–50)
GLOBULIN SER CALC-MCNC: 3.4 G/DL (ref 1.9–3.5)
GLUCOSE SERPL-MCNC: 93 MG/DL (ref 65–99)
HCT VFR BLD AUTO: 42.5 % (ref 37–47)
HGB BLD-MCNC: 14.4 G/DL (ref 12–16)
IMM GRANULOCYTES # BLD AUTO: 0.02 K/UL (ref 0–0.11)
IMM GRANULOCYTES NFR BLD AUTO: 0.3 % (ref 0–0.9)
LYMPHOCYTES # BLD AUTO: 1.94 K/UL (ref 1–4.8)
LYMPHOCYTES NFR BLD: 29.1 % (ref 22–41)
MCH RBC QN AUTO: 33.3 PG (ref 27–33)
MCHC RBC AUTO-ENTMCNC: 33.9 G/DL (ref 33.6–35)
MCV RBC AUTO: 98.2 FL (ref 81.4–97.8)
MONOCYTES # BLD AUTO: 0.52 K/UL (ref 0–0.85)
MONOCYTES NFR BLD AUTO: 7.8 % (ref 0–13.4)
NEUTROPHILS # BLD AUTO: 4.07 K/UL (ref 2–7.15)
NEUTROPHILS NFR BLD: 61 % (ref 44–72)
NRBC # BLD AUTO: 0 K/UL
NRBC BLD-RTO: 0 /100 WBC
PLATELET # BLD AUTO: 269 K/UL (ref 164–446)
PMV BLD AUTO: 9.8 FL (ref 9–12.9)
POTASSIUM SERPL-SCNC: 4.2 MMOL/L (ref 3.6–5.5)
PROT SERPL-MCNC: 7.5 G/DL (ref 6–8.2)
RBC # BLD AUTO: 4.33 M/UL (ref 4.2–5.4)
SODIUM SERPL-SCNC: 140 MMOL/L (ref 135–145)
TROPONIN T SERPL-MCNC: <6 NG/L (ref 6–19)
WBC # BLD AUTO: 6.7 K/UL (ref 4.8–10.8)

## 2020-01-03 PROCEDURE — 36415 COLL VENOUS BLD VENIPUNCTURE: CPT

## 2020-01-03 PROCEDURE — 700102 HCHG RX REV CODE 250 W/ 637 OVERRIDE(OP): Performed by: EMERGENCY MEDICINE

## 2020-01-03 PROCEDURE — 93005 ELECTROCARDIOGRAM TRACING: CPT | Performed by: EMERGENCY MEDICINE

## 2020-01-03 PROCEDURE — 80053 COMPREHEN METABOLIC PANEL: CPT

## 2020-01-03 PROCEDURE — A9270 NON-COVERED ITEM OR SERVICE: HCPCS | Performed by: EMERGENCY MEDICINE

## 2020-01-03 PROCEDURE — 71045 X-RAY EXAM CHEST 1 VIEW: CPT

## 2020-01-03 PROCEDURE — 84484 ASSAY OF TROPONIN QUANT: CPT

## 2020-01-03 PROCEDURE — 85025 COMPLETE CBC W/AUTO DIFF WBC: CPT

## 2020-01-03 RX ORDER — DULOXETIN HYDROCHLORIDE 30 MG/1
30 CAPSULE, DELAYED RELEASE ORAL ONCE
Status: COMPLETED | OUTPATIENT
Start: 2020-01-03 | End: 2020-01-03

## 2020-01-03 RX ORDER — AMLODIPINE BESYLATE 5 MG/1
5 TABLET ORAL
Qty: 30 TAB | Refills: 0 | Status: SHIPPED | OUTPATIENT
Start: 2020-01-03 | End: 2020-02-05 | Stop reason: SDUPTHER

## 2020-01-03 RX ORDER — LOSARTAN POTASSIUM 100 MG/1
100 TABLET ORAL
Qty: 30 TAB | Refills: 0 | Status: SHIPPED | OUTPATIENT
Start: 2020-01-03 | End: 2020-02-01 | Stop reason: SDUPTHER

## 2020-01-03 RX ORDER — LOSARTAN POTASSIUM 50 MG/1
100 TABLET ORAL ONCE
Status: COMPLETED | OUTPATIENT
Start: 2020-01-03 | End: 2020-01-03

## 2020-01-03 RX ORDER — DULOXETIN HYDROCHLORIDE 30 MG/1
30 CAPSULE, DELAYED RELEASE ORAL
Qty: 30 CAP | Refills: 0 | Status: SHIPPED | OUTPATIENT
Start: 2020-01-03 | End: 2020-02-05 | Stop reason: SDUPTHER

## 2020-01-03 RX ORDER — DULOXETIN HYDROCHLORIDE 60 MG/1
60 CAPSULE, DELAYED RELEASE ORAL DAILY
Qty: 30 CAP | Refills: 0 | Status: SHIPPED | OUTPATIENT
Start: 2020-01-03 | End: 2020-02-01 | Stop reason: SDUPTHER

## 2020-01-03 RX ORDER — AMLODIPINE BESYLATE 5 MG/1
5 TABLET ORAL ONCE
Status: COMPLETED | OUTPATIENT
Start: 2020-01-03 | End: 2020-01-03

## 2020-01-03 RX ADMIN — AMLODIPINE BESYLATE 5 MG: 5 TABLET ORAL at 00:38

## 2020-01-03 RX ADMIN — LOSARTAN POTASSIUM 100 MG: 50 TABLET, FILM COATED ORAL at 00:38

## 2020-01-03 RX ADMIN — DULOXETINE HYDROCHLORIDE 30 MG: 30 CAPSULE, DELAYED RELEASE ORAL at 00:38

## 2020-01-03 NOTE — ED TRIAGE NOTES
"Rain Sanabria  64 y.o. female  Chief Complaint   Patient presents with   • Insomnia     Pt unable to sleep more than 1 hour at a time for the past 2 days     Pt ambulated to triage with steady gait for above complaint.      Pt reports that she has been out of her home medications due to a change in her insurance. Pt takes numerous medications including hydrocodone and lidocaine patch for arthritis/chronic pain and hypertensive medications.     Pt back to lobby. Educated to inform staff of any concerns or changes.     Blood Pressure : (!) 195/128, Pulse: 94, Respiration: 16, Temperature: 36.4 °C (97.5 °F), Height: 167.6 cm (5' 6\"), Weight: 118.6 kg (261 lb 7.5 oz), BMI (Calculated): 42.2, BSA (Calculated): 2.3, Pulse Oximetry: 94 %, O2 (LPM): 0, O2 Delivery: None (Room Air)    "

## 2020-01-03 NOTE — ED PROVIDER NOTES
ED Provider Note    Chief Complaint:   Insomnia, chest pain    HPI:  Rain Sanabria is a 64 y.o. female who presents with chief complaint of insomnia, though she is also concerned for some chest pain and arm pain that occurred earlier today.  She is a past medical history significant for depression, insomnia, arthritis, and hypertension.  At the end of October, she lost her insurance.  For this reason, she was unable to follow-up with her primary care physician as scheduled, and has run out of her prescription medications.  This includes blood pressure medications, Cymbalta for depression, as well as her chronic hydrocodone.  She has been out of these medications for about a week.  Over the past 2 to 3 days, she has had significant difficulty sleeping, stating that she is only been able to sleep for about an hour at a time.  Additionally, today she reports some pains localized to the chest and arms, she is uncertain if this is her usual osteoarthritis pain.  These pains have resolved on arrival to the emergency department.  She denies any headaches, denies any vision changes.  She currently has no chest pain, no shortness of breath.  She is unable to identify any exacerbating or alleviating factors.  She does have some pains in the hands and legs, though she states these are her usual osteoarthritis pains.  She has been using Voltaren gel with some alleviation, though this does not seem to work as well as her home hydrocodone.    Review of Systems:  See HPI for pertinent positives and negatives. All other systems negative.    Past Medical History:   has a past medical history of Abdominal hernia, Abnormal mammogram (6/19/2009), Alcohol use (01/26/2018), Chronic right shoulder pain, Cystocele (6/19/2009), Dental disorder, Depression with anxiety (01/26/2018), Genital herpes (6/19/2009), Hyperlipidemia, Hypertension (01/26/2018), Insomnia (6/19/2009), Migraine (06/19/2009), Morbid obesity with BMI of 45.0-49.9,  adult (HCC) (2/22/2017), OA (osteoarthritis), Sleep apnea, and Vitamin D deficiency.    Social History:  Social History     Tobacco Use   • Smoking status: Never Smoker   • Smokeless tobacco: Never Used   Substance and Sexual Activity   • Alcohol use: Yes     Alcohol/week: 4.2 oz     Types: 7 Standard drinks or equivalent per week     Comment: couple times per week   • Drug use: No   • Sexual activity: Not on file       Surgical History:   has a past surgical history that includes tubal ligation (Bilateral, 1985); gastric bypass laparoscopic (02/2003); hernia repair (2004); toe arthroplasty (Bilateral, 1990'S); rhinoplasty (07/2004); breast biopsy (Left, 1996); hysteroscopy thermal ablation (early 2000's); rotator cuff repair (Right, 11/24/2009); colonoscopy (2015); primary c section (1984); and knee arthroplasty total (Left, 2/12/2018).    Current Medications:  Home Medications     Reviewed by Jomar Valderrama R.N. (Registered Nurse) on 01/02/20 at [id number]  Med List Status: Partial   Medication Last Dose Status   acyclovir (ZOVIRAX) 400 MG tablet  Active   albuterol (VENTOLIN HFA) 108 (90 Base) MCG/ACT Aero Soln inhalation aerosol  Active   amLODIPine (NORVASC) 5 MG Tab  Active   ascorbic acid (ASCORBIC ACID) 500 MG Tab  Active   B Complex Vitamins (VITAMIN B COMPLEX PO)  Active   benzonatate (TESSALON) 100 MG Cap  Active   Cholecalciferol (VITAMIN D) 400 UNIT Tab  Active   diclofenac EC (VOLTAREN) 75 MG Tablet Delayed Response  Active   DULoxetine (CYMBALTA) 30 MG Cap DR Particles  Active   DULoxetine (CYMBALTA) 60 MG Cap DR Particles delayed-release capsule  Active   fluticasone (FLONASE) 50 MCG/ACT nasal spray  Active   glucosamine Sulfate 500 MG Cap  Active   ibuprofen (MOTRIN) 600 MG Tab  Active   lidocaine (LIDODERM) 5 % Patch  Active   losartan (COZAAR) 100 MG Tab  Active   methocarbamol (ROBAXIN) 750 MG Tab  Active   Multiple Vitamin (MULTIVITAMIN PO)  Active   Multiple Vitamins-Minerals (ENERGY BOOSTER PO)   "Active   sodium chloride (OCEAN NASAL SPRAY) 0.65 % Solution  Active   vitamin E 100 UNIT capsule  Active                Allergies:  Allergies   Allergen Reactions   • Ace Inhibitors      Ace cough   • Tape Rash and Itching     Adhesive tape.  PAPER TAPE OK.       Physical Exam:  Vital Signs: /87   Pulse 80   Temp 36.4 °C (97.5 °F) (Temporal)   Resp 16   Ht 1.676 m (5' 6\")   Wt 118.6 kg (261 lb 7.5 oz)   SpO2 100%   BMI 42.20 kg/m²   Constitutional: Alert, calm, cooperative  HENT: Moist mucus membranes, normal posterior pharynx, no intraoral lesions  Eyes: Pupils equal and reactive, normal conjunctiva  Neck: Supple, normal range of motion, no stridor  Cardiovascular: Extremities are warm and well perfused, no murmur appreciated, normal cardiac auscultation, patient reports new cardiac murmur identified at her recent primary care visit, I do not appreciate a definite murmur, she may have a subtle systolic ejection murmur though due to ambient noise I am unable to tell for certain.  Pulmonary: No respiratory distress, normal work of breathing, no accessory muscule usage, breath sounds clear and equal bilaterally, no coarse breath sounds, no wheezing  Abdomen: Soft, non-distended, non-tender to palpation, no peritoneal signs, exam somewhat limited by body habitus  Skin: Warm, dry, no rashes or lesions  Musculoskeletal: Normal range of motion in all extremities, no swelling or deformity noted, trace symmetric lower extremity edema.  Neurologic: Alert, oriented, normal speech, normal motor function  Psychiatric: Normal and appropriate mood and affect    Medical records reviewed for continuity of care.  Progress note reviewed from primary care clinic visit 10/23/2019.  Patient is noted to have a past medical history of hypertension, currently taking losartan.  At that time, had URI symptoms.  Additionally noted to have a history of depression for which she is taking Cymbalta.  [facility] monitoring database " reviewed.  Patient is on chronic hydrocodone 7.5, her most recent fill was for 60 tablets on 11/27/2019.    EKG: Rate 80, normal sinus rhythm, no ST elevation or depression, no T wave inversions, no ectopy.    Labs:  Labs Reviewed   CBC WITH DIFFERENTIAL - Abnormal; Notable for the following components:       Result Value    MCV 98.2 (*)     MCH 33.3 (*)     RDW 54.2 (*)     All other components within normal limits   COMP METABOLIC PANEL   TROPONIN   ESTIMATED GFR       Radiology:  DX-CHEST-PORTABLE (1 VIEW)   Final Result      No acute consolidation is identified      Stable cardiac silhouette enlargement      Right hilar enlargement could be from lymphadenopathy or vascular structures. CT could clarify           ED Medications Administered:  Medications   amLODIPine (NORVASC) tablet 5 mg (5 mg Oral Given 1/3/20 0038)   DULoxetine (CYMBALTA) capsule 30 mg (30 mg Oral Given 1/3/20 0038)   losartan (COZAAR) tablet 100 mg (100 mg Oral Given 1/3/20 0038)       Differential diagnosis:  Acute coronary syndrome, medication noncompliance, opiate withdrawal, chronic pain    MDM:  Patient presents with chief complaint of insomnia as well as a self-limiting episode of chest pain earlier today.  On arrival to the emergency department she has no active shoulder pain, no thoracic back pain.  She does have a history of hypertension, though she has no other symptoms concerning for aortic injury.    Her insomnia may be due to being off all medications, including Cymbalta.  Additionally she may have some mild symptoms of opiate withdrawal, though she is having no severe symptoms of opiate withdrawal today.  She understands we are not able to fill her chronic hydrocodone from the emergency department.    On laboratory evaluation high-sensitivity troponin is negative.  EKG is reassuring.  CBC and CMP are unremarkable.  Suspect that her chest and shoulder discomfort are likely related to her osteoarthritis and chronic pain.  At this  time, doubt acute cardiac process. HEART score is 2 indicating low risk of major adverse cardiac event.     At this time, I do not believe she requires any further emergent diagnostics nor treatment.  I did give her her home medications in the emergency department, excepting hydrocodone.  Additionally I provided 1 month refills of all of her home medications, excepting her narcotic pain medications.  I did provide follow-up information for 1 of our community clinics if she is unable to follow-up with her primary care physician while awaiting Medicare insurance.  She is instructed to contact them in the morning to schedule a follow-up appointment.  Good sleep hygiene discussed regarding the patient's insomnia.  Return precautions were discussed with the patient, and provided in written form with the patient's discharge instructions.     Blood pressure today is greater than 120/80, patient is instructed to follow up with primary care provider for blood pressure recheck.    Disposition:  Discharge home in stable condition    Final Impression:  1. Insomnia, unspecified type    2. Chest discomfort    3. Chronic pain of both shoulders    4. Cold sore    5. Essential hypertension    6. Other depression    7. Depression with anxiety    8. Essential hypertension        Electronically signed by: Jihan Schroeder MD, 1/3/2020 1:35 AM

## 2020-01-03 NOTE — ED NOTES
Reviewed DC instructions with pt. Provided prescriptions X4. Pt verbalized understanding. [facility] VSS on room air. Pt A/Ox4 leaving unit.

## 2020-01-03 NOTE — DISCHARGE INSTRUCTIONS
Please keep your scheduled follow-up appointment with your primary care physician.  Return to the emergency department if you develop any new or worsening symptoms, this includes recurrent chest pain, shortness of breath, nausea or vomiting, sweating, fevers, or if you have any further concerns.  Please take all of your prescribed medications.  You may contact the [facility] clinic listed above for a follow-up appointment if you are not able to follow-up with your primary care physician due to insurance reasons.

## 2020-01-23 DIAGNOSIS — D75.89 MACROCYTOSIS WITHOUT ANEMIA: ICD-10-CM

## 2020-01-23 DIAGNOSIS — J01.00 ACUTE NON-RECURRENT MAXILLARY SINUSITIS: ICD-10-CM

## 2020-01-23 DIAGNOSIS — E55.9 VITAMIN D DEFICIENCY: ICD-10-CM

## 2020-01-23 RX ORDER — FLUTICASONE PROPIONATE 50 MCG
2 SPRAY, SUSPENSION (ML) NASAL DAILY
Qty: 1 BOTTLE | Refills: 0 | Status: SHIPPED | OUTPATIENT
Start: 2020-01-23 | End: 2020-02-01 | Stop reason: SDUPTHER

## 2020-01-31 DIAGNOSIS — I10 ESSENTIAL HYPERTENSION: ICD-10-CM

## 2020-01-31 DIAGNOSIS — R06.02 SHORTNESS OF BREATH: ICD-10-CM

## 2020-01-31 DIAGNOSIS — F41.8 DEPRESSION WITH ANXIETY: ICD-10-CM

## 2020-01-31 DIAGNOSIS — J01.00 ACUTE NON-RECURRENT MAXILLARY SINUSITIS: ICD-10-CM

## 2020-02-01 RX ORDER — ALBUTEROL SULFATE 90 UG/1
AEROSOL, METERED RESPIRATORY (INHALATION)
Qty: 18 INHALER | Refills: 0 | Status: SHIPPED | OUTPATIENT
Start: 2020-02-01 | End: 2020-08-17 | Stop reason: SDUPTHER

## 2020-02-01 RX ORDER — FLUTICASONE PROPIONATE 50 MCG
2 SPRAY, SUSPENSION (ML) NASAL DAILY
Qty: 1 BOTTLE | Refills: 0 | Status: SHIPPED | OUTPATIENT
Start: 2020-02-01

## 2020-02-01 RX ORDER — DULOXETIN HYDROCHLORIDE 60 MG/1
60 CAPSULE, DELAYED RELEASE ORAL DAILY
Qty: 30 CAP | Refills: 0 | Status: SHIPPED | OUTPATIENT
Start: 2020-02-01 | End: 2020-02-05 | Stop reason: SDUPTHER

## 2020-02-01 RX ORDER — LOSARTAN POTASSIUM 100 MG/1
100 TABLET ORAL
Qty: 30 TAB | Refills: 0 | Status: SHIPPED | OUTPATIENT
Start: 2020-02-01 | End: 2020-02-05 | Stop reason: SDUPTHER

## 2020-02-04 LAB
25(OH)D3+25(OH)D2 SERPL-MCNC: 33.3 NG/ML (ref 30–100)
VIT B12 SERPL-MCNC: 233 PG/ML (ref 232–1245)

## 2020-02-05 ENCOUNTER — OFFICE VISIT (OUTPATIENT)
Dept: MEDICAL GROUP | Facility: MEDICAL CENTER | Age: 65
End: 2020-02-05
Payer: COMMERCIAL

## 2020-02-05 VITALS
HEIGHT: 63 IN | SYSTOLIC BLOOD PRESSURE: 160 MMHG | BODY MASS INDEX: 47.48 KG/M2 | HEART RATE: 88 BPM | TEMPERATURE: 96.9 F | OXYGEN SATURATION: 100 % | DIASTOLIC BLOOD PRESSURE: 88 MMHG | WEIGHT: 268 LBS

## 2020-02-05 DIAGNOSIS — I10 ESSENTIAL HYPERTENSION: ICD-10-CM

## 2020-02-05 DIAGNOSIS — M25.561 BILATERAL CHRONIC KNEE PAIN: ICD-10-CM

## 2020-02-05 DIAGNOSIS — N95.9 POST MENOPAUSAL PROBLEMS: ICD-10-CM

## 2020-02-05 DIAGNOSIS — F32.89 OTHER DEPRESSION: ICD-10-CM

## 2020-02-05 DIAGNOSIS — G89.29 CHRONIC BILATERAL LOW BACK PAIN WITH RIGHT-SIDED SCIATICA: ICD-10-CM

## 2020-02-05 DIAGNOSIS — Z12.31 ENCOUNTER FOR SCREENING MAMMOGRAM FOR MALIGNANT NEOPLASM OF BREAST: ICD-10-CM

## 2020-02-05 DIAGNOSIS — M67.40 GANGLION CYST: ICD-10-CM

## 2020-02-05 DIAGNOSIS — M54.41 CHRONIC BILATERAL LOW BACK PAIN WITH RIGHT-SIDED SCIATICA: ICD-10-CM

## 2020-02-05 DIAGNOSIS — G89.29 BILATERAL CHRONIC KNEE PAIN: ICD-10-CM

## 2020-02-05 DIAGNOSIS — F41.8 DEPRESSION WITH ANXIETY: ICD-10-CM

## 2020-02-05 DIAGNOSIS — M25.562 BILATERAL CHRONIC KNEE PAIN: ICD-10-CM

## 2020-02-05 PROCEDURE — 99214 OFFICE O/P EST MOD 30 MIN: CPT | Performed by: NURSE PRACTITIONER

## 2020-02-05 RX ORDER — LOSARTAN POTASSIUM 100 MG/1
100 TABLET ORAL
Qty: 30 TAB | Refills: 0 | Status: SHIPPED | OUTPATIENT
Start: 2020-02-05 | End: 2020-03-03

## 2020-02-05 RX ORDER — DULOXETIN HYDROCHLORIDE 30 MG/1
30 CAPSULE, DELAYED RELEASE ORAL
Qty: 30 CAP | Refills: 0 | Status: SHIPPED | OUTPATIENT
Start: 2020-02-05 | End: 2020-03-03

## 2020-02-05 RX ORDER — HYDROCODONE BITARTRATE AND ACETAMINOPHEN 7.5; 325 MG/1; MG/1
1 TABLET ORAL EVERY 6 HOURS PRN
Qty: 60 TAB | Refills: 0 | Status: SHIPPED | OUTPATIENT
Start: 2020-02-05 | End: 2020-02-05 | Stop reason: SDUPTHER

## 2020-02-05 RX ORDER — HYDROCODONE BITARTRATE AND ACETAMINOPHEN 7.5; 325 MG/1; MG/1
1 TABLET ORAL EVERY 6 HOURS PRN
Qty: 60 TAB | Refills: 0 | Status: SHIPPED | OUTPATIENT
Start: 2020-04-05 | End: 2020-05-07 | Stop reason: SDUPTHER

## 2020-02-05 RX ORDER — DULOXETIN HYDROCHLORIDE 60 MG/1
60 CAPSULE, DELAYED RELEASE ORAL DAILY
Qty: 30 CAP | Refills: 0 | Status: SHIPPED | OUTPATIENT
Start: 2020-02-05 | End: 2020-03-03

## 2020-02-05 RX ORDER — AMLODIPINE BESYLATE 5 MG/1
5 TABLET ORAL
Qty: 30 TAB | Refills: 0 | Status: SHIPPED | OUTPATIENT
Start: 2020-02-05 | End: 2020-03-03

## 2020-02-05 RX ORDER — LIDOCAINE 50 MG/G
1 PATCH TOPICAL EVERY 24 HOURS
Qty: 30 PATCH | Refills: 3 | Status: SHIPPED | OUTPATIENT
Start: 2020-02-05 | End: 2021-11-17 | Stop reason: SDUPTHER

## 2020-02-05 RX ORDER — HYDROCODONE BITARTRATE AND ACETAMINOPHEN 7.5; 325 MG/1; MG/1
1 TABLET ORAL EVERY 6 HOURS PRN
Qty: 60 TAB | Refills: 0 | Status: SHIPPED | OUTPATIENT
Start: 2020-03-05 | End: 2020-02-05 | Stop reason: SDUPTHER

## 2020-02-05 ASSESSMENT — PATIENT HEALTH QUESTIONNAIRE - PHQ9
SUM OF ALL RESPONSES TO PHQ QUESTIONS 1-9: 8
5. POOR APPETITE OR OVEREATING: 0 - NOT AT ALL
CLINICAL INTERPRETATION OF PHQ2 SCORE: 2

## 2020-02-05 NOTE — NON-PROVIDER
"Subjective:     C/C: Well visit and medication refill  HPI:  Reports a non tender \"ugly\" nontender bump on her  R hand above third digit x few months that irritates her.    Reports a second bump on her  posterior head x 1 year. She states the bump located on her head and she had it removed a few years ago. The non tender annoying bump has reappeared within the last year.    HTN-B/P not at goal- resumed losartan 2 wks ago. She reports she has not worked for the last 6 weeks and has not had the money for her prescriptions. She reported she is doing better and is expected to start a new job soon.  Depression reports taking her Cymbalta. She happily reports she will move to her own apartment in a week.  Reports increased stress she has been laid off work been off RX x 6 wks. She denies any thoughts of SI/HI. Her depression has not stopped her from doing daily activities.  Chronic bilat knee pain takes hydrocodone and lidocaine patches both help decrease her pain.  and UDS up to date.  Post menopausal Mammo screening due.  Reviewed her labs from 2/3/2020: Abnormal Vitamin D (level 33.3) and B12 (level 233).     Patient Active Problem List   Diagnosis   • Genital herpes simplex   • Insomnia   • Migraine   • Abnormal mammogram   • Preventative health care   • Cystocele   • HTN (hypertension)   • Sleep apnea   • OA (osteoarthritis)   • Vitamin D deficiency   • Abdominal hernia   • Hyperlipidemia   • Depression with anxiety   • Chronic right shoulder pain   • Morbid obesity with BMI of 45.0-49.9, adult (HCC)   • Dental disorder   • Alcohol use     Current Outpatient Medications   Medication Sig Dispense Refill   • losartan (COZAAR) 100 MG Tab Take 1 Tab by mouth every day. 30 Tab 0   • DULoxetine (CYMBALTA) 60 MG Cap DR Particles delayed-release capsule Take 1 Cap by mouth every day. 30 Cap 0   • DULoxetine (CYMBALTA) 30 MG Cap DR Particles Take 1 Cap by mouth every day. 30 Cap 0   • amLODIPine (NORVASC) 5 MG Tab Take 1 " Tab by mouth every day. 30 Tab 0   • lidocaine (LIDODERM) 5 % Patch Apply 1 Patch to skin as directed every 24 hours. 30 Patch 3   • [START ON 4/5/2020] HYDROcodone-acetaminophen (NORCO) 7.5-325 MG per tablet Take 1 Tab by mouth every 6 hours as needed for up to 30 days. 60 Tab 0   • albuterol (VENTOLIN HFA) 108 (90 Base) MCG/ACT Aero Soln inhalation aerosol INHALE 2 PUFFS BY MOUTH EVERY FOUR HOURS AS NEEDED FOR SHORTNESS OF BREATH. DISPENSE A SPACER 18 Inhaler 0   • fluticasone (FLONASE) 50 MCG/ACT nasal spray Spray 2 Sprays in nose every day. 1 Bottle 0   • benzonatate (TESSALON) 100 MG Cap Take 1 Cap by mouth 3 times a day as needed. 30 Cap 0   • sodium chloride (OCEAN NASAL SPRAY) 0.65 % Solution Spray 2 Sprays in nose every 2 hours as needed for Congestion. 1 Bottle 0   • acyclovir (ZOVIRAX) 400 MG tablet Take 1 Tab by mouth 5 Times a Day. 15 Tab 3   • methocarbamol (ROBAXIN) 750 MG Tab Take 2 Tabs by mouth 4 times a day as needed (muscle spasm). 20 Tab 0   • diclofenac EC (VOLTAREN) 75 MG Tablet Delayed Response Take 75 mg by mouth 2 times a day.     • ibuprofen (MOTRIN) 600 MG Tab Take 1 Tab by mouth every 6 hours as needed. 20 Tab 0   • ascorbic acid (ASCORBIC ACID) 500 MG Tab Take 3,000 mg by mouth every day.     • vitamin E 100 UNIT capsule Take 300 Units by mouth every day.     • Multiple Vitamins-Minerals (ENERGY BOOSTER PO) Take 80 mg by mouth. With caffiene     • glucosamine Sulfate 500 MG Cap Take 1,500 mg by mouth every day.     • Cholecalciferol (VITAMIN D) 400 UNIT Tab Take 1,600 Units by mouth every day.     • B Complex Vitamins (VITAMIN B COMPLEX PO) Take 20 mg by mouth every day.     • Multiple Vitamin (MULTIVITAMIN PO) Take 1 Tab by mouth every day.       No current facility-administered medications for this visit.    Ace inhibitors and Tape    Review of Systems   Constitutional: Denies chills, diaphoresis, fever, and malaise/fatigue and weight loss.   HENT: Denies congestion, ear discharge,  ear pain, hearing loss, nosebleeds, sinus pain, sore throat and tinnitus.    Eyes: Denies blurred vision, double vision, photophobia, pain, discharge and redness.   Respiratory: Denies cough, hemoptysis, sputum production, shortness of breath, wheezing and stridor.    Cardiovascular: Denies chest pain, palpitations, orthopnea, claudication, leg swelling and PND.   Gastrointestinal: Denies abdominal pain, blood in stool, constipation, diarrhea, heartburn, melena, nausea and vomiting.   Genitourinary: Denies dysuria, flank pain, frequency, hematuria and urgency.   Musculoskeletal: c/o back and bilat knee pain. No redness/tenderness or swelling noted. Negative for falls, joint pain, myalgia and neck pain.   Skin: C/o large bump on R hand and R head. Denies any itching or rashes.   Neurological: Denies dizziness, tingling, tremors, sensory change, speech change, and focal weakness, and seizures, loss of consciousness, weakness and headaches.   Endo/Heme/Allergies: Denies environmental allergies and polydipsia. Does not bruise/bleed easily.   Psychiatric/Behavioral: Denies depression, hallucinations, memory loss, substance abuse and suicidal ideas. The patient has insomnia. The patient is not nervous/anxious.      Physical Exam   Constitutional: She is oriented to person, place, and time and well-developed, well-nourished, and in no distress. No distress.   HENT:   Head: Normocephalic and atraumatic. Ganglion Cyst located R anterior head non-tender without erythema.   Right Ear: External ear normal.   Left Ear: External ear normal.   Nose: Nose normal.   Mouth/Throat: Oropharynx is clear and moist. No oropharyngeal exudate.   Eyes: Pupils are equal, round, and reactive to light. Conjunctivae and EOM are normal. Right eye exhibits no discharge. Left eye exhibits no discharge. No scleral icterus.   Neck: Normal range of motion. Neck supple. No JVD present. No tracheal deviation present. No thyromegaly present.    Cardiovascular: Normal rate, regular rhythm, normal heart sounds and intact distal pulses. Exam reveals no gallop and no friction rub. Murmur heard.  Pulmonary/Chest: Effort normal and breath sounds normal. No stridor. No respiratory distress. She has no wheezes. She has no rales. She exhibits no tenderness.   Abdominal: Soft. Bowel sounds are normal. She exhibits no distension and no mass. There is no tenderness. There is no rebound and no guarding.   Musculoskeletal: Normal range of motion.      General: No tenderness, edema, R hand with ganglion cyst, non-tender without erythema  Lymphadenopathy: She has no cervical adenopathy.   Neurological: She is alert and oriented to person, place, and time. She has normal reflexes. She displays normal reflexes. No cranial nerve deficit. She exhibits normal muscle tone. Gait normal. Coordination normal. GCS score is 15.   Skin: Skin is warm and dry. No rash noted. She is not diaphoretic. No erythema. No pallor.   Psychiatric: Mood, memory, affect and judgment normal.    1. Essential hypertension  losartan (COZAAR) 100 MG Tab    amLODIPine (NORVASC) 5 MG Tab    bp not at goal.  f/u 1 mo for lab review and bp ck   2. Depression with anxiety  DULoxetine (CYMBALTA) 60 MG Cap DR Particles delayed-release capsule    stable on cymbalta 30 mg am and 60 mg pm.  refilled meds   3. Other depression  DULoxetine (CYMBALTA) 30 MG Cap DR Particles    this dx in just d/t having tto reinput med   4. Essential hypertension  losartan (COZAAR) 100 MG Tab    amLODIPine (NORVASC) 5 MG Tab    bp not controlled f/u 1 mo for bp check.  enc inc exercise and low na diet.   5. Bilateral chronic knee pain  lidocaine (LIDODERM) 5 % Patch   6. Chronic bilateral low back pain with right-sided sciatica  HYDROcodone-acetaminophen (NORCO) 7.5-325 MG per tablet    DISCONTINUED: HYDROcodone-acetaminophen (NORCO) 7.5-325 MG per tablet    DISCONTINUED: HYDROcodone-acetaminophen (NORCO) 7.5-325 MG per tablet     refill pain meds  gave 3 month supply.  f/u 3 months for med refil  UDS AND CONTRACT are up to date   7. Encounter for screening mammogram for malignant neoplasm of breast  MA-SCREEN MAMMO W/CAD-BILAT   8. Post menopausal problems  DS-BONE DENSITY STUDY (DEXA)   9. Ganglion cyst  REFERRAL TO DERMATOLOGY       PLAN:  HTN-B/P not at goal- resume losartan. Start both B/P meds and return in 1 mo to recheck your blood pressure.   Depression continue cymbalta. Stable on current regimen. Assess affect in 1 mo   Chronic bilat knee pain takes hydrocodone. Refilled hydrocodone and lidocaine patches. Pain controlled with current regimen.   Bilat knee pain reordered her lidocaine patch and hydrocodone. UDS UTD. Checked  report.   Ganglion cyst r hand posterior 3rd digit and posterior head. Referred to dermatology for possible removal.   Post menopausal Mammo screening and Dexa Scan ordered. Return in 1 month to review results  Vitamin D &B12 deficiency. Take OTC vitamin D and B12 daily. Recheck her Vitamin D and B12 in 6 months

## 2020-02-20 ENCOUNTER — OFFICE VISIT (OUTPATIENT)
Dept: MEDICAL GROUP | Facility: MEDICAL CENTER | Age: 65
End: 2020-02-20
Payer: COMMERCIAL

## 2020-02-20 ENCOUNTER — HOSPITAL ENCOUNTER (OUTPATIENT)
Facility: MEDICAL CENTER | Age: 65
End: 2020-02-20
Attending: NURSE PRACTITIONER
Payer: COMMERCIAL

## 2020-02-20 VITALS
WEIGHT: 261 LBS | TEMPERATURE: 96.9 F | HEIGHT: 63 IN | OXYGEN SATURATION: 97 % | DIASTOLIC BLOOD PRESSURE: 80 MMHG | BODY MASS INDEX: 46.25 KG/M2 | HEART RATE: 99 BPM | SYSTOLIC BLOOD PRESSURE: 122 MMHG

## 2020-02-20 DIAGNOSIS — N89.8 VAGINAL DISCHARGE: ICD-10-CM

## 2020-02-20 DIAGNOSIS — N89.8 VAGINAL ITCHING: ICD-10-CM

## 2020-02-20 DIAGNOSIS — I10 ESSENTIAL HYPERTENSION: ICD-10-CM

## 2020-02-20 DIAGNOSIS — R01.1 CARDIAC MURMUR: ICD-10-CM

## 2020-02-20 PROCEDURE — 87480 CANDIDA DNA DIR PROBE: CPT

## 2020-02-20 PROCEDURE — 87510 GARDNER VAG DNA DIR PROBE: CPT

## 2020-02-20 PROCEDURE — 99214 OFFICE O/P EST MOD 30 MIN: CPT | Performed by: NURSE PRACTITIONER

## 2020-02-20 PROCEDURE — 87660 TRICHOMONAS VAGIN DIR PROBE: CPT

## 2020-02-20 NOTE — PROGRESS NOTES
Subjective:     Rain Sanabria is a 65 y.o. female who presents with  vaginal itching and discharge.    HPI:   Seen in f/u for vaginal itching and discharge.  Sx started 1 wk ago.  She has been moving and sx are getting worse.  No ppain in abd or back.  Not sexually active.  No fever, chills or sweating.    Her bp was very elevated at last appt 2 wks ago.  Stable on meds.  Taking meds approp.  Bp is controlled today.    She has hx of a murmur.  We ordered an echo in the past but she could not afford.  She has new ins.  We will retry to see if she can $$.  No chest pain or SOB.      Patient Active Problem List    Diagnosis Date Noted   • Dental disorder    • Alcohol use 01/26/2018   • Morbid obesity with BMI of 45.0-49.9, adult (HCC) 02/22/2017   • Chronic right shoulder pain 01/06/2016   • Hyperlipidemia    • Depression with anxiety    • Vitamin D deficiency    • Abdominal hernia    • Sleep apnea 07/12/2010   • OA (osteoarthritis) 07/12/2010   • HTN (hypertension) 01/20/2010   • Genital herpes simplex 06/19/2009   • Insomnia 06/19/2009   • Migraine 06/19/2009   • Abnormal mammogram 06/19/2009   • Preventative health care 06/19/2009   • Cystocele 06/19/2009       Current medicines (including changes today)  Current Outpatient Medications   Medication Sig Dispense Refill   • losartan (COZAAR) 100 MG Tab Take 1 Tab by mouth every day. 30 Tab 0   • DULoxetine (CYMBALTA) 60 MG Cap DR Particles delayed-release capsule Take 1 Cap by mouth every day. 30 Cap 0   • DULoxetine (CYMBALTA) 30 MG Cap DR Particles Take 1 Cap by mouth every day. 30 Cap 0   • amLODIPine (NORVASC) 5 MG Tab Take 1 Tab by mouth every day. 30 Tab 0   • lidocaine (LIDODERM) 5 % Patch Apply 1 Patch to skin as directed every 24 hours. 30 Patch 3   • [START ON 4/5/2020] HYDROcodone-acetaminophen (NORCO) 7.5-325 MG per tablet Take 1 Tab by mouth every 6 hours as needed for up to 30 days. 60 Tab 0   • albuterol (VENTOLIN HFA) 108 (90 Base) MCG/ACT Aero  Soln inhalation aerosol INHALE 2 PUFFS BY MOUTH EVERY FOUR HOURS AS NEEDED FOR SHORTNESS OF BREATH. DISPENSE A SPACER 18 Inhaler 0   • fluticasone (FLONASE) 50 MCG/ACT nasal spray Spray 2 Sprays in nose every day. 1 Bottle 0   • benzonatate (TESSALON) 100 MG Cap Take 1 Cap by mouth 3 times a day as needed. 30 Cap 0   • sodium chloride (OCEAN NASAL SPRAY) 0.65 % Solution Spray 2 Sprays in nose every 2 hours as needed for Congestion. 1 Bottle 0   • acyclovir (ZOVIRAX) 400 MG tablet Take 1 Tab by mouth 5 Times a Day. 15 Tab 3   • methocarbamol (ROBAXIN) 750 MG Tab Take 2 Tabs by mouth 4 times a day as needed (muscle spasm). 20 Tab 0   • diclofenac EC (VOLTAREN) 75 MG Tablet Delayed Response Take 75 mg by mouth 2 times a day.     • ibuprofen (MOTRIN) 600 MG Tab Take 1 Tab by mouth every 6 hours as needed. 20 Tab 0   • ascorbic acid (ASCORBIC ACID) 500 MG Tab Take 3,000 mg by mouth every day.     • vitamin E 100 UNIT capsule Take 300 Units by mouth every day.     • Multiple Vitamins-Minerals (ENERGY BOOSTER PO) Take 80 mg by mouth. With caffiene     • glucosamine Sulfate 500 MG Cap Take 1,500 mg by mouth every day.     • Cholecalciferol (VITAMIN D) 400 UNIT Tab Take 1,600 Units by mouth every day.     • B Complex Vitamins (VITAMIN B COMPLEX PO) Take 20 mg by mouth every day.     • Multiple Vitamin (MULTIVITAMIN PO) Take 1 Tab by mouth every day.       No current facility-administered medications for this visit.        Allergies   Allergen Reactions   • Ace Inhibitors      Ace cough   • Tape Rash and Itching     Adhesive tape.  PAPER TAPE OK.       ROS  Constitutional: Negative. Negative for fever, chills, weight loss, malaise/fatigue and diaphoresis.   HENT: Negative. Negative for hearing loss, ear pain, nosebleeds, congestion, sore throat, neck pain, tinnitus and ear discharge.   Respiratory: Negative. Negative for cough, hemoptysis, sputum production, shortness of breath, wheezing and stridor.   Cardiovascular:  "Negative. Negative for chest pain, palpitations, orthopnea, claudication, leg swelling and PND.   Gastrointestinal: Denies nausea, vomiting, diarrhea, constipation, heartburn, melena or hematochezia.  Genitourinary: Denies dysuria, hematuria, urinary incontinence, frequency or urgency.        Objective:     /80 (BP Location: Right arm, Patient Position: Sitting)   Pulse 99   Temp 36.1 °C (96.9 °F) (Temporal)   Ht 1.6 m (5' 3\")   Wt 118.4 kg (261 lb)   SpO2 97%  Body mass index is 46.23 kg/m².    Physical Exam:  Vitals reviewed.  Constitutional: Oriented to person, place, and time. appears well-developed and well-nourished. No distress.   Cardiovascular: Normal rate, regular rhythm, normal heart sounds and intact distal pulses. Exam reveals no gallop and no friction rub. 2/6 murmur heard. No carotid bruits.   Pulmonary/Chest: Effort normal and breath sounds normal. No stridor. No respiratory distress. no wheezes or rales. exhibits no tenderness.   Musculoskeletal: Normal range of motion.   Lymphadenopathy: No cervical or supraclavicular adenopathy.   Neurological: Alert and oriented to person, place, and time. exhibits normal muscle tone.  Skin: Skin is warm and dry. No diaphoresis.   Psychiatric: Normal mood and affect. Behavior is normal.      Assessment and Plan:     The following treatment plan was discussed:    1. Vaginal discharge  VAGINAL PATHOGENS DNA PANEL    affirm done today.  f/u with pt via email with results and approp tx.   2. Vaginal itching  VAGINAL PATHOGENS DNA PANEL   3. Cardiac murmur  EC-ECHOCARDIOGRAM COMPLETE W/O CONT    reordered echo.  now has new ins.  will see if $$   4. Essential hypertension      was elevated last wk.  now stable and controlled on meds.  f/u 6 mo.  call for lab slip         Followup: Return in about 6 months (around 8/20/2020).  "

## 2020-02-21 LAB
CANDIDA DNA VAG QL PROBE+SIG AMP: NEGATIVE
G VAGINALIS DNA VAG QL PROBE+SIG AMP: NEGATIVE
T VAGINALIS DNA VAG QL PROBE+SIG AMP: POSITIVE

## 2020-02-23 ENCOUNTER — TELEPHONE (OUTPATIENT)
Dept: MEDICAL GROUP | Facility: MEDICAL CENTER | Age: 65
End: 2020-02-23

## 2020-02-23 RX ORDER — METRONIDAZOLE 500 MG/1
2000 TABLET ORAL ONCE
Qty: 4 TAB | Refills: 0 | Status: SHIPPED | OUTPATIENT
Start: 2020-02-23 | End: 2020-02-23

## 2020-02-23 NOTE — ED NOTES
Daughter will be here to pick her up in 10 mins, tech taking pt to lobby in wheelchair.   INPATIENT NEPHROLOGY CONSULT   [facility] NEPHROLOGY    Patient Name: Estevan Tim  Robb Tripathi MD;  Location: [facility];  Angely Rodriguez MD MPH  [facility] [place] [id number] [phone] (p)  [phone] (f)

## 2020-02-23 NOTE — TELEPHONE ENCOUNTER
Please let pt know that the affirm cx shows trichomonas. That is a sexually transmitted infection so she needs to notify her sexual contacts to also get treatment.  She needs to take 4 tabs once for a total of 2000 mg

## 2020-04-09 ENCOUNTER — TELEPHONE (OUTPATIENT)
Dept: MEDICAL GROUP | Facility: MEDICAL CENTER | Age: 65
End: 2020-04-09

## 2020-04-09 NOTE — LETTER
April 9, 2020        Rain Sanabria  [address] [facility] [address]        You insurance indicates you will need to complete the attached image orders at [facility].            If have any questions please feel free to call us at [phone].      Esme VARELA.

## 2020-04-09 NOTE — TELEPHONE ENCOUNTER
i received a message from billing.  Pt cannot do her tests at [facility] d/t insurance. Please fax orders to [facility].  Let pt know to call there to schedule.  She has echo, mammo and dexa

## 2020-05-07 ENCOUNTER — OFFICE VISIT (OUTPATIENT)
Dept: MEDICAL GROUP | Facility: MEDICAL CENTER | Age: 65
End: 2020-05-07
Payer: MEDICARE

## 2020-05-07 VITALS
OXYGEN SATURATION: 95 % | DIASTOLIC BLOOD PRESSURE: 84 MMHG | WEIGHT: 262 LBS | TEMPERATURE: 98.1 F | SYSTOLIC BLOOD PRESSURE: 130 MMHG | BODY MASS INDEX: 46.42 KG/M2 | HEIGHT: 63 IN | HEART RATE: 89 BPM

## 2020-05-07 DIAGNOSIS — M54.41 CHRONIC BILATERAL LOW BACK PAIN WITH RIGHT-SIDED SCIATICA: ICD-10-CM

## 2020-05-07 DIAGNOSIS — G89.29 CHRONIC BILATERAL LOW BACK PAIN WITH RIGHT-SIDED SCIATICA: ICD-10-CM

## 2020-05-07 PROCEDURE — 99213 OFFICE O/P EST LOW 20 MIN: CPT | Performed by: NURSE PRACTITIONER

## 2020-05-07 RX ORDER — HYDROCODONE BITARTRATE AND ACETAMINOPHEN 7.5; 325 MG/1; MG/1
1 TABLET ORAL EVERY 6 HOURS PRN
Qty: 60 TAB | Refills: 0 | Status: SHIPPED | OUTPATIENT
Start: 2020-06-14 | End: 2020-05-07 | Stop reason: SDUPTHER

## 2020-05-07 RX ORDER — HYDROCODONE BITARTRATE AND ACETAMINOPHEN 7.5; 325 MG/1; MG/1
1 TABLET ORAL EVERY 6 HOURS PRN
Qty: 60 TAB | Refills: 0 | Status: SHIPPED | OUTPATIENT
Start: 2020-07-14 | End: 2020-08-17 | Stop reason: SDUPTHER

## 2020-05-07 RX ORDER — HYDROCODONE BITARTRATE AND ACETAMINOPHEN 7.5; 325 MG/1; MG/1
1 TABLET ORAL EVERY 6 HOURS PRN
Qty: 60 TAB | Refills: 0 | Status: SHIPPED | OUTPATIENT
Start: 2020-05-14 | End: 2020-05-07 | Stop reason: SDUPTHER

## 2020-05-07 ASSESSMENT — FIBROSIS 4 INDEX: FIB4 SCORE: 1.2

## 2020-05-07 NOTE — PROGRESS NOTES
Subjective:     Rain Sanabria is a 65 y.o. female who presents with chronic back and joint pain.    HPI:   Seen in f/u for chronic back and joint pain.  She is here for pain med refill.  She is taking meds approp.  She is having some headache in aftervoon.  Does have some allergies.  Taking pain meds approp.  UDS and contract are up to date.      Patient Active Problem List    Diagnosis Date Noted   • Dental disorder    • Alcohol use 01/26/2018   • Morbid obesity with BMI of 45.0-49.9, adult (HCC) 02/22/2017   • Chronic right shoulder pain 01/06/2016   • Hyperlipidemia    • Depression with anxiety    • Vitamin D deficiency    • Abdominal hernia    • Sleep apnea 07/12/2010   • OA (osteoarthritis) 07/12/2010   • HTN (hypertension) 01/20/2010   • Genital herpes simplex 06/19/2009   • Insomnia 06/19/2009   • Migraine 06/19/2009   • Abnormal mammogram 06/19/2009   • Preventative health care 06/19/2009   • Cystocele 06/19/2009       Current medicines (including changes today)  Current Outpatient Medications   Medication Sig Dispense Refill   • [START ON 7/14/2020] HYDROcodone-acetaminophen (NORCO) 7.5-325 MG per tablet Take 1 Tab by mouth every 6 hours as needed for up to 30 days. 60 Tab 0   • amLODIPine (NORVASC) 5 MG Tab TAKE 1 TABLET EVERY DAY 90 Tab 0   • losartan (COZAAR) 100 MG Tab TAKE 1 TABLET EVERY DAY 90 Tab 0   • DULoxetine (CYMBALTA) 60 MG Cap DR Particles delayed-release capsule TAKE 1 CAPSULE EVERY DAY IN THE EVENING 90 Cap 0   • DULoxetine (CYMBALTA) 30 MG Cap DR Particles TAKE 1 CAPSULE EVERY DAY 90 Cap 0   • lidocaine (LIDODERM) 5 % Patch Apply 1 Patch to skin as directed every 24 hours. 30 Patch 3   • albuterol (VENTOLIN HFA) 108 (90 Base) MCG/ACT Aero Soln inhalation aerosol INHALE 2 PUFFS BY MOUTH EVERY FOUR HOURS AS NEEDED FOR SHORTNESS OF BREATH. DISPENSE A SPACER 18 Inhaler 0   • fluticasone (FLONASE) 50 MCG/ACT nasal spray Spray 2 Sprays in nose every day. 1 Bottle 0   • benzonatate  (TESSALON) 100 MG Cap Take 1 Cap by mouth 3 times a day as needed. 30 Cap 0   • sodium chloride (OCEAN NASAL SPRAY) 0.65 % Solution Spray 2 Sprays in nose every 2 hours as needed for Congestion. 1 Bottle 0   • acyclovir (ZOVIRAX) 400 MG tablet Take 1 Tab by mouth 5 Times a Day. 15 Tab 3   • methocarbamol (ROBAXIN) 750 MG Tab Take 2 Tabs by mouth 4 times a day as needed (muscle spasm). 20 Tab 0   • diclofenac EC (VOLTAREN) 75 MG Tablet Delayed Response Take 75 mg by mouth 2 times a day.     • ibuprofen (MOTRIN) 600 MG Tab Take 1 Tab by mouth every 6 hours as needed. 20 Tab 0   • ascorbic acid (ASCORBIC ACID) 500 MG Tab Take 3,000 mg by mouth every day.     • vitamin E 100 UNIT capsule Take 300 Units by mouth every day.     • Multiple Vitamins-Minerals (ENERGY BOOSTER PO) Take 80 mg by mouth. With caffiene     • glucosamine Sulfate 500 MG Cap Take 1,500 mg by mouth every day.     • Cholecalciferol (VITAMIN D) 400 UNIT Tab Take 1,600 Units by mouth every day.     • B Complex Vitamins (VITAMIN B COMPLEX PO) Take 20 mg by mouth every day.     • Multiple Vitamin (MULTIVITAMIN PO) Take 1 Tab by mouth every day.       No current facility-administered medications for this visit.        Allergies   Allergen Reactions   • Ace Inhibitors      Ace cough   • Tape Rash and Itching     Adhesive tape.  PAPER TAPE OK.       ROS  Constitutional: Negative. Negative for fever, chills, weight loss, malaise/fatigue and diaphoresis.   HENT: Negative. Negative for hearing loss, ear pain, nosebleeds, congestion, sore throat, neck pain, tinnitus and ear discharge.   Respiratory: Negative. Negative for cough, hemoptysis, sputum production, shortness of breath, wheezing and stridor.   Cardiovascular: Negative. Negative for chest pain, palpitations, orthopnea, claudication, leg swelling and PND.   Gastrointestinal: Denies nausea, vomiting, diarrhea, constipation, heartburn, melena or hematochezia.  Genitourinary: Denies dysuria, hematuria,  "urinary incontinence, frequency or urgency.        Objective:     /84 (BP Location: Right arm, Patient Position: Sitting)   Pulse 89   Temp 36.7 °C (98.1 °F)   Ht 1.6 m (5' 3\")   Wt 118.8 kg (262 lb)   SpO2 95%  Body mass index is 46.41 kg/m².    Physical Exam:  Vitals reviewed.  Constitutional: Oriented to person, place, and time. appears well-developed and well-nourished. No distress.   Cardiovascular: Normal rate, regular rhythm, normal heart sounds and intact distal pulses. Exam reveals no gallop and no friction rub. No murmur heard. No carotid bruits.   Pulmonary/Chest: Effort normal and breath sounds normal. No stridor. No respiratory distress. no wheezes or rales. exhibits no tenderness.   Musculoskeletal: Normal range of motion. exhibits no edema. davin pedal pulses 2+.  Lymphadenopathy: No cervical or supraclavicular adenopathy.   Neurological: Alert and oriented to person, place, and time. exhibits normal muscle tone.  Skin: Skin is warm and dry. No diaphoresis.   Psychiatric: Normal mood and affect. Behavior is normal.      Assessment and Plan:     The following treatment plan was discussed:    1. Chronic bilateral low back pain with right-sided sciatica  HYDROcodone-acetaminophen (NORCO) 7.5-325 MG per tablet    DISCONTINUED: HYDROcodone-acetaminophen (NORCO) 7.5-325 MG per tablet    DISCONTINUED: HYDROcodone-acetaminophen (NORCO) 7.5-325 MG per tablet    refill pain meds  gave 3 month supply.  f/u 3 months for med refil  UDS AND CONTRACT are up to date         Followup: Return in about 3 months (around 8/7/2020).  "

## 2020-06-03 DIAGNOSIS — I10 ESSENTIAL HYPERTENSION: ICD-10-CM

## 2020-06-04 RX ORDER — LOSARTAN POTASSIUM 100 MG/1
TABLET ORAL
Qty: 90 TAB | Refills: 0 | Status: SHIPPED | OUTPATIENT
Start: 2020-06-04 | End: 2020-09-03

## 2020-06-04 NOTE — TELEPHONE ENCOUNTER
Received request via: Pharmacy    Was the patient seen in the last year in this department? Yes 5/7/2020    Does the patient have an active prescription (recently filled or refills available) for medication(s) requested? No

## 2020-08-17 ENCOUNTER — OFFICE VISIT (OUTPATIENT)
Dept: MEDICAL GROUP | Facility: MEDICAL CENTER | Age: 65
End: 2020-08-17
Payer: MEDICARE

## 2020-08-17 VITALS
HEART RATE: 97 BPM | DIASTOLIC BLOOD PRESSURE: 92 MMHG | WEIGHT: 269 LBS | TEMPERATURE: 97.3 F | BODY MASS INDEX: 47.66 KG/M2 | HEIGHT: 63 IN | SYSTOLIC BLOOD PRESSURE: 164 MMHG | OXYGEN SATURATION: 95 %

## 2020-08-17 DIAGNOSIS — Z12.31 ENCOUNTER FOR SCREENING MAMMOGRAM FOR MALIGNANT NEOPLASM OF BREAST: ICD-10-CM

## 2020-08-17 DIAGNOSIS — M54.41 CHRONIC BILATERAL LOW BACK PAIN WITH RIGHT-SIDED SCIATICA: ICD-10-CM

## 2020-08-17 DIAGNOSIS — N95.9 POST MENOPAUSAL PROBLEMS: ICD-10-CM

## 2020-08-17 DIAGNOSIS — J45.20 MILD INTERMITTENT ASTHMA WITHOUT COMPLICATION: ICD-10-CM

## 2020-08-17 DIAGNOSIS — G89.29 CHRONIC BILATERAL LOW BACK PAIN WITH RIGHT-SIDED SCIATICA: ICD-10-CM

## 2020-08-17 PROCEDURE — 99214 OFFICE O/P EST MOD 30 MIN: CPT | Performed by: NURSE PRACTITIONER

## 2020-08-17 RX ORDER — ALBUTEROL SULFATE 90 UG/1
AEROSOL, METERED RESPIRATORY (INHALATION)
Qty: 1 EACH | Refills: 1 | Status: SHIPPED | OUTPATIENT
Start: 2020-08-17 | End: 2022-10-21

## 2020-08-17 RX ORDER — HYDROCODONE BITARTRATE AND ACETAMINOPHEN 7.5; 325 MG/1; MG/1
1 TABLET ORAL EVERY 6 HOURS PRN
Qty: 60 TAB | Refills: 0 | Status: SHIPPED | OUTPATIENT
Start: 2020-08-17 | End: 2020-09-02 | Stop reason: SDUPTHER

## 2020-08-17 ASSESSMENT — FIBROSIS 4 INDEX: FIB4 SCORE: 1.2

## 2020-08-17 NOTE — PROGRESS NOTES
Subjective:     Rain Sanabria is a 65 y.o. female who presents with anxiety.    HPI:   Seen in f/u for anxiety.  She is having some days with more anxiety.  She is working from home and doesn't really care for that.  She is on 90 mg cymbalta.  She is also on that for depression.  Depression is controlled.    She is due refill on her pain meds.  Uses for chronic back and joint pain.  She has used some topical CBD oil for her hands.  Will stop using that today.    She needs refill on inhaler.  She uses occas for allergies and asthma.       Patient Active Problem List    Diagnosis Date Noted   • Dental disorder    • Alcohol use 01/26/2018   • Morbid obesity with BMI of 45.0-49.9, adult (HCC) 02/22/2017   • Chronic right shoulder pain 01/06/2016   • Hyperlipidemia    • Depression with anxiety    • Vitamin D deficiency    • Abdominal hernia    • Sleep apnea 07/12/2010   • OA (osteoarthritis) 07/12/2010   • HTN (hypertension) 01/20/2010   • Genital herpes simplex 06/19/2009   • Insomnia 06/19/2009   • Migraine 06/19/2009   • Abnormal mammogram 06/19/2009   • Preventative health care 06/19/2009   • Cystocele 06/19/2009       Current medicines (including changes today)  Current Outpatient Medications   Medication Sig Dispense Refill   • albuterol (VENTOLIN HFA) 108 (90 Base) MCG/ACT Aero Soln inhalation aerosol INHALE 2 PUFFS BY MOUTH EVERY FOUR HOURS AS NEEDED FOR SHORTNESS OF BREATH. DISPENSE A SPACER 1 Each 1   • HYDROcodone-acetaminophen (NORCO) 7.5-325 MG per tablet Take 1 Tab by mouth every 6 hours as needed for up to 30 days. 60 Tab 0   • DULoxetine (CYMBALTA) 60 MG Cap DR Particles delayed-release capsule TAKE 1 CAPSULE EVERY EVENING 90 Cap 0   • amLODIPine (NORVASC) 5 MG Tab TAKE 1 TABLET EVERY DAY 90 Tab 0   • DULoxetine (CYMBALTA) 30 MG Cap DR Particles TAKE 1 CAPSULE EVERY DAY 90 Cap 0   • losartan (COZAAR) 100 MG Tab TAKE 1 TABLET EVERY DAY 90 Tab 0   • lidocaine (LIDODERM) 5 % Patch Apply 1 Patch to  skin as directed every 24 hours. 30 Patch 3   • fluticasone (FLONASE) 50 MCG/ACT nasal spray Spray 2 Sprays in nose every day. 1 Bottle 0   • benzonatate (TESSALON) 100 MG Cap Take 1 Cap by mouth 3 times a day as needed. 30 Cap 0   • sodium chloride (OCEAN NASAL SPRAY) 0.65 % Solution Spray 2 Sprays in nose every 2 hours as needed for Congestion. 1 Bottle 0   • methocarbamol (ROBAXIN) 750 MG Tab Take 2 Tabs by mouth 4 times a day as needed (muscle spasm). 20 Tab 0   • diclofenac EC (VOLTAREN) 75 MG Tablet Delayed Response Take 75 mg by mouth 2 times a day.     • ibuprofen (MOTRIN) 600 MG Tab Take 1 Tab by mouth every 6 hours as needed. 20 Tab 0   • ascorbic acid (ASCORBIC ACID) 500 MG Tab Take 3,000 mg by mouth every day.     • vitamin E 100 UNIT capsule Take 300 Units by mouth every day.     • Multiple Vitamins-Minerals (ENERGY BOOSTER PO) Take 80 mg by mouth. With caffiene     • glucosamine Sulfate 500 MG Cap Take 1,500 mg by mouth every day.     • Cholecalciferol (VITAMIN D) 400 UNIT Tab Take 1,600 Units by mouth every day.     • B Complex Vitamins (VITAMIN B COMPLEX PO) Take 20 mg by mouth every day.     • Multiple Vitamin (MULTIVITAMIN PO) Take 1 Tab by mouth every day.       No current facility-administered medications for this visit.        Allergies   Allergen Reactions   • Ace Inhibitors      Ace cough   • Tape Rash and Itching     Adhesive tape.  PAPER TAPE OK.       ROS  Constitutional: Negative. Negative for fever, chills, weight loss, malaise/fatigue and diaphoresis.   HENT: Negative. Negative for hearing loss, ear pain, nosebleeds, congestion, sore throat, neck pain, tinnitus and ear discharge.   Respiratory: Negative. Negative for cough, hemoptysis, sputum production, shortness of breath, wheezing and stridor.   Cardiovascular: Negative. Negative for chest pain, palpitations, orthopnea, claudication, leg swelling and PND.   Gastrointestinal: Denies nausea, vomiting, diarrhea, constipation, heartburn,  "melena or hematochezia.  Genitourinary: Denies dysuria, hematuria, urinary incontinence, frequency or urgency.        Objective:     BP (!) 164/92   Pulse 97   Temp 36.3 °C (97.3 °F) (Temporal)   Ht 1.6 m (5' 3\")   Wt 122 kg (269 lb)   SpO2 95%  Body mass index is 47.65 kg/m².    Physical Exam:  Vitals reviewed.  Constitutional: Oriented to person, place, and time. appears well-developed and well-nourished. No distress.   Cardiovascular: Normal rate, regular rhythm, normal heart sounds and intact distal pulses. Exam reveals no gallop and no friction rub. No murmur heard. No carotid bruits.   Pulmonary/Chest: Effort normal and breath sounds normal. No stridor. No respiratory distress. no wheezes or rales. exhibits no tenderness.   Musculoskeletal: Normal range of motion. exhibits 1+ davin pedal edema. davin pedal pulses 2+.  Lymphadenopathy: No cervical or supraclavicular adenopathy.   Neurological: Alert and oriented to person, place, and time. exhibits normal muscle tone.  Skin: Skin is warm and dry. No diaphoresis.   Psychiatric: Normal mood and affect. Behavior is normal.      Assessment and Plan:     The following treatment plan was discussed:    1. Chronic bilateral low back pain with right-sided sciatica  Pain Management Screen    Controlled Substance Treatment Agreement    HYDROcodone-acetaminophen (NORCO) 7.5-325 MG per tablet    refill pain meds  gave 1 month supply since has used CBD oil on hands.  f/u 1 month for repeat UDS.  updated UDS and contract today.    2. Post menopausal problems  DS-BONE DENSITY STUDY (DEXA)    dexa scan   3. Mild intermittent asthma without complication  albuterol (VENTOLIN HFA) 108 (90 Base) MCG/ACT Aero Soln inhalation aerosol    refilled inhaler.  pt uses only occas.     4. Encounter for screening mammogram for malignant neoplasm of breast  MA-SCREENING MAMMO BILAT W/CAD   5.  Only gave 1 mo supply of pain med.  If  UDS clean will give her the other 2 mon refills w/o " appt      Followup: Return in about 4 weeks (around 9/14/2020).

## 2020-08-26 DIAGNOSIS — G89.29 CHRONIC BILATERAL LOW BACK PAIN WITH RIGHT-SIDED SCIATICA: ICD-10-CM

## 2020-08-26 DIAGNOSIS — M54.41 CHRONIC BILATERAL LOW BACK PAIN WITH RIGHT-SIDED SCIATICA: ICD-10-CM

## 2020-09-01 DIAGNOSIS — J45.20 MILD INTERMITTENT ASTHMA WITHOUT COMPLICATION: ICD-10-CM

## 2020-09-01 DIAGNOSIS — G89.29 CHRONIC BILATERAL LOW BACK PAIN WITH RIGHT-SIDED SCIATICA: ICD-10-CM

## 2020-09-01 DIAGNOSIS — M54.41 CHRONIC BILATERAL LOW BACK PAIN WITH RIGHT-SIDED SCIATICA: ICD-10-CM

## 2020-09-01 RX ORDER — HYDROCODONE BITARTRATE AND ACETAMINOPHEN 7.5; 325 MG/1; MG/1
1 TABLET ORAL EVERY 6 HOURS PRN
Qty: 60 TAB | Refills: 0 | OUTPATIENT
Start: 2020-09-01 | End: 2020-10-01

## 2020-09-01 NOTE — TELEPHONE ENCOUNTER
Pt called states you informed her that if her UDS was consistent you would be able to send her next 2 months to the pharmacy with out apt. Pt is asking if you can send the refills and she will make apt in 2 months for her next 3 month refill.

## 2020-09-02 RX ORDER — HYDROCODONE BITARTRATE AND ACETAMINOPHEN 7.5; 325 MG/1; MG/1
1 TABLET ORAL EVERY 6 HOURS PRN
Qty: 60 TAB | Refills: 0 | Status: SHIPPED | OUTPATIENT
Start: 2020-09-02 | End: 2020-09-02 | Stop reason: SDUPTHER

## 2020-09-02 RX ORDER — HYDROCODONE BITARTRATE AND ACETAMINOPHEN 7.5; 325 MG/1; MG/1
1 TABLET ORAL EVERY 6 HOURS PRN
Qty: 60 TAB | Refills: 0 | Status: SHIPPED | OUTPATIENT
Start: 2020-11-02 | End: 2020-12-02

## 2020-09-02 RX ORDER — HYDROCODONE BITARTRATE AND ACETAMINOPHEN 7.5; 325 MG/1; MG/1
1 TABLET ORAL EVERY 6 HOURS PRN
Qty: 60 TAB | Refills: 0 | Status: SHIPPED | OUTPATIENT
Start: 2020-10-02 | End: 2020-09-02 | Stop reason: SDUPTHER

## 2020-09-03 DIAGNOSIS — I10 ESSENTIAL HYPERTENSION: ICD-10-CM

## 2020-09-03 RX ORDER — LOSARTAN POTASSIUM 100 MG/1
TABLET ORAL
Qty: 90 TAB | Refills: 0 | Status: SHIPPED | OUTPATIENT
Start: 2020-09-03 | End: 2020-12-03

## 2020-09-17 ENCOUNTER — APPOINTMENT (OUTPATIENT)
Dept: MEDICAL GROUP | Facility: MEDICAL CENTER | Age: 65
End: 2020-09-17
Payer: MEDICARE

## 2020-10-05 DIAGNOSIS — I10 ESSENTIAL HYPERTENSION: ICD-10-CM

## 2020-10-05 DIAGNOSIS — F41.8 DEPRESSION WITH ANXIETY: ICD-10-CM

## 2020-10-05 DIAGNOSIS — F32.89 OTHER DEPRESSION: ICD-10-CM

## 2020-10-05 RX ORDER — DULOXETIN HYDROCHLORIDE 30 MG/1
CAPSULE, DELAYED RELEASE ORAL
Qty: 90 CAP | Refills: 0 | Status: SHIPPED | OUTPATIENT
Start: 2020-10-05 | End: 2020-12-24

## 2020-10-05 RX ORDER — AMLODIPINE BESYLATE 5 MG/1
TABLET ORAL
Qty: 90 TAB | Refills: 0 | Status: SHIPPED | OUTPATIENT
Start: 2020-10-05 | End: 2020-12-24

## 2020-10-05 RX ORDER — DULOXETIN HYDROCHLORIDE 60 MG/1
CAPSULE, DELAYED RELEASE ORAL
Qty: 90 CAP | Refills: 0 | Status: SHIPPED | OUTPATIENT
Start: 2020-10-05 | End: 2020-12-24

## 2020-10-15 DIAGNOSIS — N95.9 POST MENOPAUSAL PROBLEMS: ICD-10-CM

## 2020-11-17 ENCOUNTER — TELEMEDICINE (OUTPATIENT)
Dept: MEDICAL GROUP | Facility: MEDICAL CENTER | Age: 65
End: 2020-11-17
Payer: MEDICARE

## 2020-11-17 VITALS — BODY MASS INDEX: 47.66 KG/M2 | HEIGHT: 63 IN | WEIGHT: 269 LBS

## 2020-11-17 DIAGNOSIS — M54.41 CHRONIC BILATERAL LOW BACK PAIN WITH RIGHT-SIDED SCIATICA: ICD-10-CM

## 2020-11-17 DIAGNOSIS — E78.5 HYPERLIPIDEMIA LDL GOAL <100: ICD-10-CM

## 2020-11-17 DIAGNOSIS — G89.29 CHRONIC BILATERAL LOW BACK PAIN WITH RIGHT-SIDED SCIATICA: ICD-10-CM

## 2020-11-17 DIAGNOSIS — E53.8 B12 DEFICIENCY: ICD-10-CM

## 2020-11-17 DIAGNOSIS — I10 ESSENTIAL HYPERTENSION: ICD-10-CM

## 2020-11-17 PROCEDURE — 99214 OFFICE O/P EST MOD 30 MIN: CPT | Mod: 95,CR | Performed by: NURSE PRACTITIONER

## 2020-11-17 RX ORDER — HYDROCODONE BITARTRATE AND ACETAMINOPHEN 7.5; 325 MG/1; MG/1
1 TABLET ORAL EVERY 6 HOURS PRN
Qty: 60 TAB | Refills: 0 | Status: SHIPPED | OUTPATIENT
Start: 2021-01-20 | End: 2021-02-22 | Stop reason: SDUPTHER

## 2020-11-17 RX ORDER — HYDROCODONE BITARTRATE AND ACETAMINOPHEN 7.5; 325 MG/1; MG/1
1 TABLET ORAL EVERY 6 HOURS PRN
Qty: 60 TAB | Refills: 0 | Status: SHIPPED | OUTPATIENT
Start: 2020-12-20 | End: 2020-11-17 | Stop reason: SDUPTHER

## 2020-11-17 RX ORDER — HYDROCODONE BITARTRATE AND ACETAMINOPHEN 7.5; 325 MG/1; MG/1
1 TABLET ORAL EVERY 6 HOURS PRN
Qty: 60 TAB | Refills: 0 | Status: SHIPPED | OUTPATIENT
Start: 2020-11-20 | End: 2020-11-17 | Stop reason: SDUPTHER

## 2020-11-17 ASSESSMENT — FIBROSIS 4 INDEX: FIB4 SCORE: 1.2

## 2020-11-17 NOTE — PROGRESS NOTES
Virtual Visit: Established Patient   This visit was conducted via Zoom using secure and encrypted videoconferencing technology. The patient was in a private location in the state of [place].    The patient's identity was confirmed and verbal consent was obtained for this virtual visit.    Subjective:   CC: chronic pain    Rain Sanabria is a 65 y.o. female presenting for evaluation and management of:  Chronic pain.  She is feeling well.    She has chronic knees and shoulder pain.  She is due her updated refill.  Taking meds approp.    She is having some pain in her abd d/t mulitple hernias.  She will have pain when she bends over.  She has had repairs in the past but not recently.  She doesn't want to see surgeon at this time.  Will monitor for strangulation.   She will be due updated with next appt.    She has b12 def.  Her last b12 was 233.  She is on otc supplement.  She needs updated lab.  Her alb/cr ratio is due to be repeated.  She is on bp meds.  Taking meds approp. Bp not taken since this is a virtual.  Was elevated at last visit but normal in the past.        ROS   Review of Systems   Constitutional: Negative.  Negative for fever, chills, weight loss, malaise/fatigue and diaphoresis.   HENT: Negative.  Negative for hearing loss, ear pain, nosebleeds, congestion, sore throat, neck pain, tinnitus and ear discharge.    Respiratory: Negative.  Negative for cough, hemoptysis, sputum production, shortness of breath, wheezing and stridor.    Cardiovascular: Negative.  Negative for chest pain, palpitations, orthopnea, claudication, leg swelling and PND.   Gastrointestinal: denies nausea, vomiting, diarrhea, constipation, heartburn, melena or hematochezia.  Genitourinary: Denies dysuria, hematuria, urinary incontinence, frequency or urgency.    Musculoskeletal: Negative.  Negative for myalgias and back pain.   Neurological: Negative.  Negative for dizziness, tingling, tremors, weakness and headaches.   Psych:   Denies depression, anxiety or insomnia.  All other systems reviewed and are negative.      Allergies   Allergen Reactions   • Ace Inhibitors      Ace cough   • Tape Rash and Itching     Adhesive tape.  PAPER TAPE OK.       Current medicines (including changes today)  Current Outpatient Medications   Medication Sig Dispense Refill   • [START ON 1/20/2021] HYDROcodone-acetaminophen (NORCO) 7.5-325 MG per tablet Take 1 Tab by mouth every 6 hours as needed for up to 30 days. 60 Tab 0   • DULoxetine (CYMBALTA) 30 MG Cap DR Particles TAKE 1 CAPSULE EVERY DAY 90 Cap 0   • DULoxetine (CYMBALTA) 60 MG Cap DR Particles delayed-release capsule TAKE 1 CAPSULE EVERY EVENING 90 Cap 0   • amLODIPine (NORVASC) 5 MG Tab TAKE 1 TABLET EVERY DAY 90 Tab 0   • losartan (COZAAR) 100 MG Tab TAKE 1 TABLET EVERY DAY 90 Tab 0   • HYDROcodone-acetaminophen (NORCO) 7.5-325 MG per tablet Take 1 Tab by mouth every 6 hours as needed for up to 30 days. 60 Tab 0   • albuterol (VENTOLIN HFA) 108 (90 Base) MCG/ACT Aero Soln inhalation aerosol INHALE 2 PUFFS BY MOUTH EVERY FOUR HOURS AS NEEDED FOR SHORTNESS OF BREATH. DISPENSE A SPACER 1 Each 1   • lidocaine (LIDODERM) 5 % Patch Apply 1 Patch to skin as directed every 24 hours. 30 Patch 3   • fluticasone (FLONASE) 50 MCG/ACT nasal spray Spray 2 Sprays in nose every day. 1 Bottle 0   • benzonatate (TESSALON) 100 MG Cap Take 1 Cap by mouth 3 times a day as needed. 30 Cap 0   • sodium chloride (OCEAN NASAL SPRAY) 0.65 % Solution Spray 2 Sprays in nose every 2 hours as needed for Congestion. 1 Bottle 0   • methocarbamol (ROBAXIN) 750 MG Tab Take 2 Tabs by mouth 4 times a day as needed (muscle spasm). 20 Tab 0   • diclofenac EC (VOLTAREN) 75 MG Tablet Delayed Response Take 75 mg by mouth 2 times a day.     • ibuprofen (MOTRIN) 600 MG Tab Take 1 Tab by mouth every 6 hours as needed. 20 Tab 0   • ascorbic acid (ASCORBIC ACID) 500 MG Tab Take 3,000 mg by mouth every day.     • vitamin E 100 UNIT capsule Take  300 Units by mouth every day.     • Multiple Vitamins-Minerals (ENERGY BOOSTER PO) Take 80 mg by mouth. With caffiene     • glucosamine Sulfate 500 MG Cap Take 1,500 mg by mouth every day.     • Cholecalciferol (VITAMIN D) 400 UNIT Tab Take 1,600 Units by mouth every day.     • B Complex Vitamins (VITAMIN B COMPLEX PO) Take 20 mg by mouth every day.     • Multiple Vitamin (MULTIVITAMIN PO) Take 1 Tab by mouth every day.       No current facility-administered medications for this visit.        Patient Active Problem List    Diagnosis Date Noted   • Dental disorder    • Alcohol use 01/26/2018   • Morbid obesity with BMI of 45.0-49.9, adult (HCC) 02/22/2017   • Chronic right shoulder pain 01/06/2016   • Hyperlipidemia    • Depression with anxiety    • Vitamin D deficiency    • Abdominal hernia    • Sleep apnea 07/12/2010   • OA (osteoarthritis) 07/12/2010   • HTN (hypertension) 01/20/2010   • Genital herpes simplex 06/19/2009   • Insomnia 06/19/2009   • Migraine 06/19/2009   • Abnormal mammogram 06/19/2009   • Preventative health care 06/19/2009   • Cystocele 06/19/2009       Family History   Problem Relation Age of Onset   • Diabetes Mother    • Hypertension Mother    • Diabetes Maternal Grandfather    • Diabetes Paternal Grandfather    • Cancer Paternal Grandfather    • Blood Disease Daughter         leukemia-all       She  has a past medical history of Abdominal hernia, Abnormal mammogram (6/19/2009), Alcohol use (01/26/2018), B12 deficiency (02/05/2020), Chronic right shoulder pain, Cystocele (6/19/2009), Dental disorder, Depression with anxiety (01/26/2018), Genital herpes (6/19/2009), Hyperlipidemia, Hypertension (01/26/2018), Insomnia (6/19/2009), Migraine (06/19/2009), Morbid obesity with BMI of 45.0-49.9, adult ([facility]) (2/22/2017), OA (osteoarthritis), Sleep apnea, and Vitamin D deficiency.  She  has a past surgical history that includes tubal ligation (Bilateral, 1985); gastric bypass laparoscopic (02/2003);  "hernia repair (2004); toe arthroplasty (Bilateral, 1990'S); rhinoplasty (07/2004); breast biopsy (Left, 1996); hysteroscopy thermal ablation (early 2000's); rotator cuff repair (Right, 11/24/2009); colonoscopy (2015); primary c section (1984); and knee arthroplasty total (Left, 2/12/2018).       Objective:   Ht 1.6 m (5' 3\")   Wt 122 kg (269 lb)   BMI 47.65 kg/m²     Physical Exam:  Constitutional: Alert, no distress, well-groomed.  Skin: No rashes in visible areas.  Eye: Round. Conjunctiva clear, lids normal. No icterus.   ENMT: Lips pink without lesions, good dentition, moist mucous membranes. Phonation normal.  Neck: No masses, no thyromegaly. Moves freely without pain.  Respiratory: Unlabored respiratory effort, no cough or audible wheeze  Psych: Alert and oriented x3, normal affect and mood.       Assessment and Plan:   The following treatment plan was discussed:     1. Chronic bilateral low back pain with right-sided sciatica  HYDROcodone-acetaminophen (NORCO) 7.5-325 MG per tablet    DISCONTINUED: HYDROcodone-acetaminophen (NORCO) 7.5-325 MG per tablet    DISCONTINUED: HYDROcodone-acetaminophen (NORCO) 7.5-325 MG per tablet    refill pain meds for 3 months.  taking meds approp.  UDS and contract are up to date.  repeat UDS in august was clean w/o THC or CBD   2. Essential hypertension  Comp Metabolic Panel    MICROALBUMIN CREAT RATIO URINE    continue to monitor bp.  recheck lab with CMP, alb/cr ratio before next appt in 3 mo.  f/u for review and pain med refill   3. Hyperlipidemia LDL goal <100  Comp Metabolic Panel    Lipid Profile    not on meds.  will recheck lab before next appt   4. B12 deficiency  VITAMIN B12    rechek b12 with next lab.  f/u for review.  last b12 was low.          Follow-up: Return in about 3 months (around 2/17/2021).           "

## 2020-12-23 DIAGNOSIS — I10 ESSENTIAL HYPERTENSION: ICD-10-CM

## 2020-12-23 DIAGNOSIS — F41.8 DEPRESSION WITH ANXIETY: ICD-10-CM

## 2020-12-23 DIAGNOSIS — F32.89 OTHER DEPRESSION: ICD-10-CM

## 2020-12-24 RX ORDER — AMLODIPINE BESYLATE 5 MG/1
TABLET ORAL
Qty: 90 TAB | Refills: 0 | Status: SHIPPED | OUTPATIENT
Start: 2020-12-24 | End: 2021-08-02

## 2020-12-24 RX ORDER — DULOXETIN HYDROCHLORIDE 60 MG/1
CAPSULE, DELAYED RELEASE ORAL
Qty: 90 CAP | Refills: 0 | Status: SHIPPED | OUTPATIENT
Start: 2020-12-24

## 2020-12-24 RX ORDER — DULOXETIN HYDROCHLORIDE 30 MG/1
CAPSULE, DELAYED RELEASE ORAL
Qty: 90 CAP | Refills: 0 | Status: SHIPPED | OUTPATIENT
Start: 2020-12-24 | End: 2021-08-02

## 2021-02-03 ENCOUNTER — HOSPITAL ENCOUNTER (OUTPATIENT)
Dept: LAB | Facility: MEDICAL CENTER | Age: 66
End: 2021-02-03
Attending: NURSE PRACTITIONER
Payer: MEDICARE

## 2021-02-03 DIAGNOSIS — E78.5 HYPERLIPIDEMIA LDL GOAL <100: ICD-10-CM

## 2021-02-03 DIAGNOSIS — E53.8 B12 DEFICIENCY: ICD-10-CM

## 2021-02-03 DIAGNOSIS — I10 ESSENTIAL HYPERTENSION: ICD-10-CM

## 2021-02-03 LAB
ALBUMIN SERPL BCP-MCNC: 3.7 G/DL (ref 3.2–4.9)
ALBUMIN/GLOB SERPL: 1.2 G/DL
ALP SERPL-CCNC: 82 U/L (ref 30–99)
ALT SERPL-CCNC: 18 U/L (ref 2–50)
ANION GAP SERPL CALC-SCNC: 7 MMOL/L (ref 7–16)
AST SERPL-CCNC: 23 U/L (ref 12–45)
BILIRUB SERPL-MCNC: 0.4 MG/DL (ref 0.1–1.5)
BUN SERPL-MCNC: 13 MG/DL (ref 8–22)
CALCIUM SERPL-MCNC: 9.4 MG/DL (ref 8.5–10.5)
CHLORIDE SERPL-SCNC: 104 MMOL/L (ref 96–112)
CHOLEST SERPL-MCNC: 180 MG/DL (ref 100–199)
CO2 SERPL-SCNC: 29 MMOL/L (ref 20–33)
CREAT SERPL-MCNC: 0.57 MG/DL (ref 0.5–1.4)
CREAT UR-MCNC: 36.53 MG/DL
FASTING STATUS PATIENT QL REPORTED: NORMAL
GLOBULIN SER CALC-MCNC: 3.2 G/DL (ref 1.9–3.5)
GLUCOSE SERPL-MCNC: 91 MG/DL (ref 65–99)
HDLC SERPL-MCNC: 79 MG/DL
LDLC SERPL CALC-MCNC: 77 MG/DL
MICROALBUMIN UR-MCNC: <1.2 MG/DL
MICROALBUMIN/CREAT UR: NORMAL MG/G (ref 0–30)
POTASSIUM SERPL-SCNC: 4.1 MMOL/L (ref 3.6–5.5)
PROT SERPL-MCNC: 6.9 G/DL (ref 6–8.2)
SODIUM SERPL-SCNC: 140 MMOL/L (ref 135–145)
TRIGL SERPL-MCNC: 122 MG/DL (ref 0–149)
VIT B12 SERPL-MCNC: 291 PG/ML (ref 211–911)

## 2021-02-03 PROCEDURE — 80053 COMPREHEN METABOLIC PANEL: CPT

## 2021-02-03 PROCEDURE — 80061 LIPID PANEL: CPT

## 2021-02-03 PROCEDURE — 36415 COLL VENOUS BLD VENIPUNCTURE: CPT

## 2021-02-03 PROCEDURE — 82043 UR ALBUMIN QUANTITATIVE: CPT

## 2021-02-03 PROCEDURE — 82607 VITAMIN B-12: CPT

## 2021-02-03 PROCEDURE — 82570 ASSAY OF URINE CREATININE: CPT

## 2021-02-16 ENCOUNTER — TELEPHONE (OUTPATIENT)
Dept: MEDICAL GROUP | Facility: MEDICAL CENTER | Age: 66
End: 2021-02-16

## 2021-02-16 NOTE — TELEPHONE ENCOUNTER
ESTABLISHED PATIENT PRE-VISIT PLANNING     Patient was NOT contacted to complete PVP.     Note: Patient will not be contacted if there is no indication to call.     1.  Reviewed notes from the last few office visits within the medical group: Yes    2.  If any orders were placed at last visit or intended to be done for this visit (i.e. 6 mos follow-up), do we have Results/Consult Notes?         •  Labs - Labs ordered, completed on 02/03/2021 and results are in chart.  Note: If patient appointment is for lab review and patient did not complete labs, check with provider if OK to reschedule patient until labs completed.       •  Imaging - Imaging was not ordered at last office visit.       •  Referrals - No referrals were ordered at last office visit.    3. Is this appointment scheduled as a Hospital Follow-Up? No    4.  Immunizations were updated in Epic using Reconcile Outside Information activity? Yes    5.  Patient is due for the following Health Maintenance Topics:   Health Maintenance Due   Topic Date Due   • Annual Wellness Visit  1955   • HEPATITIS C SCREENING  1955       6.  AHA (Pulse[employer]) form printed for Provider? N/A         Outside information NOT reconciled using the Mechanology feature. Per Michelle De La Cruz, the Mechanology feature is down as of 02/09/2021 at 2:00pm. Will reconcile outside information at a later date.

## 2021-02-22 ENCOUNTER — TELEMEDICINE (OUTPATIENT)
Dept: MEDICAL GROUP | Facility: MEDICAL CENTER | Age: 66
End: 2021-02-22
Payer: MEDICARE

## 2021-02-22 VITALS — WEIGHT: 269 LBS | HEIGHT: 63 IN | BODY MASS INDEX: 47.66 KG/M2

## 2021-02-22 DIAGNOSIS — G89.29 CHRONIC BILATERAL LOW BACK PAIN WITH RIGHT-SIDED SCIATICA: ICD-10-CM

## 2021-02-22 DIAGNOSIS — M79.641 BILATERAL HAND PAIN: ICD-10-CM

## 2021-02-22 DIAGNOSIS — R60.0 PEDAL EDEMA: ICD-10-CM

## 2021-02-22 DIAGNOSIS — M54.41 CHRONIC BILATERAL LOW BACK PAIN WITH RIGHT-SIDED SCIATICA: ICD-10-CM

## 2021-02-22 DIAGNOSIS — F41.8 DEPRESSION WITH ANXIETY: ICD-10-CM

## 2021-02-22 DIAGNOSIS — M79.642 BILATERAL HAND PAIN: ICD-10-CM

## 2021-02-22 PROCEDURE — 99214 OFFICE O/P EST MOD 30 MIN: CPT | Mod: 95,CR | Performed by: NURSE PRACTITIONER

## 2021-02-22 RX ORDER — HYDROCODONE BITARTRATE AND ACETAMINOPHEN 7.5; 325 MG/1; MG/1
1 TABLET ORAL EVERY 6 HOURS PRN
Qty: 60 TABLET | Refills: 0 | Status: SHIPPED | OUTPATIENT
Start: 2021-02-22 | End: 2021-02-22 | Stop reason: SDUPTHER

## 2021-02-22 RX ORDER — FUROSEMIDE 20 MG/1
20 TABLET ORAL
Qty: 30 TABLET | Refills: 0 | Status: SHIPPED | OUTPATIENT
Start: 2021-02-22 | End: 2021-03-22 | Stop reason: SDUPTHER

## 2021-02-22 RX ORDER — BUPROPION HYDROCHLORIDE 75 MG/1
75 TABLET ORAL 2 TIMES DAILY
Qty: 30 TABLET | Refills: 1 | Status: SHIPPED | OUTPATIENT
Start: 2021-02-22 | End: 2021-03-22 | Stop reason: SDUPTHER

## 2021-02-22 RX ORDER — HYDROCODONE BITARTRATE AND ACETAMINOPHEN 7.5; 325 MG/1; MG/1
1 TABLET ORAL EVERY 6 HOURS PRN
Qty: 60 TABLET | Refills: 0 | Status: SHIPPED | OUTPATIENT
Start: 2021-03-22 | End: 2021-02-22 | Stop reason: SDUPTHER

## 2021-02-22 RX ORDER — POTASSIUM CHLORIDE 750 MG/1
10 TABLET, FILM COATED, EXTENDED RELEASE ORAL
Qty: 30 TABLET | Refills: 0 | Status: SHIPPED | OUTPATIENT
Start: 2021-02-22 | End: 2021-03-22 | Stop reason: SDUPTHER

## 2021-02-22 RX ORDER — HYDROCODONE BITARTRATE AND ACETAMINOPHEN 7.5; 325 MG/1; MG/1
1 TABLET ORAL EVERY 6 HOURS PRN
Qty: 60 TABLET | Refills: 0 | Status: SHIPPED | OUTPATIENT
Start: 2021-04-22 | End: 2021-05-17 | Stop reason: SDUPTHER

## 2021-02-22 ASSESSMENT — FIBROSIS 4 INDEX: FIB4 SCORE: 1.35

## 2021-02-23 NOTE — PROGRESS NOTES
Virtual Visit: Established Patient   This visit was conducted via Zoom using secure and encrypted videoconferencing technology. The patient was in a private location in the state of [place].    The patient's identity was confirmed and verbal consent was obtained for this virtual visit.    Subjective:   CC: chronic joint pain    Rain Sanabria is a 66 y.o. female presenting for evaluation and management of:  Chronic joint pain.  For the last month she has been having ankles and calves have been very swollen.  They are worse than they every have.  She is having pain in her legs with the tightness.  It will resolve after a couple of days if she keeps her feet elevated.  She tried doing low na diet but not helped much but she is just starting that.  She held all na today until this evening and took pickle relish.  Now swelling is worse. No chest pain.  No change in SOB.    The arthritis pain in her hands has gotten worse with typing at work.  The pain is worst between thumb and index finger.  Using otc meds.  They will help.   D/t her swelling she started asa 81 mg daily several days ago.    Reviewed lab with pt.  Her GFR, CMP, alb/cr ratio, LP is wnl.  b12 is low normal in 200's.  Not on otc supplemetn.  She is due pain med refill.  She has chronic joint pain.  She is stable on norco.  Taking med approp.  UDS and contract are up to date.    She has been more depressed.  She is staying at home to work.  She is also getting more anxiety about her health.  She is on cymbalta 60 mg in am and 30 mg pm.  Not totally controlling her sx.  She declines counseling.                ROS   Review of Systems   Constitutional: Negative.  Negative for fever, chills, weight loss, malaise/fatigue and diaphoresis.   HENT: Negative.  Negative for hearing loss, ear pain, nosebleeds, congestion, sore throat, neck pain, tinnitus and ear discharge.    Respiratory: Negative.  Negative for cough, hemoptysis, sputum production, shortness of  breath, wheezing and stridor.    Cardiovascular: Negative.  Negative for chest pain, palpitations, orthopnea, claudication, leg swelling and PND.   Gastrointestinal: denies nausea, vomiting, diarrhea, constipation, heartburn, melena or hematochezia.  Genitourinary: Denies dysuria, hematuria, urinary incontinence, frequency or urgency.    Musculoskeletal: Negative.  Negative for myalgias and back pain.   Neurological: Negative.  Negative for dizziness, tingling, tremors, weakness and headaches.   Psych:  Denies depression, anxiety or insomnia.  All other systems reviewed and are negative.      Allergies   Allergen Reactions   • Ace Inhibitors      Ace cough   • Tape Rash and Itching     Adhesive tape.  PAPER TAPE OK.       Current medicines (including changes today)  Current Outpatient Medications   Medication Sig Dispense Refill   • furosemide (LASIX) 20 MG Tab Take 1 tablet by mouth every 24 hours as needed. 30 tablet 0   • potassium chloride ER (KLOR-CON) 10 MEQ tablet Take 1 tablet by mouth every 24 hours as needed. 30 tablet 0   • [START ON 4/22/2021] HYDROcodone-acetaminophen (NORCO) 7.5-325 MG per tablet Take 1 tablet by mouth every 6 hours as needed for up to 30 days. 60 tablet 0   • buPROPion (WELLBUTRIN) 75 MG Tab Take 1 tablet by mouth 2 times a day. 30 tablet 1   • amLODIPine (NORVASC) 5 MG Tab TAKE 1 TABLET EVERY DAY 90 Tab 0   • DULoxetine (CYMBALTA) 60 MG Cap DR Particles delayed-release capsule TAKE 1 CAPSULE EVERY EVENING 90 Cap 0   • DULoxetine (CYMBALTA) 30 MG Cap DR Particles TAKE 1 CAPSULE EVERY DAY 90 Cap 0   • losartan (COZAAR) 100 MG Tab TAKE 1 TABLET EVERY DAY 90 Tab 3   • albuterol (VENTOLIN HFA) 108 (90 Base) MCG/ACT Aero Soln inhalation aerosol INHALE 2 PUFFS BY MOUTH EVERY FOUR HOURS AS NEEDED FOR SHORTNESS OF BREATH. DISPENSE A SPACER 1 Each 1   • lidocaine (LIDODERM) 5 % Patch Apply 1 Patch to skin as directed every 24 hours. 30 Patch 3   • fluticasone (FLONASE) 50 MCG/ACT nasal spray  Spray 2 Sprays in nose every day. 1 Bottle 0   • benzonatate (TESSALON) 100 MG Cap Take 1 Cap by mouth 3 times a day as needed. 30 Cap 0   • sodium chloride (OCEAN NASAL SPRAY) 0.65 % Solution Spray 2 Sprays in nose every 2 hours as needed for Congestion. 1 Bottle 0   • methocarbamol (ROBAXIN) 750 MG Tab Take 2 Tabs by mouth 4 times a day as needed (muscle spasm). 20 Tab 0   • diclofenac EC (VOLTAREN) 75 MG Tablet Delayed Response Take 75 mg by mouth 2 times a day.     • ibuprofen (MOTRIN) 600 MG Tab Take 1 Tab by mouth every 6 hours as needed. 20 Tab 0   • ascorbic acid (ASCORBIC ACID) 500 MG Tab Take 3,000 mg by mouth every day.     • vitamin E 100 UNIT capsule Take 300 Units by mouth every day.     • Multiple Vitamins-Minerals (ENERGY BOOSTER PO) Take 80 mg by mouth. With caffiene     • glucosamine Sulfate 500 MG Cap Take 1,500 mg by mouth every day.     • Cholecalciferol (VITAMIN D) 400 UNIT Tab Take 1,600 Units by mouth every day.     • B Complex Vitamins (VITAMIN B COMPLEX PO) Take 20 mg by mouth every day.     • Multiple Vitamin (MULTIVITAMIN PO) Take 1 Tab by mouth every day.       No current facility-administered medications for this visit.       Patient Active Problem List    Diagnosis Date Noted   • Dental disorder    • Alcohol use 01/26/2018   • Morbid obesity with BMI of 45.0-49.9, adult ([facility]) 02/22/2017   • Chronic right shoulder pain 01/06/2016   • Hyperlipidemia    • Depression with anxiety    • Vitamin D deficiency    • Abdominal hernia    • Sleep apnea 07/12/2010   • OA (osteoarthritis) 07/12/2010   • HTN (hypertension) 01/20/2010   • Genital herpes simplex 06/19/2009   • Insomnia 06/19/2009   • Migraine 06/19/2009   • Abnormal mammogram 06/19/2009   • Preventative health care 06/19/2009   • Cystocele 06/19/2009       Family History   Problem Relation Age of Onset   • Diabetes Mother    • Hypertension Mother    • Diabetes Maternal Grandfather    • Diabetes Paternal Grandfather    • Cancer Paternal  "Grandfather    • Blood Disease Daughter         leukemia-all       She  has a past medical history of Abdominal hernia, Abnormal mammogram (6/19/2009), Alcohol use (01/26/2018), B12 deficiency (02/05/2020), Chronic right shoulder pain, Cystocele (6/19/2009), Dental disorder, Depression with anxiety (01/26/2018), Genital herpes (6/19/2009), Hyperlipidemia, Hypertension (01/26/2018), Insomnia (6/19/2009), Migraine (06/19/2009), Morbid obesity with BMI of 45.0-49.9, adult (HCC) (2/22/2017), OA (osteoarthritis), Sleep apnea, and Vitamin D deficiency.  She  has a past surgical history that includes tubal ligation (Bilateral, 1985); gastric bypass laparoscopic (02/2003); hernia repair (2004); toe arthroplasty (Bilateral, 1990'S); rhinoplasty (07/2004); breast biopsy (Left, 1996); hysteroscopy thermal ablation (early 2000's); rotator cuff repair (Right, 11/24/2009); colonoscopy (2015); primary c section (1984); and knee arthroplasty total (Left, 2/12/2018).       Objective:   Ht 1.6 m (5' 3\")   Wt 122 kg (269 lb)   BMI 47.65 kg/m²     Physical Exam:  Constitutional: Alert, no distress, well-groomed.  Skin: No rashes in visible areas.  Eye: Round. Conjunctiva clear, lids normal. No icterus.   ENMT: Lips pink without lesions, good dentition, moist mucous membranes. Phonation normal.  Neck: No masses, no thyromegaly. Moves freely without pain.  Respiratory: Unlabored respiratory effort, no cough or audible wheeze  Psych: Alert and oriented x3, normal affect and mood.       Assessment and Plan:   The following treatment plan was discussed:     1. Pedal edema  furosemide (LASIX) 20 MG Tab    potassium chloride ER (KLOR-CON) 10 MEQ tablet    EC-ECHOCARDIOGRAM COMPLETE W/O CONT    US-EXTREMITY ARTERY LOWER BILAT W/KLAUDIA (COMBO)    do echo and KLAUDIA.  f/u with pt with results.  use lasix and k+ daily until swelling improved.  f/u 1 mo for sx eval & test review   2. Depression with anxiety  buPROPion (WELLBUTRIN) 75 MG Tab    having " worse sx recently with house bound.  add wellbutrin.  f/u 1 mo for sx eval.     3. Bilateral hand pain  DX-HAND 3+ LEFT    DX-HAND 3+ RIGHT    using otc meds.  do xrays davin hands.  f/u w/pt w/tresults.    4. Chronic bilateral low back pain with right-sided sciatica  HYDROcodone-acetaminophen (NORCO) 7.5-325 MG per tablet    DISCONTINUED: HYDROcodone-acetaminophen (NORCO) 7.5-325 MG per tablet    DISCONTINUED: HYDROcodone-acetaminophen (NORCO) 7.5-325 MG per tablet    refill pain meds for 3 months.  taking meds approp.  UDS and contract are up to date.             Follow-up: Return in about 4 weeks (around 3/22/2021).

## 2021-03-03 DIAGNOSIS — Z23 NEED FOR VACCINATION: ICD-10-CM

## 2021-03-12 ENCOUNTER — IMMUNIZATION (OUTPATIENT)
Dept: FAMILY PLANNING/WOMEN'S HEALTH CLINIC | Facility: IMMUNIZATION CENTER | Age: 66
End: 2021-03-12
Attending: INTERNAL MEDICINE
Payer: MEDICARE

## 2021-03-12 DIAGNOSIS — Z23 NEED FOR VACCINATION: ICD-10-CM

## 2021-03-12 DIAGNOSIS — Z23 ENCOUNTER FOR VACCINATION: Primary | ICD-10-CM

## 2021-03-12 PROCEDURE — 0011A MODERNA SARS-COV-2 VACCINE: CPT

## 2021-03-12 PROCEDURE — 91301 MODERNA SARS-COV-2 VACCINE: CPT

## 2021-03-15 ENCOUNTER — TELEPHONE (OUTPATIENT)
Dept: MEDICAL GROUP | Facility: MEDICAL CENTER | Age: 66
End: 2021-03-15

## 2021-03-15 NOTE — TELEPHONE ENCOUNTER
ESTABLISHED PATIENT PRE-VISIT PLANNING     Patient was NOT contacted to complete PVP.     Note: Patient will not be contacted if there is no indication to call.     1.  Reviewed notes from the last few office visits within the medical group: Yes    2.  If any orders were placed at last visit or intended to be done for this visit (i.e. 6 mos follow-up), do we have Results/Consult Notes?         •  Labs - Labs ordered, completed on 02/03/2021 and results are in chart.  Note: If patient appointment is for lab review and patient did not complete labs, check with provider if OK to reschedule patient until labs completed.       •  Imaging - Imaging ordered, appointment scheduled.       •  Referrals - No referrals were ordered at last office visit.    3. Is this appointment scheduled as a Hospital Follow-Up? No    4.  Immunizations were updated in Epic using Reconcile Outside Information activity? Yes    5.  Patient is due for the following Health Maintenance Topics:   Health Maintenance Due   Topic Date Due   • Annual Wellness Visit  Never done   • HEPATITIS C SCREENING  Never done     6.  AHA (P[employer]) form printed for Provider? N/A         Outside information NOT reconciled using the unbound technologies feature. Per Michelle De La Cruz, the unbound technologies feature is down as of 02/09/2021 at 2:00pm. Will reconcile outside information at a later date.

## 2021-03-22 ENCOUNTER — TELEMEDICINE (OUTPATIENT)
Dept: MEDICAL GROUP | Facility: MEDICAL CENTER | Age: 66
End: 2021-03-22
Payer: MEDICARE

## 2021-03-22 VITALS — WEIGHT: 286 LBS | HEIGHT: 63 IN | BODY MASS INDEX: 50.68 KG/M2

## 2021-03-22 DIAGNOSIS — R60.0 PEDAL EDEMA: ICD-10-CM

## 2021-03-22 DIAGNOSIS — E66.01 MORBID OBESITY WITH BMI OF 50.0-59.9, ADULT (HCC): ICD-10-CM

## 2021-03-22 DIAGNOSIS — F41.8 DEPRESSION WITH ANXIETY: ICD-10-CM

## 2021-03-22 PROCEDURE — 99214 OFFICE O/P EST MOD 30 MIN: CPT | Mod: 95,CR | Performed by: NURSE PRACTITIONER

## 2021-03-22 RX ORDER — POTASSIUM CHLORIDE 750 MG/1
10 TABLET, FILM COATED, EXTENDED RELEASE ORAL
Qty: 30 TABLET | Refills: 3 | Status: SHIPPED | OUTPATIENT
Start: 2021-03-22

## 2021-03-22 RX ORDER — BUPROPION HYDROCHLORIDE 75 MG/1
75 TABLET ORAL 2 TIMES DAILY
Qty: 180 TABLET | Refills: 1 | Status: SHIPPED | OUTPATIENT
Start: 2021-03-22 | End: 2021-11-04

## 2021-03-22 RX ORDER — FUROSEMIDE 20 MG/1
20 TABLET ORAL
Qty: 30 TABLET | Refills: 3 | Status: SHIPPED | OUTPATIENT
Start: 2021-03-22

## 2021-03-22 ASSESSMENT — FIBROSIS 4 INDEX: FIB4 SCORE: 1.35

## 2021-03-22 ASSESSMENT — PATIENT HEALTH QUESTIONNAIRE - PHQ9: CLINICAL INTERPRETATION OF PHQ2 SCORE: 0

## 2021-03-22 NOTE — PROGRESS NOTES
Virtual Visit: Established Patient   This visit was conducted via Zoom using secure and encrypted videoconferencing technology. The patient was in a private location in the state of [place].    The patient's identity was confirmed and verbal consent was obtained for this virtual visit.    Subjective:   CC:  depression    Rain Sanabria is a 66 y.o. female presenting for evaluation and management of: depression.  She was changed to wellbutrin last month for her depression.  She was only given 30 tabs and she ran out about a week ago.  Until she ran out she was feeling great.  Will need a refill.  Depression was controlled on med.  Taking med approp.  She is on the lasix and k+.  She is still having edema up almost to her knees.  She can't elevate her legs at work.  She keeps her feet elevated at nite.  She is very active during w/e and there is no swelling.  She is still SOB but no change.  She is set for her tests on 4/2/21.   She got her 1st COVID vaccine.  She had fatigue the day after but o/w ok.  She does not have varicose veins.  Never happened before.  She needs refills on lasix and k+.  Taking daily.  Still gaining wt.  No chest pain.          ROS   Review of Systems   Constitutional: Negative.  Negative for fever, chills, weight loss, malaise/fatigue and diaphoresis.   HENT: Negative.  Negative for hearing loss, ear pain, nosebleeds, congestion, sore throat, neck pain, tinnitus and ear discharge.    Respiratory: Negative.  Negative for cough, hemoptysis, sputum production, wheezing and stridor.    Cardiovascular: Negative.  Negative for chest pain, palpitations, orthopnea, claudication, PND.   Gastrointestinal: denies nausea, vomiting, diarrhea, constipation, heartburn, melena or hematochezia.  Genitourinary: Denies dysuria, hematuria, urinary incontinence, frequency or urgency.    Musculoskeletal: Negative.  Negative for myalgias and back pain.   Neurological: Negative.  Negative for dizziness, tingling,  tremors, weakness and headaches.   Psych:  Denies depression, anxiety or insomnia.  All other systems reviewed and are negative.      Allergies   Allergen Reactions   • Ace Inhibitors      Ace cough   • Tape Rash and Itching     Adhesive tape.  PAPER TAPE OK.       Current medicines (including changes today)  Current Outpatient Medications   Medication Sig Dispense Refill   • buPROPion (WELLBUTRIN) 75 MG Tab Take 1 tablet by mouth 2 times a day. 180 tablet 1   • furosemide (LASIX) 20 MG Tab Take 1 tablet by mouth every 24 hours as needed. 30 tablet 3   • potassium chloride ER (KLOR-CON) 10 MEQ tablet Take 1 tablet by mouth every 24 hours as needed. 30 tablet 3   • [START ON 4/22/2021] HYDROcodone-acetaminophen (NORCO) 7.5-325 MG per tablet Take 1 tablet by mouth every 6 hours as needed for up to 30 days. 60 tablet 0   • amLODIPine (NORVASC) 5 MG Tab TAKE 1 TABLET EVERY DAY 90 Tab 0   • DULoxetine (CYMBALTA) 60 MG Cap DR Particles delayed-release capsule TAKE 1 CAPSULE EVERY EVENING 90 Cap 0   • DULoxetine (CYMBALTA) 30 MG Cap DR Particles TAKE 1 CAPSULE EVERY DAY 90 Cap 0   • losartan (COZAAR) 100 MG Tab TAKE 1 TABLET EVERY DAY 90 Tab 3   • albuterol (VENTOLIN HFA) 108 (90 Base) MCG/ACT Aero Soln inhalation aerosol INHALE 2 PUFFS BY MOUTH EVERY FOUR HOURS AS NEEDED FOR SHORTNESS OF BREATH. DISPENSE A SPACER 1 Each 1   • lidocaine (LIDODERM) 5 % Patch Apply 1 Patch to skin as directed every 24 hours. 30 Patch 3   • fluticasone (FLONASE) 50 MCG/ACT nasal spray Spray 2 Sprays in nose every day. 1 Bottle 0   • benzonatate (TESSALON) 100 MG Cap Take 1 Cap by mouth 3 times a day as needed. 30 Cap 0   • sodium chloride (OCEAN NASAL SPRAY) 0.65 % Solution Spray 2 Sprays in nose every 2 hours as needed for Congestion. 1 Bottle 0   • methocarbamol (ROBAXIN) 750 MG Tab Take 2 Tabs by mouth 4 times a day as needed (muscle spasm). 20 Tab 0   • diclofenac EC (VOLTAREN) 75 MG Tablet Delayed Response Take 75 mg by mouth 2 times a  day.     • ibuprofen (MOTRIN) 600 MG Tab Take 1 Tab by mouth every 6 hours as needed. 20 Tab 0   • ascorbic acid (ASCORBIC ACID) 500 MG Tab Take 3,000 mg by mouth every day.     • vitamin E 100 UNIT capsule Take 300 Units by mouth every day.     • Multiple Vitamins-Minerals (ENERGY BOOSTER PO) Take 80 mg by mouth. With caffiene     • glucosamine Sulfate 500 MG Cap Take 1,500 mg by mouth every day.     • Cholecalciferol (VITAMIN D) 400 UNIT Tab Take 1,600 Units by mouth every day.     • B Complex Vitamins (VITAMIN B COMPLEX PO) Take 20 mg by mouth every day.     • Multiple Vitamin (MULTIVITAMIN PO) Take 1 Tab by mouth every day.       No current facility-administered medications for this visit.       Patient Active Problem List    Diagnosis Date Noted   • Morbid obesity with BMI of 50.0-59.9, adult ([facility]) 03/22/2021   • Dental disorder    • Alcohol use 01/26/2018   • Morbid obesity with BMI of 45.0-49.9, adult ([facility]) 02/22/2017   • Chronic right shoulder pain 01/06/2016   • Hyperlipidemia    • Depression with anxiety    • Vitamin D deficiency    • Abdominal hernia    • Sleep apnea 07/12/2010   • OA (osteoarthritis) 07/12/2010   • HTN (hypertension) 01/20/2010   • Genital herpes simplex 06/19/2009   • Insomnia 06/19/2009   • Migraine 06/19/2009   • Abnormal mammogram 06/19/2009   • Preventative health care 06/19/2009   • Cystocele 06/19/2009       Family History   Problem Relation Age of Onset   • Diabetes Mother    • Hypertension Mother    • Diabetes Maternal Grandfather    • Diabetes Paternal Grandfather    • Cancer Paternal Grandfather    • Blood Disease Daughter         [id number]-all       She  has a past medical history of Abdominal hernia, Abnormal mammogram (6/19/2009), Alcohol use (01/26/2018), B12 deficiency (02/05/2020), Chronic right shoulder pain, Cystocele (6/19/2009), Dental disorder, Depression with anxiety (01/26/2018), Genital herpes (6/19/2009), Hyperlipidemia, Hypertension (01/26/2018), Insomnia  "(6/19/2009), Migraine (06/19/2009), Morbid obesity with BMI of 45.0-49.9, adult ([facility]) (2/22/2017), OA (osteoarthritis), Sleep apnea, and Vitamin D deficiency.  She  has a past surgical history that includes tubal ligation (Bilateral, 1985); gastric bypass laparoscopic (02/2003); hernia repair (2004); toe arthroplasty (Bilateral, 1990'S); rhinoplasty (07/2004); breast biopsy (Left, 1996); hysteroscopy thermal ablation (early 2000's); rotator cuff repair (Right, 11/24/2009); colonoscopy (2015); primary c section (1984); and knee arthroplasty total (Left, 2/12/2018).       Objective:   Ht 1.6 m (5' 3\")   Wt (!) 130 kg (286 lb)   BMI 50.66 kg/m²     Physical Exam:  Constitutional: Alert, no distress, well-groomed.  Skin: No rashes in visible areas.  Eye: Round. Conjunctiva clear, lids normal. No icterus.   ENMT: Lips pink without lesions, good dentition, moist mucous membranes. Phonation normal.  Neck: No masses, no thyromegaly. Moves freely without pain.  Respiratory: Unlabored respiratory effort, no cough or audible wheeze  Psych: Alert and oriented x3, normal affect and mood.       Assessment and Plan:   The following treatment plan was discussed:     1. Depression with anxiety  buPROPion (WELLBUTRIN) 75 MG Tab    stable and well controlled on wellbutrin but ran out since wasn't given enough.  will restart med.  refill done.     2. Pedal edema  furosemide (LASIX) 20 MG Tab    potassium chloride ER (KLOR-CON) 10 MEQ tablet    do echo and KLAUDIA as sched.  f/u with pt with results.  RF lasix & k+.  contin daily for now.  consider vascular referral based on KLAUDIA results.    3. Morbid obesity with BMI of 50.0-59.9, adult ([facility])  Patient identified as having weight management issue.  Appropriate orders and counseling given.           Follow-up: Return in about 8 weeks (around 5/20/2021), or [facility].           "

## 2021-04-06 DIAGNOSIS — J06.9 VIRAL URI WITH COUGH: ICD-10-CM

## 2021-04-06 RX ORDER — BENZONATATE 100 MG/1
100 CAPSULE ORAL 3 TIMES DAILY PRN
Qty: 30 CAPSULE | Refills: 0 | Status: SHIPPED | OUTPATIENT
Start: 2021-04-06 | End: 2021-04-08

## 2021-04-08 RX ORDER — BENZONATATE 100 MG/1
100 CAPSULE ORAL 3 TIMES DAILY PRN
Qty: 30 CAPSULE | Refills: 0 | Status: SHIPPED | OUTPATIENT
Start: 2021-04-08 | End: 2021-05-10

## 2021-04-10 ENCOUNTER — IMMUNIZATION (OUTPATIENT)
Dept: FAMILY PLANNING/WOMEN'S HEALTH CLINIC | Facility: IMMUNIZATION CENTER | Age: 66
End: 2021-04-10
Attending: INTERNAL MEDICINE
Payer: MEDICARE

## 2021-04-10 DIAGNOSIS — Z23 ENCOUNTER FOR VACCINATION: Primary | ICD-10-CM

## 2021-04-10 PROCEDURE — 0012A MODERNA SARS-COV-2 VACCINE: CPT | Performed by: INTERNAL MEDICINE

## 2021-04-10 PROCEDURE — 91301 MODERNA SARS-COV-2 VACCINE: CPT | Performed by: INTERNAL MEDICINE

## 2021-04-14 ENCOUNTER — TELEPHONE (OUTPATIENT)
Dept: MEDICAL GROUP | Facility: MEDICAL CENTER | Age: 66
End: 2021-04-14

## 2021-04-14 NOTE — TELEPHONE ENCOUNTER
Please let pt know that the echo is wnl with strong heart muscle and function.  She has OA in both hands but worse in rt.  Her leg circulation test is normal

## 2021-04-19 NOTE — TELEPHONE ENCOUNTER
It is probably venous insufficiency.  Needs to drink adequate water, low na diet, keep feet elevated when sitting and wear compression hose as much as poss.

## 2021-04-28 ENCOUNTER — APPOINTMENT (OUTPATIENT)
Dept: URGENT CARE | Facility: PHYSICIAN GROUP | Age: 66
End: 2021-04-28
Payer: MEDICARE

## 2021-05-10 ENCOUNTER — TELEPHONE (OUTPATIENT)
Dept: MEDICAL GROUP | Facility: MEDICAL CENTER | Age: 66
End: 2021-05-10

## 2021-05-10 NOTE — TELEPHONE ENCOUNTER
VOICEMAIL  1. Caller Name: Rain                      Call Back Number: [phone]    2. Message: Pt appt 5/20 was cancelled and is upset she was not notified. Wants call back    3. Patient approves office to leave a detailed voicemail/MyChart message: N\A

## 2021-05-11 RX ORDER — BENZONATATE 100 MG/1
CAPSULE ORAL
Qty: 30 CAPSULE | Refills: 0 | Status: SHIPPED | OUTPATIENT
Start: 2021-05-11 | End: 2021-08-18

## 2021-05-17 ENCOUNTER — OFFICE VISIT (OUTPATIENT)
Dept: MEDICAL GROUP | Facility: MEDICAL CENTER | Age: 66
End: 2021-05-17
Payer: MEDICARE

## 2021-05-17 VITALS
SYSTOLIC BLOOD PRESSURE: 130 MMHG | OXYGEN SATURATION: 92 % | TEMPERATURE: 97.6 F | HEIGHT: 63 IN | DIASTOLIC BLOOD PRESSURE: 90 MMHG | WEIGHT: 293 LBS | BODY MASS INDEX: 51.91 KG/M2 | HEART RATE: 108 BPM

## 2021-05-17 DIAGNOSIS — Z79.899 HIGH RISK MEDICATION USE: ICD-10-CM

## 2021-05-17 DIAGNOSIS — E53.8 B12 DEFICIENCY: ICD-10-CM

## 2021-05-17 DIAGNOSIS — M15.9 PRIMARY OSTEOARTHRITIS INVOLVING MULTIPLE JOINTS: ICD-10-CM

## 2021-05-17 DIAGNOSIS — G89.29 CHRONIC BILATERAL LOW BACK PAIN WITH RIGHT-SIDED SCIATICA: ICD-10-CM

## 2021-05-17 DIAGNOSIS — M54.41 CHRONIC BILATERAL LOW BACK PAIN WITH RIGHT-SIDED SCIATICA: ICD-10-CM

## 2021-05-17 PROCEDURE — 99214 OFFICE O/P EST MOD 30 MIN: CPT | Performed by: NURSE PRACTITIONER

## 2021-05-17 RX ORDER — CELECOXIB 200 MG/1
200 CAPSULE ORAL 2 TIMES DAILY
Qty: 30 CAPSULE | Refills: 2 | Status: SHIPPED | OUTPATIENT
Start: 2021-05-17 | End: 2021-05-24 | Stop reason: SDUPTHER

## 2021-05-17 RX ORDER — HYDROCODONE BITARTRATE AND ACETAMINOPHEN 7.5; 325 MG/1; MG/1
1 TABLET ORAL EVERY 6 HOURS PRN
Qty: 60 TABLET | Refills: 0 | Status: SHIPPED | OUTPATIENT
Start: 2021-07-23 | End: 2021-08-18 | Stop reason: SDUPTHER

## 2021-05-17 RX ORDER — HYDROCODONE BITARTRATE AND ACETAMINOPHEN 7.5; 325 MG/1; MG/1
1 TABLET ORAL EVERY 6 HOURS PRN
Qty: 60 TABLET | Refills: 0 | Status: SHIPPED | OUTPATIENT
Start: 2021-05-24 | End: 2021-05-17 | Stop reason: SDUPTHER

## 2021-05-17 RX ORDER — HYDROCODONE BITARTRATE AND ACETAMINOPHEN 7.5; 325 MG/1; MG/1
1 TABLET ORAL EVERY 6 HOURS PRN
Qty: 60 TABLET | Refills: 0 | Status: SHIPPED | OUTPATIENT
Start: 2021-06-23 | End: 2021-05-17 | Stop reason: SDUPTHER

## 2021-05-17 ASSESSMENT — FIBROSIS 4 INDEX: FIB4 SCORE: 1.35

## 2021-05-17 NOTE — PROGRESS NOTES
Subjective:     Rain Sanabria is a 66 y.o. female who presents with OA.    HPI:   Seen in f/u for OA.  She was recently seen  In  ER for left wrist jpain.  She woke up and it was bruised.  She was concerned about DVT.  They r/o.  Told her it was OA.  They told her to take aleve. She uses the norco at nite for pain to help her sleep.   Her last lab showed low b12.  She is now on otc supplement.   She has lost wt after her bariatric surgery in 2003.  She has gained all wt back.  Gained 28 lbs since feb.  That is making her joitn pain worse.  She is using her norco at nite.  Pain is not controlled.  Need pain med refill.  Taking med approp. UDS and contract are up to date.      Patient Active Problem List    Diagnosis Date Noted   • Morbid obesity with BMI of 50.0-59.9, adult ([facility]) 03/22/2021   • B12 deficiency 02/05/2020   • Dental disorder    • Alcohol use 01/26/2018   • Chronic right shoulder pain 01/06/2016   • Hyperlipidemia    • Depression with anxiety    • Vitamin D deficiency    • Abdominal hernia    • Sleep apnea 07/12/2010   • OA (osteoarthritis) 07/12/2010   • HTN (hypertension) 01/20/2010   • Genital herpes simplex 06/19/2009   • Insomnia 06/19/2009   • Migraine 06/19/2009   • Abnormal mammogram 06/19/2009   • Cystocele 06/19/2009       Current medicines (including changes today)  Current Outpatient Medications   Medication Sig Dispense Refill   • celecoxib (CELEBREX) 200 MG Cap Take 1 capsule by mouth 2 times a day. 30 capsule 2   • [START ON 7/23/2021] HYDROcodone-acetaminophen (NORCO) 7.5-325 MG per tablet Take 1 tablet by mouth every 6 hours as needed for up to 30 days. 60 tablet 0   • benzonatate (TESSALON) 100 MG Cap TAKE 1 CAPSULE BY MOUTH THREE TIMES A DAY AS NEEDED 30 capsule 0   • buPROPion (WELLBUTRIN) 75 MG Tab Take 1 tablet by mouth 2 times a day. 180 tablet 1   • furosemide (LASIX) 20 MG Tab Take 1 tablet by mouth every 24 hours as needed. 30 tablet 3   • potassium chloride ER  (KLOR-CON) 10 MEQ tablet Take 1 tablet by mouth every 24 hours as needed. 30 tablet 3   • amLODIPine (NORVASC) 5 MG Tab TAKE 1 TABLET EVERY DAY 90 Tab 0   • DULoxetine (CYMBALTA) 60 MG Cap DR Particles delayed-release capsule TAKE 1 CAPSULE EVERY EVENING 90 Cap 0   • DULoxetine (CYMBALTA) 30 MG Cap DR Particles TAKE 1 CAPSULE EVERY DAY 90 Cap 0   • losartan (COZAAR) 100 MG Tab TAKE 1 TABLET EVERY DAY 90 Tab 3   • albuterol (VENTOLIN HFA) 108 (90 Base) MCG/ACT Aero Soln inhalation aerosol INHALE 2 PUFFS BY MOUTH EVERY FOUR HOURS AS NEEDED FOR SHORTNESS OF BREATH. DISPENSE A SPACER 1 Each 1   • lidocaine (LIDODERM) 5 % Patch Apply 1 Patch to skin as directed every 24 hours. 30 Patch 3   • fluticasone (FLONASE) 50 MCG/ACT nasal spray Spray 2 Sprays in nose every day. 1 Bottle 0   • sodium chloride (OCEAN NASAL SPRAY) 0.65 % Solution Spray 2 Sprays in nose every 2 hours as needed for Congestion. 1 Bottle 0   • methocarbamol (ROBAXIN) 750 MG Tab Take 2 Tabs by mouth 4 times a day as needed (muscle spasm). 20 Tab 0   • diclofenac EC (VOLTAREN) 75 MG Tablet Delayed Response Take 75 mg by mouth 2 times a day.     • ibuprofen (MOTRIN) 600 MG Tab Take 1 Tab by mouth every 6 hours as needed. 20 Tab 0   • ascorbic acid (ASCORBIC ACID) 500 MG Tab Take 3,000 mg by mouth every day.     • vitamin E 100 UNIT capsule Take 300 Units by mouth every day.     • Multiple Vitamins-Minerals (ENERGY BOOSTER PO) Take 80 mg by mouth. With caffiene     • glucosamine Sulfate 500 MG Cap Take 1,500 mg by mouth every day.     • Cholecalciferol (VITAMIN D) 400 UNIT Tab Take 1,600 Units by mouth every day.     • B Complex Vitamins (VITAMIN B COMPLEX PO) Take 20 mg by mouth every day.     • Multiple Vitamin (MULTIVITAMIN PO) Take 1 Tab by mouth every day.       No current facility-administered medications for this visit.       Allergies   Allergen Reactions   • Ace Inhibitors      Ace cough   • Tape Rash and Itching     Adhesive tape.  PAPER TAPE OK.  "      ROS  Constitutional: Negative. Negative for fever, chills, weight loss, malaise/fatigue and diaphoresis.   HENT: Negative. Negative for hearing loss, ear pain, nosebleeds, congestion, sore throat, neck pain, tinnitus and ear discharge.   Respiratory: Negative. Negative for cough, hemoptysis, sputum production, shortness of breath, wheezing and stridor.   Cardiovascular: Negative. Negative for chest pain, palpitations, orthopnea, claudication, leg swelling and PND.   Gastrointestinal: Denies nausea, vomiting, diarrhea, constipation, heartburn, melena or hematochezia.  Genitourinary: Denies dysuria, hematuria, urinary incontinence, frequency or urgency.        Objective:     /90 (BP Location: Right arm, Patient Position: Sitting)   Pulse (!) 108   Temp 36.4 °C (97.6 °F) (Temporal)   Ht 1.6 m (5' 3\")   Wt (!) 134 kg (295 lb)   SpO2 92%  Body mass index is 52.26 kg/m².    Physical Exam:  Vitals reviewed.  Constitutional: Oriented to person, place, and time. appears well-developed and well-nourished. No distress.   Cardiovascular: Normal rate, regular rhythm, normal heart sounds and intact distal pulses. Exam reveals no gallop and no friction rub. No murmur heard. No carotid bruits.   Pulmonary/Chest: Effort normal and breath sounds normal. No stridor. No respiratory distress. no wheezes or rales. exhibits no tenderness.   Musculoskeletal: Normal range of motion. exhibits no edema. daivn pedal pulses 2+.  Lymphadenopathy: No cervical or supraclavicular adenopathy.   Neurological: Alert and oriented to person, place, and time. exhibits normal muscle tone.  Skin: Skin is warm and dry. No diaphoresis.   Psychiatric: Normal mood and affect. Behavior is normal.      Assessment and Plan:     The following treatment plan was discussed:    1. Chronic bilateral low back pain with right-sided sciatica  celecoxib (CELEBREX) 200 MG Cap    HYDROcodone-acetaminophen (NORCO) 7.5-325 MG per tablet    DISCONTINUED: " HYDROcodone-acetaminophen (NORCO) 7.5-325 MG per tablet    DISCONTINUED: HYDROcodone-acetaminophen (NORCO) 7.5-325 MG per tablet    refill pain meds for 3 months.  taking meds approp.  UDS and contract are up to date.     2. Primary osteoarthritis involving multiple joints  celecoxib (CELEBREX) 200 MG Cap    HYDROcodone-acetaminophen (NORCO) 7.5-325 MG per tablet    DISCONTINUED: HYDROcodone-acetaminophen (NORCO) 7.5-325 MG per tablet    DISCONTINUED: HYDROcodone-acetaminophen (NORCO) 7.5-325 MG per tablet    continue norco and add celebrex for pain control   3. BMI 50.0-59.9, adult ([facility])  REFERRAL TO BARIATRIC SURGERY    refer back to bariatric surgery for discussion of further surgery for wt loss.  already had initial surgery 2003.   4. High risk medication use  Comp Metabolic Panel    MICROALBUMIN CREAT RATIO URINE    recheck CMP, alb/cr ratio in 1 mo after starting celebrex for OA pain   5. B12 deficiency      stable on otc supplement         Followup: Return in about 3 months (around 8/17/2021).

## 2021-05-24 ENCOUNTER — TELEPHONE (OUTPATIENT)
Dept: MEDICAL GROUP | Facility: MEDICAL CENTER | Age: 66
End: 2021-05-24

## 2021-05-24 DIAGNOSIS — M54.41 CHRONIC BILATERAL LOW BACK PAIN WITH RIGHT-SIDED SCIATICA: ICD-10-CM

## 2021-05-24 DIAGNOSIS — M15.9 PRIMARY OSTEOARTHRITIS INVOLVING MULTIPLE JOINTS: ICD-10-CM

## 2021-05-24 DIAGNOSIS — G89.29 CHRONIC BILATERAL LOW BACK PAIN WITH RIGHT-SIDED SCIATICA: ICD-10-CM

## 2021-05-24 RX ORDER — CELECOXIB 200 MG/1
200 CAPSULE ORAL 2 TIMES DAILY
Qty: 30 CAPSULE | Refills: 2 | Status: SHIPPED | OUTPATIENT
Start: 2021-05-24 | End: 2021-09-15 | Stop reason: SDUPTHER

## 2021-05-24 RX ORDER — CELECOXIB 200 MG/1
200 CAPSULE ORAL 2 TIMES DAILY
Qty: 30 CAPSULE | Refills: 2 | Status: SHIPPED
Start: 2021-05-24 | End: 2022-06-03

## 2021-07-30 DIAGNOSIS — I10 ESSENTIAL HYPERTENSION: ICD-10-CM

## 2021-07-30 DIAGNOSIS — F32.89 OTHER DEPRESSION: ICD-10-CM

## 2021-08-02 ENCOUNTER — TELEPHONE (OUTPATIENT)
Dept: MEDICAL GROUP | Facility: MEDICAL CENTER | Age: 66
End: 2021-08-02

## 2021-08-02 RX ORDER — DULOXETIN HYDROCHLORIDE 30 MG/1
CAPSULE, DELAYED RELEASE ORAL
Qty: 90 CAPSULE | Refills: 0 | Status: SHIPPED | OUTPATIENT
Start: 2021-08-02 | End: 2021-09-29

## 2021-08-02 RX ORDER — AMLODIPINE BESYLATE 5 MG/1
TABLET ORAL
Qty: 90 TABLET | Refills: 0 | Status: SHIPPED | OUTPATIENT
Start: 2021-08-02 | End: 2021-08-18

## 2021-08-04 ENCOUNTER — HOSPITAL ENCOUNTER (EMERGENCY)
Facility: MEDICAL CENTER | Age: 66
End: 2021-08-04
Attending: EMERGENCY MEDICINE
Payer: MEDICARE

## 2021-08-04 ENCOUNTER — APPOINTMENT (OUTPATIENT)
Dept: RADIOLOGY | Facility: MEDICAL CENTER | Age: 66
End: 2021-08-04
Attending: EMERGENCY MEDICINE
Payer: MEDICARE

## 2021-08-04 VITALS
WEIGHT: 293 LBS | SYSTOLIC BLOOD PRESSURE: 175 MMHG | DIASTOLIC BLOOD PRESSURE: 80 MMHG | HEIGHT: 63 IN | OXYGEN SATURATION: 98 % | HEART RATE: 80 BPM | BODY MASS INDEX: 51.91 KG/M2 | RESPIRATION RATE: 16 BRPM | TEMPERATURE: 98 F

## 2021-08-04 DIAGNOSIS — R06.02 SHORTNESS OF BREATH: ICD-10-CM

## 2021-08-04 LAB
ALBUMIN SERPL BCP-MCNC: 3.9 G/DL (ref 3.2–4.9)
ALBUMIN/GLOB SERPL: 1.1 G/DL
ALP SERPL-CCNC: 97 U/L (ref 30–99)
ALT SERPL-CCNC: 30 U/L (ref 2–50)
ANION GAP SERPL CALC-SCNC: 13 MMOL/L (ref 7–16)
AST SERPL-CCNC: 27 U/L (ref 12–45)
BASOPHILS # BLD AUTO: 0.7 % (ref 0–1.8)
BASOPHILS # BLD: 0.06 K/UL (ref 0–0.12)
BILIRUB SERPL-MCNC: 0.3 MG/DL (ref 0.1–1.5)
BUN SERPL-MCNC: 14 MG/DL (ref 8–22)
CALCIUM SERPL-MCNC: 9.4 MG/DL (ref 8.5–10.5)
CHLORIDE SERPL-SCNC: 106 MMOL/L (ref 96–112)
CO2 SERPL-SCNC: 23 MMOL/L (ref 20–33)
CREAT SERPL-MCNC: 0.7 MG/DL (ref 0.5–1.4)
EKG IMPRESSION: NORMAL
EKG IMPRESSION: NORMAL
EOSINOPHIL # BLD AUTO: 0.33 K/UL (ref 0–0.51)
EOSINOPHIL NFR BLD: 3.7 % (ref 0–6.9)
ERYTHROCYTE [DISTWIDTH] IN BLOOD BY AUTOMATED COUNT: 53.6 FL (ref 35.9–50)
FLUAV RNA SPEC QL NAA+PROBE: NEGATIVE
FLUBV RNA SPEC QL NAA+PROBE: NEGATIVE
GLOBULIN SER CALC-MCNC: 3.4 G/DL (ref 1.9–3.5)
GLUCOSE SERPL-MCNC: 99 MG/DL (ref 65–99)
HCT VFR BLD AUTO: 41.6 % (ref 37–47)
HGB BLD-MCNC: 13.7 G/DL (ref 12–16)
IMM GRANULOCYTES # BLD AUTO: 0.04 K/UL (ref 0–0.11)
IMM GRANULOCYTES NFR BLD AUTO: 0.5 % (ref 0–0.9)
LIPASE SERPL-CCNC: 18 U/L (ref 11–82)
LYMPHOCYTES # BLD AUTO: 1.95 K/UL (ref 1–4.8)
LYMPHOCYTES NFR BLD: 22.1 % (ref 22–41)
MCH RBC QN AUTO: 32.9 PG (ref 27–33)
MCHC RBC AUTO-ENTMCNC: 32.9 G/DL (ref 33.6–35)
MCV RBC AUTO: 99.8 FL (ref 81.4–97.8)
MONOCYTES # BLD AUTO: 0.78 K/UL (ref 0–0.85)
MONOCYTES NFR BLD AUTO: 8.8 % (ref 0–13.4)
NEUTROPHILS # BLD AUTO: 5.66 K/UL (ref 2–7.15)
NEUTROPHILS NFR BLD: 64.2 % (ref 44–72)
NRBC # BLD AUTO: 0 K/UL
NRBC BLD-RTO: 0 /100 WBC
PLATELET # BLD AUTO: 262 K/UL (ref 164–446)
PMV BLD AUTO: 10.4 FL (ref 9–12.9)
POTASSIUM SERPL-SCNC: 4.4 MMOL/L (ref 3.6–5.5)
PROT SERPL-MCNC: 7.3 G/DL (ref 6–8.2)
RBC # BLD AUTO: 4.17 M/UL (ref 4.2–5.4)
RSV RNA SPEC QL NAA+PROBE: NEGATIVE
SARS-COV-2 RNA RESP QL NAA+PROBE: NOTDETECTED
SODIUM SERPL-SCNC: 142 MMOL/L (ref 135–145)
SPECIMEN SOURCE: NORMAL
TROPONIN T SERPL-MCNC: 8 NG/L (ref 6–19)
WBC # BLD AUTO: 8.8 K/UL (ref 4.8–10.8)

## 2021-08-04 PROCEDURE — 99283 EMERGENCY DEPT VISIT LOW MDM: CPT

## 2021-08-04 PROCEDURE — 71045 X-RAY EXAM CHEST 1 VIEW: CPT

## 2021-08-04 PROCEDURE — C9803 HOPD COVID-19 SPEC COLLECT: HCPCS | Performed by: EMERGENCY MEDICINE

## 2021-08-04 PROCEDURE — 85025 COMPLETE CBC W/AUTO DIFF WBC: CPT

## 2021-08-04 PROCEDURE — 83690 ASSAY OF LIPASE: CPT

## 2021-08-04 PROCEDURE — 93005 ELECTROCARDIOGRAM TRACING: CPT | Performed by: EMERGENCY MEDICINE

## 2021-08-04 PROCEDURE — 0241U HCHG SARS-COV-2 COVID-19 NFCT DS RESP RNA 4 TRGT MIC: CPT

## 2021-08-04 PROCEDURE — 84484 ASSAY OF TROPONIN QUANT: CPT

## 2021-08-04 PROCEDURE — 80053 COMPREHEN METABOLIC PANEL: CPT

## 2021-08-04 ASSESSMENT — FIBROSIS 4 INDEX: FIB4 SCORE: 1.35

## 2021-08-04 NOTE — TELEPHONE ENCOUNTER
Pt states she wants to wait until 8/18 at her appt with you. She says she gets through phases where she gets coughing attacks where she cough until she throws up and gets dizzy and migraines. She is using inhaler, allergy pills, and was using tessalon pearls that she had but is now out of that. She wants to know if this sounds like whooping cough and if she will be fine waiting until her appt. I suggested she goes to  asap however she declined and insisted I ask Esme if it was okay to wait until appointment in 2 weeks. Please advise.

## 2021-08-04 NOTE — TELEPHONE ENCOUNTER
No not ok to wait.  It could be whooping cough.  In that case we need to know if that is going around so the public can be warned of an outbreak.   If its not whooping cough then she needs to be evaluated for covid &/or pneumonia.  She needs to go to  today!!!

## 2021-08-05 NOTE — DISCHARGE INSTRUCTIONS
Chest x-ray is clear, Covid is negative.  Been placed on an antibiotic to see if this may improve the cough.  Please take it as prescribed.  Return if your symptoms change or worsen.

## 2021-08-05 NOTE — ED TRIAGE NOTES
Rain Sanabria  66 y.o. female  Chief Complaint   Patient presents with   • Cough     Onset x2wks. Pt states productive cough w/ clear thick sputum. Intermittent attacks 5-6 xdays.       Pt ambulatory to triage with steady gait for above complaint.   Pt is alert and oriented, speaking in full sentences, follows commands and responds appropriately to questions. Resp are even and unlabored.   Pt placed in lobby. Pt educated on triage process. Pt encouraged to alert staff for any changes. This RN masked and in appropriate PPE during encounter.

## 2021-08-05 NOTE — ED PROVIDER NOTES
ED Provider  Scribed for Severino Padilla D.O. by Brayan Dejesus. 8/4/2021  7:26 PM    Means of arrival: walk in  History obtained from: patient  History limited by: none    CHIEF COMPLAINT  Chief Complaint   Patient presents with    Cough     Onset x2wks. Pt states productive cough w/ clear thick sputum. Intermittent attacks 5-6 xdays.       HPI  Rain Sanabria is a 66 y.o. female who presents for a cough onset 2 weeks ago. She reports associated post-tussive emesis with clear, white sputum, as well as associated subjective fever. No alleviating factors noted. Per the patient, she has been having 5-6 attacks per day. No chills, new shortness of breath, nausea, or vomiting. She spoke with her PCP, who advised going to urgent care for further evaluation. She reportedly comes to the ED instead because she could not find an urgent care that would take her insurance.    REVIEW OF SYSTEMS  See HPI for further details. All other systems are negative.     PAST MEDICAL HISTORY   has a past medical history of Abdominal hernia, Abnormal mammogram (6/19/2009), Alcohol use (01/26/2018), B12 deficiency (02/05/2020), Chronic right shoulder pain, Cystocele (6/19/2009), Dental disorder, Depression with anxiety (01/26/2018), Genital herpes (6/19/2009), Hyperlipidemia, Hypertension (01/26/2018), Insomnia (6/19/2009), Migraine (06/19/2009), Morbid obesity with BMI of 45.0-49.9, adult (HCC) (2/22/2017), OA (osteoarthritis), Sleep apnea, and Vitamin D deficiency.    SOCIAL HISTORY  Social History     Tobacco Use    Smoking status: Never Smoker    Smokeless tobacco: Never Used   Vaping Use    Vaping Use: Never used   Substance and Sexual Activity    Alcohol use: Yes     Alcohol/week: 4.2 oz     Types: 7 Standard drinks or equivalent per week     Comment: couple times per week    Drug use: No    Sexual activity: None noted       SURGICAL HISTORY   has a past surgical history that includes tubal ligation (Bilateral, 1985); gastric  bypass laparoscopic (02/2003); hernia repair (2004); toe arthroplasty (Bilateral, 1990'S); rhinoplasty (07/2004); breast biopsy (Left, 1996); hysteroscopy thermal ablation (early 2000's); rotator cuff repair (Right, 11/24/2009); colonoscopy (2015); primary c section (1984); and knee arthroplasty total (Left, 2/12/2018).    CURRENT MEDICATIONS  Home Medications       Reviewed by Leonidas Aguirre R.N. (Registered Nurse) on 08/04/21 at 1713  Med List Status: Partial     Medication Last Dose Status   albuterol (VENTOLIN HFA) 108 (90 Base) MCG/ACT Aero Soln inhalation aerosol  Active   amLODIPine (NORVASC) 5 MG Tab  Active   ascorbic acid (ASCORBIC ACID) 500 MG Tab  Active   B Complex Vitamins (VITAMIN B COMPLEX PO)  Active   benzonatate (TESSALON) 100 MG Cap  Active   buPROPion (WELLBUTRIN) 75 MG Tab  Active   celecoxib (CELEBREX) 200 MG Cap  Active   celecoxib (CELEBREX) 200 MG Cap  Active   Cholecalciferol (VITAMIN D) 400 UNIT Tab  Active   diclofenac EC (VOLTAREN) 75 MG Tablet Delayed Response  Active   DULoxetine (CYMBALTA) 30 MG Cap DR Particles  Active   DULoxetine (CYMBALTA) 60 MG Cap DR Particles delayed-release capsule  Active   fluticasone (FLONASE) 50 MCG/ACT nasal spray  Active   furosemide (LASIX) 20 MG Tab  Active   glucosamine Sulfate 500 MG Cap  Active   HYDROcodone-acetaminophen (NORCO) 7.5-325 MG per tablet  Active   ibuprofen (MOTRIN) 600 MG Tab  Active   lidocaine (LIDODERM) 5 % Patch  Active   losartan (COZAAR) 100 MG Tab  Active   methocarbamol (ROBAXIN) 750 MG Tab  Active   Multiple Vitamin (MULTIVITAMIN PO)  Active   Multiple Vitamins-Minerals (ENERGY BOOSTER PO)  Active   potassium chloride ER (KLOR-CON) 10 MEQ tablet  Active   sodium chloride (OCEAN NASAL SPRAY) 0.65 % Solution  Active   vitamin E 100 UNIT capsule  Active                    ALLERGIES  Allergies   Allergen Reactions    Ace Inhibitors      Ace cough    Tape Rash and Itching     Adhesive tape.  PAPER TAPE OK.       PHYSICAL  "EXAM  VITAL SIGNS: BP (!) 193/93   Pulse (!) 102   Temp 36.6 °C (97.9 °F) (Oral)   Resp 18   Ht 1.6 m (5' 3\")   Wt (!) 136 kg (299 lb 6.2 oz)   SpO2 95%   BMI 53.03 kg/m²   Constitutional: Alert in no apparent distress.  HENT: No signs of trauma, mucous membranes are moist  Eyes: Conjunctiva normal, Non-icteric.   Neck: Normal range of motion, No tenderness, Supple.  Lymphatic: No lymphadenopathy noted.   Cardiovascular: Regular rate and rhythm, no murmurs.   Thorax & Lungs: Normal breath sounds, No respiratory distress, No wheezing, No chest tenderness.   Abdomen: Bowel sounds normal, Soft, No tenderness, No masses, No pulsatile masses. No peritoneal signs.  Skin: Warm, Dry, normal color.   Back: No bony tenderness, No CVA tenderness.   Extremities: No edema, No tenderness, No cyanosis  Musculoskeletal: Good range of motion in all major joints. No tenderness to palpation or major deformities noted.   Neurologic: Alert and oriented x4, Normal motor function, Normal sensory function, No focal deficits noted.   Psychiatric: Affect normal, Judgment normal, Mood normal.     DIAGNOSTIC STUDIES / PROCEDURES    EKG  12 Lead EKG interpreted by me shown below.    LABS  Results for orders placed or performed during the hospital encounter of 08/04/21   CBC w/ Differential   Result Value Ref Range    WBC 8.8 4.8 - 10.8 K/uL    RBC 4.17 (L) 4.20 - 5.40 M/uL    Hemoglobin 13.7 12.0 - 16.0 g/dL    Hematocrit 41.6 37.0 - 47.0 %    MCV 99.8 (H) 81.4 - 97.8 fL    MCH 32.9 27.0 - 33.0 pg    MCHC 32.9 (L) 33.6 - 35.0 g/dL    RDW 53.6 (H) 35.9 - 50.0 fL    Platelet Count 262 164 - 446 K/uL    MPV 10.4 9.0 - 12.9 fL    Neutrophils-Polys 64.20 44.00 - 72.00 %    Lymphocytes 22.10 22.00 - 41.00 %    Monocytes 8.80 0.00 - 13.40 %    Eosinophils 3.70 0.00 - 6.90 %    Basophils 0.70 0.00 - 1.80 %    Immature Granulocytes 0.50 0.00 - 0.90 %    Nucleated RBC 0.00 /100 WBC    Neutrophils (Absolute) 5.66 2.00 - 7.15 K/uL    Lymphs " (Absolute) 1.95 1.00 - 4.80 K/uL    Monos (Absolute) 0.78 0.00 - 0.85 K/uL    Eos (Absolute) 0.33 0.00 - 0.51 K/uL    Baso (Absolute) 0.06 0.00 - 0.12 K/uL    Immature Granulocytes (abs) 0.04 0.00 - 0.11 K/uL    NRBC (Absolute) 0.00 K/uL   Complete Metabolic Panel (CMP)   Result Value Ref Range    Sodium 142 135 - 145 mmol/L    Potassium 4.4 3.6 - 5.5 mmol/L    Chloride 106 96 - 112 mmol/L    Co2 23 20 - 33 mmol/L    Anion Gap 13.0 7.0 - 16.0    Glucose 99 65 - 99 mg/dL    Bun 14 8 - 22 mg/dL    Creatinine 0.70 0.50 - 1.40 mg/dL    Calcium 9.4 8.5 - 10.5 mg/dL    AST(SGOT) 27 12 - 45 U/L    ALT(SGPT) 30 2 - 50 U/L    Alkaline Phosphatase 97 30 - 99 U/L    Total Bilirubin 0.3 0.1 - 1.5 mg/dL    Albumin 3.9 3.2 - 4.9 g/dL    Total Protein 7.3 6.0 - 8.2 g/dL    Globulin 3.4 1.9 - 3.5 g/dL    A-G Ratio 1.1 g/dL   Troponin STAT   Result Value Ref Range    Troponin T 8 6 - 19 ng/L   Lipase   Result Value Ref Range    Lipase 18 11 - 82 U/L   COV-2, FLU A/B, AND RSV BY PCR (2-4 HOURS CEPHEID): Collect NP swab in VTM    Specimen: Respirate   Result Value Ref Range    Influenza virus A RNA Negative Negative    Influenza virus B, PCR Negative Negative    RSV, PCR Negative Negative    SARS-CoV-2 by PCR NotDetected     SARS-CoV-2 Source NP Swab    ESTIMATED GFR   Result Value Ref Range    GFR If African American >60 >60 mL/min/1.73 m 2    GFR If Non African American >60 >60 mL/min/1.73 m 2   EKG   Result Value Ref Range    Report       [facility]    Test Date:  2021  Pt Name:    COLLIN MEDRANO                  Department: ER  MRN:        [id number]                      Room:        26  Gender:     Female                       Technician: [id number]  :        [id number]                   Requested By:ER TRIAGE PROTOCOL  Order #:    [id number]                    Daniela MD: ARSLAN SHAIKH D.O.    Measurements  Intervals                                Axis  Rate:       92                            P:          45  KY:         160                          QRS:        58  QRSD:       86                           T:          17  QT:         344  QTc:        426    Interpretive Statements  SINUS RHYTHM  Compared to ECG 01/03/2020 00:13:32  No significant changes  Electronically Signed On 8-4-2021 21:36:51 PDT by ARSLAN SHAIKH D.O.         All labs reviewed by me.    RADIOLOGY  DX-CHEST-PORTABLE (1 VIEW)   Final Result      No acute cardiopulmonary abnormality identified.        The radiologist's interpretations of all radiological studies have been reviewed by me.    Films have been independently by me      COURSE  Pertinent Labs & Imaging studies reviewed. (See chart for details)    7:26 PM - Patient seen and examined at bedside. Discussed plan of care. Ordered for EKG, DX chest, COV-2 flu A/B and RSV by PCR, CBC w/, CMP, troponin, lipase to evaluate her symptoms.     9:33 PM - Patient seen at bedside. Discussed lab and imaging results with the patient and updated them on the plan of care, including discharge. I answered all questions regarding their care and discussed strict return precautions for new or changing symptoms. Patient verbalizes understanding and agreement to this plan of care.     MEDICAL DECISION MAKING  This is a 66 y.o. female who presents with cough for several weeks.  Her shortness of breath is only with a cough.  Otherwise she is able to function without difficulty.  She has no fevers.  She has had her Covid vaccinations and Covid test is negative.  Chest x-ray is negative.  Her cardiac evaluation is negative also.  The patient has isolated cough and will be treated with antibiotics.  As stated the shortness of breath is only with cough.    The patient will return for new or worsening symptoms and is stable at the time of discharge.    The patient is referred to a primary physician for blood pressure management, diabetic screening, and for all other preventative health  concerns.    DISPOSITION:  Patient will be discharged home in stable condition.    FOLLOW UP:  [facility]  [address]  [id number]  [place] [id number] [phone]    In 1 week      OUTPATIENT MEDICATIONS:  Discharge Medication List as of 8/4/2021  9:54 PM        START taking these medications    Details   azithromycin (ZITHROMAX) 100 MG/5ML Recon Susp Weight: (!) 136 kg (299 lb 6.2 oz) (08/04/21 1706) Take 25 mL by mouth on day 1 and then take 12.5 mL by mouth daily on days 2-5., Disp-15 mL, R-0, Normal               FINAL IMPRESSION  1. Shortness of breath         Brayan CARPENTER (Scribe), am scribing for, and in the presence of, Severino Padilla D.O..    Electronically signed by: Brayan Dejesus (Chengibcori), 8/4/2021    ISeverino D.O. personally performed the services described in this documentation, as scribed by Brayan Dejesus in my presence, and it is both accurate and complete. C    The note accurately reflects work and decisions made by me.  Severino Padilla D.O.  8/4/2021  11:00 PM

## 2021-08-05 NOTE — ED NOTES
DC instructions reviewed with pt. Pt verbalized understanding. Pt ambulated to [facility] with steady gait.

## 2021-08-18 ENCOUNTER — OFFICE VISIT (OUTPATIENT)
Dept: MEDICAL GROUP | Facility: MEDICAL CENTER | Age: 66
End: 2021-08-18
Payer: MEDICARE

## 2021-08-18 VITALS
TEMPERATURE: 96.9 F | SYSTOLIC BLOOD PRESSURE: 152 MMHG | HEART RATE: 103 BPM | HEIGHT: 63 IN | WEIGHT: 292.6 LBS | OXYGEN SATURATION: 96 % | DIASTOLIC BLOOD PRESSURE: 92 MMHG | BODY MASS INDEX: 51.84 KG/M2

## 2021-08-18 DIAGNOSIS — Z12.39 ENCOUNTER FOR SCREENING FOR MALIGNANT NEOPLASM OF BREAST, UNSPECIFIED SCREENING MODALITY: ICD-10-CM

## 2021-08-18 DIAGNOSIS — M54.41 CHRONIC BILATERAL LOW BACK PAIN WITH RIGHT-SIDED SCIATICA: ICD-10-CM

## 2021-08-18 DIAGNOSIS — M15.9 PRIMARY OSTEOARTHRITIS INVOLVING MULTIPLE JOINTS: ICD-10-CM

## 2021-08-18 DIAGNOSIS — I10 ESSENTIAL HYPERTENSION: ICD-10-CM

## 2021-08-18 DIAGNOSIS — G89.29 CHRONIC BILATERAL LOW BACK PAIN WITH RIGHT-SIDED SCIATICA: ICD-10-CM

## 2021-08-18 PROCEDURE — 99214 OFFICE O/P EST MOD 30 MIN: CPT | Performed by: NURSE PRACTITIONER

## 2021-08-18 RX ORDER — AMLODIPINE BESYLATE 10 MG/1
10 TABLET ORAL DAILY
Qty: 30 TABLET | Refills: 1 | Status: SHIPPED | OUTPATIENT
Start: 2021-08-18 | End: 2021-10-20

## 2021-08-18 RX ORDER — HYDROCODONE BITARTRATE AND ACETAMINOPHEN 7.5; 325 MG/1; MG/1
1 TABLET ORAL EVERY 6 HOURS PRN
Qty: 60 TABLET | Refills: 0 | Status: SHIPPED | OUTPATIENT
Start: 2021-09-29 | End: 2021-08-18 | Stop reason: SDUPTHER

## 2021-08-18 RX ORDER — HYDROCODONE BITARTRATE AND ACETAMINOPHEN 7.5; 325 MG/1; MG/1
1 TABLET ORAL EVERY 6 HOURS PRN
Qty: 60 TABLET | Refills: 0 | Status: SHIPPED | OUTPATIENT
Start: 2021-08-29 | End: 2021-08-18 | Stop reason: SDUPTHER

## 2021-08-18 RX ORDER — HYDROCODONE BITARTRATE AND ACETAMINOPHEN 7.5; 325 MG/1; MG/1
1 TABLET ORAL EVERY 6 HOURS PRN
Qty: 60 TABLET | Refills: 0 | Status: SHIPPED | OUTPATIENT
Start: 2021-10-29 | End: 2021-11-17 | Stop reason: SDUPTHER

## 2021-08-18 ASSESSMENT — FIBROSIS 4 INDEX: FIB4 SCORE: 1.26

## 2021-08-18 NOTE — PROGRESS NOTES
Subjective:     Rain Sanabria is a 66 y.o. female who presents with chronic pain.    HPI:   Seen in f/u for chronic pain.  She is due for UDS and controlled substance.  She is having rt knee and left shoulder pain.  She doesn't want any further surgery at this time.  She is taking her med approp.  Stable on meds.   Her bp is again elevated.  Was very elevated in recent ER visit for URI.  She is taking meds approp.    She is due mammo.    Patient Active Problem List    Diagnosis Date Noted   • Morbid obesity with BMI of 50.0-59.9, adult (HCC) 03/22/2021   • B12 deficiency 02/05/2020   • Dental disorder    • Alcohol use 01/26/2018   • Chronic right shoulder pain 01/06/2016   • Hyperlipidemia    • Depression with anxiety    • Vitamin D deficiency    • Abdominal hernia    • Sleep apnea 07/12/2010   • OA (osteoarthritis) 07/12/2010   • HTN (hypertension) 01/20/2010   • Genital herpes simplex 06/19/2009   • Insomnia 06/19/2009   • Migraine 06/19/2009   • Abnormal mammogram 06/19/2009   • Cystocele 06/19/2009       Current medicines (including changes today)  Current Outpatient Medications   Medication Sig Dispense Refill   • amLODIPine (NORVASC) 10 MG Tab Take 1 Tablet by mouth every day. 30 Tablet 1   • [START ON 10/29/2021] HYDROcodone-acetaminophen (NORCO) 7.5-325 MG per tablet Take 1 Tablet by mouth every 6 hours as needed for up to 30 days. 60 Tablet 0   • DULoxetine (CYMBALTA) 30 MG Cap DR Particles TAKE 1 CAPSULE EVERY DAY 90 capsule 0   • celecoxib (CELEBREX) 200 MG Cap Take 1 capsule by mouth 2 times a day. 30 capsule 2   • celecoxib (CELEBREX) 200 MG Cap Take 1 capsule by mouth 2 times a day. 30 capsule 2   • buPROPion (WELLBUTRIN) 75 MG Tab Take 1 tablet by mouth 2 times a day. 180 tablet 1   • furosemide (LASIX) 20 MG Tab Take 1 tablet by mouth every 24 hours as needed. 30 tablet 3   • potassium chloride ER (KLOR-CON) 10 MEQ tablet Take 1 tablet by mouth every 24 hours as needed. 30 tablet 3   •  DULoxetine (CYMBALTA) 60 MG Cap DR Particles delayed-release capsule TAKE 1 CAPSULE EVERY EVENING 90 Cap 0   • losartan (COZAAR) 100 MG Tab TAKE 1 TABLET EVERY DAY 90 Tab 3   • albuterol (VENTOLIN HFA) 108 (90 Base) MCG/ACT Aero Soln inhalation aerosol INHALE 2 PUFFS BY MOUTH EVERY FOUR HOURS AS NEEDED FOR SHORTNESS OF BREATH. DISPENSE A SPACER 1 Each 1   • lidocaine (LIDODERM) 5 % Patch Apply 1 Patch to skin as directed every 24 hours. 30 Patch 3   • fluticasone (FLONASE) 50 MCG/ACT nasal spray Spray 2 Sprays in nose every day. 1 Bottle 0   • sodium chloride (OCEAN NASAL SPRAY) 0.65 % Solution Spray 2 Sprays in nose every 2 hours as needed for Congestion. 1 Bottle 0   • methocarbamol (ROBAXIN) 750 MG Tab Take 2 Tabs by mouth 4 times a day as needed (muscle spasm). 20 Tab 0   • diclofenac EC (VOLTAREN) 75 MG Tablet Delayed Response Take 75 mg by mouth 2 times a day.     • ibuprofen (MOTRIN) 600 MG Tab Take 1 Tab by mouth every 6 hours as needed. 20 Tab 0   • ascorbic acid (ASCORBIC ACID) 500 MG Tab Take 3,000 mg by mouth every day.     • vitamin E 100 UNIT capsule Take 300 Units by mouth every day.     • Multiple Vitamins-Minerals (ENERGY BOOSTER PO) Take 80 mg by mouth. With caffiene     • glucosamine Sulfate 500 MG Cap Take 1,500 mg by mouth every day.     • Cholecalciferol (VITAMIN D) 400 UNIT Tab Take 1,600 Units by mouth every day.     • B Complex Vitamins (VITAMIN B COMPLEX PO) Take 20 mg by mouth every day.     • Multiple Vitamin (MULTIVITAMIN PO) Take 1 Tab by mouth every day.       No current facility-administered medications for this visit.       Allergies   Allergen Reactions   • Ace Inhibitors      Ace cough   • Tape Rash and Itching     Adhesive tape.  PAPER TAPE OK.       ROS  Constitutional: Negative. Negative for fever, chills, weight loss, malaise/fatigue and diaphoresis.   HENT: Negative. Negative for hearing loss, ear pain, nosebleeds, congestion, sore throat, neck pain, tinnitus and ear  "discharge.   Respiratory: Negative. Negative for cough, hemoptysis, sputum production, shortness of breath, wheezing and stridor.   Cardiovascular: Negative. Negative for chest pain, palpitations, orthopnea, claudication, leg swelling and PND.   Gastrointestinal: Denies nausea, vomiting, diarrhea, constipation, heartburn, melena or hematochezia.  Genitourinary: Denies dysuria, hematuria, urinary incontinence, frequency or urgency.        Objective:     /92 (BP Location: Right arm, Patient Position: Sitting)   Pulse (!) 103   Temp 36.1 °C (96.9 °F) (Temporal)   Ht 1.6 m (5' 3\")   Wt (!) 133 kg (292 lb 9.6 oz)   SpO2 96%  Body mass index is 51.83 kg/m².    Physical Exam:  Vitals reviewed.  Constitutional: Oriented to person, place, and time. appears well-developed and well-nourished. No distress.   Cardiovascular: Normal rate, regular rhythm, normal heart sounds and intact distal pulses. Exam reveals no gallop and no friction rub. No murmur heard. No carotid bruits.   Pulmonary/Chest: Effort normal and breath sounds normal. No stridor. No respiratory distress. no wheezes or rales. exhibits no tenderness.   Musculoskeletal: amb slowly with cane. exhibits 1+ davin pedal edema. davin pedal pulses 2+.  Lymphadenopathy: No cervical or supraclavicular adenopathy.   Neurological: Alert and oriented to person, place, and time. exhibits normal muscle tone.  Skin: Skin is warm and dry. No diaphoresis.   Psychiatric: Normal mood and affect. Behavior is normal.      Assessment and Plan:     The following treatment plan was discussed:    1. Essential hypertension      bp not controlled.  inc norvasc to 10 mg daily.  f/u 1 mo for bp check   2. Chronic bilateral low back pain with right-sided sciatica  Controlled Substance Treatment Agreement    Pain Management Screen    HYDROcodone-acetaminophen (NORCO) 7.5-325 MG per tablet    DISCONTINUED: HYDROcodone-acetaminophen (NORCO) 7.5-325 MG per tablet    DISCONTINUED: " HYDROcodone-acetaminophen (NORCO) 7.5-325 MG per tablet    refill pain meds for 3 months.  taking meds approp.  UDS and contract up dated today.     3. Primary osteoarthritis involving multiple joints  Controlled Substance Treatment Agreement    Pain Management Screen    HYDROcodone-acetaminophen (NORCO) 7.5-325 MG per tablet    DISCONTINUED: HYDROcodone-acetaminophen (NORCO) 7.5-325 MG per tablet    DISCONTINUED: HYDROcodone-acetaminophen (NORCO) 7.5-325 MG per tablet    continue norco and add celebrex for pain control   4. Encounter for screening for malignant neoplasm of breast, unspecified screening modality  MA-SCREENING MAMMO BILAT W/CAD         Followup: Return in about 4 weeks (around 9/15/2021).

## 2021-09-15 ENCOUNTER — OFFICE VISIT (OUTPATIENT)
Dept: MEDICAL GROUP | Facility: MEDICAL CENTER | Age: 66
End: 2021-09-15
Payer: MEDICARE

## 2021-09-15 VITALS
DIASTOLIC BLOOD PRESSURE: 90 MMHG | SYSTOLIC BLOOD PRESSURE: 146 MMHG | HEIGHT: 63 IN | BODY MASS INDEX: 51.91 KG/M2 | OXYGEN SATURATION: 93 % | TEMPERATURE: 96.9 F | HEART RATE: 109 BPM | WEIGHT: 293 LBS

## 2021-09-15 DIAGNOSIS — F33.1 MODERATE EPISODE OF RECURRENT MAJOR DEPRESSIVE DISORDER (HCC): ICD-10-CM

## 2021-09-15 DIAGNOSIS — I10 ESSENTIAL HYPERTENSION: ICD-10-CM

## 2021-09-15 DIAGNOSIS — M15.9 PRIMARY OSTEOARTHRITIS INVOLVING MULTIPLE JOINTS: ICD-10-CM

## 2021-09-15 PROCEDURE — 99214 OFFICE O/P EST MOD 30 MIN: CPT | Performed by: NURSE PRACTITIONER

## 2021-09-15 RX ORDER — DOXAZOSIN MESYLATE 1 MG/1
1 TABLET ORAL DAILY
Qty: 30 TABLET | Refills: 1 | Status: SHIPPED | OUTPATIENT
Start: 2021-09-15 | End: 2021-10-20

## 2021-09-15 RX ORDER — CELECOXIB 200 MG/1
200 CAPSULE ORAL 2 TIMES DAILY
Qty: 180 CAPSULE | Refills: 2 | Status: SHIPPED | OUTPATIENT
Start: 2021-09-15 | End: 2021-12-21

## 2021-09-15 ASSESSMENT — FIBROSIS 4 INDEX: FIB4 SCORE: 1.26

## 2021-09-15 NOTE — PROGRESS NOTES
Subjective:     Rain Sanabria is a 66 y.o. female who presents with HTN.    HPI:   Seen in f/u for HTN.  She is seen today after norvasc was inc at last appt to 10 mg daily.   Not having any s/e to med. However, bp still high.  Doesn't check bp at home.  Also on losartan.  Taking meds approp.    She is on celebrex but was RF to wrong pharmacy.  Will resend to correct pharmacy  She is having lack of motivation.  She is not doing her housework.  She has not unpacked from when she moved in dec.  Her children thought that a dog would help.  She is on cymbalta 90 mg already.     Patient Active Problem List    Diagnosis Date Noted   • Morbid obesity with BMI of 50.0-59.9, adult (HCC) 03/22/2021   • B12 deficiency 02/05/2020   • Dental disorder    • Alcohol use 01/26/2018   • Chronic right shoulder pain 01/06/2016   • Hyperlipidemia    • Depression with anxiety    • Vitamin D deficiency    • Abdominal hernia    • Sleep apnea 07/12/2010   • OA (osteoarthritis) 07/12/2010   • HTN (hypertension) 01/20/2010   • Genital herpes simplex 06/19/2009   • Insomnia 06/19/2009   • Migraine 06/19/2009   • Abnormal mammogram 06/19/2009   • Cystocele 06/19/2009       Current medicines (including changes today)  Current Outpatient Medications   Medication Sig Dispense Refill   • doxazosin (CARDURA) 1 MG Tab Take 1 Tablet by mouth every day. 30 Tablet 1   • celecoxib (CELEBREX) 200 MG Cap Take 1 Capsule by mouth 2 times a day. 180 Capsule 2   • amLODIPine (NORVASC) 10 MG Tab Take 1 Tablet by mouth every day. 30 Tablet 1   • [START ON 10/29/2021] HYDROcodone-acetaminophen (NORCO) 7.5-325 MG per tablet Take 1 Tablet by mouth every 6 hours as needed for up to 30 days. 60 Tablet 0   • DULoxetine (CYMBALTA) 30 MG Cap DR Particles TAKE 1 CAPSULE EVERY DAY 90 capsule 0   • celecoxib (CELEBREX) 200 MG Cap Take 1 capsule by mouth 2 times a day. 30 capsule 2   • buPROPion (WELLBUTRIN) 75 MG Tab Take 1 tablet by mouth 2 times a day. 180 tablet  1   • furosemide (LASIX) 20 MG Tab Take 1 tablet by mouth every 24 hours as needed. 30 tablet 3   • potassium chloride ER (KLOR-CON) 10 MEQ tablet Take 1 tablet by mouth every 24 hours as needed. 30 tablet 3   • DULoxetine (CYMBALTA) 60 MG Cap DR Particles delayed-release capsule TAKE 1 CAPSULE EVERY EVENING 90 Cap 0   • losartan (COZAAR) 100 MG Tab TAKE 1 TABLET EVERY DAY 90 Tab 3   • albuterol (VENTOLIN HFA) 108 (90 Base) MCG/ACT Aero Soln inhalation aerosol INHALE 2 PUFFS BY MOUTH EVERY FOUR HOURS AS NEEDED FOR SHORTNESS OF BREATH. DISPENSE A SPACER 1 Each 1   • lidocaine (LIDODERM) 5 % Patch Apply 1 Patch to skin as directed every 24 hours. 30 Patch 3   • fluticasone (FLONASE) 50 MCG/ACT nasal spray Spray 2 Sprays in nose every day. 1 Bottle 0   • sodium chloride (OCEAN NASAL SPRAY) 0.65 % Solution Spray 2 Sprays in nose every 2 hours as needed for Congestion. 1 Bottle 0   • methocarbamol (ROBAXIN) 750 MG Tab Take 2 Tabs by mouth 4 times a day as needed (muscle spasm). 20 Tab 0   • diclofenac EC (VOLTAREN) 75 MG Tablet Delayed Response Take 75 mg by mouth 2 times a day.     • ibuprofen (MOTRIN) 600 MG Tab Take 1 Tab by mouth every 6 hours as needed. 20 Tab 0   • ascorbic acid (ASCORBIC ACID) 500 MG Tab Take 3,000 mg by mouth every day.     • vitamin E 100 UNIT capsule Take 300 Units by mouth every day.     • Multiple Vitamins-Minerals (ENERGY BOOSTER PO) Take 80 mg by mouth. With caffiene     • glucosamine Sulfate 500 MG Cap Take 1,500 mg by mouth every day.     • Cholecalciferol (VITAMIN D) 400 UNIT Tab Take 1,600 Units by mouth every day.     • B Complex Vitamins (VITAMIN B COMPLEX PO) Take 20 mg by mouth every day.     • Multiple Vitamin (MULTIVITAMIN PO) Take 1 Tab by mouth every day.       No current facility-administered medications for this visit.       Allergies   Allergen Reactions   • Ace Inhibitors      Ace cough   • Tape Rash and Itching     Adhesive tape.  PAPER TAPE OK.       ROS  Constitutional:  "Negative. Negative for fever, chills, weight loss, malaise/fatigue and diaphoresis.   HENT: Negative. Negative for hearing loss, ear pain, nosebleeds, congestion, sore throat, neck pain, tinnitus and ear discharge.   Respiratory: Negative. Negative for cough, hemoptysis, sputum production, shortness of breath, wheezing and stridor.   Cardiovascular: Negative. Negative for chest pain, palpitations, orthopnea, claudication, leg swelling and PND.   Gastrointestinal: Denies nausea, vomiting, diarrhea, constipation, heartburn, melena or hematochezia.  Genitourinary: Denies dysuria, hematuria, urinary incontinence, frequency or urgency.        Objective:     /90 (BP Location: Right arm, Patient Position: Sitting)   Pulse (!) 109   Temp 36.1 °C (96.9 °F) (Temporal)   Ht 1.6 m (5' 3\")   Wt (!) 135 kg (297 lb 6.4 oz)   SpO2 93%  Body mass index is 52.68 kg/m².    Physical Exam:  Vitals reviewed.  Constitutional: Oriented to person, place, and time. appears well-developed and well-nourished. No distress.   Cardiovascular: Normal rate, regular rhythm, normal heart sounds and intact distal pulses. Exam reveals no gallop and no friction rub. 3/6 holosystolic murmur heard. No carotid bruits.   Pulmonary/Chest: Effort normal and breath sounds normal. No stridor. No respiratory distress. no wheezes or rales. exhibits no tenderness.   Musculoskeletal: Normal range of motion. exhibits 2+ davin pedal edema. davin pedal pulses 2+.  Lymphadenopathy: No cervical or supraclavicular adenopathy.   Neurological: Alert and oriented to person, place, and time. exhibits normal muscle tone.  Skin: Skin is warm and dry. No diaphoresis.   Psychiatric: Normal mood and affect. Behavior is normal.      Assessment and Plan:     The following treatment plan was discussed:    1. Essential hypertension      not controlled on losartan and norvasc.  dc norvasc.  start cardura 1 mg daily.  f/u 1 mo for bp check and depression sx eval   2. Primary " osteoarthritis involving multiple joints  celecoxib (CELEBREX) 200 MG Cap    refill celebrex   3. Moderate episode of recurrent major depressive disorder (HCC)      for now continue cymbalta.  get a dog poss.  if not better in a month consider refer psych &/or chg med         Followup: Return in about 4 weeks (around 10/13/2021).

## 2021-10-04 ENCOUNTER — TELEPHONE (OUTPATIENT)
Dept: MEDICAL GROUP | Facility: MEDICAL CENTER | Age: 66
End: 2021-10-04

## 2021-10-20 ENCOUNTER — OFFICE VISIT (OUTPATIENT)
Dept: MEDICAL GROUP | Facility: MEDICAL CENTER | Age: 66
End: 2021-10-20
Payer: MEDICARE

## 2021-10-20 VITALS
OXYGEN SATURATION: 93 % | SYSTOLIC BLOOD PRESSURE: 150 MMHG | TEMPERATURE: 97.3 F | DIASTOLIC BLOOD PRESSURE: 90 MMHG | HEART RATE: 109 BPM | BODY MASS INDEX: 51.91 KG/M2 | HEIGHT: 63 IN | WEIGHT: 293 LBS

## 2021-10-20 DIAGNOSIS — I10 HYPERTENSION, UNCONTROLLED: ICD-10-CM

## 2021-10-20 DIAGNOSIS — Z23 NEED FOR INFLUENZA VACCINATION: ICD-10-CM

## 2021-10-20 DIAGNOSIS — R01.1 CARDIAC MURMUR: ICD-10-CM

## 2021-10-20 DIAGNOSIS — F33.1 MODERATE EPISODE OF RECURRENT MAJOR DEPRESSIVE DISORDER (HCC): ICD-10-CM

## 2021-10-20 PROCEDURE — G0008 ADMIN INFLUENZA VIRUS VAC: HCPCS | Performed by: NURSE PRACTITIONER

## 2021-10-20 PROCEDURE — 90662 IIV NO PRSV INCREASED AG IM: CPT | Performed by: NURSE PRACTITIONER

## 2021-10-20 PROCEDURE — 99214 OFFICE O/P EST MOD 30 MIN: CPT | Mod: 25 | Performed by: NURSE PRACTITIONER

## 2021-10-20 RX ORDER — DOXAZOSIN 2 MG/1
2 TABLET ORAL DAILY
Qty: 30 TABLET | Refills: 1 | Status: SHIPPED | OUTPATIENT
Start: 2021-10-20 | End: 2021-11-17

## 2021-10-20 ASSESSMENT — FIBROSIS 4 INDEX: FIB4 SCORE: 1.26

## 2021-10-20 NOTE — PROGRESS NOTES
Subjective:     Rain Sanabria is a 66 y.o. female who presents with HTN.    HPI:   Seen in f/u for HTN.  Her bp was not controlled at last appt.  She was also having swelling of her feet and legs with norvasc.  Her norvasc was stopped.  She was started on cardura 1 mg.  Today her swelling is improved but her bp is still elevated.    She is due flu shot.  Her depression is improved.  She feels that it was r/t weather and not able to get out and do stuff.  She feels that she is stable on med.  However she has only done laundry once in her last month.   She has a murmur.  Had echo ordered in feb but not done.  She will get it done at [facility].      Patient Active Problem List    Diagnosis Date Noted   • Morbid obesity with BMI of 50.0-59.9, adult (HCC) 03/22/2021   • B12 deficiency 02/05/2020   • Dental disorder    • Alcohol use 01/26/2018   • Chronic right shoulder pain 01/06/2016   • Hyperlipidemia    • Depression with anxiety    • Vitamin D deficiency    • Abdominal hernia    • Sleep apnea 07/12/2010   • OA (osteoarthritis) 07/12/2010   • HTN (hypertension) 01/20/2010   • Genital herpes simplex 06/19/2009   • Insomnia 06/19/2009   • Migraine 06/19/2009   • Abnormal mammogram 06/19/2009   • Cystocele 06/19/2009       Current medicines (including changes today)  Current Outpatient Medications   Medication Sig Dispense Refill   • doxazosin (CARDURA) 2 MG Tab Take 1 Tablet by mouth every day. 30 Tablet 1   • DULoxetine (CYMBALTA) 30 MG Cap DR Particles TAKE 1 CAPSULE EVERY DAY 90 Capsule 3   • celecoxib (CELEBREX) 200 MG Cap Take 1 Capsule by mouth 2 times a day. 180 Capsule 2   • [START ON 10/29/2021] HYDROcodone-acetaminophen (NORCO) 7.5-325 MG per tablet Take 1 Tablet by mouth every 6 hours as needed for up to 30 days. 60 Tablet 0   • celecoxib (CELEBREX) 200 MG Cap Take 1 capsule by mouth 2 times a day. 30 capsule 2   • buPROPion (WELLBUTRIN) 75 MG Tab Take 1 tablet by mouth 2 times a day. 180 tablet 1   •  furosemide (LASIX) 20 MG Tab Take 1 tablet by mouth every 24 hours as needed. 30 tablet 3   • potassium chloride ER (KLOR-CON) 10 MEQ tablet Take 1 tablet by mouth every 24 hours as needed. 30 tablet 3   • DULoxetine (CYMBALTA) 60 MG Cap DR Particles delayed-release capsule TAKE 1 CAPSULE EVERY EVENING 90 Cap 0   • losartan (COZAAR) 100 MG Tab TAKE 1 TABLET EVERY DAY 90 Tab 3   • albuterol (VENTOLIN HFA) 108 (90 Base) MCG/ACT Aero Soln inhalation aerosol INHALE 2 PUFFS BY MOUTH EVERY FOUR HOURS AS NEEDED FOR SHORTNESS OF BREATH. DISPENSE A SPACER 1 Each 1   • lidocaine (LIDODERM) 5 % Patch Apply 1 Patch to skin as directed every 24 hours. 30 Patch 3   • fluticasone (FLONASE) 50 MCG/ACT nasal spray Spray 2 Sprays in nose every day. 1 Bottle 0   • sodium chloride (OCEAN NASAL SPRAY) 0.65 % Solution Spray 2 Sprays in nose every 2 hours as needed for Congestion. 1 Bottle 0   • methocarbamol (ROBAXIN) 750 MG Tab Take 2 Tabs by mouth 4 times a day as needed (muscle spasm). 20 Tab 0   • diclofenac EC (VOLTAREN) 75 MG Tablet Delayed Response Take 75 mg by mouth 2 times a day.     • ibuprofen (MOTRIN) 600 MG Tab Take 1 Tab by mouth every 6 hours as needed. 20 Tab 0   • ascorbic acid (ASCORBIC ACID) 500 MG Tab Take 3,000 mg by mouth every day.     • vitamin E 100 UNIT capsule Take 300 Units by mouth every day.     • Multiple Vitamins-Minerals (ENERGY BOOSTER PO) Take 80 mg by mouth. With caffiene     • glucosamine Sulfate 500 MG Cap Take 1,500 mg by mouth every day.     • Cholecalciferol (VITAMIN D) 400 UNIT Tab Take 1,600 Units by mouth every day.     • B Complex Vitamins (VITAMIN B COMPLEX PO) Take 20 mg by mouth every day.     • Multiple Vitamin (MULTIVITAMIN PO) Take 1 Tab by mouth every day.       No current facility-administered medications for this visit.       Allergies   Allergen Reactions   • Ace Inhibitors      Ace cough   • Tape Rash and Itching     Adhesive tape.  PAPER TAPE OK.       ROS  Constitutional:  "Negative. Negative for fever, chills, weight loss, malaise/fatigue and diaphoresis.   HENT: Negative. Negative for hearing loss, ear pain, nosebleeds, congestion, sore throat, neck pain, tinnitus and ear discharge.   Respiratory: Negative. Negative for cough, hemoptysis, sputum production, shortness of breath, wheezing and stridor.   Cardiovascular: Negative. Negative for chest pain, palpitations, orthopnea, claudication, leg swelling and PND.   Gastrointestinal: Denies nausea, vomiting, diarrhea, constipation, heartburn, melena or hematochezia.  Genitourinary: Denies dysuria, hematuria, urinary incontinence, frequency or urgency.        Objective:     /90 (BP Location: Right arm, Patient Position: Sitting)   Pulse (!) 109   Temp 36.3 °C (97.3 °F) (Temporal)   Ht 1.6 m (5' 3\")   Wt (!) 136 kg (299 lb 12.8 oz)   SpO2 93%  Body mass index is 53.11 kg/m².    Physical Exam:  Vitals reviewed.  Constitutional: Oriented to person, place, and time. appears well-developed and well-nourished. No distress.   Cardiovascular: Normal rate, regular rhythm, normal heart sounds and intact distal pulses. Exam reveals no gallop and no friction rub. 3/6 holosystolic murmur heard. No carotid bruits.   Pulmonary/Chest: Effort normal and breath sounds normal. No stridor. No respiratory distress. no wheezes or rales. exhibits no tenderness.   Musculoskeletal: Normal range of motion. exhibits no edema. davin pedal pulses 2+.  Lymphadenopathy: No cervical or supraclavicular adenopathy.   Neurological: Alert and oriented to person, place, and time. exhibits normal muscle tone.  Skin: Skin is warm and dry. No diaphoresis.   Psychiatric: Normal mood and affect. Behavior is normal.      Assessment and Plan:     The following treatment plan was discussed:    1. Hypertension, uncontrolled  doxazosin (CARDURA) 2 MG Tab    bp not controlled.  inc cardura to 2 mg daily.  f/u 1 mo for bp check   2. Cardiac murmur      did not do echo for " murmur in 2/21 when ordered.  will do now at [facility].     3. Moderate episode of recurrent major depressive disorder (HCC)      she feels that she is stable on meds now.  will do more activities including laundry   4. Need for influenza vaccination  Influenza Vaccine, High Dose (65+ Only)         Followup: Return in about 4 weeks (around 11/17/2021).

## 2021-11-04 DIAGNOSIS — F41.8 DEPRESSION WITH ANXIETY: ICD-10-CM

## 2021-11-05 RX ORDER — BUPROPION HYDROCHLORIDE 75 MG/1
TABLET ORAL
Qty: 180 TABLET | Refills: 0 | Status: SHIPPED | OUTPATIENT
Start: 2021-11-05 | End: 2022-02-03

## 2021-11-15 DIAGNOSIS — G89.29 BILATERAL CHRONIC KNEE PAIN: ICD-10-CM

## 2021-11-15 DIAGNOSIS — M25.562 BILATERAL CHRONIC KNEE PAIN: ICD-10-CM

## 2021-11-15 DIAGNOSIS — M25.561 BILATERAL CHRONIC KNEE PAIN: ICD-10-CM

## 2021-11-16 ENCOUNTER — TELEPHONE (OUTPATIENT)
Dept: MEDICAL GROUP | Facility: MEDICAL CENTER | Age: 66
End: 2021-11-16

## 2021-11-17 ENCOUNTER — OFFICE VISIT (OUTPATIENT)
Dept: MEDICAL GROUP | Facility: MEDICAL CENTER | Age: 66
End: 2021-11-17
Payer: MEDICARE

## 2021-11-17 VITALS
WEIGHT: 293 LBS | TEMPERATURE: 96.9 F | HEART RATE: 105 BPM | BODY MASS INDEX: 51.91 KG/M2 | HEIGHT: 63 IN | OXYGEN SATURATION: 95 % | SYSTOLIC BLOOD PRESSURE: 136 MMHG | DIASTOLIC BLOOD PRESSURE: 82 MMHG

## 2021-11-17 DIAGNOSIS — R05.3 CHRONIC COUGH: ICD-10-CM

## 2021-11-17 DIAGNOSIS — M54.41 CHRONIC BILATERAL LOW BACK PAIN WITH RIGHT-SIDED SCIATICA: ICD-10-CM

## 2021-11-17 DIAGNOSIS — M15.9 PRIMARY OSTEOARTHRITIS INVOLVING MULTIPLE JOINTS: ICD-10-CM

## 2021-11-17 DIAGNOSIS — G89.29 CHRONIC BILATERAL LOW BACK PAIN WITH RIGHT-SIDED SCIATICA: ICD-10-CM

## 2021-11-17 DIAGNOSIS — R14.0 ABDOMINAL BLOATING: ICD-10-CM

## 2021-11-17 DIAGNOSIS — I10 PRIMARY HYPERTENSION: ICD-10-CM

## 2021-11-17 DIAGNOSIS — R06.02 SOB (SHORTNESS OF BREATH) ON EXERTION: ICD-10-CM

## 2021-11-17 DIAGNOSIS — I36.1 NONRHEUMATIC TRICUSPID VALVE REGURGITATION: ICD-10-CM

## 2021-11-17 PROCEDURE — 99214 OFFICE O/P EST MOD 30 MIN: CPT | Performed by: NURSE PRACTITIONER

## 2021-11-17 RX ORDER — HYDROCODONE BITARTRATE AND ACETAMINOPHEN 7.5; 325 MG/1; MG/1
1 TABLET ORAL EVERY 6 HOURS PRN
Qty: 60 TABLET | Refills: 0 | Status: SHIPPED | OUTPATIENT
Start: 2021-11-17 | End: 2021-11-17 | Stop reason: SDUPTHER

## 2021-11-17 RX ORDER — MONTELUKAST SODIUM 10 MG/1
10 TABLET ORAL DAILY
Qty: 30 TABLET | Refills: 3 | Status: SHIPPED | OUTPATIENT
Start: 2021-11-17 | End: 2022-03-21

## 2021-11-17 RX ORDER — DOXAZOSIN MESYLATE 4 MG/1
4 TABLET ORAL DAILY
Qty: 30 TABLET | Refills: 1 | Status: SHIPPED | OUTPATIENT
Start: 2021-11-17 | End: 2022-01-20

## 2021-11-17 RX ORDER — LIDOCAINE 50 MG/G
1 PATCH TOPICAL EVERY 24 HOURS
Qty: 30 PATCH | Refills: 3 | Status: SHIPPED | OUTPATIENT
Start: 2021-11-17 | End: 2021-12-21

## 2021-11-17 RX ORDER — LIDOCAINE 50 MG/G
1 PATCH TOPICAL EVERY 24 HOURS
Qty: 90 PATCH | Refills: 3 | Status: SHIPPED | OUTPATIENT
Start: 2021-11-17 | End: 2023-12-07

## 2021-11-17 RX ORDER — HYDROCODONE BITARTRATE AND ACETAMINOPHEN 7.5; 325 MG/1; MG/1
1 TABLET ORAL EVERY 6 HOURS PRN
Qty: 60 TABLET | Refills: 0 | Status: SHIPPED | OUTPATIENT
Start: 2021-12-17 | End: 2021-11-17 | Stop reason: SDUPTHER

## 2021-11-17 RX ORDER — HYDROCODONE BITARTRATE AND ACETAMINOPHEN 7.5; 325 MG/1; MG/1
1 TABLET ORAL EVERY 6 HOURS PRN
Qty: 60 TABLET | Refills: 0 | Status: SHIPPED | OUTPATIENT
Start: 2022-01-16 | End: 2021-12-21 | Stop reason: SDUPTHER

## 2021-11-17 ASSESSMENT — FIBROSIS 4 INDEX: FIB4 SCORE: 1.26

## 2021-11-17 NOTE — TELEPHONE ENCOUNTER
Please let pt know that the echo is wnl. Doesn't show any significant abn and no change from previous echo. She has strong heart muscle and function.

## 2021-11-18 NOTE — PROGRESS NOTES
Subjective:     Rain Sanabria is a 66 y.o. female who presents with chronic cough.    HPI:   Seen in f/u for chronic cough.  She has been coughing for a year.  It is clear secretions until today.  Today its white.  She is not feeling well.  She is on antihistamine.  + SOB with exertion.  Only occas wheezing.  She is on losartan.  Has been on about 1 yr.    Having some bloating and gas now.  It has been occurring for the last week. No black tarry stools.  No fever, chills or sweating.  She is due pain med refill.  Stable on med.  Taking med approp.  Using for chronic joint pain.  UDS and contract are up to date.  Echo results reviewed with pt.  They are wnl.  Strong heart. Murmur is prob the mild TR.  Bp is stable and controlled.       Patient Active Problem List    Diagnosis Date Noted   • Morbid obesity with BMI of 50.0-59.9, adult (HCC) 03/22/2021   • B12 deficiency 02/05/2020   • Dental disorder    • Alcohol use 01/26/2018   • Chronic right shoulder pain 01/06/2016   • Hyperlipidemia    • Depression with anxiety    • Vitamin D deficiency    • Abdominal hernia    • Sleep apnea 07/12/2010   • OA (osteoarthritis) 07/12/2010   • HTN (hypertension) 01/20/2010   • Genital herpes simplex 06/19/2009   • Insomnia 06/19/2009   • Migraine 06/19/2009   • Abnormal mammogram 06/19/2009   • Cystocele 06/19/2009       Current medicines (including changes today)  Current Outpatient Medications   Medication Sig Dispense Refill   • montelukast (SINGULAIR) 10 MG Tab Take 1 Tablet by mouth every day. 30 Tablet 3   • lidocaine (LIDODERM) 5 % Patch Place 1 Patch on the skin every 24 hours. 30 Patch 3   • [START ON 1/16/2022] HYDROcodone-acetaminophen (NORCO) 7.5-325 MG per tablet Take 1 Tablet by mouth every 6 hours as needed for up to 30 days. 60 Tablet 0   • doxazosin (CARDURA) 4 MG Tab Take 1 Tablet by mouth every day. 30 Tablet 1   • buPROPion (WELLBUTRIN) 75 MG Tab TAKE 1 TABLET BY MOUTH TWICE A  Tablet 0   •  DULoxetine (CYMBALTA) 30 MG Cap DR Particles TAKE 1 CAPSULE EVERY DAY 90 Capsule 3   • celecoxib (CELEBREX) 200 MG Cap Take 1 Capsule by mouth 2 times a day. 180 Capsule 2   • celecoxib (CELEBREX) 200 MG Cap Take 1 capsule by mouth 2 times a day. 30 capsule 2   • furosemide (LASIX) 20 MG Tab Take 1 tablet by mouth every 24 hours as needed. 30 tablet 3   • potassium chloride ER (KLOR-CON) 10 MEQ tablet Take 1 tablet by mouth every 24 hours as needed. 30 tablet 3   • DULoxetine (CYMBALTA) 60 MG Cap DR Particles delayed-release capsule TAKE 1 CAPSULE EVERY EVENING 90 Cap 0   • albuterol (VENTOLIN HFA) 108 (90 Base) MCG/ACT Aero Soln inhalation aerosol INHALE 2 PUFFS BY MOUTH EVERY FOUR HOURS AS NEEDED FOR SHORTNESS OF BREATH. DISPENSE A SPACER 1 Each 1   • fluticasone (FLONASE) 50 MCG/ACT nasal spray Spray 2 Sprays in nose every day. 1 Bottle 0   • sodium chloride (OCEAN NASAL SPRAY) 0.65 % Solution Spray 2 Sprays in nose every 2 hours as needed for Congestion. 1 Bottle 0   • methocarbamol (ROBAXIN) 750 MG Tab Take 2 Tabs by mouth 4 times a day as needed (muscle spasm). 20 Tab 0   • diclofenac EC (VOLTAREN) 75 MG Tablet Delayed Response Take 75 mg by mouth 2 times a day.     • ibuprofen (MOTRIN) 600 MG Tab Take 1 Tab by mouth every 6 hours as needed. 20 Tab 0   • ascorbic acid (ASCORBIC ACID) 500 MG Tab Take 3,000 mg by mouth every day.     • vitamin E 100 UNIT capsule Take 300 Units by mouth every day.     • Multiple Vitamins-Minerals (ENERGY BOOSTER PO) Take 80 mg by mouth. With caffiene     • glucosamine Sulfate 500 MG Cap Take 1,500 mg by mouth every day.     • Cholecalciferol (VITAMIN D) 400 UNIT Tab Take 1,600 Units by mouth every day.     • B Complex Vitamins (VITAMIN B COMPLEX PO) Take 20 mg by mouth every day.     • Multiple Vitamin (MULTIVITAMIN PO) Take 1 Tab by mouth every day.       No current facility-administered medications for this visit.       Allergies   Allergen Reactions   • Ace Inhibitors      Ace  "cough   • Tape Rash and Itching     Adhesive tape.  PAPER TAPE OK.       ROS  Constitutional: Negative. Negative for fever, chills, weight loss, malaise/fatigue and diaphoresis.   HENT: Negative. Negative for hearing loss, ear pain, nosebleeds, congestion, sore throat, neck pain, tinnitus and ear discharge.   Respiratory: Negative. Negative for cough, hemoptysis, sputum production, shortness of breath, wheezing and stridor.   Cardiovascular: Negative. Negative for chest pain, palpitations, orthopnea, claudication, leg swelling and PND.   Gastrointestinal: Denies nausea, vomiting, diarrhea, constipation, heartburn, melena or hematochezia.  Genitourinary: Denies dysuria, hematuria, urinary incontinence, frequency or urgency.        Objective:     /82 (BP Location: Right arm, Patient Position: Sitting)   Pulse (!) 105   Temp 36.1 °C (96.9 °F) (Temporal)   Ht 1.6 m (5' 3\")   Wt (!) 136 kg (300 lb)   SpO2 95%  Body mass index is 53.14 kg/m².    Physical Exam:  Vitals reviewed.  Constitutional: Oriented to person, place, and time. appears well-developed and well-nourished. No distress.   Cardiovascular: Normal rate, regular rhythm, normal heart sounds and intact distal pulses. Exam reveals no gallop and no friction rub. 2/6 systolic murmur heard. No carotid bruits.   Pulmonary/Chest: Effort normal and breath sounds normal. No stridor. No respiratory distress. no wheezes or rales. exhibits no tenderness.   Musculoskeletal: Normal range of motion. exhibits no edema. davin pedal pulses 2+.  Lymphadenopathy: No cervical or supraclavicular adenopathy.   Neurological: Alert and oriented to person, place, and time. exhibits normal muscle tone.  Skin: Skin is warm and dry. No diaphoresis.   Psychiatric: Normal mood and affect. Behavior is normal.      Assessment and Plan:     The following treatment plan was discussed:    1. Chronic cough  DX-CHEST-2 VIEWS    montelukast (SINGULAIR) 10 MG Tab    do cxr.  f/u w/pt " w/results.  dc losartan.  inc cardura to 4 mg daily.  monitor bp.  add zyrtec and singulair daily.  f/u 1 mo for sx eval   2. Chronic bilateral low back pain with right-sided sciatica  HYDROcodone-acetaminophen (NORCO) 7.5-325 MG per tablet    DISCONTINUED: HYDROcodone-acetaminophen (NORCO) 7.5-325 MG per tablet    DISCONTINUED: HYDROcodone-acetaminophen (NORCO) 7.5-325 MG per tablet    refill pain meds for 3 months.  taking meds approp.   3. Primary osteoarthritis involving multiple joints  lidocaine (LIDODERM) 5 % Patch    HYDROcodone-acetaminophen (NORCO) 7.5-325 MG per tablet    DISCONTINUED: HYDROcodone-acetaminophen (NORCO) 7.5-325 MG per tablet    DISCONTINUED: HYDROcodone-acetaminophen (NORCO) 7.5-325 MG per tablet    continue norco and celebrex for pain control   4. Abdominal bloating  H. PYLORI, UREA BREATH TEST, ADULT    check h pylori.  f/u w/pt w/results.  consider gi referral if sx persist   5. SOB (shortness of breath) on exertion  DX-CHEST-2 VIEWS    montelukast (SINGULAIR) 10 MG Tab    related to her weight but will do CXR d/t her chronic cough   6. Primary hypertension  doxazosin (CARDURA) 4 MG Tab    stable on meds but has chronic cough.  dc losartan.  inc cardura.  f/u 1 mo for sx eval and bp chk   7. Nonrheumatic tricuspid valve regurgitation      reviewed echo with pt.  that is only abn and causing the murmur         Followup: Return in about 4 weeks (around 12/15/2021).

## 2021-11-19 ENCOUNTER — TELEPHONE (OUTPATIENT)
Dept: MEDICAL GROUP | Facility: MEDICAL CENTER | Age: 66
End: 2021-11-19

## 2021-12-21 ENCOUNTER — OFFICE VISIT (OUTPATIENT)
Dept: MEDICAL GROUP | Facility: MEDICAL CENTER | Age: 66
End: 2021-12-21
Payer: MEDICARE

## 2021-12-21 VITALS
SYSTOLIC BLOOD PRESSURE: 126 MMHG | BODY MASS INDEX: 51.91 KG/M2 | TEMPERATURE: 96.7 F | WEIGHT: 293 LBS | OXYGEN SATURATION: 93 % | DIASTOLIC BLOOD PRESSURE: 82 MMHG | HEIGHT: 63 IN | HEART RATE: 102 BPM

## 2021-12-21 DIAGNOSIS — G89.29 CHRONIC BILATERAL LOW BACK PAIN WITH RIGHT-SIDED SCIATICA: ICD-10-CM

## 2021-12-21 DIAGNOSIS — M54.41 CHRONIC BILATERAL LOW BACK PAIN WITH RIGHT-SIDED SCIATICA: ICD-10-CM

## 2021-12-21 DIAGNOSIS — G89.29 CHRONIC RIGHT HIP PAIN: ICD-10-CM

## 2021-12-21 DIAGNOSIS — I10 PRIMARY HYPERTENSION: ICD-10-CM

## 2021-12-21 DIAGNOSIS — M25.551 CHRONIC RIGHT HIP PAIN: ICD-10-CM

## 2021-12-21 DIAGNOSIS — E53.8 B12 DEFICIENCY: ICD-10-CM

## 2021-12-21 DIAGNOSIS — E55.9 VITAMIN D DEFICIENCY: ICD-10-CM

## 2021-12-21 DIAGNOSIS — M15.9 PRIMARY OSTEOARTHRITIS INVOLVING MULTIPLE JOINTS: ICD-10-CM

## 2021-12-21 PROCEDURE — 99214 OFFICE O/P EST MOD 30 MIN: CPT | Performed by: NURSE PRACTITIONER

## 2021-12-21 RX ORDER — HYDROCODONE BITARTRATE AND ACETAMINOPHEN 7.5; 325 MG/1; MG/1
1 TABLET ORAL EVERY 6 HOURS PRN
Qty: 60 TABLET | Refills: 0 | Status: SHIPPED | OUTPATIENT
Start: 2022-03-17 | End: 2022-04-20 | Stop reason: SDUPTHER

## 2021-12-21 RX ORDER — HYDROCODONE BITARTRATE AND ACETAMINOPHEN 7.5; 325 MG/1; MG/1
1 TABLET ORAL EVERY 6 HOURS PRN
Qty: 60 TABLET | Refills: 0 | Status: SHIPPED | OUTPATIENT
Start: 2022-01-16 | End: 2021-12-21 | Stop reason: SDUPTHER

## 2021-12-21 RX ORDER — HYDROCODONE BITARTRATE AND ACETAMINOPHEN 7.5; 325 MG/1; MG/1
1 TABLET ORAL EVERY 6 HOURS PRN
Qty: 60 TABLET | Refills: 0 | Status: SHIPPED | OUTPATIENT
Start: 2022-02-15 | End: 2021-12-21 | Stop reason: SDUPTHER

## 2021-12-21 ASSESSMENT — FIBROSIS 4 INDEX: FIB4 SCORE: 1.26

## 2021-12-22 ENCOUNTER — TELEPHONE (OUTPATIENT)
Dept: MEDICAL GROUP | Facility: MEDICAL CENTER | Age: 66
End: 2021-12-22

## 2021-12-22 NOTE — PROGRESS NOTES
Subjective:     Rain Sanabria is a 66 y.o. female who presents with chronic pain.    HPI:   Seen in f/u for chronic pain.  She is here for refill of her pain med.  Stable on med.  Taking med approp.  Uses for pain in multiple sites including shoulders, back, knees.  She is having spot pain in rt hip also.  That started about 6 mo ago.  She hasn't had any injections yet.  UDS and contract are up to date.    She is due updated yearly lab with next appt.  She will need CMP, LP, alb/cr ratio for her HTN.  Her bp today is well controlled.  She is stable on cardura.    Her last b12 was low normal.      Patient Active Problem List    Diagnosis Date Noted   • Morbid obesity with BMI of 50.0-59.9, adult (HCC) 03/22/2021   • B12 deficiency 02/05/2020   • Dental disorder    • Alcohol use 01/26/2018   • Chronic right shoulder pain 01/06/2016   • Hyperlipidemia    • Depression with anxiety    • Vitamin D deficiency    • Abdominal hernia    • Sleep apnea 07/12/2010   • OA (osteoarthritis) 07/12/2010   • HTN (hypertension) 01/20/2010   • Genital herpes simplex 06/19/2009   • Insomnia 06/19/2009   • Migraine 06/19/2009   • Abnormal mammogram 06/19/2009   • Cystocele 06/19/2009       Current medicines (including changes today)  Current Outpatient Medications   Medication Sig Dispense Refill   • [START ON 3/17/2022] HYDROcodone-acetaminophen (NORCO) 7.5-325 MG per tablet Take 1 Tablet by mouth every 6 hours as needed for up to 30 days. 60 Tablet 0   • lidocaine (LIDODERM) 5 % Patch Apply 1 Patch to skin as directed every 24 hours. 90 Patch 3   • montelukast (SINGULAIR) 10 MG Tab Take 1 Tablet by mouth every day. 30 Tablet 3   • doxazosin (CARDURA) 4 MG Tab Take 1 Tablet by mouth every day. 30 Tablet 1   • buPROPion (WELLBUTRIN) 75 MG Tab TAKE 1 TABLET BY MOUTH TWICE A  Tablet 0   • DULoxetine (CYMBALTA) 30 MG Cap DR Particles TAKE 1 CAPSULE EVERY DAY 90 Capsule 3   • celecoxib (CELEBREX) 200 MG Cap Take 1 capsule by  mouth 2 times a day. 30 capsule 2   • furosemide (LASIX) 20 MG Tab Take 1 tablet by mouth every 24 hours as needed. 30 tablet 3   • potassium chloride ER (KLOR-CON) 10 MEQ tablet Take 1 tablet by mouth every 24 hours as needed. 30 tablet 3   • DULoxetine (CYMBALTA) 60 MG Cap DR Particles delayed-release capsule TAKE 1 CAPSULE EVERY EVENING 90 Cap 0   • albuterol (VENTOLIN HFA) 108 (90 Base) MCG/ACT Aero Soln inhalation aerosol INHALE 2 PUFFS BY MOUTH EVERY FOUR HOURS AS NEEDED FOR SHORTNESS OF BREATH. DISPENSE A SPACER 1 Each 1   • fluticasone (FLONASE) 50 MCG/ACT nasal spray Spray 2 Sprays in nose every day. 1 Bottle 0   • sodium chloride (OCEAN NASAL SPRAY) 0.65 % Solution Spray 2 Sprays in nose every 2 hours as needed for Congestion. 1 Bottle 0   • methocarbamol (ROBAXIN) 750 MG Tab Take 2 Tabs by mouth 4 times a day as needed (muscle spasm). 20 Tab 0   • diclofenac EC (VOLTAREN) 75 MG Tablet Delayed Response Take 75 mg by mouth 2 times a day.     • ibuprofen (MOTRIN) 600 MG Tab Take 1 Tab by mouth every 6 hours as needed. 20 Tab 0   • ascorbic acid (ASCORBIC ACID) 500 MG Tab Take 3,000 mg by mouth every day.     • vitamin E 100 UNIT capsule Take 300 Units by mouth every day.     • Multiple Vitamins-Minerals (ENERGY BOOSTER PO) Take 80 mg by mouth. With caffiene     • glucosamine Sulfate 500 MG Cap Take 1,500 mg by mouth every day.     • Cholecalciferol (VITAMIN D) 400 UNIT Tab Take 1,600 Units by mouth every day.     • B Complex Vitamins (VITAMIN B COMPLEX PO) Take 20 mg by mouth every day.     • Multiple Vitamin (MULTIVITAMIN PO) Take 1 Tab by mouth every day.       No current facility-administered medications for this visit.       Allergies   Allergen Reactions   • Ace Inhibitors      Ace cough   • Tape Rash and Itching     Adhesive tape.  PAPER TAPE OK.       ROS  Constitutional: Negative. Negative for fever, chills, weight loss, malaise/fatigue and diaphoresis.   HENT: Negative. Negative for hearing loss,  "ear pain, nosebleeds, congestion, sore throat, neck pain, tinnitus and ear discharge.   Respiratory: Negative. Negative for cough, hemoptysis, sputum production, shortness of breath, wheezing and stridor.   Cardiovascular: Negative. Negative for chest pain, palpitations, orthopnea, claudication, leg swelling and PND.   Gastrointestinal: Denies nausea, vomiting, diarrhea, constipation, heartburn, melena or hematochezia.  Genitourinary: Denies dysuria, hematuria, urinary incontinence, frequency or urgency.        Objective:     /82 (BP Location: Right arm, Patient Position: Sitting)   Pulse (!) 102   Temp 35.9 °C (96.7 °F) (Temporal)   Ht 1.6 m (5' 3\")   Wt (!) 135 kg (298 lb 3.2 oz)   SpO2 93%  Body mass index is 52.82 kg/m².    Physical Exam:  Vitals reviewed.  Constitutional: Oriented to person, place, and time. appears well-developed and well-nourished. No distress.   Cardiovascular: Normal rate, regular rhythm, normal heart sounds and intact distal pulses. Exam reveals no gallop and no friction rub. No murmur heard. No carotid bruits.   Pulmonary/Chest: Effort normal and breath sounds normal. No stridor. No respiratory distress. no wheezes or rales. exhibits no tenderness.   Musculoskeletal: Normal range of motion. exhibits no edema. davin pedal pulses 2+.  Lymphadenopathy: No cervical or supraclavicular adenopathy.   Neurological: Alert and oriented to person, place, and time. exhibits normal muscle tone.  Skin: Skin is warm and dry. No diaphoresis.   Psychiatric: Normal mood and affect. Behavior is normal.      Assessment and Plan:     The following treatment plan was discussed:    1. Chronic bilateral low back pain with right-sided sciatica  Referral to Orthopedics    HYDROcodone-acetaminophen (NORCO) 7.5-325 MG per tablet    DISCONTINUED: HYDROcodone-acetaminophen (NORCO) 7.5-325 MG per tablet    DISCONTINUED: HYDROcodone-acetaminophen (NORCO) 7.5-325 MG per tablet    refill pain meds for 3 months.  " taking meds approp.   2. Primary osteoarthritis involving multiple joints  Referral to Orthopedics    HYDROcodone-acetaminophen (NORCO) 7.5-325 MG per tablet    DISCONTINUED: HYDROcodone-acetaminophen (NORCO) 7.5-325 MG per tablet    DISCONTINUED: HYDROcodone-acetaminophen (NORCO) 7.5-325 MG per tablet    continue norco for pain control.  RF med   3. Chronic right hip pain  Referral to Orthopedics    refer ortho for poss injections.    4. Primary hypertension  Comp Metabolic Panel    Lipid Profile    MICROALBUMIN CREAT RATIO URINE    stable on med.  bp controlled.  do lab before next appt.  f/u for med refill and lab review   5. Vitamin D deficiency  VITAMIN D,25 HYDROXY    continue otc supplement.  recheck lab.    6. B12 deficiency  VITAMIN B12    continue b12 supplement.  recheck lab for next appt.           Followup: Return in about 4 months (around 4/21/2022).

## 2022-01-20 DIAGNOSIS — I10 PRIMARY HYPERTENSION: ICD-10-CM

## 2022-01-20 RX ORDER — DOXAZOSIN MESYLATE 4 MG/1
4 TABLET ORAL DAILY
Qty: 90 TABLET | Refills: 1 | Status: SHIPPED | OUTPATIENT
Start: 2022-01-20 | End: 2022-07-19

## 2022-02-03 DIAGNOSIS — F41.8 DEPRESSION WITH ANXIETY: ICD-10-CM

## 2022-02-03 RX ORDER — BUPROPION HYDROCHLORIDE 75 MG/1
TABLET ORAL
Qty: 180 TABLET | Refills: 0 | Status: SHIPPED | OUTPATIENT
Start: 2022-02-03 | End: 2022-10-21

## 2022-02-07 DIAGNOSIS — I10 ESSENTIAL HYPERTENSION: ICD-10-CM

## 2022-02-07 RX ORDER — LOSARTAN POTASSIUM 100 MG/1
100 TABLET ORAL
Qty: 90 TABLET | Refills: 0 | Status: SHIPPED | OUTPATIENT
Start: 2022-02-07 | End: 2022-05-09

## 2022-03-01 ENCOUNTER — TELEPHONE (OUTPATIENT)
Dept: MEDICAL GROUP | Facility: MEDICAL CENTER | Age: 67
End: 2022-03-01
Payer: MEDICARE

## 2022-03-01 NOTE — TELEPHONE ENCOUNTER
"VOICEMAIL  1. Caller Name: Rain Sanabria                      Call Back Number: [phone]    2. Message: Pt c/o  the bridge on the left foot is starting to turn black. Pt states that there is a \"bump\". Pt sched an appt 3/2/22.    3. Patient approves office to leave a detailed voicemail/MyChart message: N\A  "

## 2022-03-02 ENCOUNTER — OFFICE VISIT (OUTPATIENT)
Dept: MEDICAL GROUP | Facility: MEDICAL CENTER | Age: 67
End: 2022-03-02
Payer: MEDICARE

## 2022-03-02 VITALS
HEIGHT: 63 IN | SYSTOLIC BLOOD PRESSURE: 138 MMHG | TEMPERATURE: 97 F | WEIGHT: 293 LBS | BODY MASS INDEX: 51.91 KG/M2 | HEART RATE: 94 BPM | DIASTOLIC BLOOD PRESSURE: 86 MMHG | OXYGEN SATURATION: 96 %

## 2022-03-02 DIAGNOSIS — U07.1 COVID-19: ICD-10-CM

## 2022-03-02 DIAGNOSIS — S90.32XA CONTUSION OF LEFT FOOT, INITIAL ENCOUNTER: ICD-10-CM

## 2022-03-02 DIAGNOSIS — M50.30 DDD (DEGENERATIVE DISC DISEASE), CERVICAL: ICD-10-CM

## 2022-03-02 PROCEDURE — 99214 OFFICE O/P EST MOD 30 MIN: CPT | Mod: CS | Performed by: NURSE PRACTITIONER

## 2022-03-02 ASSESSMENT — FIBROSIS 4 INDEX: FIB4 SCORE: 1.28

## 2022-03-02 NOTE — PROGRESS NOTES
Subjective:     Rain Sanabria is a 67 y.o. female who presents with left foot bruising.    HPI:   Seen in f/u for left foot bruising.  Noted it yesterday.  No hx of injury.  Worried about clot.  Lower leg is same reddness as chronic and same as rt leg.  No pain in calf.    She had COVID after she was vaccinated.  She is improved but she is still coughing.  Using mucinex .  Productive with clear and occas colored.  No change in her chronic SOB.  No fever, chills or sweating since day before she was +.  She tested + on 12/26/21.    She is having davin arm pain that wakes her up at nite.  She saw [facility].  They did MRI neck.  She has DDD.  They are recommending injections.  They told her it was mild changes.      Patient Active Problem List    Diagnosis Date Noted   • Morbid obesity with BMI of 50.0-59.9, adult (HCC) 03/22/2021   • B12 deficiency 02/05/2020   • Dental disorder    • Alcohol use 01/26/2018   • Chronic right shoulder pain 01/06/2016   • Hyperlipidemia    • Depression with anxiety    • Vitamin D deficiency    • Abdominal hernia    • Sleep apnea 07/12/2010   • OA (osteoarthritis) 07/12/2010   • HTN (hypertension) 01/20/2010   • Genital herpes simplex 06/19/2009   • Insomnia 06/19/2009   • Migraine 06/19/2009   • Abnormal mammogram 06/19/2009   • Cystocele 06/19/2009       Current medicines (including changes today)  Current Outpatient Medications   Medication Sig Dispense Refill   • losartan (COZAAR) 100 MG Tab Take 1 Tablet by mouth every day. 90 Tablet 0   • buPROPion (WELLBUTRIN) 75 MG Tab TAKE 1 TABLET BY MOUTH TWICE A  Tablet 0   • doxazosin (CARDURA) 4 MG Tab TAKE 1 TABLET BY MOUTH EVERY DAY 90 Tablet 1   • [START ON 3/17/2022] HYDROcodone-acetaminophen (NORCO) 7.5-325 MG per tablet Take 1 Tablet by mouth every 6 hours as needed for up to 30 days. 60 Tablet 0   • lidocaine (LIDODERM) 5 % Patch Apply 1 Patch to skin as directed every 24 hours. 90 Patch 3   • montelukast (SINGULAIR) 10 MG Tab  Take 1 Tablet by mouth every day. 30 Tablet 3   • DULoxetine (CYMBALTA) 30 MG Cap DR Particles TAKE 1 CAPSULE EVERY DAY 90 Capsule 3   • celecoxib (CELEBREX) 200 MG Cap Take 1 capsule by mouth 2 times a day. 30 capsule 2   • furosemide (LASIX) 20 MG Tab Take 1 tablet by mouth every 24 hours as needed. 30 tablet 3   • potassium chloride ER (KLOR-CON) 10 MEQ tablet Take 1 tablet by mouth every 24 hours as needed. 30 tablet 3   • DULoxetine (CYMBALTA) 60 MG Cap DR Particles delayed-release capsule TAKE 1 CAPSULE EVERY EVENING 90 Cap 0   • albuterol (VENTOLIN HFA) 108 (90 Base) MCG/ACT Aero Soln inhalation aerosol INHALE 2 PUFFS BY MOUTH EVERY FOUR HOURS AS NEEDED FOR SHORTNESS OF BREATH. DISPENSE A SPACER 1 Each 1   • fluticasone (FLONASE) 50 MCG/ACT nasal spray Spray 2 Sprays in nose every day. 1 Bottle 0   • sodium chloride (OCEAN NASAL SPRAY) 0.65 % Solution Spray 2 Sprays in nose every 2 hours as needed for Congestion. 1 Bottle 0   • methocarbamol (ROBAXIN) 750 MG Tab Take 2 Tabs by mouth 4 times a day as needed (muscle spasm). 20 Tab 0   • diclofenac EC (VOLTAREN) 75 MG Tablet Delayed Response Take 75 mg by mouth 2 times a day.     • ibuprofen (MOTRIN) 600 MG Tab Take 1 Tab by mouth every 6 hours as needed. 20 Tab 0   • ascorbic acid (ASCORBIC ACID) 500 MG Tab Take 3,000 mg by mouth every day.     • vitamin E 100 UNIT capsule Take 300 Units by mouth every day.     • Multiple Vitamins-Minerals (ENERGY BOOSTER PO) Take 80 mg by mouth. With caffiene     • glucosamine Sulfate 500 MG Cap Take 1,500 mg by mouth every day.     • Cholecalciferol (VITAMIN D) 400 UNIT Tab Take 1,600 Units by mouth every day.     • B Complex Vitamins (VITAMIN B COMPLEX PO) Take 20 mg by mouth every day.     • Multiple Vitamin (MULTIVITAMIN PO) Take 1 Tab by mouth every day.       No current facility-administered medications for this visit.       Allergies   Allergen Reactions   • Ace Inhibitors      Ace cough   • Tape Rash and Itching      "Adhesive tape.  PAPER TAPE OK.       ROS  Constitutional: Negative. Negative for fever, chills, weight loss, malaise/fatigue and diaphoresis.   HENT: Negative. Negative for hearing loss, ear pain, nosebleeds, congestion, sore throat, neck pain, tinnitus and ear discharge.   Respiratory: Negative. Negative for cough, hemoptysis, sputum production, shortness of breath, wheezing and stridor.   Cardiovascular: Negative. Negative for chest pain, palpitations, orthopnea, claudication, leg swelling and PND.   Gastrointestinal: Denies nausea, vomiting, diarrhea, constipation, heartburn, melena or hematochezia.  Genitourinary: Denies dysuria, hematuria, urinary incontinence, frequency or urgency.        Objective:     /86 (BP Location: Right arm, Patient Position: Sitting)   Pulse 94   Temp 36.1 °C (97 °F) (Temporal)   Ht 1.6 m (5' 3\")   Wt (!) 135 kg (297 lb 9.6 oz)   SpO2 96%  Body mass index is 52.72 kg/m².    Physical Exam:  Vitals reviewed.  Constitutional: Oriented to person, place, and time. appears well-developed and well-nourished. No distress.   Cardiovascular: Normal rate, regular rhythm, normal heart sounds and intact distal pulses. Exam reveals no gallop and no friction rub. 3/6 murmur heard. No carotid bruits.   Pulmonary/Chest: Effort normal and breath sounds normal. No stridor. No respiratory distress. no wheezes or rales. exhibits no tenderness.   Musculoskeletal: Normal range of motion. exhibits no edema. davin pedal pulses 2+.  Lymphadenopathy: No cervical or supraclavicular adenopathy.   Neurological: Alert and oriented to person, place, and time. exhibits normal muscle tone.  Skin: Skin is warm and dry. No diaphoresis. davin LE chronic erythema mild assoc with venous insufficiency.  No edema davin LE.  Fading bruise noted to top of left foot.  nontender and no erythema.    Psychiatric: Normal mood and affect. Behavior is normal.      Assessment and Plan:     The following treatment plan was " discussed:    1. Contusion of left foot, initial encounter      fading bruise left top of foot w/o pain or tenderness.  plan:  do lab and f/u 1 mo for review   2. COVID-19      recovered except for lingering cough controlled with otc mucinex.  no sx of infection   3. DDD (degenerative disc disease), cervical      dr helm is referring her to physiatry for poss injections         Followup: Return in about 4 weeks (around 3/30/2022).

## 2022-04-02 ENCOUNTER — HOSPITAL ENCOUNTER (OUTPATIENT)
Dept: LAB | Facility: MEDICAL CENTER | Age: 67
End: 2022-04-02
Attending: NURSE PRACTITIONER
Payer: MEDICARE

## 2022-04-02 DIAGNOSIS — I10 PRIMARY HYPERTENSION: ICD-10-CM

## 2022-04-02 DIAGNOSIS — E55.9 VITAMIN D DEFICIENCY: ICD-10-CM

## 2022-04-02 DIAGNOSIS — E53.8 B12 DEFICIENCY: ICD-10-CM

## 2022-04-02 LAB
25(OH)D3 SERPL-MCNC: 150 NG/ML (ref 30–100)
ALBUMIN SERPL BCP-MCNC: 4.1 G/DL (ref 3.2–4.9)
ALBUMIN/GLOB SERPL: 1.5 G/DL
ALP SERPL-CCNC: 97 U/L (ref 30–99)
ALT SERPL-CCNC: 23 U/L (ref 2–50)
ANION GAP SERPL CALC-SCNC: 12 MMOL/L (ref 7–16)
AST SERPL-CCNC: 25 U/L (ref 12–45)
BILIRUB SERPL-MCNC: 0.4 MG/DL (ref 0.1–1.5)
BUN SERPL-MCNC: 14 MG/DL (ref 8–22)
CALCIUM SERPL-MCNC: 9.3 MG/DL (ref 8.5–10.5)
CHLORIDE SERPL-SCNC: 104 MMOL/L (ref 96–112)
CHOLEST SERPL-MCNC: 174 MG/DL (ref 100–199)
CO2 SERPL-SCNC: 27 MMOL/L (ref 20–33)
CREAT SERPL-MCNC: 0.75 MG/DL (ref 0.5–1.4)
CREAT UR-MCNC: 50.32 MG/DL
GFR SERPLBLD CREATININE-BSD FMLA CKD-EPI: 87 ML/MIN/1.73 M 2
GLOBULIN SER CALC-MCNC: 2.8 G/DL (ref 1.9–3.5)
GLUCOSE SERPL-MCNC: 91 MG/DL (ref 65–99)
HDLC SERPL-MCNC: 87 MG/DL
LDLC SERPL CALC-MCNC: 68 MG/DL
MICROALBUMIN UR-MCNC: <1.2 MG/DL
MICROALBUMIN/CREAT UR: NORMAL MG/G (ref 0–30)
POTASSIUM SERPL-SCNC: 4.2 MMOL/L (ref 3.6–5.5)
PROT SERPL-MCNC: 6.9 G/DL (ref 6–8.2)
SODIUM SERPL-SCNC: 143 MMOL/L (ref 135–145)
TRIGL SERPL-MCNC: 94 MG/DL (ref 0–149)
VIT B12 SERPL-MCNC: 1173 PG/ML (ref 211–911)

## 2022-04-02 PROCEDURE — 82043 UR ALBUMIN QUANTITATIVE: CPT

## 2022-04-02 PROCEDURE — 80061 LIPID PANEL: CPT

## 2022-04-02 PROCEDURE — 80053 COMPREHEN METABOLIC PANEL: CPT

## 2022-04-02 PROCEDURE — 82607 VITAMIN B-12: CPT

## 2022-04-02 PROCEDURE — 82306 VITAMIN D 25 HYDROXY: CPT

## 2022-04-02 PROCEDURE — 36415 COLL VENOUS BLD VENIPUNCTURE: CPT

## 2022-04-02 PROCEDURE — 82570 ASSAY OF URINE CREATININE: CPT

## 2022-04-20 ENCOUNTER — OFFICE VISIT (OUTPATIENT)
Dept: MEDICAL GROUP | Facility: MEDICAL CENTER | Age: 67
End: 2022-04-20
Payer: MEDICARE

## 2022-04-20 VITALS
DIASTOLIC BLOOD PRESSURE: 86 MMHG | OXYGEN SATURATION: 92 % | HEIGHT: 63 IN | WEIGHT: 293 LBS | SYSTOLIC BLOOD PRESSURE: 134 MMHG | HEART RATE: 99 BPM | BODY MASS INDEX: 51.91 KG/M2 | TEMPERATURE: 97.6 F

## 2022-04-20 DIAGNOSIS — M15.9 PRIMARY OSTEOARTHRITIS INVOLVING MULTIPLE JOINTS: ICD-10-CM

## 2022-04-20 DIAGNOSIS — I10 ESSENTIAL HYPERTENSION: ICD-10-CM

## 2022-04-20 DIAGNOSIS — E67.3 VITAMIN D INTOXICATION: ICD-10-CM

## 2022-04-20 DIAGNOSIS — M54.41 CHRONIC BILATERAL LOW BACK PAIN WITH RIGHT-SIDED SCIATICA: ICD-10-CM

## 2022-04-20 DIAGNOSIS — G89.29 CHRONIC BILATERAL LOW BACK PAIN WITH RIGHT-SIDED SCIATICA: ICD-10-CM

## 2022-04-20 PROCEDURE — 99214 OFFICE O/P EST MOD 30 MIN: CPT | Performed by: NURSE PRACTITIONER

## 2022-04-20 RX ORDER — METOPROLOL SUCCINATE 25 MG/1
25 TABLET, EXTENDED RELEASE ORAL DAILY
Qty: 30 TABLET | Refills: 1 | Status: SHIPPED | OUTPATIENT
Start: 2022-04-20 | End: 2022-05-17

## 2022-04-20 RX ORDER — HYDROCODONE BITARTRATE AND ACETAMINOPHEN 7.5; 325 MG/1; MG/1
1 TABLET ORAL EVERY 6 HOURS PRN
Qty: 60 TABLET | Refills: 0 | Status: SHIPPED | OUTPATIENT
Start: 2022-04-21 | End: 2022-04-20 | Stop reason: SDUPTHER

## 2022-04-20 RX ORDER — HYDROCODONE BITARTRATE AND ACETAMINOPHEN 7.5; 325 MG/1; MG/1
1 TABLET ORAL EVERY 6 HOURS PRN
Qty: 60 TABLET | Refills: 0 | Status: SHIPPED | OUTPATIENT
Start: 2022-06-20 | End: 2022-07-21 | Stop reason: SDUPTHER

## 2022-04-20 RX ORDER — HYDROCODONE BITARTRATE AND ACETAMINOPHEN 7.5; 325 MG/1; MG/1
1 TABLET ORAL EVERY 6 HOURS PRN
Qty: 60 TABLET | Refills: 0 | Status: SHIPPED | OUTPATIENT
Start: 2022-05-21 | End: 2022-04-20 | Stop reason: SDUPTHER

## 2022-04-20 ASSESSMENT — FIBROSIS 4 INDEX: FIB4 SCORE: 1.35

## 2022-04-20 NOTE — PROGRESS NOTES
Subjective:     Rain Sanabria is a 67 y.o. female who presents with HTN.    HPI:   Seen in f/u for HTN.  Her BP is mildly elevated today.  Taking her losartan and cardura approp. They are max dose.  Doesn't check at home.   She is due refill on her pain med.  Taking med approp.  Stable on med.  She has chronic back and joint pain.  UDS and contract are up to date.  She had a knee injection x 2 with Dr Morgan.  It is helping.    When she saw him they did cervical xray that was abn.  They then did a MRI cervical.  It was for her chronic davin arm pain. The MRI showed multiple levels of DDD.  They did a neck injection.  They told her that it may take a week to start working.  She will f/u with Dr Lynn about it.  Since getting the injection she has only had one nite that woke her up with arm pain.  Used to have more freq before her injection.   Reviewed lab with pt.  GFR, B12, LP, CMP, alb/cr ratio is wnl  Vitamin d is high at 150.  She is taking multiple supplements daily.  Will stop.      Patient Active Problem List    Diagnosis Date Noted   • Morbid obesity with BMI of 50.0-59.9, adult (HCC) 03/22/2021   • B12 deficiency 02/05/2020   • Dental disorder    • Alcohol use 01/26/2018   • Chronic right shoulder pain 01/06/2016   • Hyperlipidemia    • Depression with anxiety    • Vitamin D deficiency    • Abdominal hernia    • Sleep apnea 07/12/2010   • OA (osteoarthritis) 07/12/2010   • HTN (hypertension) 01/20/2010   • Genital herpes simplex 06/19/2009   • Insomnia 06/19/2009   • Migraine 06/19/2009   • Abnormal mammogram 06/19/2009   • Cystocele 06/19/2009       Current medicines (including changes today)  Current Outpatient Medications   Medication Sig Dispense Refill   • metoprolol SR (TOPROL XL) 25 MG TABLET SR 24 HR Take 1 Tablet by mouth every day. 30 Tablet 1   • [START ON 6/20/2022] HYDROcodone-acetaminophen (NORCO) 7.5-325 MG tab Take 1 Tablet by mouth every 6 hours as needed for up to 30 days. 60 Tablet 0    • montelukast (SINGULAIR) 10 MG Tab TAKE 1 TABLET BY MOUTH EVERY DAY 30 Tablet 1   • losartan (COZAAR) 100 MG Tab Take 1 Tablet by mouth every day. 90 Tablet 0   • buPROPion (WELLBUTRIN) 75 MG Tab TAKE 1 TABLET BY MOUTH TWICE A  Tablet 0   • doxazosin (CARDURA) 4 MG Tab TAKE 1 TABLET BY MOUTH EVERY DAY 90 Tablet 1   • lidocaine (LIDODERM) 5 % Patch Apply 1 Patch to skin as directed every 24 hours. 90 Patch 3   • DULoxetine (CYMBALTA) 30 MG Cap DR Particles TAKE 1 CAPSULE EVERY DAY 90 Capsule 3   • celecoxib (CELEBREX) 200 MG Cap Take 1 capsule by mouth 2 times a day. 30 capsule 2   • furosemide (LASIX) 20 MG Tab Take 1 tablet by mouth every 24 hours as needed. 30 tablet 3   • potassium chloride ER (KLOR-CON) 10 MEQ tablet Take 1 tablet by mouth every 24 hours as needed. 30 tablet 3   • DULoxetine (CYMBALTA) 60 MG Cap DR Particles delayed-release capsule TAKE 1 CAPSULE EVERY EVENING 90 Cap 0   • albuterol (VENTOLIN HFA) 108 (90 Base) MCG/ACT Aero Soln inhalation aerosol INHALE 2 PUFFS BY MOUTH EVERY FOUR HOURS AS NEEDED FOR SHORTNESS OF BREATH. DISPENSE A SPACER 1 Each 1   • fluticasone (FLONASE) 50 MCG/ACT nasal spray Spray 2 Sprays in nose every day. 1 Bottle 0   • sodium chloride (OCEAN NASAL SPRAY) 0.65 % Solution Spray 2 Sprays in nose every 2 hours as needed for Congestion. 1 Bottle 0   • methocarbamol (ROBAXIN) 750 MG Tab Take 2 Tabs by mouth 4 times a day as needed (muscle spasm). 20 Tab 0   • diclofenac EC (VOLTAREN) 75 MG Tablet Delayed Response Take 75 mg by mouth 2 times a day.     • ibuprofen (MOTRIN) 600 MG Tab Take 1 Tab by mouth every 6 hours as needed. 20 Tab 0   • ascorbic acid (ASCORBIC ACID) 500 MG Tab Take 3,000 mg by mouth every day.     • vitamin E 100 UNIT capsule Take 300 Units by mouth every day.     • Multiple Vitamins-Minerals (ENERGY BOOSTER PO) Take 80 mg by mouth. With caffiene     • glucosamine Sulfate 500 MG Cap Take 1,500 mg by mouth every day.     • Cholecalciferol (VITAMIN  "D) 400 UNIT Tab Take 1,600 Units by mouth every day.     • B Complex Vitamins (VITAMIN B COMPLEX PO) Take 20 mg by mouth every day.     • Multiple Vitamin (MULTIVITAMIN PO) Take 1 Tab by mouth every day.       No current facility-administered medications for this visit.       Allergies   Allergen Reactions   • Ace Inhibitors      Ace cough   • Tape Rash and Itching     Adhesive tape.  PAPER TAPE OK.       ROS  Constitutional: Negative. Negative for fever, chills, weight loss, malaise/fatigue and diaphoresis.   HENT: Negative. Negative for hearing loss, ear pain, nosebleeds, congestion, sore throat, neck pain, tinnitus and ear discharge.   Respiratory: Negative. Negative for cough, hemoptysis, sputum production, shortness of breath, wheezing and stridor.   Cardiovascular: Negative. Negative for chest pain, palpitations, orthopnea, claudication, leg swelling and PND.   Gastrointestinal: Denies nausea, vomiting, diarrhea, constipation, heartburn, melena or hematochezia.  Genitourinary: Denies dysuria, hematuria, urinary incontinence, frequency or urgency.        Objective:     /86 (BP Location: Right arm, Patient Position: Sitting)   Pulse 99   Temp 36.4 °C (97.6 °F) (Temporal)   Ht 1.6 m (5' 3\")   Wt (!) 135 kg (298 lb 3.2 oz)   SpO2 92%  Body mass index is 52.82 kg/m².    Physical Exam:  Vitals reviewed.  Constitutional: Oriented to person, place, and time. appears well-developed and well-nourished. No distress.   Cardiovascular: Normal rate, regular rhythm, normal heart sounds and intact distal pulses. Exam reveals no gallop and no friction rub. No murmur heard. No carotid bruits.   Pulmonary/Chest: Effort normal and breath sounds normal. No stridor. No respiratory distress. no wheezes or rales. exhibits no tenderness.   Musculoskeletal: Normal range of motion. exhibits no edema. davin pedal pulses 2+.  Lymphadenopathy: No cervical or supraclavicular adenopathy.   Neurological: Alert and oriented to person, " place, and time. exhibits normal muscle tone.  Skin: Skin is warm and dry. No diaphoresis.   Psychiatric: Normal mood and affect. Behavior is normal.      Assessment and Plan:     The following treatment plan was discussed:    1. Essential hypertension  metoprolol SR (TOPROL XL) 25 MG TABLET SR 24 HR    BP mildly elevated.  try toprol 25 mg daily.  f/u 1 mo for bp check   2. Chronic bilateral low back pain with right-sided sciatica  HYDROcodone-acetaminophen (NORCO) 7.5-325 MG tab    DISCONTINUED: HYDROcodone-acetaminophen (NORCO) 7.5-325 MG tab    DISCONTINUED: HYDROcodone-acetaminophen (NORCO) 7.5-325 MG tab    refill pain meds for 3 months.  taking meds approp. f/u 3 mo for med refill   3. Primary osteoarthritis involving multiple joints  HYDROcodone-acetaminophen (NORCO) 7.5-325 MG tab    DISCONTINUED: HYDROcodone-acetaminophen (NORCO) 7.5-325 MG tab    DISCONTINUED: HYDROcodone-acetaminophen (NORCO) 7.5-325 MG tab    continue norco for pain control.  RF med   4. Vitamin D intoxication      hold vitamin d supplement x 1 mo then restart at only 2000 units daily.           Followup: Return in about 4 weeks (around 5/18/2022), or for BP check.

## 2022-05-06 DIAGNOSIS — I10 ESSENTIAL HYPERTENSION: ICD-10-CM

## 2022-05-09 RX ORDER — LOSARTAN POTASSIUM 100 MG/1
TABLET ORAL
Qty: 90 TABLET | Refills: 0 | Status: SHIPPED | OUTPATIENT
Start: 2022-05-09 | End: 2022-10-12 | Stop reason: SDUPTHER

## 2022-05-10 ENCOUNTER — APPOINTMENT (OUTPATIENT)
Dept: RADIOLOGY | Facility: MEDICAL CENTER | Age: 67
End: 2022-05-10
Attending: EMERGENCY MEDICINE
Payer: MEDICARE

## 2022-05-10 ENCOUNTER — HOSPITAL ENCOUNTER (EMERGENCY)
Facility: MEDICAL CENTER | Age: 67
End: 2022-05-10
Attending: EMERGENCY MEDICINE
Payer: MEDICARE

## 2022-05-10 VITALS
TEMPERATURE: 97.2 F | HEIGHT: 63 IN | HEART RATE: 82 BPM | SYSTOLIC BLOOD PRESSURE: 206 MMHG | OXYGEN SATURATION: 94 % | RESPIRATION RATE: 16 BRPM | WEIGHT: 293 LBS | DIASTOLIC BLOOD PRESSURE: 94 MMHG | BODY MASS INDEX: 51.91 KG/M2

## 2022-05-10 DIAGNOSIS — R60.9 PERIPHERAL EDEMA: ICD-10-CM

## 2022-05-10 DIAGNOSIS — R21 RASH: ICD-10-CM

## 2022-05-10 PROCEDURE — 700111 HCHG RX REV CODE 636 W/ 250 OVERRIDE (IP): Performed by: EMERGENCY MEDICINE

## 2022-05-10 PROCEDURE — 93970 EXTREMITY STUDY: CPT

## 2022-05-10 PROCEDURE — 99283 EMERGENCY DEPT VISIT LOW MDM: CPT

## 2022-05-10 PROCEDURE — 93970 EXTREMITY STUDY: CPT | Mod: 26 | Performed by: INTERNAL MEDICINE

## 2022-05-10 RX ORDER — PREDNISONE 20 MG/1
20 TABLET ORAL ONCE
Status: COMPLETED | OUTPATIENT
Start: 2022-05-10 | End: 2022-05-10

## 2022-05-10 RX ORDER — PREDNISONE 20 MG/1
20 TABLET ORAL DAILY
Qty: 5 TABLET | Refills: 0 | Status: SHIPPED | OUTPATIENT
Start: 2022-05-10 | End: 2022-05-15

## 2022-05-10 RX ADMIN — PREDNISONE 20 MG: 20 TABLET ORAL at 17:47

## 2022-05-10 ASSESSMENT — PAIN DESCRIPTION - PAIN TYPE: TYPE: ACUTE PAIN

## 2022-05-10 ASSESSMENT — FIBROSIS 4 INDEX: FIB4 SCORE: 1.35

## 2022-05-10 NOTE — ED NOTES
Patient wheeled to Ortho 1 and transferred to [facility] with one assist without incident. Patient oriented to care area with call light in place. Primary assessment complete. Chart up for ERP.
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
## 2022-05-10 NOTE — ED PROVIDER NOTES
ED Physician Note    Chief Concern:   Lower extremity swelling and edema.    HPI:  Rain Sanabria is a very pleasant 67-year-old woman who presents to the emergency department for evaluation of lower extremity swelling and edema.  Her symptoms began about 2 to 3 days ago, after she returned home from a trip to [place].  She reports that she traveled by car, the family stopped once for gas, her  got out of the car but she remained in the car for the entire trip both ways.  She did some swimming while she was in [place], she also used sunscreen on her legs, and then noticed that her legs seem to be getting a little more red and itchy than usual.  She started putting an over-the-counter lidocaine cream on her calves and shins, though swelling and redness seem to worsen despite this treatment.  She states that she does have some itching, and has been scratching at her legs, though no severe pain.  She has not noticed any associated fevers.  She did have similar symptoms several years ago when she had an allergic reaction to an insect spray that she used.  She has not had any severe calf pain or posterior thigh pain, though she does have some diffuse swelling and diffuse tenderness to palpation.  She has not had any associated shortness of breath, no chest pain.  She reports no history of DVT nor pulmonary embolism, she does have a history of hypertension, she has no past history of diabetes.  She is unable to identify any exacerbating or alleviating factors.  No improvement with over-the-counter medications, symptoms seem to be worsening despite that therapy.    Review of Systems:  See HPI for pertinent positives and negatives. All other systems negative.    Past Medical History:   has a past medical history of Abdominal hernia, Abnormal mammogram (6/19/2009), Alcohol use (01/26/2018), B12 deficiency (02/05/2020), Chronic right shoulder pain, Cystocele (6/19/2009), Dental disorder, Depression with anxiety  (01/26/2018), Genital herpes (6/19/2009), Hyperlipidemia, Hypertension (01/26/2018), Insomnia (6/19/2009), Migraine (06/19/2009), Morbid obesity with BMI of 45.0-49.9, adult (HCC) (2/22/2017), OA (osteoarthritis), Sleep apnea, and Vitamin D deficiency.    Social History:  Social History     Tobacco Use   • Smoking status: Never Smoker   • Smokeless tobacco: Never Used   Vaping Use   • Vaping Use: Never used   Substance and Sexual Activity   • Alcohol use: Yes     Alcohol/week: 4.2 oz     Types: 7 Standard drinks or equivalent per week     Comment: couple times per week   • Drug use: No   • Sexual activity: Not on file       Surgical History:   has a past surgical history that includes tubal ligation (Bilateral, 1985); gastric bypass laparoscopic (02/2003); hernia repair (2004); toe arthroplasty (Bilateral, 1990'S); rhinoplasty (07/2004); breast biopsy (Left, 1996); hysteroscopy thermal ablation (early 2000's); rotator cuff repair (Right, 11/24/2009); colonoscopy (2015); primary c section (1984); and knee arthroplasty total (Left, 2/12/2018).    Current Medications:  Home Medications     Reviewed by Tanja Echevarria R.N. (Registered Nurse) on 05/10/22 at 1522  Med List Status: <None>   Medication Last Dose Status   albuterol (VENTOLIN HFA) 108 (90 Base) MCG/ACT Aero Soln inhalation aerosol  Active   ascorbic acid (ASCORBIC ACID) 500 MG Tab  Active   B Complex Vitamins (VITAMIN B COMPLEX PO)  Active   buPROPion (WELLBUTRIN) 75 MG Tab  Active   celecoxib (CELEBREX) 200 MG Cap  Active   Cholecalciferol (VITAMIN D) 400 UNIT Tab  Active   diclofenac EC (VOLTAREN) 75 MG Tablet Delayed Response  Active   doxazosin (CARDURA) 4 MG Tab  Active   DULoxetine (CYMBALTA) 30 MG Cap DR Particles  Active   DULoxetine (CYMBALTA) 60 MG Cap DR Particles delayed-release capsule  Active   fluticasone (FLONASE) 50 MCG/ACT nasal spray  Active   furosemide (LASIX) 20 MG Tab  Active   glucosamine Sulfate 500 MG Cap  Active  "  HYDROcodone-acetaminophen (NORCO) 7.5-325 MG tab  Active   ibuprofen (MOTRIN) 600 MG Tab  Active   lidocaine (LIDODERM) 5 % Patch  Active   losartan (COZAAR) 100 MG Tab  Active   methocarbamol (ROBAXIN) 750 MG Tab  Active   metoprolol SR (TOPROL XL) 25 MG TABLET SR 24 HR  Active   montelukast (SINGULAIR) 10 MG Tab  Active   Multiple Vitamin (MULTIVITAMIN PO)  Active   Multiple Vitamins-Minerals (ENERGY BOOSTER PO)  Active   potassium chloride ER (KLOR-CON) 10 MEQ tablet  Active   sodium chloride (OCEAN NASAL SPRAY) 0.65 % Solution  Active   vitamin E 100 UNIT capsule  Active                Allergies:  Allergies   Allergen Reactions   • Ace Inhibitors      Ace cough   • Tape Rash and Itching     Adhesive tape.  PAPER TAPE OK.       Physical Exam:  Vital Signs: BP (!) 206/94   Pulse 82   Temp 36.2 °C (97.2 °F)   Resp 16   Ht 1.6 m (5' 3\")   Wt (!) 135 kg (298 lb)   SpO2 94%   BMI 52.79 kg/m²   Constitutional: Alert, no acute distress  HENT: Atraumatic  Eyes: Pupils equal and reactive, normal conjunctiva  Neck: Supple, normal range of motion, no stridor  Cardiovascular: Bilateral lower extremities are warm and well perfused, no tachycardia  Pulmonary: No respiratory distress, normal work of breathing  Skin: 1+ pretibial edema bilaterally, erythema present over the bilateral lower extremities, predominantly over the shins, right greater than left, knees are not involved, feet are not involved.  Musculoskeletal: Normal range of motion in all extremities, no swelling or deformity noted, no posterior calf tenderness to palpation  Neurologic: Alert, oriented, normal speech, normal motor function  Psychiatric: Normal and appropriate mood and affect    Medical records reviewed for continuity of care.  No recent visits for similar symptoms.  She was seen in family medicine clinic, APN note reviewed from that visit.  She is following up for hypertension, blood pressure noted to be mildly elevated at this visit.  It " appears as though she was on losartan and Cardura at that time, plan notes she will be discharged on metoprolol.    Labs:  Labs Reviewed - No data to display    Radiology:  US-EXTREMITY VENOUS LOWER BILAT   Final Result           ED Medications Administered:  Medications   predniSONE (DELTASONE) tablet 20 mg (20 mg Oral Given 5/10/22 1747)       Differential diagnosis:  Contact dermatitis, inflammation, DVT, less likely cellulitis    MDM:  Ms. Sanabria presents to the emergency department for evaluation of bilateral lower extremity swelling and redness as documented above.  The rash is suggestive of a contact dermatitis, as there is a clear demarcation around the feet where her shoes would be, and also seems to stop rather abruptly before reaching the knees, though she states she was not wearing any santiago pants or other clothing that would give sun exposure to the shins only.  Calves are not as affected.  She does not have any significant tenderness to palpation, no areas of fluctuance, no purulent drainage, this seems less consistent with cellulitis.  She has had no associated fevers.  Her vital signs are reassuring she has no tachycardia, no fever.  She is hypertensive on arrival, and has a documented history of hypertension, currently on medical management.    Due to recent lengthy car ride, I did obtain a bilateral lower extremity ultrasound.  Preliminary result notes no evidence of DVT, though limited by inability to tolerate compressions due to pain.  Final result confirms no evidence of DVT.    Plan at this time is for discharge home.  I will treat with a short course of prednisone, however I have counseled Ms. Sanabria that she should follow-up within 1 to 2 days to make sure that her rash is improving, and should return to the emergency department or primary care immediately if it begins to worsen.  Return precautions were discussed with the patient, and provided in written form with the patient's discharge  instructions.,  She was hypertensive at time of discharge, she is scheduled to take her evening home blood pressure medications, she is counseled to take this when she returns home.    Personal protective equipment including N95 surgical respirator, goggles, and gloves were used during this encounter.       Disposition:  Discharge home in stable condition    Final Impression:  1. Peripheral edema    2. Rash        Electronically signed by: Jihan Schroeder MD, 5/10/2022 11:17 PM

## 2022-05-11 NOTE — DISCHARGE INSTRUCTIONS
Please take the steroids as prescribed.  Your first dose was given in the emergency department today, you may start your prescription tomorrow.  Please follow-up with your primary care practitioner within 1 to 2 days to recheck the rash.  If it is not improving significantly over the next 1 to 2 days, or if it worsens at any time, please return to the emergency department for recheck.  At this time, it does not appear to be due to an infection, however if it worsens it should be rechecked immediately.  Return to the emergency department if you develop worsening rash, fevers, drainage from the area, or if you have any further concerns.  Please keep your legs elevated as much as possible when you are resting.   [address]                 [address] [address]                               PULMONARY FUNCTION    PATIENT NAME: Matthew Smith                 :        1955  MED REC NO:   [id number]                          ROOM:  ACCOUNT NO:   [de-identified]                           ADMIT DATE: 2020  PROVIDER:     Krystin Starr MD    DATE OF PROCEDURE:  2020    INDICATION:  Shortness of breath. FINDINGS:  1. Spirometry: The FEV1 is 2.52 liters which is 79% of predicted. The  FEV1/FVC ratio is normal.  Inhaled bronchodilators are given. There is  no significant improvement. 2.  Lung volumes: Total lung capacity is 5.08 liters or 74% of  predicted. 3.  Diffusion capacity:  DLCO is 21.83 mL/min/mmHg, which is 75% of  predicted. 4.  Flow volume loop is normal.  5.  Six-minute walk test per [facility] protocol. Baseline oxygen  saturation 98%. Lowest oxygen saturation 91%. The patient ambulated  1000 feet. IMPRESSION:  1. No obstructive lung defect. 2.  Mild restrictive lung defect. 3.  Mild reduction in diffusion capacity. 4.  Significant oxyhemoglobin desaturation on six-minute walk testing,  but supplemental oxygen is not required. 5.  Restrictive pulmonary processes should be considered including  interstitial lung disease.         Dania Wu MD    D: 2020 18:59:55       T: 2020 0:42:40     [id number]  Job#: [id number]     Doc#: [id number]    CC:

## 2022-05-11 NOTE — ED NOTES
Discharge orders received. AVS provided and explained to patient. All questions answered. No IV placed during ED visit. Patient AOx4. Patient transferred from [facility] to wheelchair. Patient discharged to self without incident.

## 2022-05-13 DIAGNOSIS — R06.02 SOB (SHORTNESS OF BREATH) ON EXERTION: ICD-10-CM

## 2022-05-13 DIAGNOSIS — I10 ESSENTIAL HYPERTENSION: ICD-10-CM

## 2022-05-13 DIAGNOSIS — R05.3 CHRONIC COUGH: ICD-10-CM

## 2022-05-17 RX ORDER — MONTELUKAST SODIUM 10 MG/1
10 TABLET ORAL DAILY
Qty: 30 TABLET | Refills: 1 | Status: SHIPPED | OUTPATIENT
Start: 2022-05-17 | End: 2022-09-24

## 2022-05-17 RX ORDER — METOPROLOL SUCCINATE 25 MG/1
25 TABLET, EXTENDED RELEASE ORAL DAILY
Qty: 30 TABLET | Refills: 1 | Status: SHIPPED | OUTPATIENT
Start: 2022-05-17 | End: 2022-07-28 | Stop reason: SDUPTHER

## 2022-05-26 ENCOUNTER — OFFICE VISIT (OUTPATIENT)
Dept: MEDICAL GROUP | Facility: MEDICAL CENTER | Age: 67
End: 2022-05-26
Payer: MEDICARE

## 2022-05-26 VITALS
BODY MASS INDEX: 51.91 KG/M2 | DIASTOLIC BLOOD PRESSURE: 82 MMHG | HEIGHT: 63 IN | WEIGHT: 293 LBS | SYSTOLIC BLOOD PRESSURE: 132 MMHG | HEART RATE: 83 BPM | TEMPERATURE: 97 F | OXYGEN SATURATION: 98 %

## 2022-05-26 DIAGNOSIS — L55.1 SUNBURN OF SECOND DEGREE: ICD-10-CM

## 2022-05-26 DIAGNOSIS — I10 PRIMARY HYPERTENSION: ICD-10-CM

## 2022-05-26 PROCEDURE — 99214 OFFICE O/P EST MOD 30 MIN: CPT | Performed by: NURSE PRACTITIONER

## 2022-05-26 ASSESSMENT — FIBROSIS 4 INDEX: FIB4 SCORE: 1.35

## 2022-05-26 NOTE — PROGRESS NOTES
Subjective:     Rain Sanabria is a 67 y.o. female who presents with HTN.    HPI:   Seen in f/u for HTN.  When last seen her bp was elevated.  She was taking her lisinopril and cardura approp.  Toprol 25 mg was added.  Today bp sl better.  At home running 130's on top number.  Has not been over 135 except for once.  Otherwise controlled.  No s/e to med.   She went ot [place] in her car.  She didn't walk much on the trip.  Poss sunburned her lower legs.  Had to go to ER for the swelling and blisters.  She was given steroid.  Sx are much improved.   Legs still sl red but that is chronic.    Patient Active Problem List    Diagnosis Date Noted   • Morbid obesity with BMI of 50.0-59.9, adult (HCC) 03/22/2021   • B12 deficiency 02/05/2020   • Dental disorder    • Alcohol use 01/26/2018   • Chronic right shoulder pain 01/06/2016   • Hyperlipidemia    • Depression with anxiety    • Vitamin D deficiency    • Abdominal hernia    • Sleep apnea 07/12/2010   • OA (osteoarthritis) 07/12/2010   • HTN (hypertension) 01/20/2010   • Genital herpes simplex 06/19/2009   • Insomnia 06/19/2009   • Migraine 06/19/2009   • Abnormal mammogram 06/19/2009   • Cystocele 06/19/2009       Current medicines (including changes today)  Current Outpatient Medications   Medication Sig Dispense Refill   • montelukast (SINGULAIR) 10 MG Tab TAKE 1 TABLET BY MOUTH EVERY DAY 30 Tablet 1   • metoprolol SR (TOPROL XL) 25 MG TABLET SR 24 HR TAKE 1 TABLET BY MOUTH EVERY DAY 30 Tablet 1   • losartan (COZAAR) 100 MG Tab TAKE 1 TABLET EVERY DAY 90 Tablet 0   • [START ON 6/20/2022] HYDROcodone-acetaminophen (NORCO) 7.5-325 MG tab Take 1 Tablet by mouth every 6 hours as needed for up to 30 days. 60 Tablet 0   • buPROPion (WELLBUTRIN) 75 MG Tab TAKE 1 TABLET BY MOUTH TWICE A  Tablet 0   • doxazosin (CARDURA) 4 MG Tab TAKE 1 TABLET BY MOUTH EVERY DAY 90 Tablet 1   • lidocaine (LIDODERM) 5 % Patch Apply 1 Patch to skin as directed every 24 hours. 90 Patch 3    • DULoxetine (CYMBALTA) 30 MG Cap DR Particles TAKE 1 CAPSULE EVERY DAY 90 Capsule 3   • celecoxib (CELEBREX) 200 MG Cap Take 1 capsule by mouth 2 times a day. 30 capsule 2   • furosemide (LASIX) 20 MG Tab Take 1 tablet by mouth every 24 hours as needed. 30 tablet 3   • potassium chloride ER (KLOR-CON) 10 MEQ tablet Take 1 tablet by mouth every 24 hours as needed. 30 tablet 3   • DULoxetine (CYMBALTA) 60 MG Cap DR Particles delayed-release capsule TAKE 1 CAPSULE EVERY EVENING 90 Cap 0   • albuterol (VENTOLIN HFA) 108 (90 Base) MCG/ACT Aero Soln inhalation aerosol INHALE 2 PUFFS BY MOUTH EVERY FOUR HOURS AS NEEDED FOR SHORTNESS OF BREATH. DISPENSE A SPACER 1 Each 1   • fluticasone (FLONASE) 50 MCG/ACT nasal spray Spray 2 Sprays in nose every day. 1 Bottle 0   • sodium chloride (OCEAN NASAL SPRAY) 0.65 % Solution Spray 2 Sprays in nose every 2 hours as needed for Congestion. 1 Bottle 0   • methocarbamol (ROBAXIN) 750 MG Tab Take 2 Tabs by mouth 4 times a day as needed (muscle spasm). 20 Tab 0   • diclofenac EC (VOLTAREN) 75 MG Tablet Delayed Response Take 75 mg by mouth 2 times a day.     • ibuprofen (MOTRIN) 600 MG Tab Take 1 Tab by mouth every 6 hours as needed. 20 Tab 0   • ascorbic acid (ASCORBIC ACID) 500 MG Tab Take 3,000 mg by mouth every day.     • vitamin E 100 UNIT capsule Take 300 Units by mouth every day.     • Multiple Vitamins-Minerals (ENERGY BOOSTER PO) Take 80 mg by mouth. With caffiene     • glucosamine Sulfate 500 MG Cap Take 1,500 mg by mouth every day.     • Cholecalciferol (VITAMIN D) 400 UNIT Tab Take 1,600 Units by mouth every day.     • B Complex Vitamins (VITAMIN B COMPLEX PO) Take 20 mg by mouth every day.     • Multiple Vitamin (MULTIVITAMIN PO) Take 1 Tab by mouth every day.       No current facility-administered medications for this visit.       Allergies   Allergen Reactions   • Ace Inhibitors      Ace cough   • Tape Rash and Itching     Adhesive tape.  PAPER TAPE OK.  "      ROS  Constitutional: Negative. Negative for fever, chills, weight loss, malaise/fatigue and diaphoresis.   HENT: Negative. Negative for hearing loss, ear pain, nosebleeds, congestion, sore throat, neck pain, tinnitus and ear discharge.   Respiratory: Negative. Negative for cough, hemoptysis, sputum production, shortness of breath, wheezing and stridor.   Cardiovascular: Negative. Negative for chest pain, palpitations, orthopnea, claudication, leg swelling and PND.   Gastrointestinal: Denies nausea, vomiting, diarrhea, constipation, heartburn, melena or hematochezia.  Genitourinary: Denies dysuria, hematuria, urinary incontinence, frequency or urgency.        Objective:     /82 (BP Location: Right arm, Patient Position: Sitting, BP Cuff Size: Adult)   Pulse 83   Temp 36.1 °C (97 °F) (Temporal)   Ht 1.6 m (5' 3\")   Wt (!) 137 kg (301 lb)   SpO2 98%  Body mass index is 53.32 kg/m².    Physical Exam:  Vitals reviewed.  Constitutional: Oriented to person, place, and time. appears well-developed and well-nourished. No distress.   Cardiovascular: Normal rate, regular rhythm, normal heart sounds and intact distal pulses. Exam reveals no gallop and no friction rub. No murmur heard. No carotid bruits.   Pulmonary/Chest: Effort normal and breath sounds normal. No stridor. No respiratory distress. no wheezes or rales. exhibits no tenderness.   Musculoskeletal: Normal range of motion. exhibits no edema. davin pedal pulses 2+.  Lymphadenopathy: No cervical or supraclavicular adenopathy.   Neurological: Alert and oriented to person, place, and time. exhibits normal muscle tone.  Skin: Skin is warm and dry. No diaphoresis. davin lower legs reddish discoloration w/o rash, blisters or swelling.  Psychiatric: Normal mood and affect. Behavior is normal.      Assessment and Plan:     The following treatment plan was discussed:    1. Primary hypertension      now stable and controlled on 3 meds for BP.  f/u as sched   2. " Sunburn of second degree      resolved and off steroids.           Followup: Return if symptoms worsen or fail to improve.

## 2022-06-02 DIAGNOSIS — G89.29 CHRONIC BILATERAL LOW BACK PAIN WITH RIGHT-SIDED SCIATICA: ICD-10-CM

## 2022-06-02 DIAGNOSIS — M54.41 CHRONIC BILATERAL LOW BACK PAIN WITH RIGHT-SIDED SCIATICA: ICD-10-CM

## 2022-06-02 DIAGNOSIS — M15.9 PRIMARY OSTEOARTHRITIS INVOLVING MULTIPLE JOINTS: ICD-10-CM

## 2022-06-03 RX ORDER — CELECOXIB 200 MG/1
CAPSULE ORAL
Qty: 30 CAPSULE | Refills: 2 | Status: SHIPPED | OUTPATIENT
Start: 2022-06-03 | End: 2022-07-21 | Stop reason: SDUPTHER

## 2022-07-19 DIAGNOSIS — I10 PRIMARY HYPERTENSION: ICD-10-CM

## 2022-07-19 RX ORDER — DOXAZOSIN MESYLATE 4 MG/1
4 TABLET ORAL DAILY
Qty: 90 TABLET | Refills: 1 | Status: SHIPPED | OUTPATIENT
Start: 2022-07-19 | End: 2023-06-08 | Stop reason: SDUPTHER

## 2022-07-19 NOTE — TELEPHONE ENCOUNTER
Received request via: Pharmacy    Was the patient seen in the last year in this department? Yes, LOV: 05/26/2022    Does the patient have an active prescription (recently filled or refills available) for medication(s) requested? No

## 2022-07-21 ENCOUNTER — OFFICE VISIT (OUTPATIENT)
Dept: MEDICAL GROUP | Facility: MEDICAL CENTER | Age: 67
End: 2022-07-21
Payer: MEDICARE

## 2022-07-21 VITALS
TEMPERATURE: 97.9 F | DIASTOLIC BLOOD PRESSURE: 88 MMHG | WEIGHT: 292.4 LBS | SYSTOLIC BLOOD PRESSURE: 156 MMHG | OXYGEN SATURATION: 94 % | HEART RATE: 89 BPM | RESPIRATION RATE: 16 BRPM | BODY MASS INDEX: 51.81 KG/M2 | HEIGHT: 63 IN

## 2022-07-21 DIAGNOSIS — M15.9 PRIMARY OSTEOARTHRITIS INVOLVING MULTIPLE JOINTS: ICD-10-CM

## 2022-07-21 DIAGNOSIS — G89.29 CHRONIC BILATERAL LOW BACK PAIN WITH RIGHT-SIDED SCIATICA: ICD-10-CM

## 2022-07-21 DIAGNOSIS — M54.41 CHRONIC BILATERAL LOW BACK PAIN WITH RIGHT-SIDED SCIATICA: ICD-10-CM

## 2022-07-21 PROCEDURE — 99214 OFFICE O/P EST MOD 30 MIN: CPT | Performed by: STUDENT IN AN ORGANIZED HEALTH CARE EDUCATION/TRAINING PROGRAM

## 2022-07-21 RX ORDER — CELECOXIB 200 MG/1
200 CAPSULE ORAL 2 TIMES DAILY
Qty: 30 CAPSULE | Refills: 2 | Status: SHIPPED | OUTPATIENT
Start: 2022-07-21 | End: 2022-07-28 | Stop reason: SDUPTHER

## 2022-07-21 RX ORDER — HYDROCODONE BITARTRATE AND ACETAMINOPHEN 7.5; 325 MG/1; MG/1
1 TABLET ORAL EVERY 6 HOURS PRN
Qty: 60 TABLET | Refills: 0 | Status: SHIPPED | OUTPATIENT
Start: 2022-07-21 | End: 2022-08-17

## 2022-07-21 ASSESSMENT — ENCOUNTER SYMPTOMS
FEVER: 0
SHORTNESS OF BREATH: 0
CHILLS: 0

## 2022-07-21 ASSESSMENT — FIBROSIS 4 INDEX: FIB4 SCORE: 1.35

## 2022-07-21 NOTE — PROGRESS NOTES
Subjective:     CC: Medication Refill    HPI:   Rain presents today for the following;    No problems updated.    Current Outpatient Medications Ordered in Epic   Medication Sig Dispense Refill   • celecoxib (CELEBREX) 200 MG Cap Take 1 Capsule by mouth 2 times a day. 30 Capsule 2   • HYDROcodone-acetaminophen (NORCO) 7.5-325 MG tab Take 1 Tablet by mouth every 6 hours as needed for Severe Pain for up to 30 days. 60 Tablet 0   • doxazosin (CARDURA) 4 MG Tab TAKE 1 TABLET BY MOUTH EVERY DAY 90 Tablet 1   • montelukast (SINGULAIR) 10 MG Tab TAKE 1 TABLET BY MOUTH EVERY DAY 30 Tablet 1   • metoprolol SR (TOPROL XL) 25 MG TABLET SR 24 HR TAKE 1 TABLET BY MOUTH EVERY DAY 30 Tablet 1   • losartan (COZAAR) 100 MG Tab TAKE 1 TABLET EVERY DAY 90 Tablet 0   • buPROPion (WELLBUTRIN) 75 MG Tab TAKE 1 TABLET BY MOUTH TWICE A  Tablet 0   • lidocaine (LIDODERM) 5 % Patch Apply 1 Patch to skin as directed every 24 hours. 90 Patch 3   • DULoxetine (CYMBALTA) 30 MG Cap DR Particles TAKE 1 CAPSULE EVERY DAY 90 Capsule 3   • furosemide (LASIX) 20 MG Tab Take 1 tablet by mouth every 24 hours as needed. 30 tablet 3   • potassium chloride ER (KLOR-CON) 10 MEQ tablet Take 1 tablet by mouth every 24 hours as needed. 30 tablet 3   • DULoxetine (CYMBALTA) 60 MG Cap DR Particles delayed-release capsule TAKE 1 CAPSULE EVERY EVENING 90 Cap 0   • albuterol (VENTOLIN HFA) 108 (90 Base) MCG/ACT Aero Soln inhalation aerosol INHALE 2 PUFFS BY MOUTH EVERY FOUR HOURS AS NEEDED FOR SHORTNESS OF BREATH. DISPENSE A SPACER 1 Each 1   • fluticasone (FLONASE) 50 MCG/ACT nasal spray Spray 2 Sprays in nose every day. 1 Bottle 0   • sodium chloride (OCEAN NASAL SPRAY) 0.65 % Solution Spray 2 Sprays in nose every 2 hours as needed for Congestion. 1 Bottle 0   • methocarbamol (ROBAXIN) 750 MG Tab Take 2 Tabs by mouth 4 times a day as needed (muscle spasm). 20 Tab 0   • ibuprofen (MOTRIN) 600 MG Tab Take 1 Tab by mouth every 6 hours as needed. 20 Tab 0   •  "ascorbic acid (ASCORBIC ACID) 500 MG Tab Take 3,000 mg by mouth every day.     • vitamin E 100 UNIT capsule Take 300 Units by mouth every day.     • Multiple Vitamins-Minerals (ENERGY BOOSTER PO) Take 80 mg by mouth. With caffiene     • glucosamine Sulfate 500 MG Cap Take 1,500 mg by mouth every day.     • Cholecalciferol (VITAMIN D) 400 UNIT Tab Take 1,600 Units by mouth every day.     • B Complex Vitamins (VITAMIN B COMPLEX PO) Take 20 mg by mouth every day.     • Multiple Vitamin (MULTIVITAMIN PO) Take 1 Tab by mouth every day.       No current [facility]-ordered facility-administered medications on file.           ROS:  Review of Systems   Constitutional: Negative for chills and fever.   Respiratory: Negative for shortness of breath.    Cardiovascular: Negative for chest pain.       Objective:     Exam:  BP (!) 156/88   Pulse 89   Temp 36.6 °C (97.9 °F) (Temporal)   Resp 16   Ht 1.6 m (5' 3\")   Wt (!) 133 kg (292 lb 6.4 oz)   SpO2 94%   BMI 51.80 kg/m²  Body mass index is 51.8 kg/m².    Physical Exam  Constitutional:       General: She is not in acute distress.     Appearance: She is not ill-appearing.   Pulmonary:      Effort: Pulmonary effort is normal.   Neurological:      Mental Status: She is alert.   Psychiatric:         Mood and Affect: Mood normal.         Behavior: Behavior normal.         Thought Content: Thought content normal.         Judgment: Judgment normal.               Assessment & Plan:     Problem List Items Addressed This Visit     OA (osteoarthritis)    Relevant Medications    celecoxib (CELEBREX) 200 MG Cap    HYDROcodone-acetaminophen (NORCO) 7.5-325 MG tab      Other Visit Diagnoses     Chronic bilateral low back pain with right-sided sciatica        refill pain meds for 3 months.  taking meds approp.  UDS and contract are up to date.      Relevant Medications    celecoxib (CELEBREX) 200 MG Cap    HYDROcodone-acetaminophen (NORCO) 7.5-325 MG tab    Other Relevant Orders    URINE DRUG " SCREEN    Controlled Substance Treatment Agreement    Chronic bilateral low back pain with right-sided sciatica        refill pain meds for 3 months.  taking meds approp. f/u 3 mo for med refill    Relevant Medications    celecoxib (CELEBREX) 200 MG Cap    HYDROcodone-acetaminophen (NORCO) 7.5-325 MG tab    Other Relevant Orders    URINE DRUG SCREEN    Controlled Substance Treatment Agreement              No follow-ups on file.    Please note that this dictation was created using voice recognition software. I have made every reasonable attempt to correct obvious errors, but I expect that there are errors of grammar and possibly content that I did not discover before finalizing the note.

## 2022-07-28 ENCOUNTER — OFFICE VISIT (OUTPATIENT)
Dept: MEDICAL GROUP | Facility: MEDICAL CENTER | Age: 67
End: 2022-07-28
Payer: MEDICARE

## 2022-07-28 VITALS
DIASTOLIC BLOOD PRESSURE: 100 MMHG | HEIGHT: 63 IN | TEMPERATURE: 98.3 F | HEART RATE: 87 BPM | WEIGHT: 293 LBS | BODY MASS INDEX: 51.91 KG/M2 | OXYGEN SATURATION: 96 % | SYSTOLIC BLOOD PRESSURE: 172 MMHG | RESPIRATION RATE: 20 BRPM

## 2022-07-28 DIAGNOSIS — M79.642 BILATERAL HAND PAIN: ICD-10-CM

## 2022-07-28 DIAGNOSIS — M54.41 CHRONIC BILATERAL LOW BACK PAIN WITH RIGHT-SIDED SCIATICA: ICD-10-CM

## 2022-07-28 DIAGNOSIS — M79.641 BILATERAL HAND PAIN: ICD-10-CM

## 2022-07-28 DIAGNOSIS — G89.29 CHRONIC BILATERAL LOW BACK PAIN WITH RIGHT-SIDED SCIATICA: ICD-10-CM

## 2022-07-28 DIAGNOSIS — M15.9 PRIMARY OSTEOARTHRITIS INVOLVING MULTIPLE JOINTS: ICD-10-CM

## 2022-07-28 DIAGNOSIS — I10 ESSENTIAL HYPERTENSION: ICD-10-CM

## 2022-07-28 PROCEDURE — 99214 OFFICE O/P EST MOD 30 MIN: CPT | Performed by: NURSE PRACTITIONER

## 2022-07-28 RX ORDER — METOPROLOL SUCCINATE 25 MG/1
25 TABLET, EXTENDED RELEASE ORAL DAILY
Qty: 30 TABLET | Refills: 1 | Status: SHIPPED | OUTPATIENT
Start: 2022-07-28 | End: 2022-08-21

## 2022-07-28 RX ORDER — CELECOXIB 200 MG/1
200 CAPSULE ORAL 2 TIMES DAILY
Qty: 60 CAPSULE | Refills: 2 | Status: SHIPPED | OUTPATIENT
Start: 2022-07-28 | End: 2023-01-02

## 2022-07-28 RX ORDER — OXYCODONE HYDROCHLORIDE AND ACETAMINOPHEN 5; 325 MG/1; MG/1
1 TABLET ORAL EVERY 4 HOURS PRN
Qty: 14 TABLET | Refills: 0 | Status: SHIPPED | OUTPATIENT
Start: 2022-07-28 | End: 2022-08-17 | Stop reason: SDUPTHER

## 2022-07-28 RX ORDER — PREDNISONE 20 MG/1
TABLET ORAL
Qty: 15 TABLET | Refills: 0 | Status: SHIPPED | OUTPATIENT
Start: 2022-07-28 | End: 2022-08-17

## 2022-07-28 ASSESSMENT — FIBROSIS 4 INDEX: FIB4 SCORE: 1.35

## 2022-07-28 NOTE — PROGRESS NOTES
Subjective:     Rain Sanabria is a 67 y.o. female who presents with pain multiple sites.    HPI:   Seen in f/u for pain multiple sites.  She is having worse davin wrist pain.  Pain is in hand and radiates davin to elbows.  She has tried gloves and warmth.  She needs her celebrex refilled for bid.    She is also having worsening LBP.  No sciatica.  [facility] refilled her norco but not helping much.  Tearful today d/t pain.   Fell recently onto rt shoulder and ribs.  They are painful. No SOB.  Did not have LOC or hit her head  She has hx of abn sleep study but insurance would not approve o2 for her.  She has medicare now and will redo apnea link.   Bp very elevated today.  She doesn't have metoprolol and doesn't know how long she has been out of med.  Needs refill    Patient Active Problem List    Diagnosis Date Noted   • Morbid obesity with BMI of 50.0-59.9, adult (HCC) 03/22/2021   • B12 deficiency 02/05/2020   • Dental disorder    • Alcohol use 01/26/2018   • Chronic right shoulder pain 01/06/2016   • Hyperlipidemia    • Depression with anxiety    • Vitamin D deficiency    • Abdominal hernia    • Sleep apnea 07/12/2010   • OA (osteoarthritis) 07/12/2010   • HTN (hypertension) 01/20/2010   • Genital herpes simplex 06/19/2009   • Insomnia 06/19/2009   • Migraine 06/19/2009   • Abnormal mammogram 06/19/2009   • Cystocele 06/19/2009       Current medicines (including changes today)  Current Outpatient Medications   Medication Sig Dispense Refill   • metoprolol SR (TOPROL XL) 25 MG TABLET SR 24 HR Take 1 Tablet by mouth every day. 30 Tablet 1   • celecoxib (CELEBREX) 200 MG Cap Take 1 Capsule by mouth 2 times a day. 60 Capsule 2   • oxyCODONE-acetaminophen (PERCOCET) 5-325 MG Tab Take 1 Tablet by mouth every four hours as needed for Severe Pain (1 tab every 12 hrs prn x 7 days.  hold norco during this week) for up to 7 days. 14 Tablet 0   • predniSONE (DELTASONE) 20 MG Tab Take 2 tabs daily x 5 days then 1 tab daily x 5  days 15 Tablet 0   • HYDROcodone-acetaminophen (NORCO) 7.5-325 MG tab Take 1 Tablet by mouth every 6 hours as needed for Severe Pain for up to 30 days. 60 Tablet 0   • doxazosin (CARDURA) 4 MG Tab TAKE 1 TABLET BY MOUTH EVERY DAY 90 Tablet 1   • montelukast (SINGULAIR) 10 MG Tab TAKE 1 TABLET BY MOUTH EVERY DAY 30 Tablet 1   • losartan (COZAAR) 100 MG Tab TAKE 1 TABLET EVERY DAY 90 Tablet 0   • buPROPion (WELLBUTRIN) 75 MG Tab TAKE 1 TABLET BY MOUTH TWICE A  Tablet 0   • lidocaine (LIDODERM) 5 % Patch Apply 1 Patch to skin as directed every 24 hours. 90 Patch 3   • furosemide (LASIX) 20 MG Tab Take 1 tablet by mouth every 24 hours as needed. 30 tablet 3   • potassium chloride ER (KLOR-CON) 10 MEQ tablet Take 1 tablet by mouth every 24 hours as needed. 30 tablet 3   • DULoxetine (CYMBALTA) 60 MG Cap DR Particles delayed-release capsule TAKE 1 CAPSULE EVERY EVENING 90 Cap 0   • albuterol (VENTOLIN HFA) 108 (90 Base) MCG/ACT Aero Soln inhalation aerosol INHALE 2 PUFFS BY MOUTH EVERY FOUR HOURS AS NEEDED FOR SHORTNESS OF BREATH. DISPENSE A SPACER 1 Each 1   • fluticasone (FLONASE) 50 MCG/ACT nasal spray Spray 2 Sprays in nose every day. 1 Bottle 0   • sodium chloride (OCEAN NASAL SPRAY) 0.65 % Solution Spray 2 Sprays in nose every 2 hours as needed for Congestion. 1 Bottle 0   • methocarbamol (ROBAXIN) 750 MG Tab Take 2 Tabs by mouth 4 times a day as needed (muscle spasm). 20 Tab 0   • ibuprofen (MOTRIN) 600 MG Tab Take 1 Tab by mouth every 6 hours as needed. 20 Tab 0   • ascorbic acid (ASCORBIC ACID) 500 MG Tab Take 3,000 mg by mouth every day.     • vitamin E 100 UNIT capsule Take 300 Units by mouth every day.     • Multiple Vitamins-Minerals (ENERGY BOOSTER PO) Take 80 mg by mouth. With caffiene     • glucosamine Sulfate 500 MG Cap Take 1,500 mg by mouth every day.     • Cholecalciferol (VITAMIN D) 400 UNIT Tab Take 1,600 Units by mouth every day.     • B Complex Vitamins (VITAMIN B COMPLEX PO) Take 20 mg by  "mouth every day.     • Multiple Vitamin (MULTIVITAMIN PO) Take 1 Tab by mouth every day.       No current facility-administered medications for this visit.       Allergies   Allergen Reactions   • Ace Inhibitors      Ace cough   • Tape Rash and Itching     Adhesive tape.  PAPER TAPE OK.       ROS  Constitutional: Negative. Negative for fever, chills, weight loss, malaise/fatigue and diaphoresis.   HENT: Negative. Negative for hearing loss, ear pain, nosebleeds, congestion, sore throat, neck pain, tinnitus and ear discharge.   Respiratory: Negative. Negative for cough, hemoptysis, sputum production, shortness of breath, wheezing and stridor.   Cardiovascular: Negative. Negative for chest pain, palpitations, orthopnea, claudication, leg swelling and PND.   Gastrointestinal: Denies nausea, vomiting, diarrhea, constipation, heartburn, melena or hematochezia.  Genitourinary: Denies dysuria, hematuria, urinary incontinence, frequency or urgency.        Objective:     BP (!) 172/100   Pulse 87   Temp 36.8 °C (98.3 °F) (Temporal)   Resp 20   Ht 1.588 m (5' 2.5\")   Wt (!) 134 kg (296 lb)   SpO2 96%  Body mass index is 53.28 kg/m².    Physical Exam:  Vitals reviewed.  Constitutional: Oriented to person, place, and time. appears well-developed and well-nourished. Tearful with her pain levels in ofc today.   Cardiovascular: Normal rate, regular rhythm, normal heart sounds and intact distal pulses. Exam reveals no gallop and no friction rub. No murmur heard. No carotid bruits.   Pulmonary/Chest: Effort normal and breath sounds normal. No stridor. No respiratory distress. no wheezes or rales. exhibits no tenderness.   Musculoskeletal: pain in davin hands with range of motion. exhibits no edema. davin pedal pulses 2+.  Lymphadenopathy: No cervical or supraclavicular adenopathy.   Neurological: Alert and oriented to person, place, and time. exhibits normal muscle tone.  Skin: Skin is warm and dry. No diaphoresis.   Psychiatric: " Normal mood and affect. Behavior is normal.      Assessment and Plan:     The following treatment plan was discussed:    1. Essential hypertension  metoprolol SR (TOPROL XL) 25 MG TABLET SR 24 HR    BP uncontrolled today.  forgot to get toprol RF.  RF med.  f/u 2 wk for sx eval and bp chk   2. Chronic bilateral low back pain with right-sided sciatica  celecoxib (CELEBREX) 200 MG Cap    hold norco for 7 days and take percocet bid.  do prednisone taper to see if helps pain.  taking meds approp.  UDS and contract are up to date.     3. Primary osteoarthritis involving multiple joints  celecoxib (CELEBREX) 200 MG Cap    RF celebrex bid for pain control   4. Bilateral hand pain  Referral to Orthopedics    oxyCODONE-acetaminophen (PERCOCET) 5-325 MG Tab    predniSONE (DELTASONE) 20 MG Tab    DX-HAND 3+ LEFT    DX-HAND 3+ RIGHT    Sed Rate    CRP QUANTITIVE (NON-CARDIAC)    STAN REFLEXIVE PROFILE    [facility]    RHEUMATOID ARTHRITIS FACTOR    do davin hand xrays and lab to check for RA or other etiologies since having pain in multiple sites that is severe.         Followup: Return in about 2 weeks (around 8/11/2022).

## 2022-08-01 ENCOUNTER — HOSPITAL ENCOUNTER (OUTPATIENT)
Dept: LAB | Facility: MEDICAL CENTER | Age: 67
End: 2022-08-01
Attending: NURSE PRACTITIONER
Payer: MEDICARE

## 2022-08-01 ENCOUNTER — APPOINTMENT (OUTPATIENT)
Dept: RADIOLOGY | Facility: MEDICAL CENTER | Age: 67
End: 2022-08-01
Attending: NURSE PRACTITIONER
Payer: MEDICARE

## 2022-08-01 DIAGNOSIS — M79.641 BILATERAL HAND PAIN: ICD-10-CM

## 2022-08-01 DIAGNOSIS — M79.642 BILATERAL HAND PAIN: ICD-10-CM

## 2022-08-01 LAB
CRP SERPL HS-MCNC: 0.68 MG/DL (ref 0–0.75)
ERYTHROCYTE [SEDIMENTATION RATE] IN BLOOD BY WESTERGREN METHOD: 12 MM/HOUR (ref 0–25)
RHEUMATOID FACT SER IA-ACNC: <10 IU/ML (ref 0–14)

## 2022-08-01 PROCEDURE — 85652 RBC SED RATE AUTOMATED: CPT

## 2022-08-01 PROCEDURE — 86200 CCP ANTIBODY: CPT

## 2022-08-01 PROCEDURE — 86140 C-REACTIVE PROTEIN: CPT

## 2022-08-01 PROCEDURE — 86038 ANTINUCLEAR ANTIBODIES: CPT

## 2022-08-01 PROCEDURE — 86431 RHEUMATOID FACTOR QUANT: CPT

## 2022-08-01 PROCEDURE — 36415 COLL VENOUS BLD VENIPUNCTURE: CPT

## 2022-08-03 LAB
CCP IGG SERPL-ACNC: 4 UNITS (ref 0–19)
NUCLEAR IGG SER QL IA: NORMAL

## 2022-08-04 ENCOUNTER — TELEPHONE (OUTPATIENT)
Dept: MEDICAL GROUP | Facility: MEDICAL CENTER | Age: 67
End: 2022-08-04
Payer: MEDICARE

## 2022-08-04 NOTE — TELEPHONE ENCOUNTER
Please let pt know that the lab that she just did is wnl.  She does not have rheumatoid arthritis, infection or inflammation.

## 2022-08-08 NOTE — TELEPHONE ENCOUNTER
Patient notified.     Patient expressed question about her medication. Requesting refill percocet and prednisone medication. She is in a lot of pain and finsihed her current medication.

## 2022-08-08 NOTE — TELEPHONE ENCOUNTER
She is not due pain med yet.  She just finished prednisone.  She cannot be on that all the time.   i can refer her to pain mgmt if she wants.

## 2022-08-10 NOTE — TELEPHONE ENCOUNTER
Spoke to pt. She understands that she cannot constantly be on Prednisone and that she is not due for a pain med refill. She stated that she has an appointment set up with you and will see you then.

## 2022-08-17 ENCOUNTER — OFFICE VISIT (OUTPATIENT)
Dept: MEDICAL GROUP | Facility: MEDICAL CENTER | Age: 67
End: 2022-08-17
Payer: MEDICARE

## 2022-08-17 VITALS
TEMPERATURE: 98.2 F | WEIGHT: 293 LBS | HEIGHT: 63 IN | SYSTOLIC BLOOD PRESSURE: 134 MMHG | DIASTOLIC BLOOD PRESSURE: 86 MMHG | HEART RATE: 105 BPM | OXYGEN SATURATION: 94 % | BODY MASS INDEX: 51.91 KG/M2
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
## 2022-08-17 DIAGNOSIS — N95.9 POST MENOPAUSAL PROBLEMS: ICD-10-CM

## 2022-08-17 DIAGNOSIS — S22.41XA CLOSED FRACTURE OF MULTIPLE RIBS OF RIGHT SIDE, INITIAL ENCOUNTER: ICD-10-CM

## 2022-08-17 DIAGNOSIS — M25.511 ACUTE PAIN OF RIGHT SHOULDER: ICD-10-CM

## 2022-08-17 PROCEDURE — 99214 OFFICE O/P EST MOD 30 MIN: CPT | Performed by: NURSE PRACTITIONER

## 2022-08-17 RX ORDER — OXYCODONE HYDROCHLORIDE AND ACETAMINOPHEN 5; 325 MG/1; MG/1
1 TABLET ORAL EVERY 4 HOURS PRN
Qty: 90 TABLET | Refills: 0 | Status: SHIPPED | OUTPATIENT
Start: 2022-08-17 | End: 2022-08-17 | Stop reason: SDUPTHER

## 2022-08-17 RX ORDER — OXYCODONE HYDROCHLORIDE AND ACETAMINOPHEN 5; 325 MG/1; MG/1
1 TABLET ORAL EVERY 4 HOURS PRN
Qty: 90 TABLET | Refills: 0 | Status: SHIPPED | OUTPATIENT
Start: 2022-08-17 | End: 2022-08-17

## 2022-08-17 RX ORDER — OXYCODONE HYDROCHLORIDE AND ACETAMINOPHEN 5; 325 MG/1; MG/1
1 TABLET ORAL EVERY 4 HOURS PRN
Qty: 90 TABLET | Refills: 0 | Status: SHIPPED | OUTPATIENT
Start: 2022-09-17 | End: 2022-10-04 | Stop reason: SDUPTHER

## 2022-08-17 ASSESSMENT — FIBROSIS 4 INDEX: FIB4 SCORE: 1.35

## 2022-08-17 NOTE — PROGRESS NOTES
Subjective:     Rain Sanabria is a 67 y.o. female who presents with pain after fall.    HPI:   Seen in f/u for pain after fall.  She saw ortho.  They found 4 fx ribs on rt side.  Poss ob 2 more fx but not sure.  She also has poss rt rotator cuff tear.  She will be getting MRI soon for this.    She was given pain med refill at last appt with prednisone.  She finished that early. Needs new pain med supply.  UDS and contract are up to date. Taking med approp.  Last appt she was in severe pain and bp elevated.  Today it is better.  Will increase percocet for 2 months d/t her injury to 3 tab daily prn.      Patient Active Problem List    Diagnosis Date Noted    Morbid obesity with BMI of 50.0-59.9, adult (HCC) 03/22/2021    B12 deficiency 02/05/2020    Dental disorder     Alcohol use 01/26/2018    Chronic right shoulder pain 01/06/2016    Hyperlipidemia     Depression with anxiety     Vitamin D deficiency     Abdominal hernia     Sleep apnea 07/12/2010    OA (osteoarthritis) 07/12/2010    HTN (hypertension) 01/20/2010    Genital herpes simplex 06/19/2009    Insomnia 06/19/2009    Migraine 06/19/2009    Abnormal mammogram 06/19/2009    Cystocele 06/19/2009       Current medicines (including changes today)  Current Outpatient Medications   Medication Sig Dispense Refill    [START ON 9/17/2022] oxyCODONE-acetaminophen (PERCOCET) 5-325 MG Tab Take 1 Tablet by mouth every four hours as needed for Severe Pain (1 tab every 12 hrs prn x 7 days.  hold norco during this week) for up to 30 days. 90 Tablet 0    metoprolol SR (TOPROL XL) 25 MG TABLET SR 24 HR Take 1 Tablet by mouth every day. 30 Tablet 1    celecoxib (CELEBREX) 200 MG Cap Take 1 Capsule by mouth 2 times a day. 60 Capsule 2    doxazosin (CARDURA) 4 MG Tab TAKE 1 TABLET BY MOUTH EVERY DAY 90 Tablet 1    montelukast (SINGULAIR) 10 MG Tab TAKE 1 TABLET BY MOUTH EVERY DAY 30 Tablet 1    losartan (COZAAR) 100 MG Tab TAKE 1 TABLET EVERY DAY 90 Tablet 0    buPROPion  (WELLBUTRIN) 75 MG Tab TAKE 1 TABLET BY MOUTH TWICE A  Tablet 0    lidocaine (LIDODERM) 5 % Patch Apply 1 Patch to skin as directed every 24 hours. 90 Patch 3    furosemide (LASIX) 20 MG Tab Take 1 tablet by mouth every 24 hours as needed. 30 tablet 3    potassium chloride ER (KLOR-CON) 10 MEQ tablet Take 1 tablet by mouth every 24 hours as needed. 30 tablet 3    DULoxetine (CYMBALTA) 60 MG Cap DR Particles delayed-release capsule TAKE 1 CAPSULE EVERY EVENING 90 Cap 0    albuterol (VENTOLIN HFA) 108 (90 Base) MCG/ACT Aero Soln inhalation aerosol INHALE 2 PUFFS BY MOUTH EVERY FOUR HOURS AS NEEDED FOR SHORTNESS OF BREATH. DISPENSE A SPACER 1 Each 1    fluticasone (FLONASE) 50 MCG/ACT nasal spray Spray 2 Sprays in nose every day. 1 Bottle 0    sodium chloride (OCEAN NASAL SPRAY) 0.65 % Solution Spray 2 Sprays in nose every 2 hours as needed for Congestion. 1 Bottle 0    methocarbamol (ROBAXIN) 750 MG Tab Take 2 Tabs by mouth 4 times a day as needed (muscle spasm). 20 Tab 0    ibuprofen (MOTRIN) 600 MG Tab Take 1 Tab by mouth every 6 hours as needed. 20 Tab 0    ascorbic acid (ASCORBIC ACID) 500 MG Tab Take 3,000 mg by mouth every day.      vitamin E 100 UNIT capsule Take 300 Units by mouth every day.      Multiple Vitamins-Minerals (ENERGY BOOSTER PO) Take 80 mg by mouth. With caffiene      glucosamine Sulfate 500 MG Cap Take 1,500 mg by mouth every day.      Cholecalciferol (VITAMIN D) 400 UNIT Tab Take 1,600 Units by mouth every day.      B Complex Vitamins (VITAMIN B COMPLEX PO) Take 20 mg by mouth every day.      Multiple Vitamin (MULTIVITAMIN PO) Take 1 Tab by mouth every day.       No current facility-administered medications for this visit.       Allergies   Allergen Reactions    Ace Inhibitors      Ace cough    Tape Rash and Itching     Adhesive tape.  PAPER TAPE OK.       ROS  Constitutional: Negative. Negative for fever, chills, weight loss, malaise/fatigue and diaphoresis.   HENT: Negative. Negative  "for hearing loss, ear pain, nosebleeds, congestion, sore throat, neck pain, tinnitus and ear discharge.   Respiratory: Negative. Negative for cough, hemoptysis, sputum production, shortness of breath, wheezing and stridor.   Cardiovascular: Negative. Negative for chest pain, palpitations, orthopnea, claudication, leg swelling and PND.   Gastrointestinal: Denies nausea, vomiting, diarrhea, constipation, heartburn, melena or hematochezia.  Genitourinary: Denies dysuria, hematuria, urinary incontinence, frequency or urgency.        Objective:     /86   Pulse (!) 105   Temp 36.8 °C (98.2 °F) (Temporal)   Ht 1.588 m (5' 2.5\")   Wt (!) 134 kg (294 lb 9.6 oz)   SpO2 94%  Body mass index is 53.02 kg/m².    Physical Exam:  Vitals reviewed.  Constitutional: Oriented to person, place, and time. appears well-developed and well-nourished. No distress.   Cardiovascular: Normal rate, regular rhythm, normal heart sounds and intact distal pulses. Exam reveals no gallop and no friction rub. No murmur heard. No carotid bruits.   Pulmonary/Chest: Effort normal and breath sounds normal. No stridor. No respiratory distress. no wheezes or rales. exhibits no tenderness.   Musculoskeletal: limited rt shoulder ROM range of motion since fall.   Lymphadenopathy: No cervical or supraclavicular adenopathy.   Neurological: Alert and oriented to person, place, and time. exhibits normal muscle tone.  Skin: Skin is warm and dry. No diaphoresis.   Psychiatric: Normal mood and affect. Behavior is normal.      Assessment and Plan:     The following treatment plan was discussed:    1. Fall, initial encounter  oxyCODONE-acetaminophen (PERCOCET) 5-325 MG Tab    DISCONTINUED: oxyCODONE-acetaminophen (PERCOCET) 5-325 MG Tab    increased percocet to 3 tabs daily.  gave 2 mo persc.   f/u 2 mo for med refill.      2. Closed fracture of multiple ribs of right side, initial encounter  oxyCODONE-acetaminophen (PERCOCET) 5-325 MG Tab    DISCONTINUED: " oxyCODONE-acetaminophen (PERCOCET) 5-325 MG Tab      3. Acute pain of right shoulder  oxyCODONE-acetaminophen (PERCOCET) 5-325 MG Tab    DISCONTINUED: oxyCODONE-acetaminophen (PERCOCET) 5-325 MG Tab      4. Post menopausal problems  DS-BONE DENSITY STUDY (DEXA)    dexa scan ordered            Followup: Return in about 2 months (around 10/17/2022).
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
## 2022-08-22 DIAGNOSIS — M25.511 ACUTE PAIN OF RIGHT SHOULDER: ICD-10-CM

## 2022-08-22 DIAGNOSIS — S22.41XA CLOSED FRACTURE OF MULTIPLE RIBS OF RIGHT SIDE, INITIAL ENCOUNTER: ICD-10-CM

## 2022-08-22 NOTE — TELEPHONE ENCOUNTER
Percocet sent on 8/17/22 was sent in with a start AND end date of 8/17/22. Please resend to pharmacy with updated end date so they can fill this medication for the patient.

## 2022-08-23 RX ORDER — OXYCODONE HYDROCHLORIDE AND ACETAMINOPHEN 5; 325 MG/1; MG/1
1 TABLET ORAL EVERY 4 HOURS PRN
Qty: 90 TABLET | Refills: 0 | Status: CANCELLED | OUTPATIENT
Start: 2022-09-17 | End: 2022-10-17

## 2022-08-24 DIAGNOSIS — S22.41XA CLOSED FRACTURE OF MULTIPLE RIBS OF RIGHT SIDE, INITIAL ENCOUNTER: ICD-10-CM

## 2022-08-24 DIAGNOSIS — M25.511 ACUTE PAIN OF RIGHT SHOULDER: ICD-10-CM
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
## 2022-08-24 NOTE — TELEPHONE ENCOUNTER
I am confused.  There is a RF in PDMP for 8/19/22 to last 30 days.  Then when i pull up presc to refill for september it has the proper dates on it.  Please check with pharmacy and see what the issue is.

## 2022-09-23 DIAGNOSIS — R05.3 CHRONIC COUGH: ICD-10-CM

## 2022-09-23 DIAGNOSIS — R06.02 SOB (SHORTNESS OF BREATH) ON EXERTION: ICD-10-CM

## 2022-09-24 ENCOUNTER — HOSPITAL ENCOUNTER (EMERGENCY)
Facility: MEDICAL CENTER | Age: 67
End: 2022-09-24
Attending: EMERGENCY MEDICINE
Payer: MEDICARE

## 2022-09-24 ENCOUNTER — APPOINTMENT (OUTPATIENT)
Dept: RADIOLOGY | Facility: MEDICAL CENTER | Age: 67
End: 2022-09-24
Attending: EMERGENCY MEDICINE
Payer: MEDICARE

## 2022-09-24 VITALS
WEIGHT: 290.79 LBS | SYSTOLIC BLOOD PRESSURE: 154 MMHG | TEMPERATURE: 96.6 F | HEART RATE: 90 BPM | BODY MASS INDEX: 53.51 KG/M2 | DIASTOLIC BLOOD PRESSURE: 92 MMHG | HEIGHT: 62 IN | OXYGEN SATURATION: 91 % | RESPIRATION RATE: 20 BRPM

## 2022-09-24 DIAGNOSIS — J06.9 UPPER RESPIRATORY TRACT INFECTION, UNSPECIFIED TYPE: ICD-10-CM

## 2022-09-24 LAB
FLUAV RNA SPEC QL NAA+PROBE: NEGATIVE
FLUBV RNA SPEC QL NAA+PROBE: NEGATIVE
RSV RNA SPEC QL NAA+PROBE: NEGATIVE
SARS-COV-2 RNA RESP QL NAA+PROBE: NOTDETECTED
SPECIMEN SOURCE: NORMAL

## 2022-09-24 PROCEDURE — 99283 EMERGENCY DEPT VISIT LOW MDM: CPT

## 2022-09-24 PROCEDURE — 700102 HCHG RX REV CODE 250 W/ 637 OVERRIDE(OP): Performed by: EMERGENCY MEDICINE

## 2022-09-24 PROCEDURE — A9270 NON-COVERED ITEM OR SERVICE: HCPCS | Performed by: EMERGENCY MEDICINE

## 2022-09-24 PROCEDURE — C9803 HOPD COVID-19 SPEC COLLECT: HCPCS

## 2022-09-24 PROCEDURE — 0241U HCHG SARS-COV-2 COVID-19 NFCT DS RESP RNA 4 TRGT MIC: CPT

## 2022-09-24 PROCEDURE — C9803 HOPD COVID-19 SPEC COLLECT: HCPCS | Performed by: EMERGENCY MEDICINE

## 2022-09-24 PROCEDURE — 71045 X-RAY EXAM CHEST 1 VIEW: CPT

## 2022-09-24 RX ORDER — OXYCODONE HYDROCHLORIDE AND ACETAMINOPHEN 5; 325 MG/1; MG/1
1 TABLET ORAL EVERY 4 HOURS PRN
Qty: 20 TABLET | Refills: 0 | Status: SHIPPED | OUTPATIENT
Start: 2022-09-24 | End: 2022-09-29

## 2022-09-24 RX ORDER — ONDANSETRON 4 MG/1
4 TABLET, ORALLY DISINTEGRATING ORAL EVERY 8 HOURS PRN
Qty: 20 TABLET | Refills: 0 | Status: SHIPPED | OUTPATIENT
Start: 2022-09-24 | End: 2023-03-27 | Stop reason: SDUPTHER

## 2022-09-24 RX ORDER — MONTELUKAST SODIUM 10 MG/1
10 TABLET ORAL DAILY
Qty: 30 TABLET | Refills: 1 | Status: SHIPPED | OUTPATIENT
Start: 2022-09-24 | End: 2022-10-18

## 2022-09-24 RX ORDER — OXYCODONE HYDROCHLORIDE AND ACETAMINOPHEN 5; 325 MG/1; MG/1
1 TABLET ORAL ONCE
Status: COMPLETED | OUTPATIENT
Start: 2022-09-24 | End: 2022-09-24

## 2022-09-24 RX ADMIN — OXYCODONE AND ACETAMINOPHEN 1 TABLET: 5; 325 TABLET ORAL at 11:30

## 2022-09-24 ASSESSMENT — FIBROSIS 4 INDEX: FIB4 SCORE: 1.35

## 2022-09-24 NOTE — ED PROVIDER NOTES
ED Provider Note    CHIEF COMPLAINT  Chief Complaint   Patient presents with    Flu Like Symptoms     Pt complains of productive cough, SOB, arm/back pain, fever/chills, emesis, and fatigue x3 days.       HPI  Rain Sanabria is a 67 y.o. female who presents stating that she has had 3 days of body aches, fever, cough, back pain, muscle aches.  She reports that she has had COVID previously and says this feels worse.  She says she has been trying over-the-counter remedies which have not helped her symptoms.    REVIEW OF SYSTEMS  See HPI for further details. All other systems are negative.     PAST MEDICAL HISTORY   has a past medical history of Abdominal hernia, Abnormal mammogram (6/19/2009), Alcohol use (01/26/2018), B12 deficiency (02/05/2020), Chronic right shoulder pain, Cystocele (6/19/2009), Dental disorder, Depression with anxiety (01/26/2018), Genital herpes (6/19/2009), Hyperlipidemia, Hypertension (01/26/2018), Insomnia (6/19/2009), Migraine (06/19/2009), Morbid obesity with BMI of 45.0-49.9, adult (HCC) (2/22/2017), OA (osteoarthritis), Sleep apnea, and Vitamin D deficiency.    SOCIAL HISTORY  Social History     Tobacco Use    Smoking status: Never    Smokeless tobacco: Never   Vaping Use    Vaping Use: Never used   Substance and Sexual Activity    Alcohol use: Yes     Alcohol/week: 4.2 oz     Types: 7 Standard drinks or equivalent per week     Comment: couple times per week    Drug use: No    Sexual activity: Not on file       SURGICAL HISTORY   has a past surgical history that includes tubal ligation (Bilateral, 1985); gastric bypass laparoscopic (02/2003); hernia repair (2004); toe arthroplasty (Bilateral, 1990'S); rhinoplasty (07/2004); breast biopsy (Left, 1996); hysteroscopy thermal ablation (early 2000's); rotator cuff repair (Right, 11/24/2009); colonoscopy (2015); primary c section (1984); and knee arthroplasty total (Left, 2/12/2018).    CURRENT MEDICATIONS    Order Audit [place]     metoprolol  SR (TOPROL XL) 25 MG TABLET SR 24 HR 2022    Sig: TAKE 1 TABLET BY MOUTH EVERY DAY   Route: Oral   Number of times this order has been changed since signin     Order Audit Trail     oxyCODONE-acetaminophen (PERCOCET) 5-325 MG Tab 2022 10/17/2022   Sig: Take 1 Tablet by mouth every four hours as needed for Severe Pain (1 tab every 12 hrs prn x 7 days.  hold norco during this week) for up to 30 days.   Earliest Fill Date: 2022   Notes to Pharmacy: Please disregard just sent presc for 14 tabs for 7 days.   Route: Oral   Number of times this order has been changed since signin     Order Audit Trail     celecoxib (CELEBREX) 200 MG Cap 2022    Sig: Take 1 Capsule by mouth 2 times a day.   Notes to Pharmacy: DX Code Needed  .   Route: Oral   Number of times this order has been changed since signin     Order Audit [place]     doxazosin (CARDURA) 4 MG Tab 2022    Sig: TAKE 1 TABLET BY MOUTH EVERY DAY   Route: Oral   Number of times this order has been changed since signin     Order Audit [place]     montelukast (SINGULAIR) 10 MG Tab 2022    Sig: TAKE 1 TABLET BY MOUTH EVERY DAY   Route: Oral   Number of times this order has been changed since signin     Order Audit [place]     losartan (COZAAR) 100 MG Tab 2022    Sig: TAKE 1 TABLET EVERY DAY   Number of times this order has been changed since signin     Order Audit [place]     buPROPion (WELLBUTRIN) 75 MG Tab 2/3/2022    Sig: TAKE 1 TABLET BY MOUTH TWICE A DAY   Number of times this order has been changed since signin     Order Audit [place]     lidocaine (LIDODERM) 5 % Patch 2021    Sig: Apply 1 Patch to skin as directed every 24 hours.   Route: Transdermal   Number of times this order has been changed since signin     Order Audit Trail     furosemide (LASIX) 20 MG Tab 3/22/2021    Sig: Take 1 tablet by mouth every 24 hours as needed.   Route: Oral   Number of times this order has been changed since signin      Order Audit Trail     potassium chloride ER (KLOR-CON) 10 MEQ tablet 3/22/2021    Sig: Take 1 tablet by mouth every 24 hours as needed.   Route: Oral   Number of times this order has been changed since signin     Order Audit Trail     DULoxetine (CYMBALTA) 60 MG Cap DR Particles delayed-release capsule 2020    Sig: TAKE 1 CAPSULE EVERY EVENING   Number of times this order has been changed since signin     Order Audit Trail     albuterol (VENTOLIN HFA) 108 (90 Base) MCG/ACT Aero Soln inhalation aerosol 2020    Sig: INHALE 2 PUFFS BY MOUTH EVERY FOUR HOURS AS NEEDED FOR SHORTNESS OF BREATH. DISPENSE A SPACER   Notes to Pharmacy: DSW   Number of times this order has been changed since signin     Order Audit [place]     fluticasone (FLONASE) 50 MCG/ACT nasal spray 2020    Sig: Spray 2 Sprays in nose every day.   Route: Nasal   Number of times this order has been changed since signin     Order Audit Trail     sodium chloride (OCEAN NASAL SPRAY) 0.65 % Solution 2019    Sig: Spray 2 Sprays in nose every 2 hours as needed for Congestion.   Route: Nasal   Number of times this order has been changed since signin     Order Audit [place]     methocarbamol (ROBAXIN) 750 MG Tab 3/16/2019    Sig: Take 2 Tabs by mouth 4 times a day as needed (muscle spasm).   Class: Print Rx Paper   Route: Oral   Number of times this order has been changed since signin     Order Audit [place]     ibuprofen (MOTRIN) 600 MG Tab 2018    Sig: Take 1 Tab by mouth every 6 hours as needed.   Class: Print Rx Paper   Route: Oral   Number of times this order has been changed since signin     Order Audit Trail     ascorbic acid (ASCORBIC ACID) 500 MG Tab     Sig: Take 3,000 mg by mouth every day.   Class: Historical Med   Route: Oral   Number of times this order has been changed since signin     Order Audit Trail     vitamin E 100 UNIT capsule     Sig: Take 300 Units by mouth every day.   Class: Historical  "Med   Route: Oral   Number of times this order has been changed since signin     Order Audit Trail     Multiple Vitamins-Minerals (ENERGY BOOSTER PO)     Sig: Take 80 mg by mouth. With caffiene   Class: Historical Med   Route: Oral   glucosamine Sulfate 500 MG Cap     Sig: Take 1,500 mg by mouth every day.   Class: Historical Med   Route: Oral   Number of times this order has been changed since signin     Order Audit Trail     Cholecalciferol (VITAMIN D) 400 UNIT Tab     Sig: Take 1,600 Units by mouth every day.   Class: Historical Med   Route: Oral   B Complex Vitamins (VITAMIN B COMPLEX PO)     Sig: Take 20 mg by mouth every day.   Class: Historical Med   Route: Oral   Multiple Vitamin (MULTIVITAMIN PO)     Sig: Take 1 Tab by mouth every day.   Class: Historical Med   Route: Oral         ALLERGIES  Allergies   Allergen Reactions    Ace Inhibitors      Ace cough    Tape Rash and Itching     Adhesive tape.  PAPER TAPE OK.       FAMILY HISTORY  No pertinent family history    PHYSICAL EXAM  VITAL SIGNS: BP (!) 154/92   Pulse 90   Temp 35.9 °C (96.6 °F)   Resp 20   Ht 1.575 m (5' 2\")   Wt (!) 132 kg (290 lb 12.6 oz)   SpO2 91%   BMI 53.19 kg/m²  @REZA[[id number]::@   Pulse ox interpretation: I interpret this pulse ox as normal.  Constitutional: Alert in no apparent distress.  HENT: No signs of trauma, Bilateral external ears normal, Nose normal.   Eyes: Pupils are equal and reactive, Conjunctiva normal, Non-icteric.   Neck: Normal range of motion, No tenderness, Supple, No stridor.   Lymphatic: No lymphadenopathy noted.   Cardiovascular: Regular rate and rhythm, no murmurs.   Thorax & Lungs: Normal breath sounds, No respiratory distress, No wheezing, No chest tenderness.   Abdomen: Bowel sounds normal, Soft, No tenderness, No masses, No pulsatile masses. No peritoneal signs.  Skin: Warm, Dry, No erythema, No rash.   Back: No bony tenderness, No CVA tenderness.   Extremities: Intact distal pulses, No edema, No " tenderness, No cyanosis.  Musculoskeletal: Good range of motion in all major joints. No tenderness to palpation or major deformities noted.   Neurologic: Alert , Normal motor function, Normal sensory function, No focal deficits noted.   Psychiatric: Affect normal, Judgment normal, Mood normal.       DIAGNOSTIC STUDIES / PROCEDURES      LABS  Labs Reviewed   COV-2, FLU A/B, AND RSV BY PCR (CEPAvosoftID)         RADIOLOGY  DX-CHEST-PORTABLE (1 VIEW)   Final Result      No acute cardiac or pulmonary abnormalities are identified.              COURSE & MEDICAL DECISION MAKING  Pertinent Labs & Imaging studies reviewed. (See chart for details)    The patient presents with fever, cough, body aches, differential diagnose includes influenza, COVID, pneumonia.  Chest x-ray was ordered, influenza/COVID test was ordered.    Patient's chest x-ray is negative for acute disease and her COVID test and flu test are negative.    At this time we will treat symptoms, she is requesting Percocet for muscle pain, I will give her a short course.  She receives hydrocodone from her primary care doctor but says she does not have any currently.  I have prescribed the patient Zofran for her nausea and vomiting.    I reviewed prescription monitoring program for patient's narcotic use before prescribing a scheduled drug.The patient will not drink alcohol nor drive with prescribed medications. The patient will return for new or worsening symptoms and is stable at the time of discharge.    The patient is referred to a primary physician for blood pressure management, diabetic screening, and for all other preventative health concerns.    In prescribing controlled substances to this patient, I certify that I have obtained and reviewed the medical history of Rain Sanabria. I have also made a good alexandra effort to obtain applicable records from other providers who have treated the patient and records did not demonstrate any increased risk of substance  abuse that would prevent me from prescribing controlled substances.     I have conducted a physical exam and documented it. I have reviewed Ms. Sanabria’s prescription history as maintained by the [place] Prescription Monitoring Program.     I have assessed the patient’s risk for abuse, dependency, and addiction using the validated Opioid Risk Tool available at https://www.mdcalc.com/mvqzuz-yaep-tibt-ort-narcotic-abuse.     Given the above, I believe the benefits of controlled substance therapy outweigh the risks. The reasons for prescribing controlled substances include non-narcotic, oral analgesic alternatives have been inadequate for pain control. Accordingly, I have discussed the risk and benefits, treatment plan, and alternative therapies with the patient.       DISPOSITION:  Patient will be discharged home in stable condition.    FOLLOW UP:  [facility], Emergency Dept  [address] [address] [phone]  Follow up  If symptoms worsen    [facility] [place]  [address] [facility] [address]  [address]  [place] [address] [phone]    Follow up        OUTPATIENT MEDICATIONS:  New Prescriptions    ONDANSETRON (ZOFRAN ODT) 4 MG TABLET DISPERSIBLE    Take 1 Tablet by mouth every 8 hours as needed for Nausea.    OXYCODONE-ACETAMINOPHEN (PERCOCET) 5-325 MG TAB    Take 1 Tablet by mouth every four hours as needed (pain) for up to 5 days. No driving, no drinking alcohol         The patient will not drink alcohol nor drive with prescribed medications. The patient will return for worsening symptoms and is stable at the time of discharge. The patient verbalizes understanding and will comply.    FINAL IMPRESSION  1. Upper respiratory tract infection, unspecified type  ondansetron (ZOFRAN ODT) 4 MG TABLET DISPERSIBLE    oxyCODONE-acetaminophen (PERCOCET) 5-325 MG Tab              Electronically signed by: Ariel Pratt M.D., 9/24/2022 10:38 AM

## 2022-09-24 NOTE — ED TRIAGE NOTES
Chief Complaint   Patient presents with    Flu Like Symptoms     Pt complains of productive cough, SOB, arm/back pain, fever/chills, emesis, and fatigue x3 days.     Pt educated upon triage process and told to inform  staff of any changes in condition so that Pt may be reassessed. No further questions at this time. Pt sitting out in lobby.

## 2022-09-24 NOTE — ED NOTES
Discharge orders received. Patient given and explained the after visit summary. Patient educated to return to the ED if symptoms become worse. Patient educated that she would need to arrange a ride due to the medication received today- patient verbally agreed she would arrange a  from the ED and not drive while under the influence of narcotic pain medication.   Patient verbally agreed to further instructions and did not have any questions.   No line in place. Pt alert and oriented/ ambulatory.   Patient shown out to the lobby where she would call for a ride.   Patient discharged w/o incident.

## 2022-09-24 NOTE — ED NOTES
Pt roomed to [facility] 02. Pt reports having covid like symptoms and is now having trouble sleeping due to her congestion.

## 2022-10-04 ENCOUNTER — OFFICE VISIT (OUTPATIENT)
Dept: MEDICAL GROUP | Facility: MEDICAL CENTER | Age: 67
End: 2022-10-04
Payer: MEDICARE

## 2022-10-04 VITALS
TEMPERATURE: 98.1 F | OXYGEN SATURATION: 92 % | HEIGHT: 62 IN | DIASTOLIC BLOOD PRESSURE: 82 MMHG | HEART RATE: 91 BPM | BODY MASS INDEX: 53.92 KG/M2 | WEIGHT: 293 LBS | SYSTOLIC BLOOD PRESSURE: 124 MMHG

## 2022-10-04 DIAGNOSIS — J06.9 URI WITH COUGH AND CONGESTION: ICD-10-CM

## 2022-10-04 DIAGNOSIS — G89.29 CHRONIC BILATERAL LOW BACK PAIN WITH RIGHT-SIDED SCIATICA: ICD-10-CM

## 2022-10-04 DIAGNOSIS — M15.9 PRIMARY OSTEOARTHRITIS INVOLVING MULTIPLE JOINTS: ICD-10-CM

## 2022-10-04 DIAGNOSIS — M54.41 CHRONIC BILATERAL LOW BACK PAIN WITH RIGHT-SIDED SCIATICA: ICD-10-CM

## 2022-10-04 PROCEDURE — 99214 OFFICE O/P EST MOD 30 MIN: CPT | Performed by: NURSE PRACTITIONER

## 2022-10-04 RX ORDER — OXYCODONE HYDROCHLORIDE AND ACETAMINOPHEN 5; 325 MG/1; MG/1
1 TABLET ORAL EVERY 4 HOURS PRN
Qty: 90 TABLET | Refills: 0 | Status: SHIPPED | OUTPATIENT
Start: 2022-12-03 | End: 2023-01-05 | Stop reason: SDUPTHER

## 2022-10-04 RX ORDER — OXYCODONE HYDROCHLORIDE AND ACETAMINOPHEN 5; 325 MG/1; MG/1
1 TABLET ORAL EVERY 4 HOURS PRN
Qty: 90 TABLET | Refills: 0 | Status: SHIPPED | OUTPATIENT
Start: 2022-10-04 | End: 2022-10-04 | Stop reason: SDUPTHER

## 2022-10-04 RX ORDER — AZITHROMYCIN 250 MG/1
TABLET, FILM COATED ORAL
Qty: 6 TABLET | Refills: 0 | Status: SHIPPED | OUTPATIENT
Start: 2022-10-04 | End: 2022-10-09

## 2022-10-04 RX ORDER — OXYCODONE HYDROCHLORIDE AND ACETAMINOPHEN 5; 325 MG/1; MG/1
1 TABLET ORAL EVERY 4 HOURS PRN
Qty: 90 TABLET | Refills: 0 | Status: SHIPPED | OUTPATIENT
Start: 2022-11-03 | End: 2022-10-04 | Stop reason: SDUPTHER

## 2022-10-04 ASSESSMENT — PATIENT HEALTH QUESTIONNAIRE - PHQ9
CLINICAL INTERPRETATION OF PHQ2 SCORE: 4
SUM OF ALL RESPONSES TO PHQ QUESTIONS 1-9: 7
5. POOR APPETITE OR OVEREATING: 0 - NOT AT ALL

## 2022-10-04 ASSESSMENT — FIBROSIS 4 INDEX: FIB4 SCORE: 1.35

## 2022-10-04 NOTE — PROGRESS NOTES
Subjective:     Rain Sanabria is a 67 y.o. female who presents with chronic back pain and URI.    HPI:   Seen in f/u for chronic back and URI.  She was recent seen in ER for URI.  She had lots of chest congestion.  She did not have COVID.  It was a viral infection.  She was given zofran for nausea.  She is still having congestion in her chest.  Has not slept for 2 days.  If she lyes down she can't breathe d/t congestion.  If she sets up she has arm numbness.  CXR in ER was wnl.   She is having fever and chills.   She is set for the EMG later this month for the arm numbness.  She was given a perscription for percocet but pharmacy didn't get it.  She has used the percocet from ER and needs a refill.  It is for her chronic neck and arm pain as well as joint OA.  UDS and contract is up to date.    Patient Active Problem List    Diagnosis Date Noted    Morbid obesity with BMI of 50.0-59.9, adult (HCC) 03/22/2021    B12 deficiency 02/05/2020    Dental disorder     Alcohol use 01/26/2018    Chronic right shoulder pain 01/06/2016    Hyperlipidemia     Depression with anxiety     Vitamin D deficiency     Abdominal hernia     Sleep apnea 07/12/2010    OA (osteoarthritis) 07/12/2010    HTN (hypertension) 01/20/2010    Genital herpes simplex 06/19/2009    Insomnia 06/19/2009    Migraine 06/19/2009    Abnormal mammogram 06/19/2009    Cystocele 06/19/2009       Current medicines (including changes today)  Current Outpatient Medications   Medication Sig Dispense Refill    azithromycin (ZITHROMAX) 250 MG Tab 2 tabs by mouth day 1, 1 tab by mouth days 2-5 6 Tablet 0    [START ON 12/3/2022] oxyCODONE-acetaminophen (PERCOCET) 5-325 MG Tab Take 1 Tablet by mouth every four hours as needed for Severe Pain (1 tab every 12 hrs prn x 7 days.  hold norco during this week) for up to 30 days. 90 Tablet 0    montelukast (SINGULAIR) 10 MG Tab TAKE 1 TABLET BY MOUTH EVERY DAY 30 Tablet 1    ondansetron (ZOFRAN ODT) 4 MG TABLET DISPERSIBLE  Take 1 Tablet by mouth every 8 hours as needed for Nausea. 20 Tablet 0    metoprolol SR (TOPROL XL) 25 MG TABLET SR 24 HR TAKE 1 TABLET BY MOUTH EVERY DAY 90 Tablet 1    celecoxib (CELEBREX) 200 MG Cap Take 1 Capsule by mouth 2 times a day. 60 Capsule 2    doxazosin (CARDURA) 4 MG Tab TAKE 1 TABLET BY MOUTH EVERY DAY 90 Tablet 1    losartan (COZAAR) 100 MG Tab TAKE 1 TABLET EVERY DAY 90 Tablet 0    buPROPion (WELLBUTRIN) 75 MG Tab TAKE 1 TABLET BY MOUTH TWICE A  Tablet 0    lidocaine (LIDODERM) 5 % Patch Apply 1 Patch to skin as directed every 24 hours. 90 Patch 3    furosemide (LASIX) 20 MG Tab Take 1 tablet by mouth every 24 hours as needed. 30 tablet 3    potassium chloride ER (KLOR-CON) 10 MEQ tablet Take 1 tablet by mouth every 24 hours as needed. 30 tablet 3    DULoxetine (CYMBALTA) 60 MG Cap DR Particles delayed-release capsule TAKE 1 CAPSULE EVERY EVENING 90 Cap 0    albuterol (VENTOLIN HFA) 108 (90 Base) MCG/ACT Aero Soln inhalation aerosol INHALE 2 PUFFS BY MOUTH EVERY FOUR HOURS AS NEEDED FOR SHORTNESS OF BREATH. DISPENSE A SPACER 1 Each 1    fluticasone (FLONASE) 50 MCG/ACT nasal spray Spray 2 Sprays in nose every day. 1 Bottle 0    sodium chloride (OCEAN NASAL SPRAY) 0.65 % Solution Spray 2 Sprays in nose every 2 hours as needed for Congestion. 1 Bottle 0    methocarbamol (ROBAXIN) 750 MG Tab Take 2 Tabs by mouth 4 times a day as needed (muscle spasm). 20 Tab 0    ibuprofen (MOTRIN) 600 MG Tab Take 1 Tab by mouth every 6 hours as needed. 20 Tab 0    ascorbic acid (ASCORBIC ACID) 500 MG Tab Take 3,000 mg by mouth every day.      vitamin E 100 UNIT capsule Take 300 Units by mouth every day.      Multiple Vitamins-Minerals (ENERGY BOOSTER PO) Take 80 mg by mouth. With caffiene      glucosamine Sulfate 500 MG Cap Take 1,500 mg by mouth every day.      Cholecalciferol (VITAMIN D) 400 UNIT Tab Take 1,600 Units by mouth every day.      B Complex Vitamins (VITAMIN B COMPLEX PO) Take 20 mg by mouth every  "day.      Multiple Vitamin (MULTIVITAMIN PO) Take 1 Tab by mouth every day.       No current facility-administered medications for this visit.       Allergies   Allergen Reactions    Ace Inhibitors      Ace cough    Tape Rash and Itching     Adhesive tape.  PAPER TAPE OK.       ROS  Constitutional: Negative. Negative forchills, weight loss,  diaphoresis.   HENT: Negative. Negative for hearing loss, ear pain, nosebleeds, sore throat,  tinnitus and ear discharge.   Respiratory: Negative. Negative for  hemoptysis, wheezing and stridor.   Cardiovascular: Negative. Negative for chest pain, palpitations, orthopnea, claudication, leg swelling and PND.   Gastrointestinal: Denies nausea, vomiting, diarrhea, constipation, heartburn, melena or hematochezia.  Genitourinary: Denies dysuria, hematuria, urinary incontinence, frequency or urgency.        Objective:     /82 (BP Location: Left arm, Patient Position: Sitting, BP Cuff Size: Large adult)   Pulse 91   Temp 36.7 °C (98.1 °F) (Temporal)   Ht 1.575 m (5' 2\")   Wt (!) 136 kg (300 lb 12.8 oz)   SpO2 92%  Body mass index is 55.02 kg/m².    Physical Exam:  Physical Exam   Vitals reviewed.  Constitutional: oriented to person, place, and time. appears well-developed and well-nourished. No distress.   HENT:  Head: Normocephalic and atraumatic. Right Ear: External ear normal. Left Ear: External ear normal. Nose: Nose normal. Mouth/Throat: Oropharynx is red, clear and moist. No oropharyngeal exudate.  davin tm wnl.  Eyes: Right eye exhibits no discharge. Left eye exhibits no discharge. No scleral icterus.  Neck: No JVD present.  Cardiovascular: Normal rate, regular rhythm, normal heart sounds and intact distal pulses.  Exam reveals no gallop and no friction rub.  No murmur heard.  No carotid bruits.   Pulmonary/Chest: Effort normal and breath sounds normal. No stridor. No respiratory distress. no wheezes or rales. exhibits no tenderness.   Musculoskeletal:  amb slowly with " cane.  Lymphadenopathy: no cervical or supraclavicular adenopathy.   Neurological: alert and oriented to person, place, and time. exhibits normal muscle tone. Coordination normal.   Skin: Skin is warm and dry. no diaphoresis.   Psychiatric: normal mood and affect. behavior is normal.        Assessment and Plan:     The following treatment plan was discussed:    1. URI with cough and congestion  azithromycin (ZITHROMAX) 250 MG Tab    zpak as directed for nonimproving URI w/cough      2. Chronic bilateral low back pain with right-sided sciatica  oxyCODONE-acetaminophen (PERCOCET) 5-325 MG Tab    DISCONTINUED: oxyCODONE-acetaminophen (PERCOCET) 5-325 MG Tab    DISCONTINUED: oxyCODONE-acetaminophen (PERCOCET) 5-325 MG Tab    RF percocet x 3 months.  UDS and contract up to date.  stable on med.  f/u 3 mo for med refill      3. Primary osteoarthritis involving multiple joints  oxyCODONE-acetaminophen (PERCOCET) 5-325 MG Tab    DISCONTINUED: oxyCODONE-acetaminophen (PERCOCET) 5-325 MG Tab    DISCONTINUED: oxyCODONE-acetaminophen (PERCOCET) 5-325 MG Tab            Followup: Return in about 3 months (around 1/4/2023), or for pain med refill.

## 2022-10-12 DIAGNOSIS — I10 ESSENTIAL HYPERTENSION: ICD-10-CM

## 2022-10-12 RX ORDER — LOSARTAN POTASSIUM 100 MG/1
100 TABLET ORAL
Qty: 90 TABLET | Refills: 0 | Status: SHIPPED | OUTPATIENT
Start: 2022-10-12 | End: 2022-11-02

## 2022-10-17 DIAGNOSIS — R06.02 SOB (SHORTNESS OF BREATH) ON EXERTION: ICD-10-CM

## 2022-10-17 DIAGNOSIS — R05.3 CHRONIC COUGH: ICD-10-CM

## 2022-10-18 RX ORDER — MONTELUKAST SODIUM 10 MG/1
10 TABLET ORAL DAILY
Qty: 30 TABLET | Refills: 1 | Status: SHIPPED | OUTPATIENT
Start: 2022-10-18 | End: 2022-11-12

## 2022-11-02 DIAGNOSIS — I10 ESSENTIAL HYPERTENSION: ICD-10-CM

## 2022-11-02 RX ORDER — LOSARTAN POTASSIUM 100 MG/1
TABLET ORAL
Qty: 90 TABLET | Refills: 0 | Status: SHIPPED | OUTPATIENT
Start: 2022-11-02 | End: 2023-01-30

## 2022-11-03 ENCOUNTER — PATIENT MESSAGE (OUTPATIENT)
Dept: HEALTH INFORMATION MANAGEMENT | Facility: OTHER | Age: 67
End: 2022-11-03

## 2022-11-12 DIAGNOSIS — R06.02 SOB (SHORTNESS OF BREATH) ON EXERTION: ICD-10-CM

## 2022-11-12 DIAGNOSIS — R05.3 CHRONIC COUGH: ICD-10-CM

## 2022-11-12 RX ORDER — MONTELUKAST SODIUM 10 MG/1
10 TABLET ORAL DAILY
Qty: 30 TABLET | Refills: 1 | Status: SHIPPED | OUTPATIENT
Start: 2022-11-12 | End: 2022-11-26

## 2023-01-05 ENCOUNTER — OFFICE VISIT (OUTPATIENT)
Dept: MEDICAL GROUP | Facility: MEDICAL CENTER | Age: 68
End: 2023-01-05
Payer: MEDICARE

## 2023-01-05 VITALS
HEART RATE: 91 BPM | TEMPERATURE: 98.1 F | OXYGEN SATURATION: 93 % | SYSTOLIC BLOOD PRESSURE: 120 MMHG | DIASTOLIC BLOOD PRESSURE: 84 MMHG | RESPIRATION RATE: 16 BRPM | WEIGHT: 270 LBS | HEIGHT: 62 IN | BODY MASS INDEX: 49.69 KG/M2

## 2023-01-05 DIAGNOSIS — E67.3 VITAMIN D INTOXICATION: ICD-10-CM

## 2023-01-05 DIAGNOSIS — I10 PRIMARY HYPERTENSION: ICD-10-CM

## 2023-01-05 DIAGNOSIS — Z12.31 ENCOUNTER FOR SCREENING MAMMOGRAM FOR MALIGNANT NEOPLASM OF BREAST: ICD-10-CM

## 2023-01-05 DIAGNOSIS — E53.8 B12 DEFICIENCY: ICD-10-CM

## 2023-01-05 DIAGNOSIS — M54.41 CHRONIC BILATERAL LOW BACK PAIN WITH RIGHT-SIDED SCIATICA: ICD-10-CM

## 2023-01-05 DIAGNOSIS — M15.9 PRIMARY OSTEOARTHRITIS INVOLVING MULTIPLE JOINTS: ICD-10-CM

## 2023-01-05 DIAGNOSIS — G89.29 CHRONIC BILATERAL LOW BACK PAIN WITH RIGHT-SIDED SCIATICA: ICD-10-CM

## 2023-01-05 DIAGNOSIS — J40 BRONCHITIS: ICD-10-CM

## 2023-01-05 DIAGNOSIS — N95.9 POST MENOPAUSAL PROBLEMS: ICD-10-CM

## 2023-01-05 DIAGNOSIS — Z12.11 SCREENING FOR MALIGNANT NEOPLASM OF COLON: ICD-10-CM

## 2023-01-05 PROCEDURE — 99214 OFFICE O/P EST MOD 30 MIN: CPT | Performed by: NURSE PRACTITIONER

## 2023-01-05 RX ORDER — OXYCODONE HYDROCHLORIDE AND ACETAMINOPHEN 5; 325 MG/1; MG/1
1 TABLET ORAL EVERY 4 HOURS PRN
Qty: 90 TABLET | Refills: 0 | Status: SHIPPED | OUTPATIENT
Start: 2023-01-05 | End: 2023-01-05 | Stop reason: SDUPTHER

## 2023-01-05 RX ORDER — OXYCODONE HYDROCHLORIDE AND ACETAMINOPHEN 5; 325 MG/1; MG/1
1 TABLET ORAL EVERY 4 HOURS PRN
Qty: 90 TABLET | Refills: 0 | Status: SHIPPED | OUTPATIENT
Start: 2023-03-06 | End: 2023-03-27 | Stop reason: SDUPTHER

## 2023-01-05 RX ORDER — OXYCODONE HYDROCHLORIDE AND ACETAMINOPHEN 5; 325 MG/1; MG/1
1 TABLET ORAL EVERY 4 HOURS PRN
Qty: 90 TABLET | Refills: 0 | Status: SHIPPED | OUTPATIENT
Start: 2023-02-04 | End: 2023-01-05 | Stop reason: SDUPTHER

## 2023-01-05 RX ORDER — DOXYCYCLINE HYCLATE 100 MG
100 TABLET ORAL 2 TIMES DAILY
Qty: 20 TABLET | Refills: 0 | Status: SHIPPED | OUTPATIENT
Start: 2023-01-05 | End: 2023-03-27

## 2023-01-05 ASSESSMENT — FIBROSIS 4 INDEX: FIB4 SCORE: 1.35

## 2023-01-05 NOTE — PROGRESS NOTES
Subjective:     Rain Sanabria is a 67 y.o. female who presents with chronic joint pain d/t OA..    HPI:   Seen in f/u for chronic joint d/t OA.    She has a viral URI for about 9 months.  She is now slowly getting better.  She is using mucinex DM bid.  She will get more congestion if she doesn't use it.  Secretions are yellow. Nasal secretions are clear.  + SOB with exertion. Also wheezing.  No headache or sinus pressure.    During the time that she was sick she didn't eat as much.  She has lost 30 lbs.   She has chronic back and knee jpain.  Taking pain med approp. UDS and contract are up to date.  Stable on med.  She is due mammo, dexa scan and colonoscopy  She will be due her updated preventative lab with next appt.  She is on b12 and vitmain d for her hx of low.  She is stable and well controlled on losartan for her bp.  Taking med approp.  Doesn't exercise regularly but has been eating much better.     Patient Active Problem List    Diagnosis Date Noted    Morbid obesity with BMI of 50.0-59.9, adult (HCC) 03/22/2021    B12 deficiency 02/05/2020    Dental disorder     Alcohol use 01/26/2018    Chronic right shoulder pain 01/06/2016    Hyperlipidemia     Depression with anxiety     Vitamin D deficiency     Abdominal hernia     Sleep apnea 07/12/2010    OA (osteoarthritis) 07/12/2010    HTN (hypertension) 01/20/2010    Genital herpes simplex 06/19/2009    Insomnia 06/19/2009    Migraine 06/19/2009    Abnormal mammogram 06/19/2009    Cystocele 06/19/2009       Current medicines (including changes today)  Current Outpatient Medications   Medication Sig Dispense Refill    doxycycline (VIBRAMYCIN) 100 MG Tab Take 1 Tablet by mouth 2 times a day. 20 Tablet 0    [START ON 3/6/2023] oxyCODONE-acetaminophen (PERCOCET) 5-325 MG Tab Take 1 Tablet by mouth every four hours as needed for Severe Pain (1 tab every 12 hrs prn x 7 days.  hold norco during this week) for up to 30 days. 90 Tablet 0    celecoxib (CELEBREX)  200 MG Cap TAKE 1 CAPSULE BY MOUTH TWICE A DAY 60 Capsule 0    montelukast (SINGULAIR) 10 MG Tab TAKE 1 TABLET BY MOUTH EVERY DAY 90 Tablet 0    losartan (COZAAR) 100 MG Tab TAKE 1 TABLET EVERY DAY 90 Tablet 0    buPROPion (WELLBUTRIN) 75 MG Tab TAKE 1 TABLET BY MOUTH TWICE A  Tablet 0    albuterol 108 (90 Base) MCG/ACT Aero Soln inhalation aerosol INHALE 2 PUFFS BY MOUTH EVERY FOUR HOURS AS NEEDED FOR SHORTNESS OF BREATH. DISPENSE A SPACER 6.7 Each 0    ondansetron (ZOFRAN ODT) 4 MG TABLET DISPERSIBLE Take 1 Tablet by mouth every 8 hours as needed for Nausea. 20 Tablet 0    metoprolol SR (TOPROL XL) 25 MG TABLET SR 24 HR TAKE 1 TABLET BY MOUTH EVERY DAY 90 Tablet 1    doxazosin (CARDURA) 4 MG Tab TAKE 1 TABLET BY MOUTH EVERY DAY 90 Tablet 1    lidocaine (LIDODERM) 5 % Patch Apply 1 Patch to skin as directed every 24 hours. 90 Patch 3    furosemide (LASIX) 20 MG Tab Take 1 tablet by mouth every 24 hours as needed. 30 tablet 3    potassium chloride ER (KLOR-CON) 10 MEQ tablet Take 1 tablet by mouth every 24 hours as needed. 30 tablet 3    DULoxetine (CYMBALTA) 60 MG Cap DR Particles delayed-release capsule TAKE 1 CAPSULE EVERY EVENING 90 Cap 0    fluticasone (FLONASE) 50 MCG/ACT nasal spray Spray 2 Sprays in nose every day. 1 Bottle 0    sodium chloride (OCEAN NASAL SPRAY) 0.65 % Solution Spray 2 Sprays in nose every 2 hours as needed for Congestion. 1 Bottle 0    methocarbamol (ROBAXIN) 750 MG Tab Take 2 Tabs by mouth 4 times a day as needed (muscle spasm). 20 Tab 0    ibuprofen (MOTRIN) 600 MG Tab Take 1 Tab by mouth every 6 hours as needed. 20 Tab 0    ascorbic acid (ASCORBIC ACID) 500 MG Tab Take 3,000 mg by mouth every day.      vitamin E 100 UNIT capsule Take 300 Units by mouth every day.      Multiple Vitamins-Minerals (ENERGY BOOSTER PO) Take 80 mg by mouth. With caffiene      glucosamine Sulfate 500 MG Cap Take 1,500 mg by mouth every day.      Cholecalciferol (VITAMIN D) 400 UNIT Tab Take 1,600 Units  "by mouth every day.      B Complex Vitamins (VITAMIN B COMPLEX PO) Take 20 mg by mouth every day.      Multiple Vitamin (MULTIVITAMIN PO) Take 1 Tab by mouth every day.       No current facility-administered medications for this visit.       Allergies   Allergen Reactions    Ace Inhibitors      Ace cough    Tape Rash and Itching     Adhesive tape.  PAPER TAPE OK.       ROS  Constitutional: Negative. Negative for fever, chills, weight loss, diaphoresis.   HENT: Negative. Negative for hearing loss, ear pain, nosebleeds, congestion, sore throat, neck pain, tinnitus and ear discharge.   Respiratory: Negative. Negative for hemoptysis,  stridor.   Cardiovascular: Negative. Negative for chest pain, palpitations, orthopnea, claudication, leg swelling and PND.   Gastrointestinal: Denies nausea, vomiting, diarrhea, constipation, heartburn, melena or hematochezia.  Genitourinary: Denies dysuria, hematuria, urinary incontinence, frequency or urgency.        Objective:     /84   Pulse 91   Temp 36.7 °C (98.1 °F) (Temporal)   Resp 16   Ht 1.575 m (5' 2\")   Wt 122 kg (270 lb)   SpO2 93%  Body mass index is 49.38 kg/m².    Physical Exam:  Physical Exam   Vitals reviewed.  Constitutional: oriented to person, place, and time. appears well-developed and well-nourished. No distress.   HENT:  Head: Normocephalic and atraumatic. Right Ear: External ear normal. Left Ear: External ear normal. Nose: Nose normal. Mouth/Throat: Oropharynx is clear and moist. No oropharyngeal exudate.  davin tm wnl.  Eyes: Right eye exhibits no discharge. Left eye exhibits no discharge. No scleral icterus.  Neck: No JVD present.  Cardiovascular: Normal rate, regular rhythm, normal heart sounds and intact distal pulses.  Exam reveals no gallop and no friction rub.  No murmur heard.  No carotid bruits.   Pulmonary/Chest: Effort normal and breath sounds normal. No stridor. No respiratory distress. no wheezes or rales. exhibits no tenderness. "   Musculoskeletal: Normal range of motion. exhibits no edema. davin pedal pulses 2+.  Lymphadenopathy: no cervical or supraclavicular adenopathy.   Neurological: alert and oriented to person, place, and time. exhibits normal muscle tone. Coordination normal.   Skin: Skin is warm and dry. no diaphoresis.   Psychiatric: normal mood and affect. behavior is normal.        Assessment and Plan:     The following treatment plan was discussed:    1. Chronic bilateral low back pain with right-sided sciatica  oxyCODONE-acetaminophen (PERCOCET) 5-325 MG Tab    DISCONTINUED: oxyCODONE-acetaminophen (PERCOCET) 5-325 MG Tab    DISCONTINUED: oxyCODONE-acetaminophen (PERCOCET) 5-325 MG Tab    RF percocet x 3 months.  UDS and contract up to date.  stable on med.  f/u 3 mo for med refill      2. Bronchitis  doxycycline (VIBRAMYCIN) 100 MG Tab    doxycycline x 10 days for continued cough with yellow secretions.  f/u if sx do not continue to improve      3. Primary hypertension  Comp Metabolic Panel    Lipid Profile    MICROALBUMIN CREAT RATIO URINE    stable and well controlled on med.  taking med approp.  do updated lab with CMP, LP, alb/cr ratio in april.  f/u for review      4. B12 deficiency  VITAMIN B12    do updated lab.  stable on otc supplement      5. Primary osteoarthritis involving multiple joints  oxyCODONE-acetaminophen (PERCOCET) 5-325 MG Tab    DISCONTINUED: oxyCODONE-acetaminophen (PERCOCET) 5-325 MG Tab    DISCONTINUED: oxyCODONE-acetaminophen (PERCOCET) 5-325 MG Tab      6. Vitamin D intoxication  VITAMIN D,25 HYDROXY (DEFICIENCY)    rechk lab with vitamin d and B12.  they have been abn in past.       7. Screening for malignant neoplasm of colon  Referral to Gastroenterology    refer GI for colonoscopy      8. Encounter for screening mammogram for malignant neoplasm of breast  MA-SCREENING MAMMO BILAT W/CAD      9. Post menopausal problems  DS-BONE DENSITY STUDY (DEXA)    dexa scan ordered            Followup: Return  in about 3 months (around 4/5/2023), or for lab review and med refill.

## 2023-01-07 DIAGNOSIS — J45.20 MILD INTERMITTENT ASTHMA WITHOUT COMPLICATION: ICD-10-CM

## 2023-01-08 RX ORDER — ALBUTEROL SULFATE 90 UG/1
AEROSOL, METERED RESPIRATORY (INHALATION)
Qty: 6.7 EACH | Refills: 0 | Status: SHIPPED | OUTPATIENT
Start: 2023-01-08 | End: 2023-04-22

## 2023-01-09 ENCOUNTER — TELEPHONE (OUTPATIENT)
Dept: MEDICAL GROUP | Facility: MEDICAL CENTER | Age: 68
End: 2023-01-09
Payer: MEDICARE

## 2023-01-09 RX ORDER — AZITHROMYCIN 250 MG/1
TABLET, FILM COATED ORAL
Qty: 6 TABLET | Refills: 0 | Status: SHIPPED | OUTPATIENT
Start: 2023-01-09 | End: 2023-01-14

## 2023-01-10 NOTE — TELEPHONE ENCOUNTER
VOICEMAIL  1. Caller Name: Rain                       Call Back Number: [phone]    2. Message: The medication she was given at her last visit the doxycycline dis making her sick. She would like something else sent in. It is upsetting her stomach.    3. Patient approves office to leave a detailed voicemail/MyChart message: N\A

## 2023-01-30 DIAGNOSIS — I10 ESSENTIAL HYPERTENSION: ICD-10-CM

## 2023-01-30 DIAGNOSIS — F41.8 DEPRESSION WITH ANXIETY: ICD-10-CM

## 2023-01-30 RX ORDER — LOSARTAN POTASSIUM 100 MG/1
TABLET ORAL
Qty: 90 TABLET | Refills: 0 | Status: SHIPPED | OUTPATIENT
Start: 2023-01-30 | End: 2023-06-30

## 2023-01-30 RX ORDER — BUPROPION HYDROCHLORIDE 75 MG/1
TABLET ORAL
Qty: 180 TABLET | Refills: 0 | Status: SHIPPED | OUTPATIENT
Start: 2023-01-30 | End: 2023-04-22

## 2023-02-13 ENCOUNTER — PATIENT MESSAGE (OUTPATIENT)
Dept: MEDICAL GROUP | Facility: MEDICAL CENTER | Age: 68
End: 2023-02-13
Payer: MEDICARE

## 2023-02-18 ENCOUNTER — HOSPITAL ENCOUNTER (EMERGENCY)
Facility: MEDICAL CENTER | Age: 68
End: 2023-02-18
Attending: EMERGENCY MEDICINE
Payer: MEDICARE

## 2023-02-18 ENCOUNTER — APPOINTMENT (OUTPATIENT)
Dept: RADIOLOGY | Facility: MEDICAL CENTER | Age: 68
End: 2023-02-18
Attending: EMERGENCY MEDICINE
Payer: MEDICARE

## 2023-02-18 VITALS
WEIGHT: 270 LBS | RESPIRATION RATE: 16 BRPM | SYSTOLIC BLOOD PRESSURE: 188 MMHG | OXYGEN SATURATION: 95 % | TEMPERATURE: 97.5 F | HEART RATE: 70 BPM | DIASTOLIC BLOOD PRESSURE: 80 MMHG | BODY MASS INDEX: 49.69 KG/M2 | HEIGHT: 62 IN

## 2023-02-18 DIAGNOSIS — L03.116 CELLULITIS OF LEFT LOWER EXTREMITY: ICD-10-CM

## 2023-02-18 DIAGNOSIS — M54.32 SCIATICA OF LEFT SIDE: ICD-10-CM

## 2023-02-18 PROCEDURE — 700111 HCHG RX REV CODE 636 W/ 250 OVERRIDE (IP): Performed by: EMERGENCY MEDICINE

## 2023-02-18 PROCEDURE — 99284 EMERGENCY DEPT VISIT MOD MDM: CPT

## 2023-02-18 PROCEDURE — A9270 NON-COVERED ITEM OR SERVICE: HCPCS | Performed by: EMERGENCY MEDICINE

## 2023-02-18 PROCEDURE — 700102 HCHG RX REV CODE 250 W/ 637 OVERRIDE(OP): Performed by: EMERGENCY MEDICINE

## 2023-02-18 PROCEDURE — 96372 THER/PROPH/DIAG INJ SC/IM: CPT

## 2023-02-18 PROCEDURE — 72100 X-RAY EXAM L-S SPINE 2/3 VWS: CPT

## 2023-02-18 RX ORDER — KETOROLAC TROMETHAMINE 30 MG/ML
30 INJECTION, SOLUTION INTRAMUSCULAR; INTRAVENOUS ONCE
Status: COMPLETED | OUTPATIENT
Start: 2023-02-18 | End: 2023-02-18

## 2023-02-18 RX ORDER — CEPHALEXIN 500 MG/1
500 CAPSULE ORAL ONCE
Status: COMPLETED | OUTPATIENT
Start: 2023-02-18 | End: 2023-02-18

## 2023-02-18 RX ORDER — CEPHALEXIN 500 MG/1
500 CAPSULE ORAL 4 TIMES DAILY
Qty: 28 CAPSULE | Refills: 0 | Status: ACTIVE | OUTPATIENT
Start: 2023-02-18 | End: 2023-03-27

## 2023-02-18 RX ORDER — CYCLOBENZAPRINE HCL 10 MG
10 TABLET ORAL EVERY 8 HOURS PRN
Qty: 21 TABLET | Refills: 0 | Status: SHIPPED | OUTPATIENT
Start: 2023-02-18 | End: 2023-06-08 | Stop reason: SDUPTHER

## 2023-02-18 RX ADMIN — KETOROLAC TROMETHAMINE 30 MG: 30 INJECTION, SOLUTION INTRAMUSCULAR; INTRAVENOUS at 15:48

## 2023-02-18 RX ADMIN — CEPHALEXIN 500 MG: 500 CAPSULE ORAL at 15:48

## 2023-02-18 ASSESSMENT — FIBROSIS 4 INDEX: FIB4 SCORE: 1.37

## 2023-02-18 NOTE — ED PROVIDER NOTES
ED Provider Note          Primary Care Provider: SUHAIL Marina    CHIEF COMPLAINT  Chief Complaint   Patient presents with    Leg Pain     BLE X 2 days, denies recent trauma, mild swelling and redness     LIMITATION TO HISTORY   Select: : None    HPI/ROS  OUTSIDE HISTORIAN(S):      EXTERNAL RECORDS REVIEWED  Inpatient Notes      Rain Sanabria is a 68 y.o. female who presents to the ED for the emergency department with chief complaint of left lower extremity pain.  Patient reports some pain and redness around the left ankle that is progressed up her left lower extremity.  She also reports that she has been having some severe stabbing pain in the posterior left hip that radiates down the lateral aspect of her thigh and into her left shin.  No fecal incontinence no urinary retention no saddle anesthesia she does report a fall 1 week prior where she injured the right leg.  No fevers no chills no nausea no vomiting no other symptoms or concerns at this time.      PAST MEDICAL HISTORY  Past Medical History:   Diagnosis Date    Abdominal hernia     Abnormal mammogram 6/19/2009    Alcohol use 01/26/2018    2 per day    B12 deficiency 02/05/2020    B12 is low enc her to take OTC B12 1000 mcg everyday     Chronic right shoulder pain     Cystocele 6/19/2009    Dental disorder     Upper denture    Depression with anxiety 01/26/2018    Genital herpes 6/19/2009 1/26/18-no breakout for 10 years.    Hyperlipidemia     Hypertension 01/26/2018    Controlled and no longer any meds for 1 year.    Insomnia 6/19/2009    Migraine 06/19/2009 1/26/18-states none since 2003.    Morbid obesity with BMI of 45.0-49.9, adult (HCC) 2/22/2017    OA (osteoarthritis)     Sleep apnea     2011 had a very abn apnea link.  sx improved on nitetime o2.  her ins wouldn't pay for o2 and she can't afford it.  she is looking into options for o2.     Vitamin D deficiency        SURGICAL HISTORY  Past Surgical History:   Procedure Laterality  Date    KNEE ARTHROPLASTY TOTAL Left 2/12/2018    Procedure: KNEE ARTHROPLASTY TOTAL;  Surgeon: Ankit Frias M.D.;  Location: SURGERY [facility];  Service: Orthopedics    COLONOSCOPY  2015    Hx of a couple    ROTATOR CUFF REPAIR Right 11/24/2009    RHINOPLASTY  07/2004    HERNIA REPAIR  2004    GASTRIC BYPASS LAPAROSCOPIC  02/2003    BREAST BIOPSY Left 1996    Benign    TUBAL LIGATION Bilateral 1985    PRIMARY C SECTION  1984    HYSTEROSCOPY THERMAL ABLATION  early 2000's    endometrial    TOE ARTHROPLASTY Bilateral 1990'S    2nd toes shortened.       FAMILY HISTORY  Family History   Problem Relation Age of Onset    Diabetes Mother     Hypertension Mother     Diabetes Maternal Grandfather     Diabetes Paternal Grandfather     Cancer Paternal Grandfather     Blood Disease Daughter         leukemia-all       SOCIAL HISTORY   reports that she has never smoked. She has never used smokeless tobacco. She reports current alcohol use of about 4.2 oz per week. She reports that she does not use drugs.    CURRENT MEDICATIONS  Previous Medications    ALBUTEROL 108 (90 BASE) MCG/ACT AERO SOLN INHALATION AEROSOL    INHALE 2 PUFFS BY MOUTH EVERY FOUR HOURS AS NEEDED FOR SHORTNESS OF BREATH. DISPENSE A SPACER    ASCORBIC ACID (ASCORBIC ACID) 500 MG TAB    Take 3,000 mg by mouth every day.    B COMPLEX VITAMINS (VITAMIN B COMPLEX PO)    Take 20 mg by mouth every day.    BUPROPION (WELLBUTRIN) 75 MG TAB    TAKE 1 TABLET BY MOUTH TWICE A DAY    CELECOXIB (CELEBREX) 200 MG CAP    TAKE 1 CAPSULE BY MOUTH TWICE A DAY    CHOLECALCIFEROL (VITAMIN D) 400 UNIT TAB    Take 1,600 Units by mouth every day.    DOXAZOSIN (CARDURA) 4 MG TAB    TAKE 1 TABLET BY MOUTH EVERY DAY    DOXYCYCLINE (VIBRAMYCIN) 100 MG TAB    Take 1 Tablet by mouth 2 times a day.    DULOXETINE (CYMBALTA) 60 MG CAP DR PARTICLES DELAYED-RELEASE CAPSULE    TAKE 1 CAPSULE EVERY EVENING    FLUTICASONE (FLONASE) 50 MCG/ACT NASAL SPRAY    Spray 2 Sprays in nose  "every day.    FUROSEMIDE (LASIX) 20 MG TAB    Take 1 tablet by mouth every 24 hours as needed.    GLUCOSAMINE SULFATE 500 MG CAP    Take 1,500 mg by mouth every day.    IBUPROFEN (MOTRIN) 600 MG TAB    Take 1 Tab by mouth every 6 hours as needed.    LIDOCAINE (LIDODERM) 5 % PATCH    Apply 1 Patch to skin as directed every 24 hours.    LOSARTAN (COZAAR) 100 MG TAB    TAKE 1 TABLET BY MOUTH EVERY DAY    METHOCARBAMOL (ROBAXIN) 750 MG TAB    Take 2 Tabs by mouth 4 times a day as needed (muscle spasm).    METOPROLOL SR (TOPROL XL) 25 MG TABLET SR 24 HR    TAKE 1 TABLET BY MOUTH EVERY DAY    MONTELUKAST (SINGULAIR) 10 MG TAB    TAKE 1 TABLET BY MOUTH EVERY DAY    MULTIPLE VITAMIN (MULTIVITAMIN PO)    Take 1 Tab by mouth every day.    MULTIPLE VITAMINS-MINERALS (ENERGY BOOSTER PO)    Take 80 mg by mouth. With caffiene    ONDANSETRON (ZOFRAN ODT) 4 MG TABLET DISPERSIBLE    Take 1 Tablet by mouth every 8 hours as needed for Nausea.    OXYCODONE-ACETAMINOPHEN (PERCOCET) 5-325 MG TAB    Take 1 Tablet by mouth every four hours as needed for Severe Pain (1 tab every 12 hrs prn x 7 days.  hold norco during this week) for up to 30 days.    POTASSIUM CHLORIDE ER (KLOR-CON) 10 MEQ TABLET    Take 1 tablet by mouth every 24 hours as needed.    SODIUM CHLORIDE (OCEAN NASAL SPRAY) 0.65 % SOLUTION    Spray 2 Sprays in nose every 2 hours as needed for Congestion.    VITAMIN E 100 UNIT CAPSULE    Take 300 Units by mouth every day.       ALLERGIES  Ace inhibitors and Tape    PHYSICAL EXAM  BP (!) 196/94   Pulse 71   Temp 36.4 °C (97.6 °F) (Temporal)   Resp 16   Ht 1.575 m (5' 2\")   Wt 122 kg (270 lb)   SpO2 97%   BMI 49.38 kg/m²   Pulse ox interpretation: I interpret this pulse ox as normal.  Constitutional: Alert and oriented x 3, normal distress  HEENT: Atraumatic normocephalic, pupils are equal round reactive to light extraocular movements are intact. The nares is clear, external ears are normal, mouth shows moist mucous " membranes normal dentition for age  Neck: Supple, no JVD no tracheal deviation  Cardiovascular: Regular rate and rhythm no murmur rub or gallop 2+ pulses peripherally x4  Thorax & Lungs: No respiratory distress, no wheezes rales or rhonchi, No chest tenderness.   GI: Orbitally obese soft nontender nondistended positive bowel sounds, no peritoneal signs  Skin: Patient has erythema induration and warmth circumferentially from the left ankle to the left mid shin no fluctuance no proximal streaking or adenopathy  Back: No midline tenderness no erythema warmth or induration  Musculoskeletal: Moving all extremities with full range and 5 of 5 strength no acute  deformity, patient has point tenderness at the left upper buttock in the sciatic region which reproduces pain down the left lower extremity normal 2+ DTRs at bilateral patellar and Achilles tendons ambulates without difficulty.  Skin changes as above  Neurologic: Cranial nerves III through XII are grossly intact no sensory deficit no cerebellar dysfunction   Psychiatric: Appropriate affect for situation at this time          DIAGNOSTIC STUDIES & PROCEDURES            Radiology:     DX-LUMBAR SPINE-2 OR 3 VIEWS   Final Result      1.  Multilevel degenerative disc disease and facet degeneration.      2.  Mild multilevel degenerative retrolisthesis.           COURSE & MEDICAL DECISION MAKING    ED Observation Status? No; Patient does not meet criteria for ED Observation.     ASSESSMENT AND PLAN  Care Narrative: Patient has no red flag symptoms of any space-occupying lesion infectious process or neurologic compromise.  She has no evidence of an acute cellulitis in the left lower extremity and signs and symptoms consistent with acute sciatica.  She has no fecal incontinence urinary retention no saddle anesthesia no numbness tingling weakness.  At this point she is already on chronic opioids for pain management she is given a dose of Toradol here she will be prescribed  "Flexeril given instructions not to combine this with her narcotics she is also given a prescription for Keflex after first dose here.  Follow-up with primary care for further evaluation and ongoing management of chronic conditions return here for worsening pain redness swelling fevers chills nausea vomiting any abnormalities with bowel or bladder numbness tingling weakness any other acute symptom change or concern otherwise discharged stable and improved condition.      3:32 PM - Patient seen and evaluated at bedside.      ADDITIONAL PROBLEM LIST AND DISPOSITION    Chronic pain management               DISPOSITION AND DISCUSSIONS  I have discussed management of the patient with the following physicians and CLAU's:     Discussion of management with other QHP or appropriate source(s):      Escalation of care considered, and ultimately not performed: blood analysis and acute inpatient care management, however at this time, the patient is most appropriate for outpatient management.    Barriers to care at this time, including but not limited to: .     Decision tools and prescription drugs considered including, but not limited to: Antibiotics muscle relaxants .    History of chronic hypertension managed by primary care.        BP (!) 188/80   Pulse 70   Temp 36.4 °C (97.5 °F)   Resp 16   Ht 1.575 m (5' 2\")   Wt 122 kg (270 lb)   SpO2 95%   BMI 49.38 kg/m²       Impression:    1. Sciatica of left side Active cyclobenzaprine (FLEXERIL) 10 mg Tab      2. Cellulitis of left lower extremity Active cephALEXin (KEFLEX) 500 MG Cap                      "

## 2023-02-18 NOTE — ED TRIAGE NOTES
"Chief Complaint   Patient presents with    Leg Pain     BLE X 2 days, denies recent trauma, mild swelling and redness     Pt to triage with baseline cane for above complaints, VSS on RA, GCS 15, NAD.    Pt returned to lobby. Educated on triage process and to inform staff of any changes.     BP (!) 196/94   Pulse 71   Temp 36.4 °C (97.6 °F) (Temporal)   Resp 16   Ht 1.575 m (5' 2\")   Wt 122 kg (270 lb)   SpO2 97%   BMI 49.38 kg/m²     "

## 2023-02-26 DIAGNOSIS — I10 ESSENTIAL HYPERTENSION: ICD-10-CM

## 2023-02-26 DIAGNOSIS — R06.02 SOB (SHORTNESS OF BREATH) ON EXERTION: ICD-10-CM

## 2023-02-26 DIAGNOSIS — R05.3 CHRONIC COUGH: ICD-10-CM

## 2023-02-26 RX ORDER — METOPROLOL SUCCINATE 25 MG/1
25 TABLET, EXTENDED RELEASE ORAL DAILY
Qty: 90 TABLET | Refills: 1 | Status: SHIPPED | OUTPATIENT
Start: 2023-02-26 | End: 2023-05-09

## 2023-02-26 RX ORDER — MONTELUKAST SODIUM 10 MG/1
10 TABLET ORAL DAILY
Qty: 90 TABLET | Refills: 0 | Status: SHIPPED | OUTPATIENT
Start: 2023-02-26 | End: 2023-05-31

## 2023-03-27 ENCOUNTER — OFFICE VISIT (OUTPATIENT)
Dept: MEDICAL GROUP | Facility: MEDICAL CENTER | Age: 68
End: 2023-03-27
Payer: MEDICARE

## 2023-03-27 VITALS
RESPIRATION RATE: 14 BRPM | OXYGEN SATURATION: 96 % | HEART RATE: 76 BPM | WEIGHT: 262 LBS | BODY MASS INDEX: 48.21 KG/M2 | DIASTOLIC BLOOD PRESSURE: 80 MMHG | HEIGHT: 62 IN | TEMPERATURE: 98 F | SYSTOLIC BLOOD PRESSURE: 130 MMHG

## 2023-03-27 DIAGNOSIS — F41.8 DEPRESSION WITH ANXIETY: ICD-10-CM

## 2023-03-27 DIAGNOSIS — M79.605 BILATERAL LEG PAIN: ICD-10-CM

## 2023-03-27 DIAGNOSIS — R60.0 PEDAL EDEMA: ICD-10-CM

## 2023-03-27 DIAGNOSIS — R11.0 NAUSEA: ICD-10-CM

## 2023-03-27 DIAGNOSIS — M54.41 CHRONIC BILATERAL LOW BACK PAIN WITH RIGHT-SIDED SCIATICA: ICD-10-CM

## 2023-03-27 DIAGNOSIS — M79.604 BILATERAL LEG PAIN: ICD-10-CM

## 2023-03-27 DIAGNOSIS — M15.9 PRIMARY OSTEOARTHRITIS INVOLVING MULTIPLE JOINTS: ICD-10-CM

## 2023-03-27 DIAGNOSIS — K21.00 GASTROESOPHAGEAL REFLUX DISEASE WITH ESOPHAGITIS WITHOUT HEMORRHAGE: ICD-10-CM

## 2023-03-27 DIAGNOSIS — G89.29 CHRONIC BILATERAL LOW BACK PAIN WITH RIGHT-SIDED SCIATICA: ICD-10-CM

## 2023-03-27 DIAGNOSIS — J30.1 CHRONIC ALLERGIC RHINITIS DUE TO POLLEN: ICD-10-CM

## 2023-03-27 PROCEDURE — 99214 OFFICE O/P EST MOD 30 MIN: CPT | Performed by: NURSE PRACTITIONER

## 2023-03-27 RX ORDER — DULOXETIN HYDROCHLORIDE 30 MG/1
30 CAPSULE, DELAYED RELEASE ORAL DAILY
Qty: 90 CAPSULE | Refills: 0 | Status: SHIPPED | OUTPATIENT
Start: 2023-03-27 | End: 2023-04-22

## 2023-03-27 RX ORDER — OMEPRAZOLE 20 MG/1
20 CAPSULE, DELAYED RELEASE ORAL DAILY
Qty: 30 CAPSULE | Refills: 1 | Status: SHIPPED | OUTPATIENT
Start: 2023-03-27 | End: 2023-04-19

## 2023-03-27 RX ORDER — SUCRALFATE 1 G/1
1 TABLET ORAL
Qty: 120 TABLET | Refills: 0 | Status: SHIPPED | OUTPATIENT
Start: 2023-03-27 | End: 2023-04-19

## 2023-03-27 RX ORDER — ONDANSETRON 4 MG/1
4 TABLET, ORALLY DISINTEGRATING ORAL EVERY 8 HOURS PRN
Qty: 20 TABLET | Refills: 0 | Status: SHIPPED | OUTPATIENT
Start: 2023-03-27 | End: 2023-12-07

## 2023-03-27 RX ORDER — OXYCODONE HYDROCHLORIDE AND ACETAMINOPHEN 5; 325 MG/1; MG/1
1 TABLET ORAL EVERY 4 HOURS PRN
Qty: 90 TABLET | Refills: 0 | Status: SHIPPED | OUTPATIENT
Start: 2023-04-05 | End: 2023-05-09 | Stop reason: SDUPTHER

## 2023-03-27 ASSESSMENT — PATIENT HEALTH QUESTIONNAIRE - PHQ9
CLINICAL INTERPRETATION OF PHQ2 SCORE: 2
5. POOR APPETITE OR OVEREATING: 3 - NEARLY EVERY DAY
SUM OF ALL RESPONSES TO PHQ QUESTIONS 1-9: 15

## 2023-03-27 ASSESSMENT — FIBROSIS 4 INDEX: FIB4 SCORE: 1.37

## 2023-03-27 NOTE — PROGRESS NOTES
Subjective:     Rain Sanabria is a 68 y.o. female who presents with chronic pain.    HPI:   Seen in f/u for chronic pain.  She is tearful in office d/t her multiple uncontrolled pain.  She is having pain in multiple joints.  She is followed by Dr Lynn.  She just did MRI with him.  She is having more pain in left ankle today.  She is not able to sleep d/t the pain.  Leah is doing injections.  Last injection was on rt knee. She is also having pain in left knee despite.   She has been taking more jpain med than ordered.  She is already out of her pain med.  She is aware that she cannot get early refill.  She is not followed by pain mgmt.   She is limited in her ability to do activity due to her pain.  She is having more depression d/t the pain.  She is on wellbutrin and cymbalta.  Is supposed to be on 90 mg of cymbalta but hasn't gotten the 30 mg tabs recently.   She is having chronic GERD and nausea.  She has been throwing up freq.  She has lost 8 lbs.  She has referral to GI but they have cancelled and resched her appt.  No black tarry stools. She is not using any tx at this time.  She was in ER for leg swelling and jpain.  No DVT.  Gave antibx.  Her leg swelling improved with the antibx.  She is drinking adequate fluids.  Her swelling has improved except still small amt on left leg  She is on mucinex for her allergies.  It was working well until recently.  Now she is having cough with clear secretions.  No headache, sinus pressure or ear pressure.     Patient Active Problem List    Diagnosis Date Noted    Morbid obesity with BMI of 50.0-59.9, adult (HCC) 03/22/2021    B12 deficiency 02/05/2020    Dental disorder     Alcohol use 01/26/2018    Chronic right shoulder pain 01/06/2016    Hyperlipidemia     Depression with anxiety     Vitamin D deficiency     Abdominal hernia     Sleep apnea 07/12/2010    OA (osteoarthritis) 07/12/2010    HTN (hypertension) 01/20/2010    Genital herpes simplex 06/19/2009     Insomnia 06/19/2009    Migraine 06/19/2009    Abnormal mammogram 06/19/2009    Cystocele 06/19/2009       Current medicines (including changes today)  Current Outpatient Medications   Medication Sig Dispense Refill    DULoxetine (CYMBALTA) 30 MG Cap DR Particles Take 1 Capsule by mouth every day. 90 Capsule 0    [START ON 4/5/2023] oxyCODONE-acetaminophen (PERCOCET) 5-325 MG Tab Take 1 Tablet by mouth every four hours as needed for Severe Pain (1 tab every 12 hrs prn x 7 days.  hold norco during this week) for up to 30 days. 90 Tablet 0    omeprazole (PRILOSEC) 20 MG delayed-release capsule Take 1 Capsule by mouth every day. 30 Capsule 1    sucralfate (CARAFATE) 1 GM Tab Take 1 Tablet by mouth 4 Times a Day,Before Meals and at Bedtime. 120 Tablet 0    ondansetron (ZOFRAN ODT) 4 MG TABLET DISPERSIBLE Take 1 Tablet by mouth every 8 hours as needed for Nausea/Vomiting. 20 Tablet 0    celecoxib (CELEBREX) 200 MG Cap TAKE 1 CAPSULE BY MOUTH TWICE A  Capsule 0    montelukast (SINGULAIR) 10 MG Tab TAKE 1 TABLET BY MOUTH EVERY DAY 90 Tablet 0    metoprolol SR (TOPROL XL) 25 MG TABLET SR 24 HR TAKE 1 TABLET BY MOUTH EVERY DAY 90 Tablet 1    cyclobenzaprine (FLEXERIL) 10 mg Tab Take 1 Tablet by mouth every 8 hours as needed for Muscle Spasms. 21 Tablet 0    losartan (COZAAR) 100 MG Tab TAKE 1 TABLET BY MOUTH EVERY DAY 90 Tablet 0    buPROPion (WELLBUTRIN) 75 MG Tab TAKE 1 TABLET BY MOUTH TWICE A  Tablet 0    albuterol 108 (90 Base) MCG/ACT Aero Soln inhalation aerosol INHALE 2 PUFFS BY MOUTH EVERY FOUR HOURS AS NEEDED FOR SHORTNESS OF BREATH. DISPENSE A SPACER 6.7 Each 0    doxazosin (CARDURA) 4 MG Tab TAKE 1 TABLET BY MOUTH EVERY DAY 90 Tablet 1    lidocaine (LIDODERM) 5 % Patch Apply 1 Patch to skin as directed every 24 hours. 90 Patch 3    furosemide (LASIX) 20 MG Tab Take 1 tablet by mouth every 24 hours as needed. 30 tablet 3    potassium chloride ER (KLOR-CON) 10 MEQ tablet Take 1 tablet by mouth every 24  hours as needed. 30 tablet 3    DULoxetine (CYMBALTA) 60 MG Cap DR Particles delayed-release capsule TAKE 1 CAPSULE EVERY EVENING 90 Cap 0    fluticasone (FLONASE) 50 MCG/ACT nasal spray Spray 2 Sprays in nose every day. 1 Bottle 0    sodium chloride (OCEAN NASAL SPRAY) 0.65 % Solution Spray 2 Sprays in nose every 2 hours as needed for Congestion. 1 Bottle 0    methocarbamol (ROBAXIN) 750 MG Tab Take 2 Tabs by mouth 4 times a day as needed (muscle spasm). 20 Tab 0    ibuprofen (MOTRIN) 600 MG Tab Take 1 Tab by mouth every 6 hours as needed. 20 Tab 0    ascorbic acid (ASCORBIC ACID) 500 MG Tab Take 3,000 mg by mouth every day.      vitamin E 100 UNIT capsule Take 300 Units by mouth every day.      Multiple Vitamins-Minerals (ENERGY BOOSTER PO) Take 80 mg by mouth. With caffiene      glucosamine Sulfate 500 MG Cap Take 1,500 mg by mouth every day.      Cholecalciferol (VITAMIN D) 400 UNIT Tab Take 1,600 Units by mouth every day.      B Complex Vitamins (VITAMIN B COMPLEX PO) Take 20 mg by mouth every day.      Multiple Vitamin (MULTIVITAMIN PO) Take 1 Tab by mouth every day.       No current facility-administered medications for this visit.       Allergies   Allergen Reactions    Ace Inhibitors      Ace cough    Tape Rash and Itching     Adhesive tape.  PAPER TAPE OK.       ROS  Constitutional: Negative. Negative for fever, chills, weight loss, malaise/fatigue and diaphoresis.   HENT: Negative. Negative for hearing loss, ear pain, nosebleeds, congestion, sore throat, neck pain, tinnitus and ear discharge.   Respiratory: Negative. Negative for cough, hemoptysis, sputum production, shortness of breath, wheezing and stridor.   Cardiovascular: Negative. Negative for chest pain, palpitations, orthopnea, claudication, leg swelling and PND.   Gastrointestinal: Denies diarrhea, constipation, heartburn, melena or hematochezia.  Genitourinary: Denies dysuria, hematuria, urinary incontinence, frequency or urgency.         "Objective:     /80 (BP Location: Right arm, Patient Position: Sitting, BP Cuff Size: Large adult)   Pulse 76   Temp 36.7 °C (98 °F) (Temporal)   Resp 14   Ht 1.575 m (5' 2\")   Wt 119 kg (262 lb)   SpO2 96%  Body mass index is 47.92 kg/m².    Physical Exam:  Vitals reviewed.  Constitutional: Oriented to person, place, and time. appears well-developed and well-nourished. No distress.   Cardiovascular: Normal rate, regular rhythm, normal heart sounds and intact distal pulses. Exam reveals no gallop and no friction rub. No murmur heard. No carotid bruits.   Pulmonary/Chest: Effort normal and breath sounds normal. No stridor. No respiratory distress. no wheezes or rales. exhibits no tenderness.   Musculoskeletal: Normal range of motion. exhibits 1+ left ankle edema. Gomez Lower legs erythematous color changes with venous insufficiency.  gomez pedal pulses 2+. Amb slowly with cane.    Lymphadenopathy: No cervical or supraclavicular adenopathy.   Neurological: Alert and oriented to person, place, and time. exhibits normal muscle tone.  Skin: Skin is warm and dry. No diaphoresis.   Psychiatric: Normal mood and affect. Behavior is normal.      Assessment and Plan:     The following treatment plan was discussed:    1. Chronic bilateral low back pain with right-sided sciatica  Referral to Pain Management    oxyCODONE-acetaminophen (PERCOCET) 5-325 MG Tab    refilled percocet for april.  refer pain mgmt since her pain is currently uncontrolled on percocet. f/u 1 mo for sx eval.      2. Primary osteoarthritis involving multiple joints  Referral to Pain Management    oxyCODONE-acetaminophen (PERCOCET) 5-325 MG Tab    refill percocet x 1 mo only      3. Depression with anxiety  DULoxetine (CYMBALTA) 30 MG Cap DR Particles    Referral to Psychiatry    not controlled.  restart 30 mg tab of cymbalta in addition to 60 mg.  continue wellbutrin. f/u 1 mo for sx olamide.      4. Bilateral leg pain  US-EXTREMITY ARTERY LOWER BILAT " W/KLAUDIA (COMBO)    assoc w/swelling.  now improved.  ck KLAUDIA. f/u w/pt w/results      5. Pedal edema  US-EXTREMITY ARTERY LOWER BILAT W/KLAUDIA (COMBO)    improved with antix.  do KLAUDIA      6. Gastroesophageal reflux disease with esophagitis without hemorrhage  omeprazole (PRILOSEC) 20 MG delayed-release capsule    sucralfate (CARAFATE) 1 GM Tab    ondansetron (ZOFRAN ODT) 4 MG TABLET DISPERSIBLE    poorly controlled. + nausea with GERD sx. start carafate and prilosec.  use zofran for nausea. f/u 1 mo for sx eval, wt ck.      7. Nausea  ondansetron (ZOFRAN ODT) 4 MG TABLET DISPERSIBLE    loosing wt w/nausea & GERD.  use zofran to control nausea. f/u 1 mo for wt ck and sx eval.      8. Chronic allergic rhinitis due to pollen      mucinex not working well.  add zyrtec.  no sx of infection            Followup: Return in about 4 weeks (around 4/24/2023), or for sx eval.

## 2023-04-19 DIAGNOSIS — K21.00 GASTROESOPHAGEAL REFLUX DISEASE WITH ESOPHAGITIS WITHOUT HEMORRHAGE: ICD-10-CM

## 2023-04-19 RX ORDER — SUCRALFATE 1 G/1
1 TABLET ORAL
Qty: 120 TABLET | Refills: 0 | Status: SHIPPED | OUTPATIENT
Start: 2023-04-19 | End: 2023-05-09

## 2023-04-19 RX ORDER — OMEPRAZOLE 20 MG/1
20 CAPSULE, DELAYED RELEASE ORAL DAILY
Qty: 30 CAPSULE | Refills: 1 | Status: SHIPPED | OUTPATIENT
Start: 2023-04-19 | End: 2023-05-28

## 2023-04-20 DIAGNOSIS — M54.41 CHRONIC BILATERAL LOW BACK PAIN WITH RIGHT-SIDED SCIATICA: ICD-10-CM

## 2023-04-20 DIAGNOSIS — F41.8 DEPRESSION WITH ANXIETY: ICD-10-CM

## 2023-04-20 DIAGNOSIS — G89.29 CHRONIC BILATERAL LOW BACK PAIN WITH RIGHT-SIDED SCIATICA: ICD-10-CM

## 2023-04-20 DIAGNOSIS — J45.20 MILD INTERMITTENT ASTHMA WITHOUT COMPLICATION: ICD-10-CM

## 2023-04-20 DIAGNOSIS — M15.9 PRIMARY OSTEOARTHRITIS INVOLVING MULTIPLE JOINTS: ICD-10-CM

## 2023-04-22 RX ORDER — ALBUTEROL SULFATE 90 UG/1
AEROSOL, METERED RESPIRATORY (INHALATION)
Qty: 6.7 EACH | Refills: 0 | Status: SHIPPED | OUTPATIENT
Start: 2023-04-22

## 2023-04-22 RX ORDER — DULOXETIN HYDROCHLORIDE 30 MG/1
30 CAPSULE, DELAYED RELEASE ORAL DAILY
Qty: 90 CAPSULE | Refills: 0 | Status: SHIPPED | OUTPATIENT
Start: 2023-04-22 | End: 2023-12-11

## 2023-04-22 RX ORDER — BUPROPION HYDROCHLORIDE 75 MG/1
TABLET ORAL
Qty: 180 TABLET | Refills: 0 | Status: SHIPPED | OUTPATIENT
Start: 2023-04-22 | End: 2023-12-23

## 2023-04-22 RX ORDER — CELECOXIB 200 MG/1
CAPSULE ORAL
Qty: 180 CAPSULE | Refills: 0 | Status: SHIPPED | OUTPATIENT
Start: 2023-04-22 | End: 2023-09-17

## 2023-05-09 ENCOUNTER — OFFICE VISIT (OUTPATIENT)
Dept: MEDICAL GROUP | Facility: MEDICAL CENTER | Age: 68
End: 2023-05-09
Payer: MEDICARE

## 2023-05-09 VITALS
TEMPERATURE: 97.9 F | RESPIRATION RATE: 14 BRPM | BODY MASS INDEX: 49.13 KG/M2 | DIASTOLIC BLOOD PRESSURE: 98 MMHG | OXYGEN SATURATION: 93 % | HEART RATE: 84 BPM | SYSTOLIC BLOOD PRESSURE: 154 MMHG | WEIGHT: 267 LBS | HEIGHT: 62 IN

## 2023-05-09 DIAGNOSIS — K21.00 GASTROESOPHAGEAL REFLUX DISEASE WITH ESOPHAGITIS WITHOUT HEMORRHAGE: ICD-10-CM

## 2023-05-09 DIAGNOSIS — M15.9 PRIMARY OSTEOARTHRITIS INVOLVING MULTIPLE JOINTS: ICD-10-CM

## 2023-05-09 DIAGNOSIS — I10 ESSENTIAL HYPERTENSION: ICD-10-CM

## 2023-05-09 DIAGNOSIS — M54.41 CHRONIC BILATERAL LOW BACK PAIN WITH RIGHT-SIDED SCIATICA: ICD-10-CM

## 2023-05-09 DIAGNOSIS — G89.29 CHRONIC BILATERAL LOW BACK PAIN WITH RIGHT-SIDED SCIATICA: ICD-10-CM

## 2023-05-09 PROCEDURE — 99214 OFFICE O/P EST MOD 30 MIN: CPT | Performed by: NURSE PRACTITIONER

## 2023-05-09 RX ORDER — OXYCODONE HYDROCHLORIDE AND ACETAMINOPHEN 5; 325 MG/1; MG/1
1 TABLET ORAL EVERY 4 HOURS PRN
Qty: 90 TABLET | Refills: 0 | Status: SHIPPED | OUTPATIENT
Start: 2023-05-09 | End: 2023-06-08

## 2023-05-09 ASSESSMENT — FIBROSIS 4 INDEX: FIB4 SCORE: 1.37

## 2023-05-09 NOTE — PROGRESS NOTES
Subjective:     Rain Sanabria is a 68 y.o. female who presents with GERD.    HPI:   Seen in f/u for GERD.  She hwas having severe sx.  Throwing up and lots of heartburn.  She was put on prilosec and carafate.  She was told by pharmacy to open prilosec  cap and put in applesause.  Seh did that and took the carafate whole tab. Her sx resolved immediately.  She accidently forget meds for 1 day and sx reoccurred.  They just refilled her meds  Her bp is again elevated today.  It was normal last time but high before that.  She is taking toprol, cardura and losartan approp.  She is using a wrist cuff at home and it is lower than today.     She is due pain med re fill. She has been referred to pain mgmt but thy haven't called her yet.  Will give her one month of percocet.  She will do further f/u with them. She has chronic back and joint pain.  Taking med approp.  UDS and contract up to date.     Patient Active Problem List    Diagnosis Date Noted    Morbid obesity with BMI of 50.0-59.9, adult ([facility]) 03/22/2021    B12 deficiency 02/05/2020    Dental disorder     Alcohol use 01/26/2018    Chronic right shoulder pain 01/06/2016    Hyperlipidemia     Depression with anxiety     Vitamin D deficiency     Abdominal hernia     Sleep apnea 07/12/2010    OA (osteoarthritis) 07/12/2010    HTN (hypertension) 01/20/2010    Genital herpes simplex 06/19/2009    Insomnia 06/19/2009    Migraine 06/19/2009    Abnormal mammogram 06/19/2009    Cystocele 06/19/2009       Current medicines (including changes today)  Current Outpatient Medications   Medication Sig Dispense Refill    oxyCODONE-acetaminophen (PERCOCET) 5-325 MG Tab Take 1 Tablet by mouth every four hours as needed for Severe Pain (1 tab every 12 hrs prn x 7 days.  hold norco during this week) for up to 30 days. 90 Tablet 0    buPROPion (WELLBUTRIN) 75 MG Tab TAKE 1 TABLET BY MOUTH TWICE A  Tablet 0    DULoxetine (CYMBALTA) 30 MG Cap DR Particles TAKE 1 CAPSULE BY  MOUTH EVERY DAY 90 Capsule 0    celecoxib (CELEBREX) 200 MG Cap TAKE 1 CAPSULE BY MOUTH TWICE A  Capsule 0    albuterol 108 (90 Base) MCG/ACT Aero Soln inhalation aerosol INHALE 2 PUFFS BY MOUTH EVERY FOUR HOURS AS NEEDED FOR SHORTNESS OF BREATH. DISPENSE A SPACER 6.7 Each 0    omeprazole (PRILOSEC) 20 MG delayed-release capsule TAKE 1 CAPSULE BY MOUTH EVERY DAY 30 Capsule 1    ondansetron (ZOFRAN ODT) 4 MG TABLET DISPERSIBLE Take 1 Tablet by mouth every 8 hours as needed for Nausea/Vomiting. 20 Tablet 0    montelukast (SINGULAIR) 10 MG Tab TAKE 1 TABLET BY MOUTH EVERY DAY 90 Tablet 0    cyclobenzaprine (FLEXERIL) 10 mg Tab Take 1 Tablet by mouth every 8 hours as needed for Muscle Spasms. 21 Tablet 0    losartan (COZAAR) 100 MG Tab TAKE 1 TABLET BY MOUTH EVERY DAY 90 Tablet 0    doxazosin (CARDURA) 4 MG Tab TAKE 1 TABLET BY MOUTH EVERY DAY 90 Tablet 1    lidocaine (LIDODERM) 5 % Patch Apply 1 Patch to skin as directed every 24 hours. 90 Patch 3    furosemide (LASIX) 20 MG Tab Take 1 tablet by mouth every 24 hours as needed. 30 tablet 3    potassium chloride ER (KLOR-CON) 10 MEQ tablet Take 1 tablet by mouth every 24 hours as needed. 30 tablet 3    DULoxetine (CYMBALTA) 60 MG Cap DR Particles delayed-release capsule TAKE 1 CAPSULE EVERY EVENING 90 Cap 0    fluticasone (FLONASE) 50 MCG/ACT nasal spray Spray 2 Sprays in nose every day. 1 Bottle 0    sodium chloride (OCEAN NASAL SPRAY) 0.65 % Solution Spray 2 Sprays in nose every 2 hours as needed for Congestion. 1 Bottle 0    methocarbamol (ROBAXIN) 750 MG Tab Take 2 Tabs by mouth 4 times a day as needed (muscle spasm). 20 Tab 0    ibuprofen (MOTRIN) 600 MG Tab Take 1 Tab by mouth every 6 hours as needed. 20 Tab 0    ascorbic acid (ASCORBIC ACID) 500 MG Tab Take 3,000 mg by mouth every day.      vitamin E 100 UNIT capsule Take 300 Units by mouth every day.      Multiple Vitamins-Minerals (ENERGY BOOSTER PO) Take 80 mg by mouth. With caffiene      glucosamine  "Sulfate 500 MG Cap Take 1,500 mg by mouth every day.      Cholecalciferol (VITAMIN D) 400 UNIT Tab Take 1,600 Units by mouth every day.      B Complex Vitamins (VITAMIN B COMPLEX PO) Take 20 mg by mouth every day.      Multiple Vitamin (MULTIVITAMIN PO) Take 1 Tab by mouth every day.       No current facility-administered medications for this visit.       Allergies   Allergen Reactions    Ace Inhibitors      Ace cough    Tape Rash and Itching     Adhesive tape.  PAPER TAPE OK.       ROS  Constitutional: Negative. Negative for fever, chills, weight loss, malaise/fatigue and diaphoresis.   HENT: Negative. Negative for hearing loss, ear pain, nosebleeds, congestion, sore throat, neck pain, tinnitus and ear discharge.   Respiratory: Negative. Negative for cough, hemoptysis, sputum production, shortness of breath, wheezing and stridor.   Cardiovascular: Negative. Negative for chest pain, palpitations, orthopnea, claudication, leg swelling and PND.   Gastrointestinal: Denies nausea, vomiting, diarrhea, constipation, heartburn, melena or hematochezia.  Genitourinary: Denies dysuria, hematuria, urinary incontinence, frequency or urgency.        Objective:     BP (!) 154/98 (BP Location: Left arm, Patient Position: Sitting, BP Cuff Size: Adult long)   Pulse 84   Temp 36.6 °C (97.9 °F) (Temporal)   Resp 14   Ht 1.575 m (5' 2\")   Wt 121 kg (267 lb)   SpO2 93%  Body mass index is 48.83 kg/m².    Physical Exam:  Vitals reviewed.  Constitutional: Oriented to person, place, and time. appears well-developed and well-nourished. No distress.   Cardiovascular: Normal rate, regular rhythm, normal heart sounds and intact distal pulses. Exam reveals no gallop and no friction rub. 3/6 systolicDictation #1  MRN:[id number]  CSN:[id number]  murmur heard. No carotid bruits.   Pulmonary/Chest: Effort normal and breath sounds normal. No stridor. No respiratory distress. no wheezes or rales. exhibits no tenderness.   Musculoskeletal: Normal " range of motion. exhibits no edema. davin pedal pulses 2+.  Lymphadenopathy: No cervical or supraclavicular adenopathy.   Neurological: Alert and oriented to person, place, and time. exhibits normal muscle tone.  Skin: Skin is warm and dry. No diaphoresis.   Psychiatric: Normal mood and affect. Behavior is normal.      Assessment and Plan:     The following treatment plan was discussed:    1. Gastroesophageal reflux disease with esophagitis without hemorrhage      well controlled on carafate and prilosec. continue for 1-2 mo then wean off. f/u if sx persist.  do lab in 1 mo. f/u for review      2. Essential hypertension      BP elevated today.  lower at home.  monitor bp at home. f/u 1 mo for bp check, sx eval and lab review.      3. Chronic bilateral low back pain with right-sided sciatica  oxyCODONE-acetaminophen (PERCOCET) 5-325 MG Tab    refilled percocet for 1 month.  refer pain mgmt since her pain is currently uncontrolled on percocet. f/u 1 mo for sx eval and lab review      4. Primary osteoarthritis involving multiple joints  oxyCODONE-acetaminophen (PERCOCET) 5-325 MG Tab    refill percocet x 1 mo only            Followup: Return in about 4 weeks (around 6/6/2023), or for lab review and sx eval.

## 2023-05-31 DIAGNOSIS — R05.3 CHRONIC COUGH: ICD-10-CM

## 2023-05-31 DIAGNOSIS — R06.02 SOB (SHORTNESS OF BREATH) ON EXERTION: ICD-10-CM

## 2023-05-31 RX ORDER — MONTELUKAST SODIUM 10 MG/1
10 TABLET ORAL DAILY
Qty: 90 TABLET | Refills: 0 | Status: SHIPPED | OUTPATIENT
Start: 2023-05-31 | End: 2023-08-10

## 2023-05-31 NOTE — TELEPHONE ENCOUNTER
Received request via: Pharmacy    Was the patient seen in the last year in this department? Yes    Does the patient have an active prescription (recently filled or refills available) for medication(s) requested?  yes    Does the patient have long-term Plus and need 100 day supply (blood pressure, diabetes and cholesterol meds only)? Patient does not have SCP  Requested Prescriptions    Pending Prescriptions Disp Refills   montelukast (SINGULAIR) 10 MG Tab [Pharmacy Med Name: MONTELUKAST SOD 10 MG TABLET] 90 Tablet 0    Sig: TAKE 1 TABLET BY MOUTH EVERY DAY

## 2023-06-08 ENCOUNTER — HOSPITAL ENCOUNTER (OUTPATIENT)
Facility: MEDICAL CENTER | Age: 68
End: 2023-06-08
Attending: NURSE PRACTITIONER
Payer: MEDICARE

## 2023-06-08 ENCOUNTER — OFFICE VISIT (OUTPATIENT)
Dept: MEDICAL GROUP | Facility: MEDICAL CENTER | Age: 68
End: 2023-06-08
Payer: MEDICARE

## 2023-06-08 VITALS
SYSTOLIC BLOOD PRESSURE: 162 MMHG | BODY MASS INDEX: 49.87 KG/M2 | WEIGHT: 271 LBS | HEART RATE: 77 BPM | DIASTOLIC BLOOD PRESSURE: 98 MMHG | RESPIRATION RATE: 17 BRPM | HEIGHT: 62 IN | OXYGEN SATURATION: 94 % | TEMPERATURE: 97.7 F

## 2023-06-08 DIAGNOSIS — M54.41 CHRONIC BILATERAL LOW BACK PAIN WITH RIGHT-SIDED SCIATICA: ICD-10-CM

## 2023-06-08 DIAGNOSIS — G89.29 CHRONIC BILATERAL LOW BACK PAIN WITH RIGHT-SIDED SCIATICA: ICD-10-CM

## 2023-06-08 DIAGNOSIS — M15.9 PRIMARY OSTEOARTHRITIS INVOLVING MULTIPLE JOINTS: ICD-10-CM

## 2023-06-08 DIAGNOSIS — F43.9 STRESS AT HOME: ICD-10-CM

## 2023-06-08 DIAGNOSIS — Z23 NEED FOR PNEUMOCOCCAL 20-VALENT CONJUGATE VACCINATION: ICD-10-CM

## 2023-06-08 DIAGNOSIS — I10 PRIMARY HYPERTENSION: ICD-10-CM

## 2023-06-08 LAB
FORWARD REASON: SPWHY: NORMAL
FORWARDED TO LAB: SPWHR: NORMAL
SPECIMEN SENT: SPWT1: NORMAL

## 2023-06-08 PROCEDURE — G0009 ADMIN PNEUMOCOCCAL VACCINE: HCPCS | Performed by: NURSE PRACTITIONER

## 2023-06-08 PROCEDURE — 3077F SYST BP >= 140 MM HG: CPT | Performed by: NURSE PRACTITIONER

## 2023-06-08 PROCEDURE — 3080F DIAST BP >= 90 MM HG: CPT | Performed by: NURSE PRACTITIONER

## 2023-06-08 PROCEDURE — 99214 OFFICE O/P EST MOD 30 MIN: CPT | Mod: 25 | Performed by: NURSE PRACTITIONER

## 2023-06-08 PROCEDURE — 90677 PCV20 VACCINE IM: CPT | Performed by: NURSE PRACTITIONER

## 2023-06-08 RX ORDER — OXYCODONE HYDROCHLORIDE AND ACETAMINOPHEN 5; 325 MG/1; MG/1
1 TABLET ORAL EVERY 8 HOURS PRN
Qty: 90 TABLET | Refills: 0 | Status: SHIPPED | OUTPATIENT
Start: 2023-06-08 | End: 2023-06-08 | Stop reason: SDUPTHER

## 2023-06-08 RX ORDER — DOXAZOSIN MESYLATE 4 MG/1
6 TABLET ORAL DAILY
Qty: 180 TABLET | Refills: 0 | Status: SHIPPED | OUTPATIENT
Start: 2023-06-08 | End: 2023-09-17

## 2023-06-08 RX ORDER — OXYCODONE HYDROCHLORIDE AND ACETAMINOPHEN 5; 325 MG/1; MG/1
1 TABLET ORAL EVERY 8 HOURS PRN
Qty: 90 TABLET | Refills: 0 | Status: SHIPPED | OUTPATIENT
Start: 2023-08-07 | End: 2023-09-08 | Stop reason: SDUPTHER

## 2023-06-08 RX ORDER — OXYCODONE HYDROCHLORIDE AND ACETAMINOPHEN 5; 325 MG/1; MG/1
1 TABLET ORAL EVERY 8 HOURS PRN
Qty: 90 TABLET | Refills: 0 | Status: SHIPPED | OUTPATIENT
Start: 2023-07-08 | End: 2023-06-08 | Stop reason: SDUPTHER

## 2023-06-08 RX ORDER — CYCLOBENZAPRINE HCL 10 MG
10 TABLET ORAL EVERY 8 HOURS PRN
Qty: 21 TABLET | Refills: 0 | Status: SHIPPED | OUTPATIENT
Start: 2023-06-08 | End: 2023-08-10

## 2023-06-08 ASSESSMENT — FIBROSIS 4 INDEX: FIB4 SCORE: 1.37

## 2023-06-08 NOTE — PROGRESS NOTES
Subjective:     Rain Sanabria is a 68 y.o. female who presents with chronic pain.    HPI:   Seen in f/u for chronic pain.  She is having worsening stress with her son.  He is having issues that is causing her more stress.   Her bp is elevated today and last appt.  She is taking her losartan and cardura approp.  Not checking her bp at home.    She is due her percocet refill that she uses for chronic jpain.  Taking med approp.  No s/e to med.  Stable and well controlled.  UDS and contract needs updating today.  Her last dose of percocet was last nite. She is also on celebrex and flexeril for her pain. Needs celebrex refilled.   She is due prevnar 20.      Patient Active Problem List    Diagnosis Date Noted    Morbid obesity with BMI of 50.0-59.9, adult ([facility]) 03/22/2021    B12 deficiency 02/05/2020    Dental disorder     Alcohol use 01/26/2018    Chronic right shoulder pain 01/06/2016    Hyperlipidemia     Depression with anxiety     Vitamin D deficiency     Abdominal hernia     Sleep apnea 07/12/2010    OA (osteoarthritis) 07/12/2010    HTN (hypertension) 01/20/2010    Genital herpes simplex 06/19/2009    Insomnia 06/19/2009    Migraine 06/19/2009    Abnormal mammogram 06/19/2009    Cystocele 06/19/2009       Current medicines (including changes today)  Current Outpatient Medications   Medication Sig Dispense Refill    doxazosin (CARDURA) 4 MG Tab Take 1.5 Tablets by mouth every day. 180 Tablet 0    cyclobenzaprine (FLEXERIL) 10 mg Tab Take 1 Tablet by mouth every 8 hours as needed for Muscle Spasms. 21 Tablet 0    [START ON 8/7/2023] oxyCODONE-acetaminophen (PERCOCET) 5-325 MG Tab Take 1 Tablet by mouth every 8 hours as needed for Severe Pain (one tab every 8 hr prn for severe pain. may take up to 3 tabs daily) for up to 30 days. 90 Tablet 0    montelukast (SINGULAIR) 10 MG Tab TAKE 1 TABLET BY MOUTH EVERY DAY 90 Tablet 0    omeprazole (PRILOSEC) 20 MG delayed-release capsule TAKE 1 CAPSULE BY MOUTH EVERY DAY  30 Capsule 0    buPROPion (WELLBUTRIN) 75 MG Tab TAKE 1 TABLET BY MOUTH TWICE A  Tablet 0    DULoxetine (CYMBALTA) 30 MG Cap DR Particles TAKE 1 CAPSULE BY MOUTH EVERY DAY 90 Capsule 0    celecoxib (CELEBREX) 200 MG Cap TAKE 1 CAPSULE BY MOUTH TWICE A  Capsule 0    albuterol 108 (90 Base) MCG/ACT Aero Soln inhalation aerosol INHALE 2 PUFFS BY MOUTH EVERY FOUR HOURS AS NEEDED FOR SHORTNESS OF BREATH. DISPENSE A SPACER 6.7 Each 0    ondansetron (ZOFRAN ODT) 4 MG TABLET DISPERSIBLE Take 1 Tablet by mouth every 8 hours as needed for Nausea/Vomiting. 20 Tablet 0    losartan (COZAAR) 100 MG Tab TAKE 1 TABLET BY MOUTH EVERY DAY 90 Tablet 0    lidocaine (LIDODERM) 5 % Patch Apply 1 Patch to skin as directed every 24 hours. 90 Patch 3    furosemide (LASIX) 20 MG Tab Take 1 tablet by mouth every 24 hours as needed. 30 tablet 3    potassium chloride ER (KLOR-CON) 10 MEQ tablet Take 1 tablet by mouth every 24 hours as needed. 30 tablet 3    DULoxetine (CYMBALTA) 60 MG Cap DR Particles delayed-release capsule TAKE 1 CAPSULE EVERY EVENING 90 Cap 0    fluticasone (FLONASE) 50 MCG/ACT nasal spray Spray 2 Sprays in nose every day. 1 Bottle 0    sodium chloride (OCEAN NASAL SPRAY) 0.65 % Solution Spray 2 Sprays in nose every 2 hours as needed for Congestion. 1 Bottle 0    methocarbamol (ROBAXIN) 750 MG Tab Take 2 Tabs by mouth 4 times a day as needed (muscle spasm). 20 Tab 0    ibuprofen (MOTRIN) 600 MG Tab Take 1 Tab by mouth every 6 hours as needed. 20 Tab 0    ascorbic acid (ASCORBIC ACID) 500 MG Tab Take 3,000 mg by mouth every day.      vitamin E 100 UNIT capsule Take 300 Units by mouth every day.      Multiple Vitamins-Minerals (ENERGY BOOSTER PO) Take 80 mg by mouth. With caffiene      glucosamine Sulfate 500 MG Cap Take 1,500 mg by mouth every day.      Cholecalciferol (VITAMIN D) 400 UNIT Tab Take 1,600 Units by mouth every day.      B Complex Vitamins (VITAMIN B COMPLEX PO) Take 20 mg by mouth every day.       "Multiple Vitamin (MULTIVITAMIN PO) Take 1 Tab by mouth every day.       No current facility-administered medications for this visit.       Allergies   Allergen Reactions    Ace Inhibitors      Ace cough    Tape Rash and Itching     Adhesive tape.  PAPER TAPE OK.       ROS  Constitutional: Negative. Negative for fever, chills, weight loss, malaise/fatigue and diaphoresis.   HENT: Negative. Negative for hearing loss, ear pain, nosebleeds, congestion, sore throat, neck pain, tinnitus and ear discharge.   Respiratory: Negative. Negative for cough, hemoptysis, sputum production, shortness of breath, wheezing and stridor.   Cardiovascular: Negative. Negative for chest pain, palpitations, orthopnea, claudication, leg swelling and PND.   Gastrointestinal: Denies nausea, vomiting, diarrhea, constipation, heartburn, melena or hematochezia.  Genitourinary: Denies dysuria, hematuria, urinary incontinence, frequency or urgency.        Objective:     BP (!) 162/98 (Patient Position: Sitting)   Pulse 77   Temp 36.5 °C (97.7 °F) (Temporal)   Resp 17   Ht 1.575 m (5' 2\")   Wt 123 kg (271 lb)   SpO2 94%  Body mass index is 49.57 kg/m².    Physical Exam:  Vitals reviewed.  Constitutional: Oriented to person, place, and time. appears well-developed and well-nourished. No distress.   Cardiovascular: Normal rate, regular rhythm, normal heart sounds and intact distal pulses. Exam reveals no gallop and no friction rub. 3/6 murmur heard. No carotid bruits.   Pulmonary/Chest: Effort normal and breath sounds normal. No stridor. No respiratory distress. no wheezes or rales. exhibits no tenderness.   Musculoskeletal: Normal range of motion. exhibits no edema. davin pedal pulses 2+.  Lymphadenopathy: No cervical or supraclavicular adenopathy.   Neurological: Alert and oriented to person, place, and time. exhibits normal muscle tone.  Skin: Skin is warm and dry. No diaphoresis.   Psychiatric: Normal mood and affect. Behavior is normal.    "   Assessment and Plan:     The following treatment plan was discussed:    1. Primary hypertension  doxazosin (CARDURA) 4 MG Tab    not well controlled on losartan 100 mg and cardura 4 mg daily. increase cardura to 6 mg daily.  f/u 1 mo for lab review and bp chk      2. Chronic bilateral low back pain with right-sided sciatica  URINE DRUG SCREEN    Controlled Substance Treatment Agreement    cyclobenzaprine (FLEXERIL) 10 mg Tab    oxyCODONE-acetaminophen (PERCOCET) 5-325 MG Tab    DISCONTINUED: oxyCODONE-acetaminophen (PERCOCET) 5-325 MG Tab    DISCONTINUED: oxyCODONE-acetaminophen (PERCOCET) 5-325 MG Tab    refilled percocex 3 mo. she will f/u with pain mgmt.  updated UDS and contract today      3. Primary osteoarthritis involving multiple joints  URINE DRUG SCREEN    Controlled Substance Treatment Agreement    cyclobenzaprine (FLEXERIL) 10 mg Tab    oxyCODONE-acetaminophen (PERCOCET) 5-325 MG Tab    DISCONTINUED: oxyCODONE-acetaminophen (PERCOCET) 5-325 MG Tab    DISCONTINUED: oxyCODONE-acetaminophen (PERCOCET) 5-325 MG Tab    refill percocet x 3 mo       4. Need for pneumococcal 20-valent conjugate vaccination  Pneumococcal Conjugate Vaccine 20-Valent (19 yrs+)    prevnar 20 given today      5. Stress at home      having more stress at . she will f/u with psych.  continue cymbalta & wellbutrin            Followup: Return in about 4 weeks (around 7/6/2023), or lab review and bp ck.

## 2023-06-15 LAB — AMBIG TEST ORDER  977174: NORMAL

## 2023-06-17 ENCOUNTER — HOSPITAL ENCOUNTER (OUTPATIENT)
Dept: LAB | Facility: MEDICAL CENTER | Age: 68
End: 2023-06-17
Attending: NURSE PRACTITIONER
Payer: MEDICARE

## 2023-06-17 DIAGNOSIS — I10 PRIMARY HYPERTENSION: ICD-10-CM

## 2023-06-17 DIAGNOSIS — E67.3 VITAMIN D INTOXICATION: ICD-10-CM

## 2023-06-17 DIAGNOSIS — E53.8 B12 DEFICIENCY: ICD-10-CM

## 2023-06-17 LAB
25(OH)D3 SERPL-MCNC: 63 NG/ML (ref 30–100)
ALBUMIN SERPL BCP-MCNC: 3.8 G/DL (ref 3.2–4.9)
ALBUMIN/GLOB SERPL: 1.4 G/DL
ALP SERPL-CCNC: 103 U/L (ref 30–99)
ALT SERPL-CCNC: 15 U/L (ref 2–50)
ANION GAP SERPL CALC-SCNC: 11 MMOL/L (ref 7–16)
AST SERPL-CCNC: 22 U/L (ref 12–45)
BILIRUB SERPL-MCNC: 0.5 MG/DL (ref 0.1–1.5)
BUN SERPL-MCNC: 12 MG/DL (ref 8–22)
CALCIUM ALBUM COR SERPL-MCNC: 9.6 MG/DL (ref 8.5–10.5)
CALCIUM SERPL-MCNC: 9.4 MG/DL (ref 8.5–10.5)
CHLORIDE SERPL-SCNC: 102 MMOL/L (ref 96–112)
CHOLEST SERPL-MCNC: 188 MG/DL (ref 100–199)
CO2 SERPL-SCNC: 26 MMOL/L (ref 20–33)
CREAT SERPL-MCNC: 0.66 MG/DL (ref 0.5–1.4)
FASTING STATUS PATIENT QL REPORTED: NORMAL
GFR SERPLBLD CREATININE-BSD FMLA CKD-EPI: 95 ML/MIN/1.73 M 2
GLOBULIN SER CALC-MCNC: 2.7 G/DL (ref 1.9–3.5)
GLUCOSE SERPL-MCNC: 83 MG/DL (ref 65–99)
HDLC SERPL-MCNC: 73 MG/DL
LDLC SERPL CALC-MCNC: 91 MG/DL
POTASSIUM SERPL-SCNC: 4.4 MMOL/L (ref 3.6–5.5)
PROT SERPL-MCNC: 6.5 G/DL (ref 6–8.2)
SODIUM SERPL-SCNC: 139 MMOL/L (ref 135–145)
TRIGL SERPL-MCNC: 121 MG/DL (ref 0–149)
VIT B12 SERPL-MCNC: 1408 PG/ML (ref 211–911)

## 2023-06-17 PROCEDURE — 80061 LIPID PANEL: CPT

## 2023-06-17 PROCEDURE — 36415 COLL VENOUS BLD VENIPUNCTURE: CPT

## 2023-06-17 PROCEDURE — 82607 VITAMIN B-12: CPT

## 2023-06-17 PROCEDURE — 80053 COMPREHEN METABOLIC PANEL: CPT

## 2023-06-17 PROCEDURE — 82306 VITAMIN D 25 HYDROXY: CPT

## 2023-06-30 DIAGNOSIS — I10 ESSENTIAL HYPERTENSION: ICD-10-CM

## 2023-06-30 RX ORDER — LOSARTAN POTASSIUM 100 MG/1
TABLET ORAL
Qty: 90 TABLET | Refills: 0 | Status: SHIPPED | OUTPATIENT
Start: 2023-06-30

## 2023-06-30 NOTE — TELEPHONE ENCOUNTER
Received request via: Pharmacy    Was the patient seen in the last year in this department? Yes    Does the patient have an active prescription (recently filled or refills available) for medication(s) requested? YES    Does the patient have [facility] and need 100 day supply (blood pressure, diabetes and cholesterol meds only)? Patient does not have SCP  Requested Prescriptions     Pending Prescriptions Disp Refills    losartan (COZAAR) 100 MG Tab [Pharmacy Med Name: LOSARTAN POTASSIUM 100 MG Tablet] 90 Tablet 0     Sig: TAKE 1 TABLET EVERY DAY

## 2023-06-30 NOTE — TELEPHONE ENCOUNTER
Was the patient seen in the last year in this department? Yes    Does patient have an active prescription for medications requested? No     Received Request Via: Pharmacy  
8

## 2023-07-06 ENCOUNTER — OFFICE VISIT (OUTPATIENT)
Dept: MEDICAL GROUP | Facility: MEDICAL CENTER | Age: 68
End: 2023-07-06
Payer: MEDICARE

## 2023-07-06 VITALS
RESPIRATION RATE: 17 BRPM | BODY MASS INDEX: 50.24 KG/M2 | SYSTOLIC BLOOD PRESSURE: 122 MMHG | HEART RATE: 66 BPM | HEIGHT: 62 IN | WEIGHT: 273 LBS | TEMPERATURE: 98 F | DIASTOLIC BLOOD PRESSURE: 70 MMHG | OXYGEN SATURATION: 95 %

## 2023-07-06 DIAGNOSIS — E53.8 DISORDER OF VITAMIN B12: ICD-10-CM

## 2023-07-06 DIAGNOSIS — I10 ESSENTIAL HYPERTENSION: ICD-10-CM

## 2023-07-06 DIAGNOSIS — R01.1 CARDIAC MURMUR, UNSPECIFIED: ICD-10-CM

## 2023-07-06 DIAGNOSIS — E67.3 VITAMIN D INTOXICATION: ICD-10-CM

## 2023-07-06 PROCEDURE — 3078F DIAST BP <80 MM HG: CPT | Performed by: NURSE PRACTITIONER

## 2023-07-06 PROCEDURE — 3074F SYST BP LT 130 MM HG: CPT | Performed by: NURSE PRACTITIONER

## 2023-07-06 PROCEDURE — 99214 OFFICE O/P EST MOD 30 MIN: CPT | Performed by: NURSE PRACTITIONER

## 2023-07-06 ASSESSMENT — FIBROSIS 4 INDEX: FIB4 SCORE: 1.5

## 2023-07-06 NOTE — PROGRESS NOTES
Subjective:     Rain Sanabria is a 68 y.o. female who presents with HTN.    HPI:   Seen in f/u for HTN.  She was seen last month with elevated bp.  She is on losartan 100 mg and cardura was increased from 4 mg to 6 mg.  She checks her bp at home on a wrist cuff. It is running 137/?.  Today her bp is well controlled.  No s/e to med.  Taking med approp.  She is due colonoscopy but requires cardiac clearance d/t murmur before having procedure.  She has been referred to cardiac by her GI. I will also do referral..  no sx of CHF.  No chest pain or change in SOB.  No previous echo done.   Reviewed lab with pt.  GFR, corrected ca++, CMP, vitamin D, LP is wnl.    She is not on healthy diet all the time.  She does not exercise.  She is not on statin.  Her vitamin d was too high last time at 160.  Now down to normal at 63.  She is now taking otc d3 2000 units daily  Her B12 is mildly elevated at 1400.  Will decrease dose from ii tabs to i tab.     Patient Active Problem List    Diagnosis Date Noted    Morbid obesity with BMI of 50.0-59.9, adult ([facility]) 03/22/2021    B12 deficiency 02/05/2020    Dental disorder     Alcohol use 01/26/2018    Chronic right shoulder pain 01/06/2016    Hyperlipidemia     Depression with anxiety     Vitamin D deficiency     Abdominal hernia     Sleep apnea 07/12/2010    OA (osteoarthritis) 07/12/2010    HTN (hypertension) 01/20/2010    Genital herpes simplex 06/19/2009    Insomnia 06/19/2009    Migraine 06/19/2009    Abnormal mammogram 06/19/2009    Cystocele 06/19/2009       Current medicines (including changes today)  Current Outpatient Medications   Medication Sig Dispense Refill    losartan (COZAAR) 100 MG Tab TAKE 1 TABLET EVERY DAY 90 Tablet 0    doxazosin (CARDURA) 4 MG Tab Take 1.5 Tablets by mouth every day. 180 Tablet 0    cyclobenzaprine (FLEXERIL) 10 mg Tab Take 1 Tablet by mouth every 8 hours as needed for Muscle Spasms. 21 Tablet 0    [START ON 8/7/2023] oxyCODONE-acetaminophen  (PERCOCET) 5-325 MG Tab Take 1 Tablet by mouth every 8 hours as needed for Severe Pain (one tab every 8 hr prn for severe pain. may take up to 3 tabs daily) for up to 30 days. 90 Tablet 0    montelukast (SINGULAIR) 10 MG Tab TAKE 1 TABLET BY MOUTH EVERY DAY 90 Tablet 0    omeprazole (PRILOSEC) 20 MG delayed-release capsule TAKE 1 CAPSULE BY MOUTH EVERY DAY 30 Capsule 0    buPROPion (WELLBUTRIN) 75 MG Tab TAKE 1 TABLET BY MOUTH TWICE A  Tablet 0    DULoxetine (CYMBALTA) 30 MG Cap DR Particles TAKE 1 CAPSULE BY MOUTH EVERY DAY 90 Capsule 0    celecoxib (CELEBREX) 200 MG Cap TAKE 1 CAPSULE BY MOUTH TWICE A  Capsule 0    albuterol 108 (90 Base) MCG/ACT Aero Soln inhalation aerosol INHALE 2 PUFFS BY MOUTH EVERY FOUR HOURS AS NEEDED FOR SHORTNESS OF BREATH. DISPENSE A SPACER 6.7 Each 0    ondansetron (ZOFRAN ODT) 4 MG TABLET DISPERSIBLE Take 1 Tablet by mouth every 8 hours as needed for Nausea/Vomiting. 20 Tablet 0    lidocaine (LIDODERM) 5 % Patch Apply 1 Patch to skin as directed every 24 hours. 90 Patch 3    furosemide (LASIX) 20 MG Tab Take 1 tablet by mouth every 24 hours as needed. 30 tablet 3    potassium chloride ER (KLOR-CON) 10 MEQ tablet Take 1 tablet by mouth every 24 hours as needed. 30 tablet 3    DULoxetine (CYMBALTA) 60 MG Cap DR Particles delayed-release capsule TAKE 1 CAPSULE EVERY EVENING 90 Cap 0    fluticasone (FLONASE) 50 MCG/ACT nasal spray Spray 2 Sprays in nose every day. 1 Bottle 0    sodium chloride (OCEAN NASAL SPRAY) 0.65 % Solution Spray 2 Sprays in nose every 2 hours as needed for Congestion. 1 Bottle 0    methocarbamol (ROBAXIN) 750 MG Tab Take 2 Tabs by mouth 4 times a day as needed (muscle spasm). 20 Tab 0    ibuprofen (MOTRIN) 600 MG Tab Take 1 Tab by mouth every 6 hours as needed. 20 Tab 0    ascorbic acid (ASCORBIC ACID) 500 MG Tab Take 3,000 mg by mouth every day.      vitamin E 100 UNIT capsule Take 300 Units by mouth every day.      Multiple Vitamins-Minerals (ENERGY  "BOOSTER PO) Take 80 mg by mouth. With caffiene      glucosamine Sulfate 500 MG Cap Take 1,500 mg by mouth every day.      Cholecalciferol (VITAMIN D) 400 UNIT Tab Take 1,600 Units by mouth every day.      B Complex Vitamins (VITAMIN B COMPLEX PO) Take 20 mg by mouth every day.      Multiple Vitamin (MULTIVITAMIN PO) Take 1 Tab by mouth every day.       No current facility-administered medications for this visit.       Allergies   Allergen Reactions    Ace Inhibitors      Ace cough    Tape Rash and Itching     Adhesive tape.  PAPER TAPE OK.       ROS  Constitutional: Negative. Negative for fever, chills, weight loss, malaise/fatigue and diaphoresis.   HENT: Negative. Negative for hearing loss, ear pain, nosebleeds, congestion, sore throat, neck pain, tinnitus and ear discharge.   Respiratory: Negative. Negative for cough, hemoptysis, sputum production, shortness of breath, wheezing and stridor.   Cardiovascular: Negative. Negative for chest pain, palpitations, orthopnea, claudication, leg swelling and PND.   Gastrointestinal: Denies nausea, vomiting, diarrhea, constipation, heartburn, melena or hematochezia.  Genitourinary: Denies dysuria, hematuria, urinary incontinence, frequency or urgency.        Objective:     /70 (BP Location: Right arm, Patient Position: Sitting, BP Cuff Size: Adult)   Pulse 66   Temp 36.7 °C (98 °F) (Temporal)   Resp 17   Ht 1.575 m (5' 2\")   Wt 124 kg (273 lb)   SpO2 95%  Body mass index is 49.93 kg/m².    Physical Exam:  Vitals reviewed.  Constitutional: Oriented to person, place, and time. appears well-developed and well-nourished. No distress.   Cardiovascular: Normal rate, regular rhythm, normal heart sounds and intact distal pulses. Exam reveals no gallop and no friction rub. 2/6 murmur heard. No carotid bruits.   Pulmonary/Chest: Effort normal and breath sounds normal. No stridor. No respiratory distress. no wheezes or rales. exhibits no tenderness.   Musculoskeletal: " Normal range of motion. exhibits 1+ left and trace pedal edema. davin pedal pulses 2+.  Lymphadenopathy: No cervical or supraclavicular adenopathy.   Neurological: Alert and oriented to person, place, and time. exhibits normal muscle tone.  Skin: Skin is warm and dry. No diaphoresis.   Psychiatric: Normal mood and affect. Behavior is normal.      Assessment and Plan:     The following treatment plan was discussed:    1. Essential hypertension      stable and well controlled on losartan 100 mg and cardura 6 mg daily.  plan: [facility] lab 1 yr.  f/u when pain meds need refill      2. Cardiac murmur, unspecified  REFERRAL TO CARDIOLOGY    refer cardiac for colonoscopy clearance d/t murmur      3. Disorder of vitamin B12      b12 elevated. dec otc supplement to 1000 mcg daily      4. Vitamin D intoxication      she decreased dose last month.  now d3 is wnl.  continue 2000 units daily otc            Followup: Return when due for next pain med refill.

## 2023-07-18 ENCOUNTER — HOSPITAL ENCOUNTER (EMERGENCY)
Facility: MEDICAL CENTER | Age: 68
End: 2023-07-18
Attending: EMERGENCY MEDICINE
Payer: MEDICARE

## 2023-07-18 VITALS
RESPIRATION RATE: 16 BRPM | OXYGEN SATURATION: 92 % | HEART RATE: 71 BPM | SYSTOLIC BLOOD PRESSURE: 134 MMHG | BODY MASS INDEX: 50.24 KG/M2 | WEIGHT: 273 LBS | HEIGHT: 62 IN | TEMPERATURE: 96.7 F | DIASTOLIC BLOOD PRESSURE: 63 MMHG
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
## 2023-07-18 DIAGNOSIS — F10.929 ALCOHOLIC INTOXICATION WITH COMPLICATION (HCC): ICD-10-CM

## 2023-07-18 PROCEDURE — 99284 EMERGENCY DEPT VISIT MOD MDM: CPT

## 2023-07-18 ASSESSMENT — FIBROSIS 4 INDEX: FIB4 SCORE: 1.5

## 2023-07-18 NOTE — ED TRIAGE NOTES
"Chief Complaint   Patient presents with    Alcohol Intoxication     Pt has been drinking tonight, unable to state how much, just \"a lot\". Repetitive and AO2    GLF     Tripped and fell. -head strike, -blood thinners, -LOC     Pt BIB [facility] from home for above complaints. Altered and repetitive, unable to provide much history. Per EMS, had soft BP initially in 90s which improved to 120s upon arrival to ED; .    "

## 2023-07-18 NOTE — ED PROVIDER NOTES
"  ER Provider Note    Scribed for Josiah Peres M.D. by Luc Wilson. 7/18/2023  5:07 AM    Primary Care Provider: SUHAIL Marina    CHIEF COMPLAINT  Chief Complaint   Patient presents with    Alcohol Intoxication     Pt has been drinking tonight, unable to state how much, just \"a lot\". Repetitive and AO2    GLF     Tripped and fell. -head strike, -blood thinners, -LOC     EXTERNAL RECORDS REVIEWED  Outpatient Notes      HPI/ROS  LIMITATION TO HISTORY   Select: Intoxication  OUTSIDE HISTORIAN(S):  EMS as below    Rain Romero a 68 y.o. female who presents to the ED via EMS for acute GLF and alcohol intoxication. Patient states she fell at home and after she was unable to get up called EMS. She denies any injuries from the fall. She does admit to drinking alcohol earlier tonight. Otherwise she has no acute medical complaints.     PAST MEDICAL HISTORY  Past Medical History:   Diagnosis Date    Abdominal hernia     Abnormal mammogram 6/19/2009    Alcohol use 01/26/2018    2 per day    B12 deficiency 02/05/2020    B12 is low enc her to take OTC B12 1000 mcg everyday     Chronic right shoulder pain     Cystocele 6/19/2009    Dental disorder     Upper denture    Depression with anxiety 01/26/2018    Genital herpes 6/19/2009 1/26/18-no breakout for 10 years.    Hyperlipidemia     Hypertension 01/26/2018    Controlled and no longer any meds for 1 year.    Insomnia 6/19/2009    Migraine 06/19/2009 1/26/18-states none since 2003.    Morbid obesity with BMI of 45.0-49.9, adult (HCC) 2/22/2017    OA (osteoarthritis)     Sleep apnea     2011 had a very abn apnea link.  sx improved on nitetime o2.  her ins wouldn't pay for o2 and she can't afford it.  she is looking into options for o2.     Vitamin D deficiency        SURGICAL HISTORY  Past Surgical History:   Procedure Laterality Date    KNEE ARTHROPLASTY TOTAL Left 2/12/2018    Procedure: KNEE ARTHROPLASTY TOTAL;  Surgeon: Ankit Frias M.D.;  " Location: SURGERY [facility];  Service: Orthopedics    COLONOSCOPY  2015    Hx of a couple    ROTATOR CUFF REPAIR Right 11/24/2009    RHINOPLASTY  07/2004    HERNIA REPAIR  2004    GASTRIC BYPASS LAPAROSCOPIC  02/2003    BREAST BIOPSY Left 1996    Benign    TUBAL LIGATION Bilateral 1985    PRIMARY C SECTION  1984    HYSTEROSCOPY THERMAL ABLATION  early 2000's    endometrial    TOE ARTHROPLASTY Bilateral 1990'S    2nd toes shortened.       FAMILY HISTORY  Family History   Problem Relation Age of Onset    Diabetes Mother     Hypertension Mother     Diabetes Maternal Grandfather     Diabetes Paternal Grandfather     Cancer Paternal Grandfather     Blood Disease Daughter         leukemia-all       SOCIAL HISTORY   reports that she has never smoked. She has never used smokeless tobacco. She reports current alcohol use of about 4.2 oz of alcohol per week. She reports that she does not use drugs.    CURRENT MEDICATIONS  Previous Medications    ALBUTEROL 108 (90 BASE) MCG/ACT AERO SOLN INHALATION AEROSOL    INHALE 2 PUFFS BY MOUTH EVERY FOUR HOURS AS NEEDED FOR SHORTNESS OF BREATH. DISPENSE A SPACER    ASCORBIC ACID (ASCORBIC ACID) 500 MG TAB    Take 3,000 mg by mouth every day.    B COMPLEX VITAMINS (VITAMIN B COMPLEX PO)    Take 20 mg by mouth every day.    BUPROPION (WELLBUTRIN) 75 MG TAB    TAKE 1 TABLET BY MOUTH TWICE A DAY    CELECOXIB (CELEBREX) 200 MG CAP    TAKE 1 CAPSULE BY MOUTH TWICE A DAY    CHOLECALCIFEROL (VITAMIN D) 400 UNIT TAB    Take 1,600 Units by mouth every day.    CYCLOBENZAPRINE (FLEXERIL) 10 MG TAB    Take 1 Tablet by mouth every 8 hours as needed for Muscle Spasms.    DOXAZOSIN (CARDURA) 4 MG TAB    Take 1.5 Tablets by mouth every day.    DULOXETINE (CYMBALTA) 30 MG CAP DR PARTICLES    TAKE 1 CAPSULE BY MOUTH EVERY DAY    DULOXETINE (CYMBALTA) 60 MG CAP DR PARTICLES DELAYED-RELEASE CAPSULE    TAKE 1 CAPSULE EVERY EVENING    FLUTICASONE (FLONASE) 50 MCG/ACT NASAL SPRAY    Spray 2 Sprays in  "nose every day.    FUROSEMIDE (LASIX) 20 MG TAB    Take 1 tablet by mouth every 24 hours as needed.    GLUCOSAMINE SULFATE 500 MG CAP    Take 1,500 mg by mouth every day.    IBUPROFEN (MOTRIN) 600 MG TAB    Take 1 Tab by mouth every 6 hours as needed.    LIDOCAINE (LIDODERM) 5 % PATCH    Apply 1 Patch to skin as directed every 24 hours.    LOSARTAN (COZAAR) 100 MG TAB    TAKE 1 TABLET EVERY DAY    METHOCARBAMOL (ROBAXIN) 750 MG TAB    Take 2 Tabs by mouth 4 times a day as needed (muscle spasm).    MONTELUKAST (SINGULAIR) 10 MG TAB    TAKE 1 TABLET BY MOUTH EVERY DAY    MULTIPLE VITAMIN (MULTIVITAMIN PO)    Take 1 Tab by mouth every day.    MULTIPLE VITAMINS-MINERALS (ENERGY BOOSTER PO)    Take 80 mg by mouth. With caffiene    OMEPRAZOLE (PRILOSEC) 20 MG DELAYED-RELEASE CAPSULE    TAKE 1 CAPSULE BY MOUTH EVERY DAY    ONDANSETRON (ZOFRAN ODT) 4 MG TABLET DISPERSIBLE    Take 1 Tablet by mouth every 8 hours as needed for Nausea/Vomiting.    OXYCODONE-ACETAMINOPHEN (PERCOCET) 5-325 MG TAB    Take 1 Tablet by mouth every 8 hours as needed for Severe Pain (one tab every 8 hr prn for severe pain. may take up to 3 tabs daily) for up to 30 days.    POTASSIUM CHLORIDE ER (KLOR-CON) 10 MEQ TABLET    Take 1 tablet by mouth every 24 hours as needed.    SODIUM CHLORIDE (OCEAN NASAL SPRAY) 0.65 % SOLUTION    Spray 2 Sprays in nose every 2 hours as needed for Congestion.    VITAMIN E 100 UNIT CAPSULE    Take 300 Units by mouth every day.       ALLERGIES  Allergies   Allergen Reactions    Ace Inhibitors      Ace cough    Tape Rash and Itching     Adhesive tape.  PAPER TAPE OK.        PHYSICAL EXAM  /63   Pulse 71   Temp 35.9 °C (96.7 °F) (Temporal)   Resp 16   Ht 1.575 m (5' 2\")   Wt 124 kg (273 lb)   SpO2 92%   BMI 49.93 kg/m²     Nursing note and vitals reviewed.  Constitutional: Well-developed and well-nourished. No distress.  Smells of alcohol.  Does not seem to be significantly intoxicated.  HENT: Head is " normocephalic and atraumatic. Oropharynx is clear and moist without exudate or erythema.   Neck: No midline C-spine tenderness to palpation.  Eyes: Pupils are equal, round, and reactive to light. Conjunctiva are normal.   Cardiovascular: Normal rate and regular rhythm. No murmur heard. Normal radial pulses.  Pulmonary/Chest: Breath sounds normal. No wheezes or rales.   Abdominal: Soft and non-tender. No distention    Musculoskeletal: Extremities exhibit normal range of motion without edema or tenderness.   Neurological: Awake, alert and oriented to person, place, and time. No focal deficits noted.  Skin: Skin is warm and dry. No rash.   Psychiatric: Normal mood and affect. Appropriate for clinical situation    DIAGNOSTIC STUDIES      COURSE & MEDICAL DECISION MAKING     5:07 AM - Patient seen and examined at bedside. Discussed plan of care, including observing her pending clinical sobriety. Patient agrees to the plan of care.     ED Observation Status? No     5:21 AM Patient able to ambulate without difficulty or assistance. Plan for discharge.       INITIAL ASSESSMENT, COURSE AND PLAN  Care Narrative: Patient presents today with mild alcohol intoxication.  She is able to ambulate with a stable unassisted gait.  Patient is discharged in stable condition.      DISPOSITION AND DISCUSSIONS  I have discussed management of the patient with the following physicians and CLAU's:  None    Discussion of management with other [facility] or appropriate source(s): None     Escalation of care considered, and ultimately not performed: blood analysis, diagnostic imaging, and acute inpatient care management, however at this time, the patient is most appropriate for outpatient management.      The patient will return for new or worsening symptoms and is stable at the time of discharge.    The patient is referred to a primary physician for blood pressure management, diabetic screening, and for all other preventative health  concerns.    DISPOSITION:  Patient will be discharged home in stable condition.    FOLLOW UP:  SUHAIL Marina  [address]  [address]  [facility] [address] [phone]    Schedule an appointment as soon as possible for a visit       [facility], Emergency Dept  [address]  [place] [address] [phone]    If symptoms worsen      OUTPATIENT MEDICATIONS:  New Prescriptions    No medications on file       FINAL DIANGOSIS  1. Alcoholic intoxication with complication (HCC)    2. Fall, initial encounter            Luc CARPENTER (Scribe), am scribing for, and in the presence of, Josiah Peres M.D..    Electronically signed by: Luc Wilson (Scribe), 7/18/2023    Josiah CARPENTER M.D. personally performed the services described in this documentation, as scribed by Luc Wilson in my presence, and it is both accurate and complete.    The note accurately reflects work and decisions made by me.  Josiah Peres M.D.  7/18/2023  11:16 AM

## 2023-09-06 ENCOUNTER — APPOINTMENT (OUTPATIENT)
Dept: MEDICAL GROUP | Facility: MEDICAL CENTER | Age: 68
End: 2023-09-06
Payer: MEDICARE

## 2023-09-08 ENCOUNTER — OFFICE VISIT (OUTPATIENT)
Dept: MEDICAL GROUP | Facility: MEDICAL CENTER | Age: 68
End: 2023-09-08
Payer: MEDICARE

## 2023-09-08 VITALS
HEIGHT: 62 IN | WEIGHT: 273 LBS | TEMPERATURE: 97 F | DIASTOLIC BLOOD PRESSURE: 60 MMHG | SYSTOLIC BLOOD PRESSURE: 124 MMHG | OXYGEN SATURATION: 94 % | BODY MASS INDEX: 50.24 KG/M2 | HEART RATE: 79 BPM

## 2023-09-08 DIAGNOSIS — M54.41 CHRONIC BILATERAL LOW BACK PAIN WITH RIGHT-SIDED SCIATICA: ICD-10-CM

## 2023-09-08 DIAGNOSIS — M15.9 PRIMARY OSTEOARTHRITIS INVOLVING MULTIPLE JOINTS: ICD-10-CM

## 2023-09-08 DIAGNOSIS — Z87.19 HISTORY OF INGUINAL HERNIA: ICD-10-CM

## 2023-09-08 DIAGNOSIS — G89.29 CHRONIC RIGHT SHOULDER PAIN: ICD-10-CM

## 2023-09-08 DIAGNOSIS — M25.511 CHRONIC RIGHT SHOULDER PAIN: ICD-10-CM

## 2023-09-08 DIAGNOSIS — K45.8 RECURRENT ABDOMINAL HERNIA WITHOUT OBSTRUCTION OR GANGRENE, UNSPECIFIED HERNIA TYPE: ICD-10-CM

## 2023-09-08 DIAGNOSIS — F11.90 CHRONIC NARCOTIC USE: ICD-10-CM

## 2023-09-08 DIAGNOSIS — G89.29 CHRONIC BILATERAL LOW BACK PAIN WITH RIGHT-SIDED SCIATICA: ICD-10-CM

## 2023-09-08 PROCEDURE — 99214 OFFICE O/P EST MOD 30 MIN: CPT | Performed by: NURSE PRACTITIONER

## 2023-09-08 PROCEDURE — 3078F DIAST BP <80 MM HG: CPT | Performed by: NURSE PRACTITIONER

## 2023-09-08 PROCEDURE — 3074F SYST BP LT 130 MM HG: CPT | Performed by: NURSE PRACTITIONER

## 2023-09-08 RX ORDER — OXYCODONE HYDROCHLORIDE AND ACETAMINOPHEN 5; 325 MG/1; MG/1
1 TABLET ORAL EVERY 8 HOURS PRN
Qty: 90 TABLET | Refills: 0 | Status: SHIPPED | OUTPATIENT
Start: 2023-09-14 | End: 2024-01-02 | Stop reason: SDUPTHER

## 2023-09-08 RX ORDER — OXYCODONE HYDROCHLORIDE AND ACETAMINOPHEN 5; 325 MG/1; MG/1
1 TABLET ORAL EVERY 8 HOURS PRN
Qty: 90 TABLET | Refills: 0 | Status: CANCELLED | OUTPATIENT
Start: 2023-09-08 | End: 2023-10-08

## 2023-09-08 RX ORDER — OXYCODONE HYDROCHLORIDE AND ACETAMINOPHEN 5; 325 MG/1; MG/1
1 TABLET ORAL EVERY 8 HOURS PRN
Qty: 90 TABLET | Refills: 0 | Status: SHIPPED | OUTPATIENT
Start: 2023-10-15 | End: 2023-11-14

## 2023-09-08 RX ORDER — OXYCODONE HYDROCHLORIDE AND ACETAMINOPHEN 5; 325 MG/1; MG/1
1 TABLET ORAL EVERY 8 HOURS PRN
Qty: 90 TABLET | Refills: 0 | Status: SHIPPED | OUTPATIENT
Start: 2023-10-15 | End: 2024-01-02 | Stop reason: SDUPTHER

## 2023-09-08 NOTE — ASSESSMENT & PLAN NOTE
Chronic problem. Taking percocet 5/325 three times daily as needed for back and shoulder pain. Usually takes one in the morning and 2 at night. Requesting refills today. Last dose of pain medication last night. Does report she has been using marijuana tea at night to help her sleep, no other controlled substances, did not know that she was not supposed to use this. Reports she will stop use.

## 2023-09-08 NOTE — PROGRESS NOTES
Subjective:   Rain Sanabria is a 68 y.o. female here today for medication refill    OA (osteoarthritis)  Chronic problem. Taking percocet 5/325 three times daily as needed for back and shoulder pain. Usually takes one in the morning and 2 at night. Requesting refills today. Last dose of pain medication last night. Does report she has been using marijuana tea at night to help her sleep, no other controlled substances, did not know that she was not supposed to use this. Reports she will stop use.        Current medicines (including changes today)  Current Outpatient Medications   Medication Sig Dispense Refill    [START ON 10/15/2023] oxyCODONE-acetaminophen (PERCOCET) 5-325 MG Tab Take 1 Tablet by mouth every 8 hours as needed for Severe Pain (one tab every 8 hr prn for severe pain. may take up to 3 tabs daily) for up to 30 days. 90 Tablet 0    [START ON 10/15/2023] oxyCODONE-acetaminophen (PERCOCET) 5-325 MG Tab Take 1 Tablet by mouth every 8 hours as needed for Severe Pain for up to 30 days. 90 Tablet 0    [START ON 9/14/2023] oxyCODONE-acetaminophen (PERCOCET) 5-325 MG Tab Take 1 Tablet by mouth every 8 hours as needed for Severe Pain (one tab every 8 hr prn for severe pain. may take up to 3 tabs daily) for up to 30 days. 90 Tablet 0    losartan (COZAAR) 100 MG Tab TAKE 1 TABLET EVERY DAY 90 Tablet 0    cyclobenzaprine (FLEXERIL) 10 mg Tab TAKE 1 TABLET BY MOUTH EVERY 8 HOURS AS NEEDED FOR MUSCLE SPASM 90 Tablet 3    montelukast (SINGULAIR) 10 MG Tab TAKE 1 TABLET BY MOUTH EVERY DAY 90 Tablet 3    omeprazole (PRILOSEC) 20 MG delayed-release capsule TAKE 1 CAPSULE BY MOUTH EVERY DAY 90 Capsule 3    doxazosin (CARDURA) 4 MG Tab Take 1.5 Tablets by mouth every day. 180 Tablet 0    buPROPion (WELLBUTRIN) 75 MG Tab TAKE 1 TABLET BY MOUTH TWICE A  Tablet 0    DULoxetine (CYMBALTA) 30 MG Cap DR Particles TAKE 1 CAPSULE BY MOUTH EVERY DAY 90 Capsule 0    celecoxib (CELEBREX) 200 MG Cap TAKE 1 CAPSULE BY MOUTH  TWICE A  Capsule 0    albuterol 108 (90 Base) MCG/ACT Aero Soln inhalation aerosol INHALE 2 PUFFS BY MOUTH EVERY FOUR HOURS AS NEEDED FOR SHORTNESS OF BREATH. DISPENSE A SPACER 6.7 Each 0    ondansetron (ZOFRAN ODT) 4 MG TABLET DISPERSIBLE Take 1 Tablet by mouth every 8 hours as needed for Nausea/Vomiting. 20 Tablet 0    lidocaine (LIDODERM) 5 % Patch Apply 1 Patch to skin as directed every 24 hours. 90 Patch 3    furosemide (LASIX) 20 MG Tab Take 1 tablet by mouth every 24 hours as needed. 30 tablet 3    potassium chloride ER (KLOR-CON) 10 MEQ tablet Take 1 tablet by mouth every 24 hours as needed. 30 tablet 3    DULoxetine (CYMBALTA) 60 MG Cap DR Particles delayed-release capsule TAKE 1 CAPSULE EVERY EVENING 90 Cap 0    fluticasone (FLONASE) 50 MCG/ACT nasal spray Spray 2 Sprays in nose every day. 1 Bottle 0    sodium chloride (OCEAN NASAL SPRAY) 0.65 % Solution Spray 2 Sprays in nose every 2 hours as needed for Congestion. 1 Bottle 0    methocarbamol (ROBAXIN) 750 MG Tab Take 2 Tabs by mouth 4 times a day as needed (muscle spasm). 20 Tab 0    ibuprofen (MOTRIN) 600 MG Tab Take 1 Tab by mouth every 6 hours as needed. 20 Tab 0    ascorbic acid (ASCORBIC ACID) 500 MG Tab Take 3,000 mg by mouth every day.      vitamin E 100 UNIT capsule Take 300 Units by mouth every day.      Multiple Vitamins-Minerals (ENERGY BOOSTER PO) Take 80 mg by mouth. With caffiene      glucosamine Sulfate 500 MG Cap Take 1,500 mg by mouth every day.      Cholecalciferol (VITAMIN D) 400 UNIT Tab Take 1,600 Units by mouth every day.      B Complex Vitamins (VITAMIN B COMPLEX PO) Take 20 mg by mouth every day.      Multiple Vitamin (MULTIVITAMIN PO) Take 1 Tab by mouth every day.       No current facility-administered medications for this visit.     She  has a past medical history of Abdominal hernia, Abnormal mammogram (6/19/2009), Alcohol use (01/26/2018), B12 deficiency (02/05/2020), Chronic right shoulder pain, Cystocele (6/19/2009),  "Dental disorder, Depression with anxiety (01/26/2018), Genital herpes (6/19/2009), Hyperlipidemia, Hypertension (01/26/2018), Insomnia (6/19/2009), Migraine (06/19/2009), Morbid obesity with BMI of 45.0-49.9, adult (HCC) (2/22/2017), OA (osteoarthritis), Sleep apnea, and Vitamin D deficiency.    ROS   No chest pain, no shortness of breath, no abdominal pain  Positive ROS as per HPI.  All other systems reviewed and are negative.     Objective:     /60   Pulse 79   Temp 36.1 °C (97 °F) (Temporal)   Ht 1.575 m (5' 2\")   Wt 124 kg (273 lb)   SpO2 94%  Body mass index is 49.93 kg/m².     Physical Exam:  Constitutional: Alert, no distress.  Skin: Warm, dry, good turgor, no rashes in visible areas.  Eye: Equal, round and reactive, conjunctiva clear, lids normal.  ENMT: Lips without lesions, good dentition, oropharynx clear.  Neck: Trachea midline, no masses, no thyromegaly. No cervical or supraclavicular lymphadenopathy  Respiratory: Unlabored respiratory effort, lungs clear to auscultation, no wheezes, no ronchi.  Cardiovascular: Normal S1, S2, no murmur, no edema.  Abdomen: Soft, non-tender, no masses, no hepatosplenomegaly.  Psych: Alert and oriented x3, normal affect and mood.        Assessment and Plan:   The following treatment plan was discussed    1. Chronic right shoulder pain  Unstable  Continue percocet as needed, refilled x 3 months  Advised NO THC, ETOH, other controlled substance. She denies any benzo use, no active prescriptions for this  UDS done today as her prior UDS was not processed, last available was 2021 which was positive for benzo and ETOH, patient denies use of these at this time.   Contract done today   reviewed and consistent  - Controlled Substance Treatment Agreement  - PAIN MANAGEMENT SCRN, UR; Future  - oxyCODONE-acetaminophen (PERCOCET) 5-325 MG Tab; Take 1 Tablet by mouth every 8 hours as needed for Severe Pain (one tab every 8 hr prn for severe pain. may take up to 3 tabs " daily) for up to 30 days.  Dispense: 90 Tablet; Refill: 0  - oxyCODONE-acetaminophen (PERCOCET) 5-325 MG Tab; Take 1 Tablet by mouth every 8 hours as needed for Severe Pain for up to 30 days.  Dispense: 90 Tablet; Refill: 0  - oxyCODONE-acetaminophen (PERCOCET) 5-325 MG Tab; Take 1 Tablet by mouth every 8 hours as needed for Severe Pain (one tab every 8 hr prn for severe pain. may take up to 3 tabs daily) for up to 30 days.  Dispense: 90 Tablet; Refill: 0    2. Chronic bilateral low back pain with right-sided sciatica  - oxyCODONE-acetaminophen (PERCOCET) 5-325 MG Tab; Take 1 Tablet by mouth every 8 hours as needed for Severe Pain (one tab every 8 hr prn for severe pain. may take up to 3 tabs daily) for up to 30 days.  Dispense: 90 Tablet; Refill: 0  - oxyCODONE-acetaminophen (PERCOCET) 5-325 MG Tab; Take 1 Tablet by mouth every 8 hours as needed for Severe Pain for up to 30 days.  Dispense: 90 Tablet; Refill: 0  - oxyCODONE-acetaminophen (PERCOCET) 5-325 MG Tab; Take 1 Tablet by mouth every 8 hours as needed for Severe Pain (one tab every 8 hr prn for severe pain. may take up to 3 tabs daily) for up to 30 days.  Dispense: 90 Tablet; Refill: 0    3. Primary osteoarthritis involving multiple joints  - oxyCODONE-acetaminophen (PERCOCET) 5-325 MG Tab; Take 1 Tablet by mouth every 8 hours as needed for Severe Pain (one tab every 8 hr prn for severe pain. may take up to 3 tabs daily) for up to 30 days.  Dispense: 90 Tablet; Refill: 0  - oxyCODONE-acetaminophen (PERCOCET) 5-325 MG Tab; Take 1 Tablet by mouth every 8 hours as needed for Severe Pain for up to 30 days.  Dispense: 90 Tablet; Refill: 0  - oxyCODONE-acetaminophen (PERCOCET) 5-325 MG Tab; Take 1 Tablet by mouth every 8 hours as needed for Severe Pain (one tab every 8 hr prn for severe pain. may take up to 3 tabs daily) for up to 30 days.  Dispense: 90 Tablet; Refill: 0    4. Chronic narcotic use  - Controlled Substance Treatment Agreement  - PAIN MANAGEMENT  SCRN, UR; Future    5. Recurrent abdominal hernia without obstruction or gangrene, unspecified hernia type  Unstable  Patient feels that her hernias are worsening, repeat US abdomen and inguinal   - US-HERNIA ABDOMEN; Future  - US-INGUINAL HERNIA; Future    6. History of inguinal hernia  - US-INGUINAL HERNIA; Future      Followup: Return in about 3 months (around 12/8/2023) for Medication Refill with PCP.    The MA is performing the above orders under the direction of Dr. Ralph    Please note that this dictation was created using voice recognition software. I have made every reasonable attempt to correct obvious errors, but I expect that there are errors of grammar and possibly content that I did not discover before finalizing the note.

## 2023-09-08 NOTE — ASSESSMENT & PLAN NOTE
Chronic problem. Taking percocet 5/325 three times daily as needed. Usually takes one in the morning and 2 at night. Requesting refills today. Last dose of pain medication last night. Does report she has been using marijuana tea at night to help her sleep, no other controlled substances

## 2023-09-11 ENCOUNTER — HOSPITAL ENCOUNTER (EMERGENCY)
Facility: MEDICAL CENTER | Age: 68
End: 2023-09-12
Attending: STUDENT IN AN ORGANIZED HEALTH CARE EDUCATION/TRAINING PROGRAM
Payer: MEDICARE

## 2023-09-11 ENCOUNTER — APPOINTMENT (OUTPATIENT)
Dept: RADIOLOGY | Facility: MEDICAL CENTER | Age: 68
End: 2023-09-11
Attending: STUDENT IN AN ORGANIZED HEALTH CARE EDUCATION/TRAINING PROGRAM
Payer: MEDICARE

## 2023-09-11 DIAGNOSIS — R06.02 SHORTNESS OF BREATH: ICD-10-CM

## 2023-09-11 DIAGNOSIS — R20.2 PARESTHESIA: ICD-10-CM

## 2023-09-11 LAB
ALBUMIN SERPL BCP-MCNC: 3.7 G/DL (ref 3.2–4.9)
ALBUMIN/GLOB SERPL: 1.1 G/DL
ALP SERPL-CCNC: 119 U/L (ref 30–99)
ALT SERPL-CCNC: 12 U/L (ref 2–50)
ANION GAP SERPL CALC-SCNC: 12 MMOL/L (ref 7–16)
AST SERPL-CCNC: 17 U/L (ref 12–45)
BASOPHILS # BLD AUTO: 0.6 % (ref 0–1.8)
BASOPHILS # BLD: 0.05 K/UL (ref 0–0.12)
BILIRUB SERPL-MCNC: 0.4 MG/DL (ref 0.1–1.5)
BUN SERPL-MCNC: 17 MG/DL (ref 8–22)
CALCIUM ALBUM COR SERPL-MCNC: 9.3 MG/DL (ref 8.5–10.5)
CALCIUM SERPL-MCNC: 9.1 MG/DL (ref 8.5–10.5)
CHLORIDE SERPL-SCNC: 104 MMOL/L (ref 96–112)
CO2 SERPL-SCNC: 21 MMOL/L (ref 20–33)
CREAT SERPL-MCNC: 0.76 MG/DL (ref 0.5–1.4)
D DIMER PPP IA.FEU-MCNC: 1.31 UG/ML (FEU) (ref 0–0.5)
EKG IMPRESSION: NORMAL
EOSINOPHIL # BLD AUTO: 0.23 K/UL (ref 0–0.51)
EOSINOPHIL NFR BLD: 2.9 % (ref 0–6.9)
ERYTHROCYTE [DISTWIDTH] IN BLOOD BY AUTOMATED COUNT: 52 FL (ref 35.9–50)
FLUAV RNA SPEC QL NAA+PROBE: NEGATIVE
FLUBV RNA SPEC QL NAA+PROBE: NEGATIVE
GFR SERPLBLD CREATININE-BSD FMLA CKD-EPI: 84 ML/MIN/1.73 M 2
GLOBULIN SER CALC-MCNC: 3.5 G/DL (ref 1.9–3.5)
GLUCOSE SERPL-MCNC: 104 MG/DL (ref 65–99)
HCT VFR BLD AUTO: 41.7 % (ref 37–47)
HGB BLD-MCNC: 13.9 G/DL (ref 12–16)
IMM GRANULOCYTES # BLD AUTO: 0.02 K/UL (ref 0–0.11)
IMM GRANULOCYTES NFR BLD AUTO: 0.2 % (ref 0–0.9)
LYMPHOCYTES # BLD AUTO: 2.65 K/UL (ref 1–4.8)
LYMPHOCYTES NFR BLD: 32.9 % (ref 22–41)
MCH RBC QN AUTO: 32.3 PG (ref 27–33)
MCHC RBC AUTO-ENTMCNC: 33.3 G/DL (ref 32.2–35.5)
MCV RBC AUTO: 96.8 FL (ref 81.4–97.8)
MONOCYTES # BLD AUTO: 0.72 K/UL (ref 0–0.85)
MONOCYTES NFR BLD AUTO: 8.9 % (ref 0–13.4)
NEUTROPHILS # BLD AUTO: 4.38 K/UL (ref 1.82–7.42)
NEUTROPHILS NFR BLD: 54.5 % (ref 44–72)
NRBC # BLD AUTO: 0 K/UL
NRBC BLD-RTO: 0 /100 WBC (ref 0–0.2)
NT-PROBNP SERPL IA-MCNC: 268 PG/ML (ref 0–125)
PLATELET # BLD AUTO: 284 K/UL (ref 164–446)
PMV BLD AUTO: 10 FL (ref 9–12.9)
POTASSIUM SERPL-SCNC: 4.4 MMOL/L (ref 3.6–5.5)
PROT SERPL-MCNC: 7.2 G/DL (ref 6–8.2)
RBC # BLD AUTO: 4.31 M/UL (ref 4.2–5.4)
RSV RNA SPEC QL NAA+PROBE: NEGATIVE
SARS-COV-2 RNA RESP QL NAA+PROBE: NOTDETECTED
SODIUM SERPL-SCNC: 137 MMOL/L (ref 135–145)
SPECIMEN SOURCE: NORMAL
WBC # BLD AUTO: 8.1 K/UL (ref 4.8–10.8)

## 2023-09-11 PROCEDURE — 85379 FIBRIN DEGRADATION QUANT: CPT

## 2023-09-11 PROCEDURE — 71275 CT ANGIOGRAPHY CHEST: CPT

## 2023-09-11 PROCEDURE — 80053 COMPREHEN METABOLIC PANEL: CPT

## 2023-09-11 PROCEDURE — 93005 ELECTROCARDIOGRAM TRACING: CPT | Performed by: STUDENT IN AN ORGANIZED HEALTH CARE EDUCATION/TRAINING PROGRAM

## 2023-09-11 PROCEDURE — 83880 ASSAY OF NATRIURETIC PEPTIDE: CPT

## 2023-09-11 PROCEDURE — C9803 HOPD COVID-19 SPEC COLLECT: HCPCS | Performed by: STUDENT IN AN ORGANIZED HEALTH CARE EDUCATION/TRAINING PROGRAM

## 2023-09-11 PROCEDURE — 85025 COMPLETE CBC W/AUTO DIFF WBC: CPT

## 2023-09-11 PROCEDURE — 99285 EMERGENCY DEPT VISIT HI MDM: CPT

## 2023-09-11 PROCEDURE — 0241U HCHG SARS-COV-2 COVID-19 NFCT DS RESP RNA 4 TRGT MIC: CPT

## 2023-09-11 PROCEDURE — 93005 ELECTROCARDIOGRAM TRACING: CPT

## 2023-09-11 PROCEDURE — 71045 X-RAY EXAM CHEST 1 VIEW: CPT

## 2023-09-11 PROCEDURE — A9270 NON-COVERED ITEM OR SERVICE: HCPCS | Performed by: STUDENT IN AN ORGANIZED HEALTH CARE EDUCATION/TRAINING PROGRAM

## 2023-09-11 PROCEDURE — 84484 ASSAY OF TROPONIN QUANT: CPT

## 2023-09-11 PROCEDURE — 36415 COLL VENOUS BLD VENIPUNCTURE: CPT

## 2023-09-11 PROCEDURE — 700117 HCHG RX CONTRAST REV CODE 255: Performed by: STUDENT IN AN ORGANIZED HEALTH CARE EDUCATION/TRAINING PROGRAM

## 2023-09-11 PROCEDURE — 700102 HCHG RX REV CODE 250 W/ 637 OVERRIDE(OP): Performed by: STUDENT IN AN ORGANIZED HEALTH CARE EDUCATION/TRAINING PROGRAM

## 2023-09-11 RX ORDER — DIAZEPAM 2 MG/1
2 TABLET ORAL ONCE
Status: COMPLETED | OUTPATIENT
Start: 2023-09-11 | End: 2023-09-11

## 2023-09-11 RX ADMIN — DIAZEPAM 2 MG: 2 TABLET ORAL at 21:07

## 2023-09-11 RX ADMIN — IOHEXOL 50 ML: 350 INJECTION, SOLUTION INTRAVENOUS at 23:12

## 2023-09-12 VITALS
RESPIRATION RATE: 16 BRPM | HEART RATE: 83 BPM | SYSTOLIC BLOOD PRESSURE: 153 MMHG | DIASTOLIC BLOOD PRESSURE: 79 MMHG | HEIGHT: 62 IN | BODY MASS INDEX: 49.9 KG/M2 | TEMPERATURE: 98.4 F | OXYGEN SATURATION: 94 % | WEIGHT: 271.17 LBS

## 2023-09-12 LAB — TROPONIN T SERPL-MCNC: 10 NG/L (ref 6–19)

## 2023-09-12 PROCEDURE — 700102 HCHG RX REV CODE 250 W/ 637 OVERRIDE(OP): Performed by: STUDENT IN AN ORGANIZED HEALTH CARE EDUCATION/TRAINING PROGRAM

## 2023-09-12 PROCEDURE — A9270 NON-COVERED ITEM OR SERVICE: HCPCS | Performed by: STUDENT IN AN ORGANIZED HEALTH CARE EDUCATION/TRAINING PROGRAM

## 2023-09-12 RX ORDER — OXYCODONE HCL 10 MG/1
10 TABLET, FILM COATED, EXTENDED RELEASE ORAL ONCE
Status: COMPLETED | OUTPATIENT
Start: 2023-09-12 | End: 2023-09-12

## 2023-09-12 RX ORDER — ACETAMINOPHEN 325 MG/1
650 TABLET ORAL ONCE
Status: COMPLETED | OUTPATIENT
Start: 2023-09-12 | End: 2023-09-12

## 2023-09-12 RX ORDER — IBUPROFEN 600 MG/1
600 TABLET ORAL ONCE
Status: COMPLETED | OUTPATIENT
Start: 2023-09-12 | End: 2023-09-12

## 2023-09-12 RX ADMIN — ACETAMINOPHEN 650 MG: 325 TABLET, FILM COATED ORAL at 01:21

## 2023-09-12 RX ADMIN — OXYCODONE HYDROCHLORIDE 10 MG: 10 TABLET, FILM COATED, EXTENDED RELEASE ORAL at 01:21

## 2023-09-12 RX ADMIN — IBUPROFEN 600 MG: 600 TABLET ORAL at 01:21

## 2023-09-12 NOTE — ED TRIAGE NOTES
"Chief Complaint   Patient presents with    Flu Like Symptoms     Increased work of breathing noted. Pt states she has been having shortness of breath for \"a couple of days\"; Pt endorses chest pain during bouts of shortness of breath.  Coughing up mucous  Bilateral arm tingling.         "

## 2023-09-12 NOTE — ED NOTES
US guided IV completed.  Pt complaints of bilateral arm pain and pain across shoudler blades. ERP notified

## 2023-09-12 NOTE — ED NOTES
Discharge paper given to patient; verbalized understanding on d/c instructions;all questions answered; all belongings with patient; left ambulatory; AOX4 [id number]

## 2023-09-12 NOTE — ED PROVIDER NOTES
"ED Provider Note    CHIEF COMPLAINT  Chief Complaint   Patient presents with    Flu Like Symptoms     Increased work of breathing noted. Pt states she has been having shortness of breath for \"a couple of days\"; Pt endorses chest pain during bouts of shortness of breath.  Coughing up mucous  Bilateral arm tingling.         EXTERNAL RECORDS REVIEWED  Other Patient was seen in the ER in July 2023 for alcohol intoxication and ground-level fall.    HPI/ROS  LIMITATION TO HISTORY   Select: : None  OUTSIDE HISTORIAN(S):  None    Rain Sanabria is a 68 y.o. female who presents with bilateral arm tingling.  She says she has had it intermittently since she was a child however it became more pronounced yesterday.  It is not persistent and seems to be intermittent.  It is improved when she moves her arms.  She denies any neck pain.  She does have some tenderness in her shoulders. She is unsure if there were any injuries or abnormal movement.  She denies any associated chest pain (appreciate triage note states intermittent chest pain) but does have shortness of breath.  She says shortness of breath has been intermittent but also worsened yesterday.  It seems to be worse when she exerts herself.  She has had a productive cough but this has been going on for months.  She denies any fevers, chills or URI symptoms.  She denies any urinary retention, bowel incontinence or urinary incontinence.  No perineal numbness.  No weakness to bilateral upper or lower extremities.    Patient denies any recent prolonged periods of immobilization (including hospitalization, long plane flight or car ride), no recent surgery, no recent traumatic injury, hemoptysis, estrogen supplementation or history of malignancy, DVT, PE or smoking.  Denies unilateral leg swelling.       PAST MEDICAL HISTORY   has a past medical history of Abdominal hernia, Abnormal mammogram (6/19/2009), Alcohol use (01/26/2018), B12 deficiency (02/05/2020), Chronic right " "shoulder pain, Cystocele (6/19/2009), Dental disorder, Depression with anxiety (01/26/2018), Genital herpes (6/19/2009), Hyperlipidemia, Hypertension (01/26/2018), Insomnia (6/19/2009), Migraine (06/19/2009), Morbid obesity with BMI of 45.0-49.9, adult (HCC) (2/22/2017), OA (osteoarthritis), Sleep apnea, and Vitamin D deficiency.    SURGICAL HISTORY   has a past surgical history that includes tubal ligation (Bilateral, 1985); gastric bypass laparoscopic (02/2003); hernia repair (2004); toe arthroplasty (Bilateral, 1990'S); rhinoplasty (07/2004); breast biopsy (Left, 1996); hysteroscopy thermal ablation (early 2000's); rotator cuff repair (Right, 11/24/2009); colonoscopy (2015); primary c section (1984); and knee arthroplasty total (Left, 2/12/2018).    FAMILY HISTORY  Family History   Problem Relation Age of Onset    Diabetes Mother     Hypertension Mother     Diabetes Maternal Grandfather     Diabetes Paternal Grandfather     Cancer Paternal Grandfather     Blood Disease Daughter         leukemia-all       SOCIAL HISTORY  Social History     Tobacco Use    Smoking status: Never    Smokeless tobacco: Never   Vaping Use    Vaping Use: Never used   Substance and Sexual Activity    Alcohol use: Yes     Alcohol/week: 4.2 oz     Types: 7 Standard drinks or equivalent per week     Comment: couple times per week    Drug use: No    Sexual activity: Not on file       CURRENT MEDICATIONS  Home Medications    **Home medications have not yet been reviewed for this encounter**         ALLERGIES  Allergies   Allergen Reactions    Ace Inhibitors      Ace cough    Tape Rash and Itching     Adhesive tape.  PAPER TAPE OK.       PHYSICAL EXAM  VITAL SIGNS: BP (!) 153/79   Pulse 83   Temp 36.9 °C (98.4 °F)   Resp 16   Ht 1.575 m (5' 2\")   Wt 123 kg (271 lb 2.7 oz)   SpO2 94%   BMI 49.60 kg/m²    Constitutional: Awake and alert . Non toxic  HENT: Normal inspection    Eyes: Normal inspection  Neck: Grossly normal range of motion. " No midline tenderness  Cardiovascular: Normal heart rate, Normal rhythm.  Symmetric peripheral pulses.   Thorax & Lungs: No respiratory distress, speaking in full sentences, No wheezing, No rales, No rhonchi, No chest tenderness.   Abdomen: Soft, non-distended, nontender to palpation in all 4 quadrants, no mass  Skin: No obvious rash.  Extremities: Warm, well perfused. No clubbing, cyanosis, edema   Neurologic: Grossly normal .  She has 5/5 strength to bilateral upper and lower extremities.  Normal sensation to light touch in upper and lower extremities.  Psychiatric: Normal for situation      DIAGNOSTIC STUDIES / PROCEDURES  EKG  I have independently interpreted this EKG  Results for orders placed or performed during the hospital encounter of 23   EKG   Result Value Ref Range    Report       [facility] Emergency Dept.    Test Date:  2023  Pt Name:    COLLIN MEDRANO                  Department: ER  MRN:        [id number]                      Room:  Gender:     Female                       Technician: [id number]  :        1955                   Requested By:ER TRIAGE PROTOCOL  Order #:    [id number]                    Reading MD: Aleja Ahuja    Measurements  Intervals                                Axis  Rate:       85                           P:          39  ME:         157                          QRS:        38  QRSD:       93                           T:          17  QT:         370  QTc:        440    Interpretive Statements  Sinus rhythm  Compared to ECG 2021 19:25:07  No significant changes  Electronically Signed On 2023 20:17:22 PDT by Aleja Ahuja         LABS  Results for orders placed or performed during the hospital encounter of 23   CBC with Differential   Result Value Ref Range    WBC 8.1 4.8 - 10.8 K/uL    RBC 4.31 4.20 - 5.40 M/uL    Hemoglobin 13.9 12.0 - 16.0 g/dL    Hematocrit 41.7 37.0 - 47.0 %    MCV 96.8 81.4 - 97.8 fL    MCH 32.3 27.0 - 33.0 pg     MCHC 33.3 32.2 - 35.5 g/dL    RDW 52.0 (H) 35.9 - 50.0 fL    Platelet Count 284 164 - 446 K/uL    MPV 10.0 9.0 - 12.9 fL    Neutrophils-Polys 54.50 44.00 - 72.00 %    Lymphocytes 32.90 22.00 - 41.00 %    Monocytes 8.90 0.00 - 13.40 %    Eosinophils 2.90 0.00 - 6.90 %    Basophils 0.60 0.00 - 1.80 %    Immature Granulocytes 0.20 0.00 - 0.90 %    Nucleated RBC 0.00 0.00 - 0.20 /100 WBC    Neutrophils (Absolute) 4.38 1.82 - 7.42 K/uL    Lymphs (Absolute) 2.65 1.00 - 4.80 K/uL    Monos (Absolute) 0.72 0.00 - 0.85 K/uL    Eos (Absolute) 0.23 0.00 - 0.51 K/uL    Baso (Absolute) 0.05 0.00 - 0.12 K/uL    Immature Granulocytes (abs) 0.02 0.00 - 0.11 K/uL    NRBC (Absolute) 0.00 K/uL   Comp Metabolic Panel   Result Value Ref Range    Sodium 137 135 - 145 mmol/L    Potassium 4.4 3.6 - 5.5 mmol/L    Chloride 104 96 - 112 mmol/L    Co2 21 20 - 33 mmol/L    Anion Gap 12.0 7.0 - 16.0    Glucose 104 (H) 65 - 99 mg/dL    Bun 17 8 - 22 mg/dL    Creatinine 0.76 0.50 - 1.40 mg/dL    Calcium 9.1 8.5 - 10.5 mg/dL    Correct Calcium 9.3 8.5 - 10.5 mg/dL    AST(SGOT) 17 12 - 45 U/L    ALT(SGPT) 12 2 - 50 U/L    Alkaline Phosphatase 119 (H) 30 - 99 U/L    Total Bilirubin 0.4 0.1 - 1.5 mg/dL    Albumin 3.7 3.2 - 4.9 g/dL    Total Protein 7.2 6.0 - 8.2 g/dL    Globulin 3.5 1.9 - 3.5 g/dL    A-G Ratio 1.1 g/dL   CoV-2, FLU A/B, and RSV by PCR (2-4 Hours CEPHEID) : Collect NP swab in VTM    Specimen: Nasopharyngeal; Respirate   Result Value Ref Range    Influenza virus A RNA Negative Negative    Influenza virus B, PCR Negative Negative    RSV, PCR Negative Negative    SARS-CoV-2 by PCR NotDetected     SARS-CoV-2 Source NP Swab    D-DIMER   Result Value Ref Range    D-Dimer 1.31 (H) 0.00 - 0.50 ug/mL (FEU)   proBrain Natriuretic Peptide, NT   Result Value Ref Range    NT-proBNP 268 (H) 0 - 125 pg/mL   ESTIMATED GFR   Result Value Ref Range    GFR (CKD-EPI) 84 >60 mL/min/1.73 m 2   TROPONIN   Result Value Ref Range    Troponin T 10 6 - 19 ng/L    EKG   Result Value Ref Range    Report       [facility] Emergency Dept.    Test Date:  2023  Pt Name:    COLLIN MEDRANO                  Department: ER  MRN:        [id number]                      Room:  Gender:     Female                       Technician: [id number]  :        1955                   Requested By:ER TRIAGE PROTOCOL  Order #:    [id number]                    Reading MD: Aleja Ahuja    Measurements  Intervals                                Axis  Rate:       85                           P:          39  AR:         157                          QRS:        38  QRSD:       93                           T:          17  QT:         370  QTc:        440    Interpretive Statements  Sinus rhythm  Compared to ECG 2021 19:25:07  No significant changes  Electronically Signed On 2023 20:17:22 PDT by Aleja Ahuja             RADIOLOGY  I have independently interpreted the diagnostic imaging associated with this visit and am waiting the final reading from the radiologist.   My preliminary interpretation is as follows: No significant edema or infiltrate on XR  Radiologist interpretation:   CT-CTA CHEST PULMONARY ARTERY W/ RECONS   Final Result         1.  No pulmonary embolus appreciated.   2.  Scattered hazy bilateral pulmonary edema or infiltrates.   3.  Atherosclerosis and atherosclerotic coronary artery disease.      DX-CHEST-PORTABLE (1 VIEW)   Final Result         1.  No acute cardiopulmonary disease.            COURSE & MEDICAL DECISION MAKING    ED Observation Status? Yes; I am placing the patient in to an observation status due to a diagnostic uncertainty as well as therapeutic intensity. Patient placed in observation status at 8:27 PM, 2023.     Observation plan is as follows: IV, Labs, Serial Exams    Upon Reevaluation, the patient's condition has: Improved; and will be discharged.    Patient discharged from ED Observation status at 12:06 AM 2023    INITIAL  ASSESSMENT, COURSE AND PLAN  Care Narrative: This is a 68-year-old female who presents with shortness of breath.  She arrives with normal vital signs notably is not hypoxic has no signs of respiratory distress or tachypnea.  Her lungs are clear to auscultation.  Labs notable for no significant electrolyte derangement, no leukocytosis.  COVID influenza and RSV are negative.  Her BNP is mildly elevated but clinically she does not appear significantly volume overloaded.  EKG is nonischemic, troponin normal and she has no chest pain and would be very atypical for ACS.  X-ray without focal infiltrate or pulmonary edema.  D-dimer was elevated and fortunately CTA without any evidence of pulmonary embolism.  She does have scattered, hazy pulmonary edema vs infiltrates.  I have very low suspicion for pneumonia her cough has been ongoing for weeks and she is not having any fever or hypoxemia and I doubt an acute infection.  Patient also states that she does have a history of pulmonary edema and this has been an ongoing issue.  She is not hypoxic does not appear to be decompensated and I think that this can be continued to be further worked up and treated as an outpatient.  She does follow with cardiology and I recommend that she call her cardiologist to arrange close follow-up.     # Bilateral Arm Tingling   This seems to be a chronic issue which was sent yesterday.  Patient denies any neck pain.  She does not have any bladder or bowel dysfunction or numbness to suggest spinal cord compression or spinal cord injury.  I do not suspect major vascular or thoracic injury including dissection as no high mechanism and injury or complaints of neck pain.  She is neurologically intact with full strength and sensation and I do not see an indication to obtain advanced imaging.  She does have tenderness to palpation over the trapezius bilaterally, possibly related to spasms.  She was given Valium in the ER with some improvement.  I again  recommended continued management and follow-up for this with her PCP for consideration of advanced imaging if she continues to have persistent symptoms.  Of note, I did review her medication list and she is already on a muscle relaxant.  I recommend that she continued with her medications as prescribed.    All her questions were answered and she was discharged home in good condition.  She understands the need for follow-up and will return to the ER sooner if worsening.      HTN/IDDM FOLLOW UP:  The patient has known hypertension and is being followed by their primary care doctor        DISPOSITION AND DISCUSSIONS  I have discussed management of the patient with the following physicians and CLAU's:  None    Discussion of management with other QHP or appropriate source(s): None     Escalation of care considered, and ultimately not performed:diagnostic imaging    Barriers to care at this time, including but not limited to:  None .     Decision tools and prescription drugs considered including, but not limited to: HEART Score 3 .    FINAL DIAGNOSIS  1. Shortness of breath Acute   2. Paresthesia Acute          Electronically signed by: Aleja Ahuja M.D., 9/11/2023 8:16 PM

## 2023-09-12 NOTE — DISCHARGE INSTRUCTIONS
Return to the Emergency Department immediately should you become worse in any way (i.e. difficulty breathing, new or recurrent or worsening pain, new arm, jaw or back pain, sweatiness, nausea/ vomiting, development of back pain) or if you have any new or acute concerns.      Please also return if you develop neck pain, urinary retention, numbness in groin and loss of bladder or bowel function

## 2023-12-07 ENCOUNTER — OFFICE VISIT (OUTPATIENT)
Dept: MEDICAL GROUP | Facility: MEDICAL CENTER | Age: 68
End: 2023-12-07
Payer: MEDICARE

## 2023-12-07 VITALS
OXYGEN SATURATION: 94 % | DIASTOLIC BLOOD PRESSURE: 76 MMHG | HEIGHT: 62 IN | BODY MASS INDEX: 50.42 KG/M2 | TEMPERATURE: 97.4 F | SYSTOLIC BLOOD PRESSURE: 136 MMHG | HEART RATE: 85 BPM | WEIGHT: 274 LBS

## 2023-12-07 DIAGNOSIS — M15.9 PRIMARY OSTEOARTHRITIS INVOLVING MULTIPLE JOINTS: ICD-10-CM

## 2023-12-07 DIAGNOSIS — J32.4 CHRONIC PANSINUSITIS: ICD-10-CM

## 2023-12-07 DIAGNOSIS — K63.5 POLYP OF COLON, UNSPECIFIED PART OF COLON, UNSPECIFIED TYPE: ICD-10-CM

## 2023-12-07 DIAGNOSIS — G89.29 CHRONIC BILATERAL LOW BACK PAIN WITH RIGHT-SIDED SCIATICA: ICD-10-CM

## 2023-12-07 DIAGNOSIS — M54.41 CHRONIC BILATERAL LOW BACK PAIN WITH RIGHT-SIDED SCIATICA: ICD-10-CM

## 2023-12-07 PROCEDURE — 99214 OFFICE O/P EST MOD 30 MIN: CPT | Performed by: NURSE PRACTITIONER

## 2023-12-07 PROCEDURE — 3078F DIAST BP <80 MM HG: CPT | Performed by: NURSE PRACTITIONER

## 2023-12-07 PROCEDURE — 3075F SYST BP GE 130 - 139MM HG: CPT | Performed by: NURSE PRACTITIONER

## 2023-12-07 RX ORDER — AZITHROMYCIN 250 MG/1
TABLET, FILM COATED ORAL
Qty: 6 TABLET | Refills: 0 | Status: SHIPPED | OUTPATIENT
Start: 2023-12-07 | End: 2023-12-12

## 2023-12-07 RX ORDER — OXYCODONE HYDROCHLORIDE AND ACETAMINOPHEN 5; 325 MG/1; MG/1
1 TABLET ORAL EVERY 8 HOURS PRN
Qty: 90 TABLET | Refills: 0 | Status: SHIPPED | OUTPATIENT
Start: 2023-12-07 | End: 2024-01-02 | Stop reason: SDUPTHER

## 2023-12-07 ASSESSMENT — FIBROSIS 4 INDEX: FIB4 SCORE: 1.19

## 2023-12-07 NOTE — PROGRESS NOTES
Subjective:     Rain Sanabria is a 68 y.o. female who presents with chronic jpain.    HPI:   Seen in f/u for chronic pain.   She is due updated pain med refill.  She has been out of pain med x 2 mo.  Her pain med refill was stopped d/t + UDS.  She was accidently getting THC in tea that her son was giving her.  He thought she needed it and didn't realize that her pain med would be stopped if she tested +.  She is now off THC except son accidently again gave her the wrong tea this week.    She has been taking otc med to control her sleep.  She cannot take ambien d/t doing things that she doesn't remember.  She has chronic knee rt joint and back pain.  She has known DDD lumbar.  She just got steroid shot in rt knee yesterday.    She did her colonoscopy on 11/30 /23.  She had 2 lg polyps.  Bx pending.    She saw cardiac for procedure clearance and she was cleared.   She is due flu shot  She is having lots more green nasal secretion. More clear secretions from drooling in mouth.  Taking singulair but not on antihistamine.  Sx started 2 mo ago.  No headache. ; she will get up in am and cough up clear secretions.  Then during the day she will have green nasal secretions.  No fever,c hills or sweating.      Patient Active Problem List    Diagnosis Date Noted    Chronic bilateral low back pain with right-sided sciatica 09/08/2023    Chronic narcotic use 09/08/2023    Morbid obesity with BMI of 50.0-59.9, adult (HCC) 03/22/2021    B12 deficiency 02/05/2020    Dental disorder     Alcohol use 01/26/2018    Chronic right shoulder pain 01/06/2016    Hyperlipidemia     Depression with anxiety     Vitamin D deficiency     Abdominal hernia     Sleep apnea 07/12/2010    OA (osteoarthritis) 07/12/2010    HTN (hypertension) 01/20/2010    Genital herpes simplex 06/19/2009    Insomnia 06/19/2009    Migraine 06/19/2009    Abnormal mammogram 06/19/2009    Cystocele 06/19/2009       Current medicines (including changes  today)  Current Outpatient Medications   Medication Sig Dispense Refill    oxyCODONE-acetaminophen (PERCOCET) 5-325 MG Tab Take 1 Tablet by mouth every 8 hours as needed for Severe Pain (one tab every 8 hr prn for severe pain. may take up to 3 tabs daily) for up to 30 days. 90 Tablet 0    azithromycin (ZITHROMAX) 250 MG Tab 2 tabs by mouth day 1, 1 tab by mouth days 2-5 6 Tablet 0    celecoxib (CELEBREX) 200 MG Cap TAKE 1 CAPSULE BY MOUTH TWICE A  Capsule 0    cyclobenzaprine (FLEXERIL) 10 mg Tab TAKE 1 TABLET BY MOUTH EVERY 8 HOURS AS NEEDED FOR MUSCLE SPASM 90 Tablet 3    montelukast (SINGULAIR) 10 MG Tab TAKE 1 TABLET BY MOUTH EVERY DAY 90 Tablet 3    omeprazole (PRILOSEC) 20 MG delayed-release capsule TAKE 1 CAPSULE BY MOUTH EVERY DAY 90 Capsule 3    losartan (COZAAR) 100 MG Tab TAKE 1 TABLET EVERY DAY 90 Tablet 0    buPROPion (WELLBUTRIN) 75 MG Tab TAKE 1 TABLET BY MOUTH TWICE A  Tablet 0    DULoxetine (CYMBALTA) 30 MG Cap DR Particles TAKE 1 CAPSULE BY MOUTH EVERY DAY 90 Capsule 0    albuterol 108 (90 Base) MCG/ACT Aero Soln inhalation aerosol INHALE 2 PUFFS BY MOUTH EVERY FOUR HOURS AS NEEDED FOR SHORTNESS OF BREATH. DISPENSE A SPACER 6.7 Each 0    furosemide (LASIX) 20 MG Tab Take 1 tablet by mouth every 24 hours as needed. 30 tablet 3    DULoxetine (CYMBALTA) 60 MG Cap DR Particles delayed-release capsule TAKE 1 CAPSULE EVERY EVENING 90 Cap 0    fluticasone (FLONASE) 50 MCG/ACT nasal spray Spray 2 Sprays in nose every day. 1 Bottle 0    sodium chloride (OCEAN NASAL SPRAY) 0.65 % Solution Spray 2 Sprays in nose every 2 hours as needed for Congestion. 1 Bottle 0    methocarbamol (ROBAXIN) 750 MG Tab Take 2 Tabs by mouth 4 times a day as needed (muscle spasm). 20 Tab 0    ascorbic acid (ASCORBIC ACID) 500 MG Tab Take 3,000 mg by mouth every day.      vitamin E 100 UNIT capsule Take 300 Units by mouth every day.      Cholecalciferol (VITAMIN D) 400 UNIT Tab Take 1,600 Units by mouth every day.    "   B Complex Vitamins (VITAMIN B COMPLEX PO) Take 20 mg by mouth every day.      doxazosin (CARDURA) 4 MG Tab TAKE 1 AND 1/2 TABLETS DAILY BY MOUTH (Patient not taking: Reported on 12/7/2023) 150 Tablet 0    potassium chloride ER (KLOR-CON) 10 MEQ tablet Take 1 tablet by mouth every 24 hours as needed. 30 tablet 3     No current facility-administered medications for this visit.       Allergies   Allergen Reactions    Ace Inhibitors      Ace cough    Tape Rash and Itching     Adhesive tape.  PAPER TAPE OK.       ROS  Constitutional: Negative. Negative for fever, chills, weight loss, malaise/fatigue and diaphoresis.   HENT: Negative. Negative for hearing loss, ear pain, nosebleeds, congestion, sore throat, neck pain, tinnitus and ear discharge.   Respiratory: Negative. Negative for cough, hemoptysis, sputum production, shortness of breath, wheezing and stridor.   Cardiovascular: Negative. Negative for chest pain, palpitations, orthopnea, claudication, leg swelling and PND.   Gastrointestinal: Denies nausea, vomiting, diarrhea, constipation, heartburn, melena or hematochezia.  Genitourinary: Denies dysuria, hematuria, urinary incontinence, frequency or urgency.        Objective:     /76 (BP Location: Right arm, Patient Position: Sitting, BP Cuff Size: Adult)   Pulse 85   Temp 36.3 °C (97.4 °F) (Temporal)   Ht 1.575 m (5' 2\")   Wt 124 kg (274 lb)   SpO2 94%  Body mass index is 50.12 kg/m².    Physical Exam:  Vitals reviewed.  Constitutional: Oriented to person, place, and time. appears well-developed and well-nourished. No distress.   Cardiovascular: Normal rate, regular rhythm, normal heart sounds and intact distal pulses. Exam reveals no gallop and no friction rub. 2/6 murmur heard. No carotid bruits.   Pulmonary/Chest: Effort normal and breath sounds normal. No stridor. No respiratory distress. no wheezes or rales. exhibits no tenderness.   Musculoskeletal: Normal range of motion. exhibits no edema. davin " pedal pulses 2+.  Lymphadenopathy: No cervical or supraclavicular adenopathy.   Neurological: Alert and oriented to person, place, and time. exhibits normal muscle tone.  Skin: Skin is warm and dry. No diaphoresis.   Psychiatric: Normal mood and affect. Behavior is normal.      Assessment and Plan:     The following treatment plan was discussed:    1. Chronic pansinusitis  azithromycin (ZITHROMAX) 250 MG Tab    recurrent sinusitis. continue singulair, add zpak and zyrtec. f/u if sx continue.      2. Chronic bilateral low back pain with right-sided sciatica  oxyCODONE-acetaminophen (PERCOCET) 5-325 MG Tab    refilled percocex 1 mo.  f/u 1 mo for updated UDS & contract.      3. Primary osteoarthritis involving multiple joints  oxyCODONE-acetaminophen (PERCOCET) 5-325 MG Tab    refill percocet x 1 mo.  will plan on refill for 3 mo at next f/u with normal UDS      4. Polyp of colon, unspecified part of colon, unspecified type      awaiting bx results. f/u with GI as determined by polyp bx.            Followup: Return in about 4 weeks (around 1/4/2024), or for updated UDS and med refill.

## 2023-12-10 DIAGNOSIS — F41.8 DEPRESSION WITH ANXIETY: ICD-10-CM

## 2023-12-11 RX ORDER — DULOXETIN HYDROCHLORIDE 30 MG/1
30 CAPSULE, DELAYED RELEASE ORAL DAILY
Qty: 100 CAPSULE | Refills: 3 | Status: SHIPPED | OUTPATIENT
Start: 2023-12-11

## 2023-12-11 NOTE — TELEPHONE ENCOUNTER
Received request via: Pharmacy    Was the patient seen in the last year in this department? Yes    Does the patient have an active prescription (recently filled or refills available) for medication(s) requested? yes    Does the patient have [facility] and need 100 day supply (blood pressure, diabetes and cholesterol meds only)? Patient does not have SCP  Requested Prescriptions     Pending Prescriptions Disp Refills    DULoxetine (CYMBALTA) 30 MG Cap DR Particles [Pharmacy Med Name: DULOXETINE HCL DR 30 MG CAP] 90 Capsule 0     Sig: TAKE 1 CAPSULE BY MOUTH EVERY DAY

## 2023-12-17 DIAGNOSIS — F41.8 DEPRESSION WITH ANXIETY: ICD-10-CM

## 2023-12-18 NOTE — TELEPHONE ENCOUNTER
Received request via: Pharmacy    Was the patient seen in the last year in this department? Yes    Does the patient have an active prescription (recently filled or refills available) for medication(s) requested? yes    Does the patient have [facility] and need 100 day supply (blood pressure, diabetes and cholesterol meds only)? Patient does not have SCP    Requested Prescriptions     Pending Prescriptions Disp Refills    buPROPion (WELLBUTRIN) 75 MG Tab [Pharmacy Med Name: BUPROPION HCL 75 MG TABLET] 180 Tablet 0     Sig: TAKE 1 TABLET BY MOUTH TWICE A DAY

## 2023-12-23 RX ORDER — BUPROPION HYDROCHLORIDE 75 MG/1
TABLET ORAL
Qty: 180 TABLET | Refills: 0 | Status: SHIPPED | OUTPATIENT
Start: 2023-12-23

## 2023-12-27 DIAGNOSIS — M54.41 CHRONIC BILATERAL LOW BACK PAIN WITH RIGHT-SIDED SCIATICA: ICD-10-CM

## 2023-12-27 DIAGNOSIS — G89.29 CHRONIC BILATERAL LOW BACK PAIN WITH RIGHT-SIDED SCIATICA: ICD-10-CM

## 2023-12-27 DIAGNOSIS — M15.9 PRIMARY OSTEOARTHRITIS INVOLVING MULTIPLE JOINTS: ICD-10-CM

## 2023-12-27 RX ORDER — CELECOXIB 200 MG/1
CAPSULE ORAL
Qty: 200 CAPSULE | Refills: 0 | Status: SHIPPED | OUTPATIENT
Start: 2023-12-27

## 2023-12-27 NOTE — TELEPHONE ENCOUNTER
Received request via: Pharmacy    Was the patient seen in the last year in this department? Yes    Does the patient have an active prescription (recently filled or refills available) for medication(s) requested? yes    Does the patient have [facility] and need 100 day supply (blood pressure, diabetes and cholesterol meds only)? Patient does not have SCP    Requested Prescriptions     Pending Prescriptions Disp Refills    celecoxib (CELEBREX) 200 MG Cap [Pharmacy Med Name: CELECOXIB 200 MG CAPSULE] 200 Capsule 0     Sig: TAKE 1 CAPSULE BY MOUTH TWICE A DAY

## 2023-12-28 ENCOUNTER — NON-PROVIDER VISIT (OUTPATIENT)
Dept: MEDICAL GROUP | Facility: MEDICAL CENTER | Age: 68
End: 2023-12-28
Payer: MEDICARE

## 2023-12-28 DIAGNOSIS — G89.29 CHRONIC BILATERAL LOW BACK PAIN WITH RIGHT-SIDED SCIATICA: ICD-10-CM

## 2023-12-28 DIAGNOSIS — M54.41 CHRONIC BILATERAL LOW BACK PAIN WITH RIGHT-SIDED SCIATICA: ICD-10-CM

## 2023-12-28 PROCEDURE — 99999 PR NO CHARGE: CPT | Performed by: NURSE PRACTITIONER

## 2024-01-02 ENCOUNTER — OFFICE VISIT (OUTPATIENT)
Dept: MEDICAL GROUP | Facility: MEDICAL CENTER | Age: 69
End: 2024-01-02
Payer: MEDICARE

## 2024-01-02 VITALS
TEMPERATURE: 97.6 F | OXYGEN SATURATION: 96 % | WEIGHT: 272.13 LBS | HEIGHT: 62 IN | DIASTOLIC BLOOD PRESSURE: 70 MMHG | SYSTOLIC BLOOD PRESSURE: 120 MMHG | BODY MASS INDEX: 50.08 KG/M2 | HEART RATE: 75 BPM

## 2024-01-02 DIAGNOSIS — M54.41 CHRONIC BILATERAL LOW BACK PAIN WITH RIGHT-SIDED SCIATICA: ICD-10-CM

## 2024-01-02 DIAGNOSIS — Z23 NEED FOR INFLUENZA VACCINATION: ICD-10-CM

## 2024-01-02 DIAGNOSIS — G89.29 CHRONIC BILATERAL LOW BACK PAIN WITH RIGHT-SIDED SCIATICA: ICD-10-CM

## 2024-01-02 DIAGNOSIS — M15.9 PRIMARY OSTEOARTHRITIS INVOLVING MULTIPLE JOINTS: ICD-10-CM

## 2024-01-02 DIAGNOSIS — Z12.31 ENCOUNTER FOR SCREENING MAMMOGRAM FOR MALIGNANT NEOPLASM OF BREAST: ICD-10-CM

## 2024-01-02 PROCEDURE — 3078F DIAST BP <80 MM HG: CPT | Performed by: NURSE PRACTITIONER

## 2024-01-02 PROCEDURE — G0008 ADMIN INFLUENZA VIRUS VAC: HCPCS | Performed by: NURSE PRACTITIONER

## 2024-01-02 PROCEDURE — 90662 IIV NO PRSV INCREASED AG IM: CPT | Performed by: NURSE PRACTITIONER

## 2024-01-02 PROCEDURE — 99214 OFFICE O/P EST MOD 30 MIN: CPT | Mod: 25 | Performed by: NURSE PRACTITIONER

## 2024-01-02 PROCEDURE — 3074F SYST BP LT 130 MM HG: CPT | Performed by: NURSE PRACTITIONER

## 2024-01-02 RX ORDER — OXYCODONE HYDROCHLORIDE AND ACETAMINOPHEN 5; 325 MG/1; MG/1
1 TABLET ORAL EVERY 8 HOURS PRN
Qty: 90 TABLET | Refills: 0 | Status: SHIPPED | OUTPATIENT
Start: 2024-01-06 | End: 2024-02-05

## 2024-01-02 RX ORDER — OXYCODONE HYDROCHLORIDE AND ACETAMINOPHEN 5; 325 MG/1; MG/1
1 TABLET ORAL EVERY 8 HOURS PRN
Qty: 90 TABLET | Refills: 0 | Status: SHIPPED | OUTPATIENT
Start: 2024-03-06 | End: 2024-04-05

## 2024-01-02 RX ORDER — OXYCODONE HYDROCHLORIDE AND ACETAMINOPHEN 5; 325 MG/1; MG/1
1 TABLET ORAL EVERY 8 HOURS PRN
Qty: 90 TABLET | Refills: 0 | Status: SHIPPED | OUTPATIENT
Start: 2024-02-05 | End: 2024-03-06

## 2024-01-02 ASSESSMENT — PATIENT HEALTH QUESTIONNAIRE - PHQ9
SUM OF ALL RESPONSES TO PHQ QUESTIONS 1-9: 6
CLINICAL INTERPRETATION OF PHQ2 SCORE: 4
5. POOR APPETITE OR OVEREATING: 0 - NOT AT ALL

## 2024-01-02 ASSESSMENT — FIBROSIS 4 INDEX: FIB4 SCORE: 1.19

## 2024-01-02 NOTE — PROGRESS NOTES
Subjective:     Rain Sanabria is a 68 y.o. female who presents with chronic LBP.    HPI:   Seen in f/u for chronic LBP.    She has hx of chronic LBP with rt sciatica.  She is due pain med refill.  Taking med approp.  UDS and contract just updated.  Stable with mostly good pain control.  She also has chronic rt knee pain that is worse d/t the cold weather.    She is due flu shot.  Also due mammo in 2/24.      Patient Active Problem List    Diagnosis Date Noted    Chronic bilateral low back pain with right-sided sciatica 09/08/2023    Chronic narcotic use 09/08/2023    Morbid obesity with BMI of 50.0-59.9, adult (HCC) 03/22/2021    B12 deficiency 02/05/2020    Dental disorder     Alcohol use 01/26/2018    Chronic right shoulder pain 01/06/2016    Hyperlipidemia     Depression with anxiety     Vitamin D deficiency     Abdominal hernia     Sleep apnea 07/12/2010    OA (osteoarthritis) 07/12/2010    HTN (hypertension) 01/20/2010    Genital herpes simplex 06/19/2009    Insomnia 06/19/2009    Migraine 06/19/2009    Abnormal mammogram 06/19/2009    Cystocele 06/19/2009       Current medicines (including changes today)  Current Outpatient Medications   Medication Sig Dispense Refill    oxyCODONE-acetaminophen (PERCOCET) 5-325 MG Tab Take 1 Tablet by mouth every 8 hours as needed for Severe Pain (one tab every 8 hr prn for severe pain. may take up to 3 tabs daily) for up to 30 days. 90 Tablet 0    [START ON 2/5/2024] oxyCODONE-acetaminophen (PERCOCET) 5-325 MG Tab Take 1 Tablet by mouth every 8 hours as needed for Severe Pain (one tab every 8 hr prn for severe pain. may take up to 3 tabs daily) for up to 30 days. 90 Tablet 0    [START ON 3/6/2024] oxyCODONE-acetaminophen (PERCOCET) 5-325 MG Tab Take 1 Tablet by mouth every 8 hours as needed for Severe Pain for up to 30 days. 90 Tablet 0    celecoxib (CELEBREX) 200 MG Cap TAKE 1 CAPSULE BY MOUTH TWICE A  Capsule 0    buPROPion (WELLBUTRIN) 75 MG Tab TAKE 1 TABLET  BY MOUTH TWICE A  Tablet 0    DULoxetine (CYMBALTA) 30 MG Cap DR Particles TAKE 1 CAPSULE BY MOUTH EVERY  Capsule 3    doxazosin (CARDURA) 4 MG Tab TAKE 1 AND 1/2 TABLETS DAILY BY MOUTH (Patient not taking: Reported on 12/7/2023) 150 Tablet 0    cyclobenzaprine (FLEXERIL) 10 mg Tab TAKE 1 TABLET BY MOUTH EVERY 8 HOURS AS NEEDED FOR MUSCLE SPASM 90 Tablet 3    montelukast (SINGULAIR) 10 MG Tab TAKE 1 TABLET BY MOUTH EVERY DAY 90 Tablet 3    omeprazole (PRILOSEC) 20 MG delayed-release capsule TAKE 1 CAPSULE BY MOUTH EVERY DAY 90 Capsule 3    losartan (COZAAR) 100 MG Tab TAKE 1 TABLET EVERY DAY 90 Tablet 0    albuterol 108 (90 Base) MCG/ACT Aero Soln inhalation aerosol INHALE 2 PUFFS BY MOUTH EVERY FOUR HOURS AS NEEDED FOR SHORTNESS OF BREATH. DISPENSE A SPACER 6.7 Each 0    furosemide (LASIX) 20 MG Tab Take 1 tablet by mouth every 24 hours as needed. 30 tablet 3    potassium chloride ER (KLOR-CON) 10 MEQ tablet Take 1 tablet by mouth every 24 hours as needed. 30 tablet 3    DULoxetine (CYMBALTA) 60 MG Cap DR Particles delayed-release capsule TAKE 1 CAPSULE EVERY EVENING 90 Cap 0    fluticasone (FLONASE) 50 MCG/ACT nasal spray Spray 2 Sprays in nose every day. 1 Bottle 0    sodium chloride (OCEAN NASAL SPRAY) 0.65 % Solution Spray 2 Sprays in nose every 2 hours as needed for Congestion. 1 Bottle 0    methocarbamol (ROBAXIN) 750 MG Tab Take 2 Tabs by mouth 4 times a day as needed (muscle spasm). 20 Tab 0    ascorbic acid (ASCORBIC ACID) 500 MG Tab Take 3,000 mg by mouth every day.      vitamin E 100 UNIT capsule Take 300 Units by mouth every day.      Cholecalciferol (VITAMIN D) 400 UNIT Tab Take 1,600 Units by mouth every day.      B Complex Vitamins (VITAMIN B COMPLEX PO) Take 20 mg by mouth every day.       No current facility-administered medications for this visit.       Allergies   Allergen Reactions    Ace Inhibitors      Ace cough    Tape Rash and Itching     Adhesive tape.  PAPER TAPE OK.  "      ROS  Constitutional: Negative. Negative for fever, chills, weight loss, malaise/fatigue and diaphoresis.   HENT: Negative. Negative for hearing loss, ear pain, nosebleeds, congestion, sore throat, neck pain, tinnitus and ear discharge.   Respiratory: Negative. Negative for cough, hemoptysis, sputum production, shortness of breath, wheezing and stridor.   Cardiovascular: Negative. Negative for chest pain, palpitations, orthopnea, claudication, leg swelling and PND.   Gastrointestinal: Denies nausea, vomiting, diarrhea, constipation, heartburn, melena or hematochezia.  Genitourinary: Denies dysuria, hematuria, urinary incontinence, frequency or urgency.        Objective:     /70 (BP Location: Right arm, Patient Position: Sitting, BP Cuff Size: Large adult)   Pulse 75   Temp 36.4 °C (97.6 °F) (Temporal)   Ht 1.57 m (5' 1.81\")   Wt 123 kg (272 lb 2 oz)   SpO2 96%  Body mass index is 50.08 kg/m².    Physical Exam:  Vitals reviewed.  Constitutional: Oriented to person, place, and time. appears well-developed and well-nourished. No distress.   Cardiovascular: Normal rate, regular rhythm, normal heart sounds and intact distal pulses. Exam reveals no gallop and no friction rub. No murmur heard. No carotid bruits.   Pulmonary/Chest: Effort normal and breath sounds normal. No stridor. No respiratory distress. no wheezes or rales. exhibits no tenderness.   Musculoskeletal: Normal range of motion. exhibits no edema. davin pedal pulses 2+.  Lymphadenopathy: No cervical or supraclavicular adenopathy.   Neurological: Alert and oriented to person, place, and time. exhibits normal muscle tone.  Skin: Skin is warm and dry. No diaphoresis.   Psychiatric: Normal mood and affect. Behavior is normal.      Assessment and Plan:     The following treatment plan was discussed:    1. Chronic bilateral low back pain with right-sided sciatica  oxyCODONE-acetaminophen (PERCOCET) 5-325 MG Tab    oxyCODONE-acetaminophen (PERCOCET) " 5-325 MG Tab    oxyCODONE-acetaminophen (PERCOCET) 5-325 MG Tab    refilled percocex 3 mo.  f/u 3 mo for med refill.  UDS & contract are up to date      2. Primary osteoarthritis involving multiple joints  oxyCODONE-acetaminophen (PERCOCET) 5-325 MG Tab    oxyCODONE-acetaminophen (PERCOCET) 5-325 MG Tab    oxyCODONE-acetaminophen (PERCOCET) 5-325 MG Tab    refill percocet x 3 mo.  will plan on refill for 3 mo at next f/u with normal UDS      3. Encounter for screening mammogram for malignant neoplasm of breast  MA-SCREENING MAMMO BILAT W/CAD      4. Need for influenza vaccination  INFLUENZA VACCINE, HIGH DOSE (65+ ONLY)    flu shot given today            Followup: Return in about 3 months (around 4/2/2024), or for med refill.

## 2024-01-03 DIAGNOSIS — M15.9 PRIMARY OSTEOARTHRITIS INVOLVING MULTIPLE JOINTS: ICD-10-CM

## 2024-01-03 DIAGNOSIS — M54.41 CHRONIC BILATERAL LOW BACK PAIN WITH RIGHT-SIDED SCIATICA: ICD-10-CM

## 2024-01-03 DIAGNOSIS — G89.29 CHRONIC BILATERAL LOW BACK PAIN WITH RIGHT-SIDED SCIATICA: ICD-10-CM

## 2024-01-03 NOTE — TELEPHONE ENCOUNTER
Received request via: Pharmacy    Was the patient seen in the last year in this department? Yes    Does the patient have an active prescription (recently filled or refills available) for medication(s) requested? yes    Does the patient have [facility] Plus and need 100 day supply (blood pressure, diabetes and cholesterol meds only)? Patient does not have SCP  Requested Prescriptions     Pending Prescriptions Disp Refills    cyclobenzaprine (FLEXERIL) 10 mg Tab [Pharmacy Med Name: CYCLOBENZAPRINE 10 MG TABLET] 90 Tablet 3     Sig: TAKE 1 TABLET BY MOUTH EVERY 8 HOURS AS NEEDED FOR MUSCLE SPASMS

## 2024-01-08 RX ORDER — CYCLOBENZAPRINE HCL 10 MG
10 TABLET ORAL EVERY 8 HOURS PRN
Qty: 90 TABLET | Refills: 3 | Status: SHIPPED | OUTPATIENT
Start: 2024-01-08

## 2024-04-08 ENCOUNTER — OFFICE VISIT (OUTPATIENT)
Dept: MEDICAL GROUP | Facility: MEDICAL CENTER | Age: 69
End: 2024-04-08
Payer: MEDICARE

## 2024-04-08 VITALS
BODY MASS INDEX: 48.69 KG/M2 | OXYGEN SATURATION: 93 % | TEMPERATURE: 97.9 F | HEART RATE: 85 BPM | DIASTOLIC BLOOD PRESSURE: 74 MMHG | HEIGHT: 62 IN | WEIGHT: 264.6 LBS | SYSTOLIC BLOOD PRESSURE: 132 MMHG

## 2024-04-08 DIAGNOSIS — K43.9 ABDOMINAL WALL HERNIA: ICD-10-CM

## 2024-04-08 DIAGNOSIS — M54.41 CHRONIC BILATERAL LOW BACK PAIN WITH RIGHT-SIDED SCIATICA: ICD-10-CM

## 2024-04-08 DIAGNOSIS — M15.9 PRIMARY OSTEOARTHRITIS INVOLVING MULTIPLE JOINTS: ICD-10-CM

## 2024-04-08 DIAGNOSIS — I10 PRIMARY HYPERTENSION: ICD-10-CM

## 2024-04-08 DIAGNOSIS — E53.8 DISORDER OF VITAMIN B12: ICD-10-CM

## 2024-04-08 DIAGNOSIS — G89.29 CHRONIC BILATERAL LOW BACK PAIN WITH RIGHT-SIDED SCIATICA: ICD-10-CM

## 2024-04-08 DIAGNOSIS — E67.3 VITAMIN D INTOXICATION: ICD-10-CM

## 2024-04-08 PROCEDURE — 3075F SYST BP GE 130 - 139MM HG: CPT | Performed by: NURSE PRACTITIONER

## 2024-04-08 PROCEDURE — 99214 OFFICE O/P EST MOD 30 MIN: CPT | Performed by: NURSE PRACTITIONER

## 2024-04-08 PROCEDURE — 3078F DIAST BP <80 MM HG: CPT | Performed by: NURSE PRACTITIONER

## 2024-04-08 RX ORDER — OXYCODONE HYDROCHLORIDE AND ACETAMINOPHEN 5; 325 MG/1; MG/1
1 TABLET ORAL EVERY 8 HOURS PRN
Qty: 90 TABLET | Refills: 0 | Status: SHIPPED | OUTPATIENT
Start: 2024-04-09 | End: 2024-05-09

## 2024-04-08 RX ORDER — OXYCODONE HYDROCHLORIDE AND ACETAMINOPHEN 5; 325 MG/1; MG/1
1 TABLET ORAL EVERY 8 HOURS PRN
Qty: 90 TABLET | Refills: 0 | Status: SHIPPED | OUTPATIENT
Start: 2024-06-08 | End: 2024-07-08

## 2024-04-08 RX ORDER — OXYCODONE HYDROCHLORIDE AND ACETAMINOPHEN 5; 325 MG/1; MG/1
1 TABLET ORAL EVERY 8 HOURS PRN
Qty: 90 TABLET | Refills: 0 | Status: SHIPPED | OUTPATIENT
Start: 2024-05-09 | End: 2024-06-08

## 2024-04-08 ASSESSMENT — FIBROSIS 4 INDEX: FIB4 SCORE: 1.21

## 2024-04-08 NOTE — PROGRESS NOTES
Subjective:     Rain Sanabria is a 69 y.o. female who presents with chronic LBP with rt sciatica.    HPI:     Seen in f/u for chronic LBP wiht rt sciatica.    Problem #1:  She continues to have chronic LBP with rt leg sciatica.  Pain has been worse recently.  She is thinking about going to get another injection.  She is stable on her pain meds. Taking med approp.  No s/e to med.  UDS and contract are up to date.        Problem #2:anxiety  She is having anxiety in the PM before her son gets home.  She would like a  animal.  She is looking for her dog but they have not found one yet.  She needs a letter stating that it is a  animal that is needed for her medical condition.        Problem #3:abd wall hernias  Her hernias are causing her more pain.  She would like to see surgeon for discussion on repair. She has not done her us for the hernias yet.        Patient Active Problem List    Diagnosis Date Noted    Chronic bilateral low back pain with right-sided sciatica 09/08/2023    Chronic narcotic use 09/08/2023    Morbid obesity with BMI of 50.0-59.9, adult (HCC) 03/22/2021    B12 deficiency 02/05/2020    Dental disorder     Alcohol use 01/26/2018    Chronic right shoulder pain 01/06/2016    Hyperlipidemia     Depression with anxiety     Vitamin D deficiency     Abdominal hernia     Sleep apnea 07/12/2010    OA (osteoarthritis) 07/12/2010    HTN (hypertension) 01/20/2010    Genital herpes simplex 06/19/2009    Insomnia 06/19/2009    Migraine 06/19/2009    Abnormal mammogram 06/19/2009    Cystocele 06/19/2009       Current medicines (including changes today)  Current Outpatient Medications   Medication Sig Dispense Refill    [START ON 4/9/2024] oxyCODONE-acetaminophen (PERCOCET) 5-325 MG Tab Take 1 Tablet by mouth every 8 hours as needed for Severe Pain for up to 30 days. 90 Tablet 0    [START ON 5/9/2024] oxyCODONE-acetaminophen (PERCOCET) 5-325 MG Tab Take 1 Tablet by mouth every 8 hours as  needed for Severe Pain (one tab every 8 hr prn for severe pain. may take up to 3 tabs daily) for up to 30 days. 90 Tablet 0    [START ON 6/8/2024] oxyCODONE-acetaminophen (PERCOCET) 5-325 MG Tab Take 1 Tablet by mouth every 8 hours as needed for Severe Pain (one tab every 8 hr prn for severe pain. may take up to 3 tabs daily) for up to 30 days. 90 Tablet 0    cyclobenzaprine (FLEXERIL) 10 mg Tab TAKE 1 TABLET BY MOUTH EVERY 8 HOURS AS NEEDED FOR MUSCLE SPASMS 90 Tablet 3    celecoxib (CELEBREX) 200 MG Cap TAKE 1 CAPSULE BY MOUTH TWICE A  Capsule 0    buPROPion (WELLBUTRIN) 75 MG Tab TAKE 1 TABLET BY MOUTH TWICE A  Tablet 0    DULoxetine (CYMBALTA) 30 MG Cap DR Particles TAKE 1 CAPSULE BY MOUTH EVERY  Capsule 3    montelukast (SINGULAIR) 10 MG Tab TAKE 1 TABLET BY MOUTH EVERY DAY 90 Tablet 3    omeprazole (PRILOSEC) 20 MG delayed-release capsule TAKE 1 CAPSULE BY MOUTH EVERY DAY 90 Capsule 3    losartan (COZAAR) 100 MG Tab TAKE 1 TABLET EVERY DAY 90 Tablet 0    albuterol 108 (90 Base) MCG/ACT Aero Soln inhalation aerosol INHALE 2 PUFFS BY MOUTH EVERY FOUR HOURS AS NEEDED FOR SHORTNESS OF BREATH. DISPENSE A SPACER 6.7 Each 0    furosemide (LASIX) 20 MG Tab Take 1 tablet by mouth every 24 hours as needed. 30 tablet 3    potassium chloride ER (KLOR-CON) 10 MEQ tablet Take 1 tablet by mouth every 24 hours as needed. 30 tablet 3    DULoxetine (CYMBALTA) 60 MG Cap DR Particles delayed-release capsule TAKE 1 CAPSULE EVERY EVENING 90 Cap 0    fluticasone (FLONASE) 50 MCG/ACT nasal spray Spray 2 Sprays in nose every day. 1 Bottle 0    sodium chloride (OCEAN NASAL SPRAY) 0.65 % Solution Spray 2 Sprays in nose every 2 hours as needed for Congestion. 1 Bottle 0    methocarbamol (ROBAXIN) 750 MG Tab Take 2 Tabs by mouth 4 times a day as needed (muscle spasm). 20 Tab 0    ascorbic acid (ASCORBIC ACID) 500 MG Tab Take 3,000 mg by mouth every day.      vitamin E 100 UNIT capsule Take 300 Units by mouth every  "day.      Cholecalciferol (VITAMIN D) 400 UNIT Tab Take 1,600 Units by mouth every day.      B Complex Vitamins (VITAMIN B COMPLEX PO) Take 20 mg by mouth every day.       No current facility-administered medications for this visit.       Allergies   Allergen Reactions    Ace Inhibitors      Ace cough    Tape Rash and Itching     Adhesive tape.  PAPER TAPE OK.     ROS  Constitutional: Negative. Negative for fever, chills, weight loss, malaise/fatigue and diaphoresis.   HENT: Negative. Negative for hearing loss, ear pain, nosebleeds, congestion, sore throat, neck pain, tinnitus and ear discharge.   Respiratory: Negative. Negative for cough, hemoptysis, sputum production, shortness of breath, wheezing and stridor.   Cardiovascular: Negative. Negative for chest pain, palpitations, orthopnea, claudication, leg swelling and PND.   Gastrointestinal: Denies nausea, vomiting, diarrhea, constipation, heartburn, melena or hematochezia.  Genitourinary: Denies dysuria, hematuria, urinary incontinence, frequency or urgency.        Objective:     /74 (BP Location: Right arm, Patient Position: Sitting, BP Cuff Size: Large adult)   Pulse 85   Temp 36.6 °C (97.9 °F) (Temporal)   Ht 1.57 m (5' 1.81\")   Wt 120 kg (264 lb 9.6 oz)   SpO2 93%  Body mass index is 48.69 kg/m².    Physical Exam:  Physical Exam   Vitals reviewed.  Constitutional: oriented to person, place, and time. appears well-developed and well-nourished. No distress.    Neck: No JVD present.  Cardiovascular: Normal rate, regular rhythm, normal heart sounds and intact distal pulses.  Exam reveals no gallop and no friction rub.  No murmur heard.  No carotid bruits.   Pulmonary/Chest: Effort normal and breath sounds normal. No stridor. No respiratory distress. no wheezes or rales. exhibits no tenderness.   Musculoskeletal: amb with cane. exhibits no edema. davin pedal pulses 2+.  Lymphadenopathy: no cervical or supraclavicular adenopathy.   Abd:  No CVAT,  Soft,  " Bs noted in all quadrants.  No HSM.  LLQ abdominal tenderness.  Neurological: alert and oriented to person, place, and time. exhibits normal muscle tone. Coordination normal.   Skin: Skin is warm and dry. no diaphoresis.   Psychiatric: normal mood and affect. behavior is normal.        Assessment and Plan:     The following treatment plan was discussed:    1. Chronic bilateral low back pain with right-sided sciatica  oxyCODONE-acetaminophen (PERCOCET) 5-325 MG Tab    oxyCODONE-acetaminophen (PERCOCET) 5-325 MG Tab    oxyCODONE-acetaminophen (PERCOCET) 5-325 MG Tab    US-ABDOMEN COMPLETE SURVEY    US-INGUINAL HERNIA    refilled percocex 3 mo.  f/u 3 mo for med refill.  UDS & contract are up to date      2. Primary osteoarthritis involving multiple joints  oxyCODONE-acetaminophen (PERCOCET) 5-325 MG Tab    oxyCODONE-acetaminophen (PERCOCET) 5-325 MG Tab    oxyCODONE-acetaminophen (PERCOCET) 5-325 MG Tab    US-ABDOMEN COMPLETE SURVEY    US-INGUINAL HERNIA    refill percocet x 3 mo.  will plan on refill for 3 mo at next f/u with normal UDS      3. Abdominal wall hernia  US-ABDOMEN COMPLETE SURVEY    US-INGUINAL HERNIA    she has 2 poss hernias in LLQ vs inguinal area.  needs us done.  would like surgery referral if us are +.            Followup: Return in about 3 months (around 7/8/2024), or for lab review and pain med refill.

## 2024-04-08 NOTE — LETTER
April 8, 2024        Patient: Rain Sanabria   YOB: 1955   Date of Visit: 4/8/2024           To Whom It May Concern:    It is my medical opinion that Rain Sanabria requires a  animal to live and travel with her due to a medical condition.    If you have any questions or concerns, please don't hesitate to call.        Sincerely,          SUHAIL Marina  Electronically Signed    [facility]  [address] [address] [phone] (Phone)  [phone] (Fax)

## 2024-04-14 DIAGNOSIS — G89.29 CHRONIC BILATERAL LOW BACK PAIN WITH RIGHT-SIDED SCIATICA: ICD-10-CM

## 2024-04-14 DIAGNOSIS — M15.9 PRIMARY OSTEOARTHRITIS INVOLVING MULTIPLE JOINTS: ICD-10-CM

## 2024-04-14 DIAGNOSIS — M54.41 CHRONIC BILATERAL LOW BACK PAIN WITH RIGHT-SIDED SCIATICA: ICD-10-CM

## 2024-04-15 RX ORDER — CELECOXIB 200 MG/1
CAPSULE ORAL
Qty: 200 CAPSULE | Refills: 0 | Status: SHIPPED | OUTPATIENT
Start: 2024-04-15

## 2024-04-15 NOTE — TELEPHONE ENCOUNTER
Received request via: Pharmacy    Was the patient seen in the last year in this department? Yes    Does the patient have an active prescription (recently filled or refills available) for medication(s) requested? No    Pharmacy Name: [facility]/pharmacy #[id number] - [place], [place] - [address]     Does the patient have [facility] Plus and need 100 day supply (blood pressure, diabetes and cholesterol meds only)? Patient does not have SCP      celecoxib (CELEBREX) 200 MG Cap

## 2024-04-29 DIAGNOSIS — F41.8 DEPRESSION WITH ANXIETY: ICD-10-CM

## 2024-05-01 DIAGNOSIS — M54.41 CHRONIC BILATERAL LOW BACK PAIN WITH RIGHT-SIDED SCIATICA: ICD-10-CM

## 2024-05-01 DIAGNOSIS — M15.9 PRIMARY OSTEOARTHRITIS INVOLVING MULTIPLE JOINTS: ICD-10-CM

## 2024-05-01 DIAGNOSIS — G89.29 CHRONIC BILATERAL LOW BACK PAIN WITH RIGHT-SIDED SCIATICA: ICD-10-CM

## 2024-05-01 NOTE — TELEPHONE ENCOUNTER
Received request via: Pharmacy    Was the patient seen in the last year in this department? Yes    Does the patient have an active prescription (recently filled or refills available) for medication(s) requested? No    [facility] Name[facility]     Does the patient have assisted Plus and need 100 day supply (blood pressure, diabetes and cholesterol meds only)? Patient does not have SCP

## 2024-05-01 NOTE — TELEPHONE ENCOUNTER
Received request via: Pharmacy    Was the patient seen in the last year in this department? Yes    Does the patient have an active prescription (recently filled or refills available) for medication(s) requested? No    [facility]    Does the patient have long-term Plus and need 100 day supply (blood pressure, diabetes and cholesterol meds only)? Patient does not have SCP

## 2024-05-03 RX ORDER — CYCLOBENZAPRINE HCL 10 MG
10 TABLET ORAL EVERY 8 HOURS PRN
Qty: 90 TABLET | Refills: 3 | Status: SHIPPED | OUTPATIENT
Start: 2024-05-03

## 2024-05-03 RX ORDER — BUPROPION HYDROCHLORIDE 75 MG/1
TABLET ORAL
Qty: 180 TABLET | Refills: 3 | Status: SHIPPED | OUTPATIENT
Start: 2024-05-03

## 2024-05-13 DIAGNOSIS — I10 PRIMARY HYPERTENSION: ICD-10-CM

## 2024-05-13 RX ORDER — DOXAZOSIN MESYLATE 4 MG/1
6 TABLET ORAL DAILY
Qty: 150 TABLET | Refills: 1 | Status: SHIPPED | OUTPATIENT
Start: 2024-05-13

## 2024-07-15 ENCOUNTER — OFFICE VISIT (OUTPATIENT)
Dept: MEDICAL GROUP | Facility: MEDICAL CENTER | Age: 69
End: 2024-07-15
Payer: MEDICARE

## 2024-07-15 VITALS
HEIGHT: 62 IN | HEART RATE: 101 BPM | TEMPERATURE: 98.3 F | BODY MASS INDEX: 51.58 KG/M2 | OXYGEN SATURATION: 94 % | WEIGHT: 280.3 LBS | SYSTOLIC BLOOD PRESSURE: 146 MMHG | DIASTOLIC BLOOD PRESSURE: 90 MMHG

## 2024-07-15 DIAGNOSIS — R60.0 PEDAL EDEMA: ICD-10-CM

## 2024-07-15 DIAGNOSIS — M54.41 CHRONIC BILATERAL LOW BACK PAIN WITH RIGHT-SIDED SCIATICA: ICD-10-CM

## 2024-07-15 DIAGNOSIS — M15.9 PRIMARY OSTEOARTHRITIS INVOLVING MULTIPLE JOINTS: ICD-10-CM

## 2024-07-15 DIAGNOSIS — I10 ESSENTIAL HYPERTENSION: ICD-10-CM

## 2024-07-15 DIAGNOSIS — G89.29 CHRONIC BILATERAL LOW BACK PAIN WITH RIGHT-SIDED SCIATICA: ICD-10-CM

## 2024-07-15 DIAGNOSIS — F41.8 DEPRESSION WITH ANXIETY: ICD-10-CM

## 2024-07-15 PROCEDURE — 3077F SYST BP >= 140 MM HG: CPT | Performed by: NURSE PRACTITIONER

## 2024-07-15 PROCEDURE — 3080F DIAST BP >= 90 MM HG: CPT | Performed by: NURSE PRACTITIONER

## 2024-07-15 PROCEDURE — 99214 OFFICE O/P EST MOD 30 MIN: CPT | Performed by: NURSE PRACTITIONER

## 2024-07-15 RX ORDER — POTASSIUM CHLORIDE 750 MG/1
10 TABLET, FILM COATED, EXTENDED RELEASE ORAL
Qty: 30 TABLET | Refills: 3 | Status: SHIPPED | OUTPATIENT
Start: 2024-07-15

## 2024-07-15 RX ORDER — OXYCODONE HYDROCHLORIDE AND ACETAMINOPHEN 5; 325 MG/1; MG/1
1 TABLET ORAL EVERY 8 HOURS PRN
Qty: 90 TABLET | Refills: 0 | Status: SHIPPED | OUTPATIENT
Start: 2024-07-15 | End: 2024-08-14

## 2024-07-15 RX ORDER — LOSARTAN POTASSIUM 100 MG/1
100 TABLET ORAL
Qty: 90 TABLET | Refills: 0 | Status: SHIPPED | OUTPATIENT
Start: 2024-07-15

## 2024-07-15 RX ORDER — DULOXETIN HYDROCHLORIDE 60 MG/1
60 CAPSULE, DELAYED RELEASE ORAL EVERY EVENING
Qty: 90 CAPSULE | Refills: 1 | Status: SHIPPED | OUTPATIENT
Start: 2024-07-15

## 2024-07-15 RX ORDER — OXYCODONE HYDROCHLORIDE AND ACETAMINOPHEN 5; 325 MG/1; MG/1
1 TABLET ORAL EVERY 8 HOURS PRN
Qty: 90 TABLET | Refills: 0 | Status: SHIPPED | OUTPATIENT
Start: 2024-08-14 | End: 2024-09-13

## 2024-07-15 RX ORDER — FUROSEMIDE 20 MG/1
20 TABLET ORAL
Qty: 30 TABLET | Refills: 3 | Status: SHIPPED | OUTPATIENT
Start: 2024-07-15

## 2024-07-15 RX ORDER — CYCLOBENZAPRINE HCL 10 MG
10 TABLET ORAL EVERY 6 HOURS PRN
Qty: 120 TABLET | Refills: 5 | Status: SHIPPED | OUTPATIENT
Start: 2024-07-15

## 2024-07-15 RX ORDER — OXYCODONE HYDROCHLORIDE AND ACETAMINOPHEN 5; 325 MG/1; MG/1
1 TABLET ORAL EVERY 8 HOURS PRN
Qty: 90 TABLET | Refills: 0 | Status: SHIPPED | OUTPATIENT
Start: 2024-09-13 | End: 2024-10-13

## 2024-07-15 ASSESSMENT — FIBROSIS 4 INDEX: FIB4 SCORE: 1.21

## 2024-07-19 ENCOUNTER — HOSPITAL ENCOUNTER (OUTPATIENT)
Dept: LAB | Facility: MEDICAL CENTER | Age: 69
End: 2024-07-19
Attending: NURSE PRACTITIONER
Payer: MEDICARE

## 2024-07-19 DIAGNOSIS — I10 PRIMARY HYPERTENSION: ICD-10-CM

## 2024-07-19 DIAGNOSIS — E53.8 DISORDER OF VITAMIN B12: ICD-10-CM

## 2024-07-19 DIAGNOSIS — E67.3 VITAMIN D INTOXICATION: ICD-10-CM

## 2024-07-19 LAB
25(OH)D3 SERPL-MCNC: 54 NG/ML (ref 30–100)
ALBUMIN SERPL BCP-MCNC: 3.8 G/DL (ref 3.2–4.9)
ALBUMIN/GLOB SERPL: 1.3 G/DL
ALP SERPL-CCNC: 134 U/L (ref 30–99)
ALT SERPL-CCNC: 18 U/L (ref 2–50)
ANION GAP SERPL CALC-SCNC: 13 MMOL/L (ref 7–16)
AST SERPL-CCNC: 26 U/L (ref 12–45)
BILIRUB SERPL-MCNC: 0.5 MG/DL (ref 0.1–1.5)
BUN SERPL-MCNC: 13 MG/DL (ref 8–22)
CALCIUM ALBUM COR SERPL-MCNC: 9.7 MG/DL (ref 8.5–10.5)
CALCIUM SERPL-MCNC: 9.5 MG/DL (ref 8.5–10.5)
CHLORIDE SERPL-SCNC: 103 MMOL/L (ref 96–112)
CHOLEST SERPL-MCNC: 159 MG/DL (ref 100–199)
CO2 SERPL-SCNC: 26 MMOL/L (ref 20–33)
CREAT SERPL-MCNC: 0.67 MG/DL (ref 0.5–1.4)
CREAT UR-MCNC: 48.13 MG/DL
FASTING STATUS PATIENT QL REPORTED: NORMAL
GFR SERPLBLD CREATININE-BSD FMLA CKD-EPI: 94 ML/MIN/1.73 M 2
GLOBULIN SER CALC-MCNC: 2.9 G/DL (ref 1.9–3.5)
GLUCOSE SERPL-MCNC: 92 MG/DL (ref 65–99)
HDLC SERPL-MCNC: 69 MG/DL
LDLC SERPL CALC-MCNC: 66 MG/DL
MICROALBUMIN UR-MCNC: <1.2 MG/DL
MICROALBUMIN/CREAT UR: NORMAL MG/G (ref 0–30)
POTASSIUM SERPL-SCNC: 4 MMOL/L (ref 3.6–5.5)
PROT SERPL-MCNC: 6.7 G/DL (ref 6–8.2)
SODIUM SERPL-SCNC: 142 MMOL/L (ref 135–145)
TRIGL SERPL-MCNC: 122 MG/DL (ref 0–149)
VIT B12 SERPL-MCNC: 3278 PG/ML (ref 211–911)

## 2024-07-19 PROCEDURE — 82607 VITAMIN B-12: CPT

## 2024-07-19 PROCEDURE — 80053 COMPREHEN METABOLIC PANEL: CPT

## 2024-07-19 PROCEDURE — 82570 ASSAY OF URINE CREATININE: CPT

## 2024-07-19 PROCEDURE — 82043 UR ALBUMIN QUANTITATIVE: CPT

## 2024-07-19 PROCEDURE — 36415 COLL VENOUS BLD VENIPUNCTURE: CPT

## 2024-07-19 PROCEDURE — 82306 VITAMIN D 25 HYDROXY: CPT

## 2024-07-19 PROCEDURE — 80061 LIPID PANEL: CPT

## 2024-07-26 DIAGNOSIS — G89.29 CHRONIC BILATERAL LOW BACK PAIN WITH RIGHT-SIDED SCIATICA: ICD-10-CM

## 2024-07-26 DIAGNOSIS — M15.9 PRIMARY OSTEOARTHRITIS INVOLVING MULTIPLE JOINTS: ICD-10-CM

## 2024-07-26 DIAGNOSIS — M54.41 CHRONIC BILATERAL LOW BACK PAIN WITH RIGHT-SIDED SCIATICA: ICD-10-CM

## 2024-07-29 RX ORDER — CELECOXIB 200 MG/1
CAPSULE ORAL
Qty: 60 CAPSULE | Refills: 0 | Status: SHIPPED | OUTPATIENT
Start: 2024-07-29

## 2024-08-19 DIAGNOSIS — R05.3 CHRONIC COUGH: ICD-10-CM

## 2024-08-19 DIAGNOSIS — R06.02 SOB (SHORTNESS OF BREATH) ON EXERTION: ICD-10-CM

## 2024-08-19 RX ORDER — MONTELUKAST SODIUM 10 MG/1
10 TABLET ORAL DAILY
Qty: 90 TABLET | Refills: 3 | Status: SHIPPED | OUTPATIENT
Start: 2024-08-19

## 2024-08-19 NOTE — TELEPHONE ENCOUNTER
Received request via: Pharmacy    Was the patient seen in the last year in this department? Yes    Does the patient have an active prescription (recently filled or refills available) for medication(s) requested? No    [facility] Name[facility]    Does the patient have snf Plus and need 100-day supply? (This applies to ALL medications) Patient does not have SCP

## 2024-08-28 DIAGNOSIS — G89.29 CHRONIC BILATERAL LOW BACK PAIN WITH RIGHT-SIDED SCIATICA: ICD-10-CM

## 2024-08-28 DIAGNOSIS — M54.41 CHRONIC BILATERAL LOW BACK PAIN WITH RIGHT-SIDED SCIATICA: ICD-10-CM

## 2024-08-28 DIAGNOSIS — M15.9 PRIMARY OSTEOARTHRITIS INVOLVING MULTIPLE JOINTS: ICD-10-CM

## 2024-08-29 NOTE — TELEPHONE ENCOUNTER
Requested Prescriptions     Pending Prescriptions Disp Refills    celecoxib (CELEBREX) 200 MG Cap [Pharmacy Med Name: CELECOXIB 200 MG CAPSULE] 60 Capsule 0     Sig: TAKE 1 CAPSULE BY MOUTH TWICE A DAY      Last office visit: 7/15/24  Last lab: 7/19/24

## 2024-08-31 RX ORDER — CELECOXIB 200 MG/1
CAPSULE ORAL
Qty: 60 CAPSULE | Refills: 0 | Status: SHIPPED | OUTPATIENT
Start: 2024-08-31

## 2024-09-09 ENCOUNTER — OFFICE VISIT (OUTPATIENT)
Dept: MEDICAL GROUP | Facility: MEDICAL CENTER | Age: 69
End: 2024-09-09
Payer: MEDICARE

## 2024-09-09 VITALS
SYSTOLIC BLOOD PRESSURE: 144 MMHG | WEIGHT: 266.7 LBS | HEIGHT: 62 IN | TEMPERATURE: 97.6 F | BODY MASS INDEX: 49.08 KG/M2 | DIASTOLIC BLOOD PRESSURE: 98 MMHG | HEART RATE: 98 BPM | OXYGEN SATURATION: 94 %

## 2024-09-09 DIAGNOSIS — R09.89 CHEST CRACKLES: ICD-10-CM

## 2024-09-09 DIAGNOSIS — I10 PRIMARY HYPERTENSION: ICD-10-CM

## 2024-09-09 DIAGNOSIS — R79.89 HIGH SERUM VITAMIN B12: ICD-10-CM

## 2024-09-09 DIAGNOSIS — K04.7 DENTAL INFECTION: ICD-10-CM

## 2024-09-09 PROCEDURE — 3077F SYST BP >= 140 MM HG: CPT | Performed by: NURSE PRACTITIONER

## 2024-09-09 PROCEDURE — 99214 OFFICE O/P EST MOD 30 MIN: CPT | Performed by: NURSE PRACTITIONER

## 2024-09-09 PROCEDURE — 3080F DIAST BP >= 90 MM HG: CPT | Performed by: NURSE PRACTITIONER

## 2024-09-09 ASSESSMENT — FIBROSIS 4 INDEX: FIB4 SCORE: 1.51

## 2024-09-09 NOTE — PROGRESS NOTES
Subjective:     History of Present Illness  The patient is a 69-year-old female seen in follow-up.    She visited her dentist this morning due to concerns about a possible gum infection. She has been experiencing dry mouth and an unpleasant taste for the past 3 to 4 weeks. An antibiotic was prescribed for her, but she has not yet started the treatment. Additionally, she reports drooling, gagging, and coughing, which she believes may be side effects of her medication.    Her BP is stable and well controlled.  Her daily medications also include Lasix and losartan. She reports no chest pain, palpitations, or swelling in her feet.    SOCIAL HISTORY  The patient does not smoke.    IMMUNIZATIONS  She had her influenza vaccine on 09/01/2024.      Results  Laboratory Studies reviewed with pt.   GFR, LP, Vitamin D, CMP, alb/cr ratio is wnl  B12 is high at 3278.  She has hx of low B12. She is currently taking vitamin B12 twice daily and vitamin D once daily.    Current medicines (including changes today)  Current Outpatient Medications   Medication Sig Dispense Refill    celecoxib (CELEBREX) 200 MG Cap TAKE 1 CAPSULE BY MOUTH TWICE A DAY 60 Capsule 0    montelukast (SINGULAIR) 10 MG Tab TAKE 1 TABLET BY MOUTH EVERY DAY 90 Tablet 3    cyclobenzaprine (FLEXERIL) 10 mg Tab Take 1 Tablet by mouth every 6 hours as needed for Muscle Spasms. 120 Tablet 5    DULoxetine (CYMBALTA) 60 MG Cap DR Particles delayed-release capsule Take 1 Capsule by mouth every evening. 90 Capsule 1    potassium chloride ER (KLOR-CON) 10 MEQ tablet Take 1 Tablet by mouth every 24 hours as needed (diruetic). 30 Tablet 3    furosemide (LASIX) 20 MG Tab Take 1 Tablet by mouth every 24 hours as needed (swelling). 30 Tablet 3    oxyCODONE-acetaminophen (PERCOCET) 5-325 MG Tab Take 1 Tablet by mouth every 8 hours as needed for Severe Pain (one tab every 8 hr prn for severe pain. may take up to 3 tabs daily) for up to 30 days. 90 Tablet 0    [START ON 9/13/2024]  oxyCODONE-acetaminophen (PERCOCET) 5-325 MG Tab Take 1 Tablet by mouth every 8 hours as needed for Severe Pain for up to 30 days. 90 Tablet 0    losartan (COZAAR) 100 MG Tab Take 1 Tablet by mouth every day. 90 Tablet 0    doxazosin (CARDURA) 4 MG Tab Take 1.5 Tablets by mouth every day. 150 Tablet 1    buPROPion (WELLBUTRIN) 75 MG Tab TAKE 1 TABLET BY MOUTH TWICE A  Tablet 3    DULoxetine (CYMBALTA) 30 MG Cap DR Particles TAKE 1 CAPSULE BY MOUTH EVERY  Capsule 3    omeprazole (PRILOSEC) 20 MG delayed-release capsule TAKE 1 CAPSULE BY MOUTH EVERY DAY 90 Capsule 3    albuterol 108 (90 Base) MCG/ACT Aero Soln inhalation aerosol INHALE 2 PUFFS BY MOUTH EVERY FOUR HOURS AS NEEDED FOR SHORTNESS OF BREATH. DISPENSE A SPACER 6.7 Each 0    fluticasone (FLONASE) 50 MCG/ACT nasal spray Spray 2 Sprays in nose every day. 1 Bottle 0    sodium chloride (OCEAN NASAL SPRAY) 0.65 % Solution Spray 2 Sprays in nose every 2 hours as needed for Congestion. 1 Bottle 0    ascorbic acid (ASCORBIC ACID) 500 MG Tab Take 3,000 mg by mouth every day.      vitamin E 100 UNIT capsule Take 300 Units by mouth every day.      Cholecalciferol (VITAMIN D) 400 UNIT Tab Take 1,600 Units by mouth every day.      B Complex Vitamins (VITAMIN B COMPLEX PO) Take 20 mg by mouth every day.       No current facility-administered medications for this visit.     She  has a past medical history of Abdominal hernia, Abnormal mammogram (6/19/2009), Alcohol use (01/26/2018), B12 deficiency (02/05/2020), Chronic right shoulder pain, Cystocele (6/19/2009), Dental disorder, Depression with anxiety (01/26/2018), Genital herpes (6/19/2009), Hyperlipidemia, Hypertension (01/26/2018), Insomnia (6/19/2009), Migraine (06/19/2009), Morbid obesity with BMI of 45.0-49.9, adult (HCC) (2/22/2017), OA (osteoarthritis), Sleep apnea, and Vitamin D deficiency.    Microalbumin/creatinine Ratio:  Lab Results   Component Value Date/Time    URCREAT 48.13 07/19/2024 0752     MICROALBUR <1.2 07/19/2024 0751    MALBCRT see below 07/19/2024 0751       Lipid Panel:  Lab Results   Component Value Date/Time    CHOLSTRLTOT 159 07/19/2024 0752    TRIGLYCERIDE 122 07/19/2024 0752    LDL 66 07/19/2024 0752         Complete Metabolic Panel:  Lab Results   Component Value Date/Time    SODIUM 142 07/19/2024 07:52 AM    POTASSIUM 4.0 07/19/2024 07:52 AM    CHLORIDE 103 07/19/2024 07:52 AM    CO2 26 07/19/2024 07:52 AM    ANION 13.0 07/19/2024 07:52 AM    GLUCOSE 92 07/19/2024 07:52 AM    BUN 13 07/19/2024 07:52 AM    CREATININE 0.67 07/19/2024 07:52 AM    CREATININE 0.8 10/15/2008 07:25 AM    ASTSGOT 26 07/19/2024 07:52 AM    ALTSGPT 18 07/19/2024 07:52 AM    TBILIRUBIN 0.5 07/19/2024 07:52 AM    ALBUMIN 3.8 07/19/2024 07:52 AM    TOTPROTEIN 6.7 07/19/2024 07:52 AM    GLOBULIN 2.9 07/19/2024 07:52 AM    AGRATIO 1.3 07/19/2024 07:52 AM         Vitamin D:    Lab Results   Component Value Date/Time    25HYDROXY 54 07/19/2024 0752       GFR:    Lab Results   Component Value Date/Time    GFRCKD 94 07/19/2024 0752           ROS   Review of Systems   Constitutional: Negative.  Negative for fever, chills, weight loss, malaise/fatigue and diaphoresis.   HENT: Negative.  Negative for hearing loss, ear pain, nosebleeds, congestion, sore throat, neck pain, tinnitus and ear discharge.    Respiratory: Negative.  Negative for cough, hemoptysis, sputum production, shortness of breath, wheezing and stridor.    Cardiovascular: Negative.  Negative for chest pain, palpitations, orthopnea, claudication, leg swelling and PND.   Gastrointestinal: denies nausea, vomiting, diarrhea, constipation, heartburn, melena or hematochezia.  Genitourinary: Denies dysuria, hematuria, urinary incontinence, frequency or urgency.    Musculoskeletal: Negative.  Negative for myalgias.   Neurological: Negative.  Negative for dizziness, tingling, tremors, weakness and headaches.   Psych:  Denies depression, anxiety or insomnia.  All other  "systems reviewed and are negative.         Objective:     BP (!) 144/98 (BP Location: Right arm, Patient Position: Sitting, BP Cuff Size: Large adult)   Pulse 98   Temp 36.4 °C (97.6 °F) (Temporal)   Ht 1.57 m (5' 1.81\")   Wt 121 kg (266 lb 11.2 oz)   SpO2 94%  Body mass index is 49.08 kg/m².   Physical Exam  Few crackles noted in lungs.  Heart murmur present.  Physical Exam   Vitals reviewed.  Constitutional: oriented to person, place, and time. appears well-developed and well-nourished. No distress.   Neck: No JVD present.  Cardiovascular: Normal rate, regular rhythm, normal heart sounds and intact distal pulses.  Exam reveals no gallop and no friction rub.  3/6 systolic murmur heard.  No carotid bruits.   Pulmonary/Chest: Effort normal. No stridor. No respiratory distress. no wheezes. exhibits no tenderness. Bibasilar crackles.  Musculoskeletal: Normal range of motion. exhibits no edema. davin pedal pulses 2+.  Lymphadenopathy: no cervical or supraclavicular adenopathy.   Neurological: alert and oriented to person, place, and time. exhibits normal muscle tone. Coordination normal.   Skin: Skin is warm and dry. no diaphoresis.   Psychiatric: normal mood and affect. behavior is normal.       Assessment and Plan:   The following treatment plan was discussed      Assessment & Plan  1. Hypertension.  Her blood pressure readings were elevated today. Blood pressure will be monitored closely to prevent any potential renal damage. No changes to her current medication regimen were made during this visit.    2. Gingival Infection.  She reports symptoms of dry mouth and bad taste, which she attributes to a possible gum infection. An antibiotic has been prescribed by her dentist to address this issue.    3. Elevated Vitamin B12 Levels.  Her vitamin B12 levels are slightly above the desired range. She is advised to discontinue vitamin B12 supplementation for one month and then resume at a dose of 1000 mcg per day.    4. " Bibasilar Crackles.  Bibasilar crackles were noted during the physical examination. A chest x-ray has been ordered to further investigate this finding.    5. Medication Management.  She requested a printed list of her medications to take to her dentist. This list was provided during the visit.     6. Health Maintenance.  She received a flu shot on September 1, 2024, and is encouraged to get the new COVID-19 vaccine.   A bone density scan will be scheduled for next year. .    Follow-up  A follow-up visit is scheduled in 1 month.      ORDERS:  1. Chest crackles  DX-CHEST-2 VIEWS      2. Primary hypertension        3. Dental infection        4. High serum vitamin B12                    Please note that this dictation was created using voice recognition software. I have made every reasonable attempt to correct obvious errors, but I expect that there are errors of grammar and possibly content that I did not discover before finalizing the note.      Attestation      Verbal consent was acquired by the patient to use [employer] ambient listening note generation during this visit Yes

## 2024-09-12 DIAGNOSIS — M15.9 PRIMARY OSTEOARTHRITIS INVOLVING MULTIPLE JOINTS: ICD-10-CM

## 2024-09-12 DIAGNOSIS — G89.29 CHRONIC BILATERAL LOW BACK PAIN WITH RIGHT-SIDED SCIATICA: ICD-10-CM

## 2024-09-12 DIAGNOSIS — M54.41 CHRONIC BILATERAL LOW BACK PAIN WITH RIGHT-SIDED SCIATICA: ICD-10-CM

## 2024-09-13 NOTE — TELEPHONE ENCOUNTER
Received request via: Pharmacy    Was the patient seen in the last year in this department? Yes    Does the patient have an active prescription (recently filled or refills available) for medication(s) requested? No    Pharmacy Name:     [facility]/pharmacy #[id number] - [place], [place] - [address] (Pharmacy) [phone]       Does the patient have correction Plus and need 100-day supply? (This applies to ALL medications) Patient does not have SCP

## 2024-09-15 RX ORDER — CELECOXIB 200 MG/1
CAPSULE ORAL
Qty: 60 CAPSULE | Refills: 0 | Status: SHIPPED | OUTPATIENT
Start: 2024-09-15

## 2024-10-03 DIAGNOSIS — M54.41 CHRONIC BILATERAL LOW BACK PAIN WITH RIGHT-SIDED SCIATICA: ICD-10-CM

## 2024-10-03 DIAGNOSIS — M15.0 PRIMARY OSTEOARTHRITIS INVOLVING MULTIPLE JOINTS: ICD-10-CM

## 2024-10-03 DIAGNOSIS — G89.29 CHRONIC BILATERAL LOW BACK PAIN WITH RIGHT-SIDED SCIATICA: ICD-10-CM

## 2024-10-09 RX ORDER — CYCLOBENZAPRINE HCL 10 MG
10 TABLET ORAL EVERY 8 HOURS PRN
Qty: 90 TABLET | Refills: 3 | Status: SHIPPED | OUTPATIENT
Start: 2024-10-09

## 2024-10-14 ENCOUNTER — OFFICE VISIT (OUTPATIENT)
Dept: MEDICAL GROUP | Facility: MEDICAL CENTER | Age: 69
End: 2024-10-14
Payer: MEDICARE

## 2024-10-14 VITALS
HEIGHT: 62 IN | TEMPERATURE: 98 F | SYSTOLIC BLOOD PRESSURE: 146 MMHG | HEART RATE: 103 BPM | DIASTOLIC BLOOD PRESSURE: 90 MMHG | WEIGHT: 269.3 LBS | BODY MASS INDEX: 49.56 KG/M2 | OXYGEN SATURATION: 90 %

## 2024-10-14 DIAGNOSIS — J30.1 CHRONIC ALLERGIC RHINITIS DUE TO POLLEN: ICD-10-CM

## 2024-10-14 DIAGNOSIS — E53.8 B12 DEFICIENCY: ICD-10-CM

## 2024-10-14 DIAGNOSIS — R60.0 PEDAL EDEMA: ICD-10-CM

## 2024-10-14 DIAGNOSIS — M15.0 PRIMARY OSTEOARTHRITIS INVOLVING MULTIPLE JOINTS: ICD-10-CM

## 2024-10-14 DIAGNOSIS — M54.41 CHRONIC BILATERAL LOW BACK PAIN WITH RIGHT-SIDED SCIATICA: ICD-10-CM

## 2024-10-14 DIAGNOSIS — G89.29 CHRONIC BILATERAL LOW BACK PAIN WITH RIGHT-SIDED SCIATICA: ICD-10-CM

## 2024-10-14 PROCEDURE — 99214 OFFICE O/P EST MOD 30 MIN: CPT | Performed by: NURSE PRACTITIONER

## 2024-10-14 PROCEDURE — 3077F SYST BP >= 140 MM HG: CPT | Performed by: NURSE PRACTITIONER

## 2024-10-14 PROCEDURE — 3080F DIAST BP >= 90 MM HG: CPT | Performed by: NURSE PRACTITIONER

## 2024-10-14 RX ORDER — FUROSEMIDE 20 MG/1
20 TABLET ORAL
Qty: 90 TABLET | Refills: 1 | Status: SHIPPED | OUTPATIENT
Start: 2024-10-14

## 2024-10-14 RX ORDER — POTASSIUM CHLORIDE 750 MG/1
10 TABLET, EXTENDED RELEASE ORAL
Qty: 90 TABLET | Refills: 1 | Status: SHIPPED | OUTPATIENT
Start: 2024-10-14

## 2024-10-14 RX ORDER — OXYCODONE AND ACETAMINOPHEN 5; 325 MG/1; MG/1
1 TABLET ORAL EVERY 8 HOURS PRN
Qty: 90 TABLET | Refills: 0 | Status: SHIPPED | OUTPATIENT
Start: 2024-12-13 | End: 2025-01-12

## 2024-10-14 RX ORDER — OXYCODONE AND ACETAMINOPHEN 5; 325 MG/1; MG/1
1 TABLET ORAL EVERY 8 HOURS PRN
Qty: 90 TABLET | Refills: 0 | Status: SHIPPED | OUTPATIENT
Start: 2024-11-13 | End: 2024-12-13

## 2024-10-14 RX ORDER — OXYCODONE AND ACETAMINOPHEN 5; 325 MG/1; MG/1
1 TABLET ORAL EVERY 8 HOURS PRN
Qty: 90 TABLET | Refills: 0 | Status: SHIPPED | OUTPATIENT
Start: 2024-10-14 | End: 2024-11-13

## 2024-10-14 ASSESSMENT — FIBROSIS 4 INDEX: FIB4 SCORE: 1.51

## 2024-11-11 DIAGNOSIS — K21.00 GASTROESOPHAGEAL REFLUX DISEASE WITH ESOPHAGITIS WITHOUT HEMORRHAGE: ICD-10-CM

## 2024-11-11 NOTE — TELEPHONE ENCOUNTER
Received request via: Patient    Was the patient seen in the last year in this department? Yes    Does the patient have an active prescription (recently filled or refills available) for medication(s) requested? No    Pharmacy Name: [facility]/pharmacy #[id number] - [place], [place] - [address]    Does the patient have FCI Plus and need 100-day supply? (This applies to ALL medications) Patient does not have SCP

## 2024-12-20 DIAGNOSIS — I10 PRIMARY HYPERTENSION: ICD-10-CM

## 2024-12-20 NOTE — TELEPHONE ENCOUNTER
Received request via: Patient    Was the patient seen in the last year in this department? Yes    Does the patient have an active prescription (recently filled or refills available) for medication(s) requested? No    Pharmacy Name: [facility]/pharmacy #[id number] - [place], [place] - [address]     Does the patient have FPC Plus and need 100-day supply? (This applies to ALL medications) Patient does not have SCP

## 2024-12-22 RX ORDER — DOXAZOSIN 4 MG/1
6 TABLET ORAL DAILY
Qty: 150 TABLET | Refills: 1 | Status: SHIPPED | OUTPATIENT
Start: 2024-12-22

## 2025-01-02 DIAGNOSIS — M15.0 PRIMARY OSTEOARTHRITIS INVOLVING MULTIPLE JOINTS: ICD-10-CM

## 2025-01-02 DIAGNOSIS — M54.41 CHRONIC BILATERAL LOW BACK PAIN WITH RIGHT-SIDED SCIATICA: ICD-10-CM

## 2025-01-02 DIAGNOSIS — G89.29 CHRONIC BILATERAL LOW BACK PAIN WITH RIGHT-SIDED SCIATICA: ICD-10-CM

## 2025-01-02 RX ORDER — CELECOXIB 200 MG/1
CAPSULE ORAL
Qty: 60 CAPSULE | Refills: 0 | Status: SHIPPED | OUTPATIENT
Start: 2025-01-02 | End: 2025-01-31

## 2025-01-02 NOTE — TELEPHONE ENCOUNTER
Received request via: Pharmacy    Was the patient seen in the last year in this department? Yes    Does the patient have an active prescription (recently filled or refills available) for medication(s) requested? No    Pharmacy Name:   : [facility]/pharmacy #[id number] - [place], [place] - [address]          Does the patient have half-way Plus and need 100-day supply? (This applies to ALL medications) Patient does not have SCP

## 2025-01-13 ENCOUNTER — APPOINTMENT (OUTPATIENT)
Dept: MEDICAL GROUP | Facility: MEDICAL CENTER | Age: 70
End: 2025-01-13
Payer: MEDICARE

## 2025-01-20 ENCOUNTER — OFFICE VISIT (OUTPATIENT)
Dept: MEDICAL GROUP | Facility: MEDICAL CENTER | Age: 70
End: 2025-01-20
Payer: MEDICARE

## 2025-01-20 ENCOUNTER — HOSPITAL ENCOUNTER (OUTPATIENT)
Facility: MEDICAL CENTER | Age: 70
End: 2025-01-20
Attending: NURSE PRACTITIONER
Payer: MEDICARE

## 2025-01-20 VITALS
HEIGHT: 62 IN | TEMPERATURE: 97 F | WEIGHT: 263 LBS | HEART RATE: 105 BPM | OXYGEN SATURATION: 95 % | DIASTOLIC BLOOD PRESSURE: 100 MMHG | SYSTOLIC BLOOD PRESSURE: 150 MMHG | BODY MASS INDEX: 48.4 KG/M2

## 2025-01-20 DIAGNOSIS — F41.8 DEPRESSION WITH ANXIETY: ICD-10-CM

## 2025-01-20 DIAGNOSIS — G89.29 CHRONIC BILATERAL LOW BACK PAIN WITH RIGHT-SIDED SCIATICA: ICD-10-CM

## 2025-01-20 DIAGNOSIS — I10 ESSENTIAL HYPERTENSION: ICD-10-CM

## 2025-01-20 DIAGNOSIS — M54.41 CHRONIC BILATERAL LOW BACK PAIN WITH RIGHT-SIDED SCIATICA: ICD-10-CM

## 2025-01-20 DIAGNOSIS — J45.20 MILD INTERMITTENT ASTHMA WITHOUT COMPLICATION: ICD-10-CM

## 2025-01-20 DIAGNOSIS — M15.0 PRIMARY OSTEOARTHRITIS INVOLVING MULTIPLE JOINTS: ICD-10-CM

## 2025-01-20 DIAGNOSIS — N95.9 POST MENOPAUSAL PROBLEMS: ICD-10-CM

## 2025-01-20 DIAGNOSIS — B02.9 HERPES ZOSTER WITHOUT COMPLICATION: ICD-10-CM

## 2025-01-20 LAB
FORWARD REASON: SPWHY: NORMAL
FORWARDED TO LAB: SPWHR: NORMAL
SPECIMEN SENT (2ND): SPWT2: NORMAL
SPECIMEN SENT (3RD): SPWT3: NORMAL
SPECIMEN SENT (4TH): SPWT4: NORMAL
SPECIMEN SENT: SPWT1: NORMAL

## 2025-01-20 PROCEDURE — 99214 OFFICE O/P EST MOD 30 MIN: CPT | Performed by: NURSE PRACTITIONER

## 2025-01-20 PROCEDURE — 3077F SYST BP >= 140 MM HG: CPT | Performed by: NURSE PRACTITIONER

## 2025-01-20 PROCEDURE — 3080F DIAST BP >= 90 MM HG: CPT | Performed by: NURSE PRACTITIONER

## 2025-01-20 RX ORDER — ALBUTEROL SULFATE 90 UG/1
INHALANT RESPIRATORY (INHALATION)
Qty: 6.7 EACH | Refills: 0 | Status: SHIPPED | OUTPATIENT
Start: 2025-01-20

## 2025-01-20 RX ORDER — GABAPENTIN 100 MG/1
100 CAPSULE ORAL
Qty: 60 CAPSULE | Refills: 3 | Status: SHIPPED | OUTPATIENT
Start: 2025-01-20

## 2025-01-20 RX ORDER — OXYCODONE AND ACETAMINOPHEN 5; 325 MG/1; MG/1
1 TABLET ORAL EVERY 8 HOURS PRN
Qty: 90 TABLET | Refills: 0 | Status: SHIPPED | OUTPATIENT
Start: 2025-02-19 | End: 2025-03-21

## 2025-01-20 RX ORDER — OXYCODONE AND ACETAMINOPHEN 5; 325 MG/1; MG/1
1 TABLET ORAL EVERY 8 HOURS PRN
Qty: 90 TABLET | Refills: 0 | Status: SHIPPED | OUTPATIENT
Start: 2025-03-21 | End: 2025-04-20

## 2025-01-20 RX ORDER — BUPROPION HYDROCHLORIDE 75 MG/1
75 TABLET ORAL 2 TIMES DAILY
Qty: 180 TABLET | Refills: 3 | Status: SHIPPED | OUTPATIENT
Start: 2025-01-20

## 2025-01-20 RX ORDER — OXYCODONE AND ACETAMINOPHEN 5; 325 MG/1; MG/1
1 TABLET ORAL EVERY 8 HOURS PRN
Qty: 90 TABLET | Refills: 0 | Status: SHIPPED | OUTPATIENT
Start: 2025-01-20 | End: 2025-02-19

## 2025-01-20 RX ORDER — LOSARTAN POTASSIUM 100 MG/1
100 TABLET ORAL
Qty: 90 TABLET | Refills: 0 | Status: SHIPPED | OUTPATIENT
Start: 2025-01-20

## 2025-01-20 ASSESSMENT — FIBROSIS 4 INDEX: FIB4 SCORE: 1.51

## 2025-01-20 NOTE — PROGRESS NOTES
Subjective:     History of Present Illness  The patient is a 69-year-old female seen in follow-up for chronic pain.    She has been experiencing elevated blood pressure readings, with systolic values ranging from 204 to 254 during a recent emergency care visit. She attributes this hypertension to the severe pain associated with her shingles outbreak. She has been without her losartan medication for approximately one month.    She is currently on a regimen of gabapentin 100 mg for her shingles, which she reports as being effective. She is in the third week of this treatment and anticipates further improvement. Despite having received both doses of the shingles vaccine in 2019, she experienced a recurrence of the condition. She is curious about the possibility of receiving another shingles vaccine and whether the rash could reappear in the same location.    She is not currently taking bupropion but expresses a desire to resume it due to ongoing anxiety and depression.   She feels that her depression and anxiety are poorly controlled.      She is seeking a refill of her rescue inhaler.  She uses only occas for her asthma.  Doesn't use frequently    She is on oxycodone with Tylenol 1 tablet every 8 hours for chronic back pain and arthritis in multiple joints.  Needs updated UDS AND controlled substance contract.  Taking med approp w/o s/e    MEDICATIONS  Celebrex, doxazosin, Prilosec, Lasix, potassium, Flexeril, Singulair, duloxetine, fluticasone nasal spray, Ocean nasal spray, vitamin C, vitamin E, vitamin D, B complex, zinc, OxyContin, gabapentin, valacyclovir, losartan, bupropion.    IMMUNIZATIONS  She has had both shingles vaccines in 2019.    Results  none      Current medicines (including changes today)  Current Outpatient Medications   Medication Sig Dispense Refill    losartan (COZAAR) 100 MG Tab Take 1 Tablet by mouth every day. 90 Tablet 0    buPROPion (WELLBUTRIN) 75 MG Tab Take 1 Tablet by mouth 2 times a  day. 180 Tablet 3    albuterol 108 (90 Base) MCG/ACT Aero Soln inhalation aerosol INHALE 2 PUFFS BY MOUTH EVERY FOUR HOURS AS NEEDED FOR SHORTNESS OF BREATH. DISPENSE A SPACER 6.7 Each 0    gabapentin (NEURONTIN) 100 MG Cap Take 1 Capsule by mouth 2 times a day. 60 Capsule 3    oxyCODONE-acetaminophen (PERCOCET) 5-325 MG Tab Take 1 Tablet by mouth every 8 hours as needed for Severe Pain for up to 30 days. 90 Tablet 0    [START ON 2/19/2025] oxyCODONE-acetaminophen (PERCOCET) 5-325 MG Tab Take 1 Tablet by mouth every 8 hours as needed for Severe Pain for up to 30 days. 90 Tablet 0    [START ON 3/21/2025] oxyCODONE-acetaminophen (PERCOCET) 5-325 MG Tab Take 1 Tablet by mouth every 8 hours as needed for Severe Pain (one tab every 8 hr prn for severe pain. may take up to 3 tabs daily) for up to 30 days. 90 Tablet 0    celecoxib (CELEBREX) 200 MG Cap TAKE 1 CAPSULE BY MOUTH TWICE A DAY 60 Capsule 0    doxazosin (CARDURA) 4 MG Tab TAKE 1 AND 1/2 TABLETS DAILY BY MOUTH 150 Tablet 1    omeprazole (PRILOSEC) 20 MG delayed-release capsule TAKE 1 CAPSULE BY MOUTH EVERY DAY 90 Capsule 3    furosemide (LASIX) 20 MG Tab Take 1 Tablet by mouth every 24 hours as needed (swelling). 90 Tablet 1    potassium chloride ER (KLOR-CON) 10 MEQ tablet Take 1 Tablet by mouth every 24 hours as needed (diruetic). 90 Tablet 1    cyclobenzaprine (FLEXERIL) 10 mg Tab TAKE 1 TABLET BY MOUTH EVERY 8 HOURS AS NEEDED FOR MUSCLE SPASM 90 Tablet 3    montelukast (SINGULAIR) 10 MG Tab TAKE 1 TABLET BY MOUTH EVERY DAY 90 Tablet 3    DULoxetine (CYMBALTA) 60 MG Cap DR Particles delayed-release capsule Take 1 Capsule by mouth every evening. 90 Capsule 1    DULoxetine (CYMBALTA) 30 MG Cap DR Particles TAKE 1 CAPSULE BY MOUTH EVERY  Capsule 3    fluticasone (FLONASE) 50 MCG/ACT nasal spray Spray 2 Sprays in nose every day. 1 Bottle 0    sodium chloride (OCEAN NASAL SPRAY) 0.65 % Solution Spray 2 Sprays in nose every 2 hours as needed for Congestion. 1  Bottle 0    ascorbic acid (ASCORBIC ACID) 500 MG Tab Take 3,000 mg by mouth every day.      vitamin E 100 UNIT capsule Take 300 Units by mouth every day.      Cholecalciferol (VITAMIN D) 400 UNIT Tab Take 1,600 Units by mouth every day.      B Complex Vitamins (VITAMIN B COMPLEX PO) Take 20 mg by mouth every day.       No current facility-administered medications for this visit.     Current Outpatient Medications   Medication Sig Dispense Refill    losartan (COZAAR) 100 MG Tab Take 1 Tablet by mouth every day. 90 Tablet 0    buPROPion (WELLBUTRIN) 75 MG Tab Take 1 Tablet by mouth 2 times a day. 180 Tablet 3    albuterol 108 (90 Base) MCG/ACT Aero Soln inhalation aerosol INHALE 2 PUFFS BY MOUTH EVERY FOUR HOURS AS NEEDED FOR SHORTNESS OF BREATH. DISPENSE A SPACER 6.7 Each 0    gabapentin (NEURONTIN) 100 MG Cap Take 1 Capsule by mouth 2 times a day. 60 Capsule 3    oxyCODONE-acetaminophen (PERCOCET) 5-325 MG Tab Take 1 Tablet by mouth every 8 hours as needed for Severe Pain for up to 30 days. 90 Tablet 0    [START ON 2/19/2025] oxyCODONE-acetaminophen (PERCOCET) 5-325 MG Tab Take 1 Tablet by mouth every 8 hours as needed for Severe Pain for up to 30 days. 90 Tablet 0    [START ON 3/21/2025] oxyCODONE-acetaminophen (PERCOCET) 5-325 MG Tab Take 1 Tablet by mouth every 8 hours as needed for Severe Pain (one tab every 8 hr prn for severe pain. may take up to 3 tabs daily) for up to 30 days. 90 Tablet 0    celecoxib (CELEBREX) 200 MG Cap TAKE 1 CAPSULE BY MOUTH TWICE A DAY 60 Capsule 0    doxazosin (CARDURA) 4 MG Tab TAKE 1 AND 1/2 TABLETS DAILY BY MOUTH 150 Tablet 1    omeprazole (PRILOSEC) 20 MG delayed-release capsule TAKE 1 CAPSULE BY MOUTH EVERY DAY 90 Capsule 3    furosemide (LASIX) 20 MG Tab Take 1 Tablet by mouth every 24 hours as needed (swelling). 90 Tablet 1    potassium chloride ER (KLOR-CON) 10 MEQ tablet Take 1 Tablet by mouth every 24 hours as needed (diruetic). 90 Tablet 1    cyclobenzaprine (FLEXERIL) 10  mg Tab TAKE 1 TABLET BY MOUTH EVERY 8 HOURS AS NEEDED FOR MUSCLE SPASM 90 Tablet 3    montelukast (SINGULAIR) 10 MG Tab TAKE 1 TABLET BY MOUTH EVERY DAY 90 Tablet 3    DULoxetine (CYMBALTA) 60 MG Cap DR Particles delayed-release capsule Take 1 Capsule by mouth every evening. 90 Capsule 1    DULoxetine (CYMBALTA) 30 MG Cap DR Particles TAKE 1 CAPSULE BY MOUTH EVERY  Capsule 3    fluticasone (FLONASE) 50 MCG/ACT nasal spray Spray 2 Sprays in nose every day. 1 Bottle 0    sodium chloride (OCEAN NASAL SPRAY) 0.65 % Solution Spray 2 Sprays in nose every 2 hours as needed for Congestion. 1 Bottle 0    ascorbic acid (ASCORBIC ACID) 500 MG Tab Take 3,000 mg by mouth every day.      vitamin E 100 UNIT capsule Take 300 Units by mouth every day.      Cholecalciferol (VITAMIN D) 400 UNIT Tab Take 1,600 Units by mouth every day.      B Complex Vitamins (VITAMIN B COMPLEX PO) Take 20 mg by mouth every day.       No current facility-administered medications for this visit.       She  has a past medical history of Abdominal hernia, Abnormal mammogram (6/19/2009), Alcohol use (01/26/2018), B12 deficiency (02/05/2020), Chronic right shoulder pain, Cystocele (6/19/2009), Dental disorder, Depression with anxiety (01/26/2018), Genital herpes (6/19/2009), Hyperlipidemia, Hypertension (01/26/2018), Insomnia (6/19/2009), Migraine (06/19/2009), Morbid obesity with BMI of 45.0-49.9, adult (HCC) (2/22/2017), OA (osteoarthritis), Sleep apnea, and Vitamin D deficiency.      ROS   Review of Systems   Constitutional: Negative.  Negative for fever, chills, weight loss, malaise/fatigue and diaphoresis.   HENT: Negative.  Negative for hearing loss, ear pain, nosebleeds, congestion, sore throat, neck pain, tinnitus and ear discharge.    Respiratory: Negative.  Negative for cough, hemoptysis, sputum production, shortness of breath, wheezing and stridor.    Cardiovascular: Negative.  Negative for chest pain, palpitations, orthopnea,  "claudication, leg swelling and PND.   Gastrointestinal: denies nausea, vomiting, diarrhea, constipation, heartburn, melena or hematochezia.  Genitourinary: Denies dysuria, hematuria, urinary incontinence, frequency or urgency.    Neurological: Negative.  Negative for dizzinesstremors, weakness and headaches.   Psych:  Denies depression, anxiety or insomnia.  All other systems reviewed and are negative.       Objective:     BP (!) 150/100   Pulse (!) 105   Temp 36.1 °C (97 °F) (Temporal)   Ht 1.57 m (5' 1.81\")   Wt 119 kg (263 lb)   SpO2 95%  Body mass index is 48.4 kg/m².   Physical Exam  Lungs were auscultated.    Vital Signs  Blood pressure is elevated.  Physical Exam   Vitals reviewed.  Constitutional: oriented to person, place, and time. appears well-developed and well-nourished. No distress.   Neck: No JVD present.  Cardiovascular: Normal rate, regular rhythm, normal heart sounds and intact distal pulses.  Exam reveals no gallop and no friction rub.  No murmur heard.  No carotid bruits.   Pulmonary/Chest: Effort normal and breath sounds normal. No stridor. No respiratory distress. no wheezes or rales. exhibits no tenderness.   Musculoskeletal: Normal range of motion.   Lymphadenopathy: no cervical or supraclavicular adenopathy.   Neurological: alert and oriented to person, place, and time. exhibits normal muscle tone.amb slowly d/t wt and back pain   Skin: Skin is warm and dry. no diaphoresis.   Psychiatric: normal mood and affect. behavior is normal.       Assessment and Plan:   The following treatment plan was discussed    Assessment & Plan  1. Hypertension.  Her blood pressure is significantly elevated, likely due to the discontinuation of losartan. Losartan 100 mg will be reintroduced into her treatment regimen. A prescription for a 90-day supply has been sent to [facility]. She will return in 1 month to assess the effectiveness of the reintroduced medication.    2. Shingles.  She is currently on " gabapentin 100 mg for nerve pain associated with shingles. The dosage will be increased to 100 mg twice daily, with a prescription for 60 tablets and refills provided. She will continue with valacyclovir until the course is completed.    3. Anxiety and depression.  Bupropion 75 mg daily will be reintroduced into her treatment regimen. A prescription for a year's supply has been sent to [facility].    4. Chronic back pain and arthralgias.  She is on oxycodone with Tylenol 1 tablet every 8 hours for 90 tablets a month. Her last fill was on 12/15/2024 and she is due for a refill today. A urine drug screen and controlled substance agreement have been updated today    5. Health maintenance.  A DEXA scan has been ordered, as she is due for this screening in March. Lab work has been scheduled for her next visit.    6.asthma and allergies  Refills for her rescue inhaler sent to pharmacy.  She uses only occas for her asthma.  She also uses fluticasone nasal spray, Ocean nasal spray, vitamin C, vitamin E, vitamin D, B complex, and zinc.    Follow-up  The patient will follow up in 1 month for a blood pressure check and in 3 months for lab review and medication refill.      ORDERS:  1. Essential hypertension    - losartan (COZAAR) 100 MG Tab; Take 1 Tablet by mouth every day.  Dispense: 90 Tablet; Refill: 0    2. Depression with anxiety    - buPROPion (WELLBUTRIN) 75 MG Tab; Take 1 Tablet by mouth 2 times a day.  Dispense: 180 Tablet; Refill: 3    3. Mild intermittent asthma without complication    - albuterol 108 (90 Base) MCG/ACT Aero Soln inhalation aerosol; INHALE 2 PUFFS BY MOUTH EVERY FOUR HOURS AS NEEDED FOR SHORTNESS OF BREATH. DISPENSE A SPACER  Dispense: 6.7 Each; Refill: 0    4. Herpes zoster without complication    - gabapentin (NEURONTIN) 100 MG Cap; Take 1 Capsule by mouth 2 times a day.  Dispense: 60 Capsule; Refill: 3  - URINE DRUG SCREEN; Future  - Controlled Substance Treatment Agreement  -  oxyCODONE-acetaminophen (PERCOCET) 5-325 MG Tab; Take 1 Tablet by mouth every 8 hours as needed for Severe Pain for up to 30 days.  Dispense: 90 Tablet; Refill: 0  - oxyCODONE-acetaminophen (PERCOCET) 5-325 MG Tab; Take 1 Tablet by mouth every 8 hours as needed for Severe Pain for up to 30 days.  Dispense: 90 Tablet; Refill: 0  - oxyCODONE-acetaminophen (PERCOCET) 5-325 MG Tab; Take 1 Tablet by mouth every 8 hours as needed for Severe Pain (one tab every 8 hr prn for severe pain. may take up to 3 tabs daily) for up to 30 days.  Dispense: 90 Tablet; Refill: 0    5. Chronic bilateral low back pain with right-sided sciatica    - URINE DRUG SCREEN; Future  - Controlled Substance Treatment Agreement  - oxyCODONE-acetaminophen (PERCOCET) 5-325 MG Tab; Take 1 Tablet by mouth every 8 hours as needed for Severe Pain for up to 30 days.  Dispense: 90 Tablet; Refill: 0  - oxyCODONE-acetaminophen (PERCOCET) 5-325 MG Tab; Take 1 Tablet by mouth every 8 hours as needed for Severe Pain for up to 30 days.  Dispense: 90 Tablet; Refill: 0  - oxyCODONE-acetaminophen (PERCOCET) 5-325 MG Tab; Take 1 Tablet by mouth every 8 hours as needed for Severe Pain (one tab every 8 hr prn for severe pain. may take up to 3 tabs daily) for up to 30 days.  Dispense: 90 Tablet; Refill: 0    6. Primary osteoarthritis involving multiple joints    - URINE DRUG SCREEN; Future  - Controlled Substance Treatment Agreement  - oxyCODONE-acetaminophen (PERCOCET) 5-325 MG Tab; Take 1 Tablet by mouth every 8 hours as needed for Severe Pain for up to 30 days.  Dispense: 90 Tablet; Refill: 0  - oxyCODONE-acetaminophen (PERCOCET) 5-325 MG Tab; Take 1 Tablet by mouth every 8 hours as needed for Severe Pain for up to 30 days.  Dispense: 90 Tablet; Refill: 0  - oxyCODONE-acetaminophen (PERCOCET) 5-325 MG Tab; Take 1 Tablet by mouth every 8 hours as needed for Severe Pain (one tab every 8 hr prn for severe pain. may take up to 3 tabs daily) for up to 30 days.  Dispense:  90 Tablet; Refill: 0    7. Post menopausal problems    - DS-BONE DENSITY STUDY (DEXA); Future        Please note that this dictation was created using voice recognition software. I have made every reasonable attempt to correct obvious errors, but I expect that there are errors of grammar and possibly content that I did not discover before finalizing the note.      Attestation      Verbal consent was acquired by the patient to use [employer] ambient listening note generation during this visit Yes

## 2025-01-24 LAB
AMBIG TEST ORDER  977174: NORMAL
COMMENT  998281: NORMAL
DEAR DOCTOR  998282: NORMAL

## 2025-01-29 ENCOUNTER — TELEPHONE (OUTPATIENT)
Dept: MEDICAL GROUP | Facility: MEDICAL CENTER | Age: 70
End: 2025-01-29
Payer: MEDICARE

## 2025-01-29 NOTE — TELEPHONE ENCOUNTER
VOICEMAIL  1. Caller Name: bita junior  Call Back Number: [phone]    2. Message: pt said she had a drug test 1/20/25 and she is getting messages from the company that the test to be done wasn't  specified     3. Patient approves office to leave a detailed voicemail/MyChart message: yes

## 2025-01-31 DIAGNOSIS — G89.29 CHRONIC BILATERAL LOW BACK PAIN WITH RIGHT-SIDED SCIATICA: ICD-10-CM

## 2025-01-31 DIAGNOSIS — M15.0 PRIMARY OSTEOARTHRITIS INVOLVING MULTIPLE JOINTS: ICD-10-CM

## 2025-01-31 DIAGNOSIS — M54.41 CHRONIC BILATERAL LOW BACK PAIN WITH RIGHT-SIDED SCIATICA: ICD-10-CM

## 2025-01-31 RX ORDER — CELECOXIB 200 MG/1
CAPSULE ORAL
Qty: 180 CAPSULE | Refills: 3 | Status: SHIPPED | OUTPATIENT
Start: 2025-01-31

## 2025-01-31 NOTE — TELEPHONE ENCOUNTER
Received request via: Pharmacy    Was the patient seen in the last year in this department? Yes    Does the patient have an active prescription (recently filled or refills available) for medication(s) requested? No    Pharmacy Name:  [facility]/pharmacy #[id number] - [place], [place] - [address]     Does the patient have [facility] Plus and need 100-day supply? (This applies to ALL medications) Patient does not have SCP

## 2025-02-15 ENCOUNTER — APPOINTMENT (OUTPATIENT)
Dept: URGENT CARE | Facility: PHYSICIAN GROUP | Age: 70
End: 2025-02-15
Payer: MEDICARE

## 2025-02-17 DIAGNOSIS — J45.20 MILD INTERMITTENT ASTHMA WITHOUT COMPLICATION: ICD-10-CM

## 2025-02-17 RX ORDER — ALBUTEROL SULFATE 90 UG/1
INHALANT RESPIRATORY (INHALATION)
Qty: 6.7 EACH | Refills: 0 | Status: SHIPPED | OUTPATIENT
Start: 2025-02-17

## 2025-03-11 DIAGNOSIS — F41.8 DEPRESSION WITH ANXIETY: ICD-10-CM

## 2025-03-11 RX ORDER — DULOXETIN HYDROCHLORIDE 60 MG/1
60 CAPSULE, DELAYED RELEASE ORAL EVERY EVENING
Qty: 90 CAPSULE | Refills: 1 | Status: SHIPPED | OUTPATIENT
Start: 2025-03-11

## 2025-03-15 DIAGNOSIS — G89.29 CHRONIC BILATERAL LOW BACK PAIN WITH RIGHT-SIDED SCIATICA: ICD-10-CM

## 2025-03-15 DIAGNOSIS — M54.41 CHRONIC BILATERAL LOW BACK PAIN WITH RIGHT-SIDED SCIATICA: ICD-10-CM

## 2025-03-15 DIAGNOSIS — M15.0 PRIMARY OSTEOARTHRITIS INVOLVING MULTIPLE JOINTS: ICD-10-CM

## 2025-03-15 RX ORDER — CYCLOBENZAPRINE HCL 10 MG
TABLET ORAL
Qty: 120 TABLET | Refills: 0 | Status: SHIPPED | OUTPATIENT
Start: 2025-03-15

## 2025-04-14 ENCOUNTER — APPOINTMENT (OUTPATIENT)
Dept: MEDICAL GROUP | Facility: MEDICAL CENTER | Age: 70
End: 2025-04-14

## 2025-04-14 DIAGNOSIS — E67.3 VITAMIN D INTOXICATION: ICD-10-CM

## 2025-04-14 DIAGNOSIS — I10 ESSENTIAL HYPERTENSION: ICD-10-CM

## 2025-04-14 DIAGNOSIS — E53.8 B12 DEFICIENCY: ICD-10-CM

## 2025-05-11 DIAGNOSIS — R60.0 PEDAL EDEMA: ICD-10-CM

## 2025-05-11 RX ORDER — FUROSEMIDE 20 MG/1
20 TABLET ORAL
Qty: 30 TABLET | Refills: 0 | Status: SHIPPED | OUTPATIENT
Start: 2025-05-11

## (undated) DEVICE — SENSOR SPO2 NEO LNCS ADHESIVE (20/BX) SEE USER NOTES

## (undated) DEVICE — SODIUM CHL IRRIGATION 0.9% 1000ML (12EA/CA)

## (undated) DEVICE — TIP INTPLS HFLO ML ORFC BTRY - (12/CS)  FOR SURGILAV

## (undated) DEVICE — GLOVE BIOGEL SZ 7.5 SURGICAL PF LTX - (50PR/BX 4BX/CA)

## (undated) DEVICE — GLOVE BIOGEL SZ 8 SURGICAL PF LTX - (50PR/BX 4BX/CA)

## (undated) DEVICE — GLOVE, LITE (PAIR)

## (undated) DEVICE — HUMID-VENT HEAT AND MOISTURE EXCHANGE- (50/BX)

## (undated) DEVICE — GLOVE BIOGEL PI ORTHO SZ 8.5 PF LF (40/BX)

## (undated) DEVICE — GLOVE BIOGEL PI ORTHO SZ 7.5 PF LF (40PR/BX)

## (undated) DEVICE — TUBE CONNECTING SUCTION - CLEAR PLASTIC STERILE 72 IN (50EA/CA)

## (undated) DEVICE — SUTURE GENERAL

## (undated) DEVICE — KIT ROOM DECONTAMINATION

## (undated) DEVICE — SUTURE 2-0 MONOCRYL PLUS UNDYED CT-1 1 X 36 (36EA/BX)"

## (undated) DEVICE — WATER IRRIGATION STERILE 1000ML (12EA/CA)

## (undated) DEVICE — MIXER BONE CEMENT REVOLUTION - W/FEMORAL PRESSURIZER (6/CA)

## (undated) DEVICE — NEEDLE SPINAL NON-SAFETY 18 GA X 3 IN (25EA/BX)

## (undated) DEVICE — LACTATED RINGERS INJ 1000 ML - (14EA/CA 60CA/PF)

## (undated) DEVICE — STOCKINETTE IMPERVIOUS 12X48 - STERILELF (10/CA)"

## (undated) DEVICE — GLOVE BIOGEL SZ 7 SURGICAL PF LTX - (50PR/BX 4BX/CA)

## (undated) DEVICE — BANDAGE ROLL STERILE BULKEE 4.5 IN X 4 YD (100EA/CA)

## (undated) DEVICE — TRAY SKIN SCRUB PVP WET (20EA/CA) PART #DYND70356 DISCONTINUED

## (undated) DEVICE — BLOCK

## (undated) DEVICE — BLADE RECIPROCATING 12.7 X 78.7 X 1.0MM (1/EA)

## (undated) DEVICE — SUCTION INSTRUMENT YANKAUER BULBOUS TIP W/O VENT (50EA/CA)

## (undated) DEVICE — BAG, SPONGE COUNT 50600

## (undated) DEVICE — DRESSING 3X8 ADAPTIC GAUZE - NON-ADHERING (36/PK 6PK/BX)

## (undated) DEVICE — ELECTRODE 850 FOAM ADHESIVE - HYDROGEL RADIOTRNSPRNT (50/PK)

## (undated) DEVICE — CANISTER SUCTION RIGID RED 1500CC (40EA/CA)

## (undated) DEVICE — KIT ANESTHESIA W/CIRCUIT & 3/LT BAG W/FILTER (20EA/CA)

## (undated) DEVICE — HANDPIECE 10FT INTPLS SCT PLS IRRIGATION HAND CONTROL SET (6/PK)

## (undated) DEVICE — NEPTUNE 4 PORT MANIFOLD - (20/PK)

## (undated) DEVICE — CEMENT ORTHOPEDIC HV US  (10/PK): Type: IMPLANTABLE DEVICE | Status: NON-FUNCTIONAL

## (undated) DEVICE — TOURNIQUET CUFF 34 X 4 ONE PORT DISP - STERILE (10/BX)

## (undated) DEVICE — MASK ANESTHESIA ADULT  - (100/CA)

## (undated) DEVICE — PROTECTOR ULNA NERVE - (36PR/CA)

## (undated) DEVICE — SYRINGE DISP. 60 CC LL - (30/BX, 12BX/CA)**WHEN THESE ARE GONE ORDER #500206**

## (undated) DEVICE — HEAD HOLDER JUNIOR/ADULT

## (undated) DEVICE — BLADE SAGITTAL 6 SYSTEM 25MM

## (undated) DEVICE — GOWN WARMING STANDARD FLEX - (30/CA)

## (undated) DEVICE — GOWN SURGEONS X-LARGE - DISP. (30/CA)

## (undated) DEVICE — MASK, LARYNGEAL AIRWAY #4

## (undated) DEVICE — DRESSING AQUACEL AG ADVANTAGE 3.5 X 10" (10EA/BX)"

## (undated) DEVICE — GLOVE BIOGEL PI ORTHO SZ 7 PF LF (40PR/BX)

## (undated) DEVICE — ELECTRODE DUAL RETURN W/ CORD - (50/PK)

## (undated) DEVICE — BANDAGE ELASTIC STERILE VELCRO 6 X 5 YDS (25EA/CA)

## (undated) DEVICE — SODIUM CHL. IRRIGATION 0.9% 3000ML (4EA/CA 65CA/PF)

## (undated) DEVICE — Device